# Patient Record
Sex: FEMALE | Race: WHITE | Employment: OTHER | URBAN - METROPOLITAN AREA
[De-identification: names, ages, dates, MRNs, and addresses within clinical notes are randomized per-mention and may not be internally consistent; named-entity substitution may affect disease eponyms.]

---

## 2017-03-30 DIAGNOSIS — I10 ESSENTIAL HYPERTENSION WITH GOAL BLOOD PRESSURE LESS THAN 130/80: ICD-10-CM

## 2017-03-30 DIAGNOSIS — I10 HYPERTENSION GOAL BP (BLOOD PRESSURE) < 130/80: ICD-10-CM

## 2017-03-30 NOTE — TELEPHONE ENCOUNTER
metoprolol (LOPRESSOR) 25 MG tablet   Last Written Prescription Date: 3/29/16  Last Fill Quantity: 180, # refills: 3    Last Office Visit with [facility], [facility] or [facility] prescribing provider:  12/28/16   Future Office Visit:        BP Readings from Last 3 Encounters:   12/28/16 176/68 08/17/16 166/64   07/28/16 112/72      lisinopril (PRINIVIL,ZESTRIL) 20 MG tablet     Last Written Prescription Date: 5/24/16  Last Fill Quantity: 180, # refills: 3  Last Office Visit with [facility], [facility] or [facility] prescribing provider: 12/28/16       Potassium   Date Value Ref Range Status   02/25/2016 4.1 3.4 - 5.3 mmol/L Final     Creatinine   Date Value Ref Range Status   02/25/2016 1.20 (H) 0.52 - 1.04 mg/dL Final     BP Readings from Last 3 Encounters:   12/28/16 176/68 08/17/16 166/64 07/28/16 112/72      hydrALAZINE (APRESOLINE) 50 MG tablet     Last Written Prescription Date: 4/15/16  Last Fill Quantity: 270, # refills: 3  Last Office Visit with [facility], [facility] or [facility] prescribing provider: 12/28/16       Potassium   Date Value Ref Range Status   02/25/2016 4.1 3.4 - 5.3 mmol/L Final     Creatinine   Date Value Ref Range Status   02/25/2016 1.20 (H) 0.52 - 1.04 mg/dL Final     BP Readings from Last 3 Encounters:   12/28/16 176/68 08/17/16 166/64 07/28/16 112/72

## 2017-04-04 RX ORDER — LISINOPRIL 20 MG/1
TABLET ORAL
Qty: 180 TABLET | Refills: 0 | Status: SHIPPED | OUTPATIENT
Start: 2017-04-04 | End: 2017-05-05

## 2017-04-04 RX ORDER — METOPROLOL TARTRATE 25 MG/1
TABLET, FILM COATED ORAL
Qty: 180 TABLET | Refills: 0 | Status: SHIPPED | OUTPATIENT
Start: 2017-04-04 | End: 2017-05-05

## 2017-04-04 RX ORDER — HYDRALAZINE HYDROCHLORIDE 50 MG/1
TABLET, FILM COATED ORAL
Qty: 270 TABLET | Refills: 0 | Status: SHIPPED | OUTPATIENT
Start: 2017-04-04 | End: 2017-05-05

## 2017-04-04 NOTE — TELEPHONE ENCOUNTER
Routing refill request to provider for review/approval because:  Labs out of range:  /68,  Cr 1.20    Sherry Gallegos RN

## 2017-05-05 ENCOUNTER — OFFICE VISIT (OUTPATIENT)
Dept: INTERNAL MEDICINE | Facility: CLINIC | Age: 82
End: 2017-05-05
Payer: COMMERCIAL

## 2017-05-05 VITALS
TEMPERATURE: 97.6 F | BODY MASS INDEX: 21.68 KG/M2 | SYSTOLIC BLOOD PRESSURE: 132 MMHG | HEIGHT: 64 IN | HEART RATE: 50 BPM | WEIGHT: 127 LBS | RESPIRATION RATE: 12 BRPM | OXYGEN SATURATION: 99 % | DIASTOLIC BLOOD PRESSURE: 64 MMHG

## 2017-05-05 DIAGNOSIS — I10 HYPERTENSION GOAL BP (BLOOD PRESSURE) < 130/80: ICD-10-CM

## 2017-05-05 DIAGNOSIS — R60.9 EDEMA, UNSPECIFIED TYPE: Primary | ICD-10-CM

## 2017-05-05 DIAGNOSIS — J44.9 CHRONIC OBSTRUCTIVE PULMONARY DISEASE, UNSPECIFIED COPD TYPE (H): ICD-10-CM

## 2017-05-05 DIAGNOSIS — I10 ESSENTIAL HYPERTENSION WITH GOAL BLOOD PRESSURE LESS THAN 130/80: ICD-10-CM

## 2017-05-05 DIAGNOSIS — R06.02 SOB (SHORTNESS OF BREATH): ICD-10-CM

## 2017-05-05 DIAGNOSIS — K52.9 CHRONIC DIARRHEA: ICD-10-CM

## 2017-05-05 DIAGNOSIS — L60.0 INGROWN NAIL: ICD-10-CM

## 2017-05-05 LAB
ALBUMIN SERPL-MCNC: 3.7 G/DL (ref 3.4–5)
ALP SERPL-CCNC: 72 U/L (ref 40–150)
ALT SERPL W P-5'-P-CCNC: 21 U/L (ref 0–50)
ANION GAP SERPL CALCULATED.3IONS-SCNC: 7 MMOL/L (ref 3–14)
AST SERPL W P-5'-P-CCNC: 24 U/L (ref 0–45)
BILIRUB SERPL-MCNC: 0.4 MG/DL (ref 0.2–1.3)
BUN SERPL-MCNC: 36 MG/DL (ref 7–30)
CALCIUM SERPL-MCNC: 9.3 MG/DL (ref 8.5–10.1)
CHLORIDE SERPL-SCNC: 102 MMOL/L (ref 94–109)
CO2 SERPL-SCNC: 29 MMOL/L (ref 20–32)
CREAT SERPL-MCNC: 1.32 MG/DL (ref 0.52–1.04)
ERYTHROCYTE [DISTWIDTH] IN BLOOD BY AUTOMATED COUNT: 13.2 % (ref 10–15)
GFR SERPL CREATININE-BSD FRML MDRD: 38 ML/MIN/1.7M2
GLUCOSE SERPL-MCNC: 93 MG/DL (ref 70–99)
HCT VFR BLD AUTO: 40.6 % (ref 35–47)
HGB BLD-MCNC: 12.9 G/DL (ref 11.7–15.7)
MCH RBC QN AUTO: 30.1 PG (ref 26.5–33)
MCHC RBC AUTO-ENTMCNC: 31.8 G/DL (ref 31.5–36.5)
MCV RBC AUTO: 95 FL (ref 78–100)
NT-PROBNP SERPL-MCNC: 1937 PG/ML (ref 0–450)
PLATELET # BLD AUTO: 194 10E9/L (ref 150–450)
POTASSIUM SERPL-SCNC: 4.6 MMOL/L (ref 3.4–5.3)
PROT SERPL-MCNC: 7.2 G/DL (ref 6.8–8.8)
RBC # BLD AUTO: 4.28 10E12/L (ref 3.8–5.2)
SODIUM SERPL-SCNC: 138 MMOL/L (ref 133–144)
WBC # BLD AUTO: 7.8 10E9/L (ref 4–11)

## 2017-05-05 PROCEDURE — 83880 ASSAY OF NATRIURETIC PEPTIDE: CPT | Performed by: INTERNAL MEDICINE

## 2017-05-05 PROCEDURE — 99214 OFFICE O/P EST MOD 30 MIN: CPT | Performed by: INTERNAL MEDICINE

## 2017-05-05 PROCEDURE — 80053 COMPREHEN METABOLIC PANEL: CPT | Performed by: INTERNAL MEDICINE

## 2017-05-05 PROCEDURE — 36415 COLL VENOUS BLD VENIPUNCTURE: CPT | Performed by: INTERNAL MEDICINE

## 2017-05-05 PROCEDURE — 85027 COMPLETE CBC AUTOMATED: CPT | Performed by: INTERNAL MEDICINE

## 2017-05-05 RX ORDER — HYDRALAZINE HYDROCHLORIDE 50 MG/1
50 TABLET, FILM COATED ORAL 3 TIMES DAILY
Qty: 270 TABLET | Refills: 3 | Status: SHIPPED | OUTPATIENT
Start: 2017-05-05 | End: 2018-03-02

## 2017-05-05 RX ORDER — TIOTROPIUM BROMIDE 18 UG/1
CAPSULE ORAL; RESPIRATORY (INHALATION)
Qty: 90 CAPSULE | Refills: 3 | Status: SHIPPED | OUTPATIENT
Start: 2017-05-05 | End: 2018-03-02

## 2017-05-05 RX ORDER — LOPERAMIDE HYDROCHLORIDE 2 MG/1
TABLET ORAL
Qty: 20 TABLET | Refills: 2 | Status: SHIPPED | OUTPATIENT
Start: 2017-05-05 | End: 2021-09-21

## 2017-05-05 RX ORDER — LISINOPRIL 20 MG/1
20 TABLET ORAL 2 TIMES DAILY
Qty: 180 TABLET | Refills: 3 | Status: SHIPPED | OUTPATIENT
Start: 2017-05-05 | End: 2018-03-02

## 2017-05-05 RX ORDER — METOPROLOL TARTRATE 25 MG/1
25 TABLET, FILM COATED ORAL 2 TIMES DAILY
Qty: 180 TABLET | Refills: 3 | Status: SHIPPED | OUTPATIENT
Start: 2017-05-05 | End: 2018-03-02

## 2017-05-05 RX ORDER — CEPHALEXIN 500 MG/1
500 CAPSULE ORAL 2 TIMES DAILY
Qty: 20 CAPSULE | Refills: 0 | Status: SHIPPED | OUTPATIENT
Start: 2017-05-05 | End: 2017-06-13

## 2017-05-05 ASSESSMENT — PAIN SCALES - GENERAL: PAINLEVEL: NO PAIN (0)

## 2017-05-05 NOTE — NURSING NOTE
"Chief Complaint   Patient presents with     Leg Pain     bilateral leg pain pain and numbness     Toe Pain     right great toe pain       Initial /64  Pulse 50  Temp 97.6  F (36.4  C) (Temporal)  Resp 12  Ht 5' 3.75\" (1.619 m)  Wt 127 lb (57.6 kg)  SpO2 99%  BMI 21.97 kg/m2 Estimated body mass index is 21.97 kg/(m^2) as calculated from the following:    Height as of this encounter: 5' 3.75\" (1.619 m).    Weight as of this encounter: 127 lb (57.6 kg).  Medication Reconciliation: complete    "

## 2017-05-05 NOTE — PROGRESS NOTES
SUBJECTIVE:                                                    Shiloh Covarrubias is a 87 year old female who presents to clinic today for the following health issues:      Chief Complaint   Patient presents with     Leg Pain     bilateral leg pain pain and numbness     Toe Pain     right great toe pain       Right big toe, no injury, sore to touch, hard to wear some shoes.      Legs are swollen woody and painful, numb at times. Weight is up 10 #.      Doesn't wear compression stockings with the sore toe,     Some sob with walking, harder to walk.      No chest pains.      Needs more imodium for chronic diarrhea, had this before.    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     Unspecified asthma(493.90)      Unspecified intestinal obstruction (H) 02/17/10    D/C 02/20/10     Current Outpatient Prescriptions   Medication     tiotropium (SPIRIVA HANDIHALER) 18 MCG capsule     metoprolol (LOPRESSOR) 25 MG tablet     lisinopril (PRINIVIL/ZESTRIL) 20 MG tablet     hydrALAZINE (APRESOLINE) 50 MG tablet     cephALEXin (KEFLEX) 500 MG capsule     loperamide (IMODIUM A-D) 2 MG tablet     EQ LORATADINE 10 MG tablet     furosemide (LASIX) 20 MG tablet     amLODIPine (NORVASC) 10 MG tablet     Multiple Vitamins-Minerals (MULTI VITAMIN/MINERALS PO)     Calcium Polycarbophil (FIBERCON PO)     ASPIRIN 81 MG OR TABS     [DISCONTINUED] metoprolol (LOPRESSOR) 25 MG tablet     [DISCONTINUED] hydrALAZINE (APRESOLINE) 50 MG tablet     [DISCONTINUED] lisinopril (PRINIVIL/ZESTRIL) 20 MG tablet     [DISCONTINUED] SPIRIVA HANDIHALER 18 MCG inhalation capsule     [DISCONTINUED] cloNIDine (CATAPRES) 0.2 MG tablet     No current facility-administered medications for this visit.      Social History   Substance Use Topics     Smoking status: Never Smoker     Smokeless tobacco: Never Used     Alcohol use 0.0 oz/week     0 Standard drinks or  "equivalent per week      Comment: 3 per  year     Review of Systems  Constitutional-Weight gain. Basically gain of 10 pounds  Cardiac-No chest pain or palpitations and Exertional SOB.  Respiratory-No cough, sob, or hemoptysis.  GI-No nausea, vomitting, diarrhea, constipation, or blood in the stool.  Musculoskeletal-toe pain and leg numbness and pain.    Physical Exam  /64  Pulse 50  Temp 97.6  F (36.4  C) (Temporal)  Resp 12  Ht 5' 3.75\" (1.619 m)  Wt 127 lb (57.6 kg)  SpO2 99%  BMI 21.97 kg/m2  General Appearance-healthy, alert, no distress  Cardiac-regular rate and rhythm  with normal S1, S2 ; no murmur, rub or gallops  Lungs-clear to auscultation  Extremities-2-3+ pitting edema in both legs up to the knees, woody hard feeling  Skin-right big toe has some mild erythema in the skin, with an ingrown nail    ASSESSMENT:  Ingrown toenail-I will treat her with Keflex but refer to podiatry for possible nail removal. It is growing down into the skin on both sides.    Fluid retention, edema, weight gain. Patient did go from 5-10 of amlodipine which can cause peripheral edema. We will cut this back again from 10-5 mg, however she needs treatment for her blood pressure which is finally good today. We'll also increase her Lasix to 20 mg daily and she is only taking it once a week. We will check her liver, kidneys, BNP today with shortness of breath and swelling. Hopefully with elevation, salt avoidance, compression stockings, and daily Lasix we get her weight down 8-10 pounds and improve her swelling which should improve her shortness of breath energy level    Electronically signed by Rajesh Vidal MD    "

## 2017-05-05 NOTE — MR AVS SNAPSHOT
"              After Visit Summary   2017    Shiloh Covarrubias    MRN: [id number]           Patient Information     Date Of Birth          11/3/1929        Visit Information        Provider Department      2017 9:45 AM Rajesh Vidal MD [facility]        Today's Diagnoses     Chronic obstructive pulmonary disease, unspecified COPD type (H)        Hypertension goal BP (blood pressure) < 130/80        Essential hypertension with goal blood pressure less than 130/80           Follow-ups after your visit        Who to contact     If you have questions or need follow up information about today's clinic visit or your schedule please contact [facility] directly at [phone].  Normal or non-critical lab and imaging results will be communicated to you by MyChart, letter or phone within 4 business days after the clinic has received the results. If you do not hear from us within 7 days, please contact the clinic through MyChart or phone. If you have a critical or abnormal lab result, we will notify you by phone as soon as possible.  Submit refill requests through [employer] or call your pharmacy and they will forward the refill request to us. Please allow 3 business days for your refill to be completed.          Additional Information About Your Visit        MyChart Information     [employer] lets you send messages to your doctor, view your test results, renew your prescriptions, schedule appointments and more. To sign up, go to [email or web address] . Click on \"Log in\" on the left side of the screen, which will take you to the Welcome page. Then click on \"Sign up Now\" on the right side of the page.     You will be asked to enter the access code listed below, as well as some personal information. Please follow the directions to create your username and password.     Your access code is: [email or web address]  Expires: 8/3/2017  9:50 AM     Your access code will  in 90 days. If you need help " "or a new code, please call your [facility]) or [phone].        Care EveryWhere ID     This is your Care EveryWhere ID. This could be used by other organizations to access your [facility] medical records  [id number]        Your Vitals Were     Pulse Temperature Respirations Height Pulse Oximetry BMI (Body Mass Index)    50 97.6  F (36.4  C) (Temporal) 12 5' 3.75\" (1.619 m) 99% 21.97 kg/m2       Blood Pressure from Last 3 Encounters:   05/05/17 132/64   12/28/16 176/68   08/17/16 166/64    Weight from Last 3 Encounters:   05/05/17 127 lb (57.6 kg)   12/28/16 124 lb (56.2 kg)   08/17/16 119 lb (54 kg)              Today, you had the following     No orders found for display       Primary Care Provider Office Phone # Fax #    Rajesh Vidal [phone]       [facility] [address] [place] [address]        Thank you!     Thank you for choosing [facility]  for your care. Our goal is always to provide you with excellent care. Hearing back from our patients is one way we can continue to improve our services. Please take a few minutes to complete the written survey that you may receive in the mail after your visit with us. Thank you!             Your Updated Medication List - Protect others around you: Learn how to safely use, store and throw away your medicines at www.disposemymeds.org.          This list is accurate as of: 5/5/17  9:51 AM.  Always use your most recent med list.                   Brand Name Dispense Instructions for use    amLODIPine 10 MG tablet    NORVASC    90 tablet    Take 1 tablet (10 mg) by mouth daily       aspirin 81 MG tablet      1 TABLET DAILY       EQ LORATADINE 10 MG tablet   Generic drug:  loratadine     90 tablet    TAKE ONE TABLET BY MOUTH ONCE DAILY       FIBERCON PO      Take  by mouth. 1/2 tablet w/ evening meal       furosemide 20 MG tablet    LASIX    180 tablet    Take 2 tablets (40 mg) by mouth daily       hydrALAZINE 50 " MG tablet    APRESOLINE    270 tablet    TAKE ONE TABLET BY MOUTH THREE TIMES DAILY       IMODIUM A-D 2 MG tablet   Generic drug:  loperamide     20 Tab    1/2 tab before meals       lisinopril 20 MG tablet    PRINIVIL/ZESTRIL    180 tablet    TAKE ONE TABLET BY MOUTH TWICE DAILY       metoprolol 25 MG tablet    LOPRESSOR    180 tablet    TAKE ONE TABLET BY MOUTH TWICE DAILY       MULTI VITAMIN/MINERALS PO      Take 1 tablet by mouth daily.       SPIRIVA HANDIHALER 18 MCG capsule   Generic drug:  tiotropium     90 capsule    INHALE ONE DOSE INTO LUNGS ONCE DAILY

## 2017-05-06 ASSESSMENT — PATIENT HEALTH QUESTIONNAIRE - PHQ9: SUM OF ALL RESPONSES TO PHQ QUESTIONS 1-9: 1

## 2017-05-11 ENCOUNTER — OFFICE VISIT (OUTPATIENT)
Dept: PODIATRY | Facility: CLINIC | Age: 82
End: 2017-05-11
Payer: COMMERCIAL

## 2017-05-11 VITALS — HEIGHT: 64 IN | TEMPERATURE: 97.3 F | WEIGHT: 127 LBS | BODY MASS INDEX: 21.68 KG/M2

## 2017-05-11 DIAGNOSIS — I73.9 PERIPHERAL VASCULAR DISEASE (H): ICD-10-CM

## 2017-05-11 DIAGNOSIS — L60.0 INGROWING NAIL: Primary | ICD-10-CM

## 2017-05-11 PROCEDURE — 99213 OFFICE O/P EST LOW 20 MIN: CPT | Performed by: PODIATRIST

## 2017-05-11 PROCEDURE — 11720 DEBRIDE NAIL 1-5: CPT | Performed by: PODIATRIST

## 2017-05-11 ASSESSMENT — PAIN SCALES - GENERAL: PAINLEVEL: NO PAIN (0)

## 2017-05-11 NOTE — NURSING NOTE
"Chief Complaint   Patient presents with     Consult     Ingrown B/L borders B/L great toenails > Right, onset Dec 2016; new patient       Initial Temp 97.3  F (36.3  C) (Temporal)  Ht 5' 3.75\" (1.619 m)  Wt 127 lb (57.6 kg)  BMI 21.97 kg/m2 Estimated body mass index is 21.97 kg/(m^2) as calculated from the following:    Height as of this encounter: 5' 3.75\" (1.619 m).    Weight as of this encounter: 127 lb (57.6 kg).  BP completed using cuff size: NA (Not Taken)  Medication Reconciliation: complete    Fiordaliza Malloy CMA, May 11, 2017    "

## 2017-05-11 NOTE — LETTER
5/11/2017       RE: Shiloh Covarrubias  [address] [address]           Dear Colleague,    Thank you for referring your patient, Shiloh Coavrrubias, to the [facility]. Please see a copy of my visit note below.    HPI:  Shiloh Covarrubias is a 87 year old female who is seen in consultation at the request of Rajesh Vidal MD.    Pt presents for eval of:   (Onset, Location, L/R, Character, Treatments, Injury if yes)     Onset Dec 2016, Ingrown and curving inward medial/lateral Left and Right great toenail > lateral Right  Sharp, dull ache, stabbing, redness, intermittent drainage, painful w/pressure even if sheets sit on toe  Soaking, tries to keep trimmed or filed, started 10 days Keflex 500mg 2 times daily by Dr. Young MD    Retired.    Weight management plan Patient was referred to their PCP to discuss a diet and exercise plan.       Review of Systems:  Patient denies fever, chills, rash, wound, stiffness, limping, numbness, weakness, heart burn, blood in stool, chest pain with activity, calf pain when walking, shortness of breath with activity, chronic cough, easy bleeding/bruising, swelling of ankles, excessive thirst, fatigue, depression, anxiety.  Patient admits only to symptoms noted in history.     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     Unspecified asthma(493.90)      Unspecified intestinal obstruction (H) 02/17/10    D/C 02/20/10     PAST SURGICAL HISTORY:   Past Surgical History:   Procedure Laterality Date     ESOPHAGOSCOPY, GASTROSCOPY, DUODENOSCOPY (EGD), COMBINED  2/8/2011    COMBINED ESOPHAGOSCOPY, GASTROSCOPY, DUODENOSCOPY (EGD), BIOPSY SINGLE OR MULTIPLE performed by ANGY LIMA at  GI     ESOPHAGOSCOPY, GASTROSCOPY, DUODENOSCOPY (EGD), DILATATION, COMBINED  2/8/2011    COMBINED ESOPHAGOSCOPY, GASTROSCOPY, DUODENOSCOPY (EGD),  DILATATION performed by ANGY LIMA at  GI      UGI ENDOSCOPY, SIMPLE EXAM  02/23/10     MEDICATIONS:   Current Outpatient Prescriptions:      tiotropium (SPIRIVA HANDIHALER) 18 MCG capsule, INHALE ONE DOSE INTO LUNGS ONCE DAILY, Disp: 90 capsule, Rfl: 3     metoprolol (LOPRESSOR) 25 MG tablet, Take 1 tablet (25 mg) by mouth 2 times daily, Disp: 180 tablet, Rfl: 3     lisinopril (PRINIVIL/ZESTRIL) 20 MG tablet, Take 1 tablet (20 mg) by mouth 2 times daily, Disp: 180 tablet, Rfl: 3     hydrALAZINE (APRESOLINE) 50 MG tablet, Take 1 tablet (50 mg) by mouth 3 times daily, Disp: 270 tablet, Rfl: 3     cephALEXin (KEFLEX) 500 MG capsule, Take 1 capsule (500 mg) by mouth 2 times daily, Disp: 20 capsule, Rfl: 0     loperamide (IMODIUM A-D) 2 MG tablet, 1/2 tab before meals, Disp: 20 tablet, Rfl: 2     EQ LORATADINE 10 MG tablet, TAKE ONE TABLET BY MOUTH ONCE DAILY, Disp: 90 tablet, Rfl: 2     furosemide (LASIX) 20 MG tablet, Take 2 tablets (40 mg) by mouth daily, Disp: 180 tablet, Rfl: 3     amLODIPine (NORVASC) 10 MG tablet, Take 1 tablet (10 mg) by mouth daily, Disp: 90 tablet, Rfl: 3     Multiple Vitamins-Minerals (MULTI VITAMIN/MINERALS PO), Take 1 tablet by mouth daily., Disp: , Rfl:      Calcium Polycarbophil (FIBERCON PO), Take  by mouth. 1/2 tablet w/ evening meal, Disp: , Rfl:      ASPIRIN 81 MG OR TABS, 1 TABLET DAILY, Disp: , Rfl:      [DISCONTINUED] cloNIDine (CATAPRES) 0.2 MG tablet, Take 1 tablet (0.2 mg) by mouth 2 times daily, Disp: 60 tablet, Rfl: 1  ALLERGIES:    Allergies   Allergen Reactions     Clonidine Derivatives      Severe side effects       Entocort [Corticosteroids]      Macrobid [Nitrofurantoin]      Diarrhea       Sulfa Drugs Itching     itch     SOCIAL HISTORY:   Social History     Social History     Marital status:      Spouse name: Edilson Haque     Number of children: 5     Years of education: 9     Occupational History     reitred worked for [employer]      Social History  "Main Topics     Smoking status: Never Smoker     Smokeless tobacco: Never Used     Alcohol use 0.0 oz/week     0 Standard drinks or equivalent per week      Comment: 3 per  year     Drug use: No     Sexual activity: Not Currently     Partners: Male     Other Topics Concern      Service No     Blood Transfusions No     Caffeine Concern No     Drinks decaf at home. Drinks alot of tea.     Occupational Exposure No     Hobby Hazards No     Sleep Concern No     Stress Concern Yes     sold their home, then rented, and now built town home.     Weight Concern No     Special Diet No     Back Care No     Goes to Chiropractor once every 8 weeks.     Exercise Yes     She goes to [employer] and she walks     Bike Helmet Not Asked     Does not ride a biicycle anymore.     Seat Belt Yes     Always uses a seat belt.     Self-Exams No     Parent/Sibling W/ Cabg, Mi Or Angioplasty Before 65f 55m? No     Social History Narrative     FAMILY HISTORY:   Family History   Problem Relation Age of Onset     Arthritis Mother      OSTEOPOROSIS Mother      Thyroid Disease Mother      Hypothyroid.     Depression Maternal Uncle      Bouts of depression     Hypertension No family hx of      C.A.D. No family hx of      EXAM:Vitals: Temp 97.3  F (36.3  C) (Temporal)  Ht 5' 3.75\" (1.619 m)  Wt 127 lb (57.6 kg)  BMI 21.97 kg/m2  BMI= Body mass index is 21.97 kg/(m^2).    General appearance: Patient is alert and fully cooperative with history & exam.  No sign of distress is noted during the visit.     Psychiatric: Affect is pleasant & appropriate.  Patient appears motivated to improve health.     Respiratory: Breathing is regular & unlabored while sitting.     HEENT: Hearing is intact to spoken word.  Speech is clear.  No gross evidence of visual impairment that would impact ambulation.     Vascular: DP 1/4 & PT 1/4 left & right.  CFT delayed with dependent rubor noted about the digits.  Diminished hair growth distal to mid tibia and no hair " about the foot and toes.  Temperature changes noted, warm to cool proximal to distal.  Hemosiderin pigmentation noted with multiple varicosities legs and feet bilateral. Generalized edema bilateral legs and feet.  Pt denies claudication history.     Neurologic: Normal plantar response bilateral.  Intact protective threshold plus 10/10 applications of a 5.07 monofilament.  Pt admits no burning and paraesthesias about the feet and toes with palpation.     Dermatologic: All 10 nails are thickened, elongated, discolored and painful with palpation and debridement.  Diminished texture turgor and tone about the integument.  Skin is thin & shiny.  No Pre ulcerative hyperkeratosis noted.  No abscess or full thickness ulcerations noted.     Musculoskeletal: Patient is ambulatory without assistive device or brace.  There is semi reducible contracture of the lesser digits.    Creatinine (mg/dL)   Date Value   05/05/2017 1.32 (H)   02/25/2016 1.20 (H)   02/15/2016 1.07 (H)   09/16/2015 1.20 (H)   03/11/2015 0.94   02/14/2014 1.23 (H)     ASSESSMENT:       ICD-10-CM    1. Ingrowing nail L60.0    2. Peripheral vascular disease (H) I73.9         PLAN:    5/11/2017  Both hallux nails were debrided with a nail nipper and a 6100 blade.    We discussed risk factors and preventive measures.    We discussed appropriate hygiene, shoe gear, daily foot exam, and reinforced management of weight, diet, activity goals and HA1C goal for diabetic patients.    Dispensed written foot care instructions.      Discussed treatment options and recommended permanent matrixectomy of any affected hallux nail borders versus chronic debridement.     All questions were answered to their satisfaction.  She will schedule matrixectomy at her convenience.      Noel Clark DPM      Again, thank you for allowing me to participate in the care of your patient.        Sincerely,              Noel Clark DPM

## 2017-05-11 NOTE — PROGRESS NOTES
HPI:  Shiloh Covarrubias is a 87 year old female who is seen in consultation at the request of Rajesh Vidal MD.    Pt presents for eval of:   (Onset, Location, L/R, Character, Treatments, Injury if yes)     Onset Dec 2016, Ingrown and curving inward medial/lateral Left and Right great toenail > lateral Right  Sharp, dull ache, stabbing, redness, intermittent drainage, painful w/pressure even if sheets sit on toe  Soaking, tries to keep trimmed or filed, started 10 days Keflex 500mg 2 times daily by Dr. Young MD    Retired.    Weight management plan Patient was referred to their PCP to discuss a diet and exercise plan.       Review of Systems:  Patient denies fever, chills, rash, wound, stiffness, limping, numbness, weakness, heart burn, blood in stool, chest pain with activity, calf pain when walking, shortness of breath with activity, chronic cough, easy bleeding/bruising, swelling of ankles, excessive thirst, fatigue, depression, anxiety.  Patient admits only to symptoms noted in history.     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     Unspecified asthma(493.90)      Unspecified intestinal obstruction (H) 02/17/10    D/C 02/20/10     PAST SURGICAL HISTORY:   Past Surgical History:   Procedure Laterality Date     ESOPHAGOSCOPY, GASTROSCOPY, DUODENOSCOPY (EGD), COMBINED  2/8/2011    COMBINED ESOPHAGOSCOPY, GASTROSCOPY, DUODENOSCOPY (EGD), BIOPSY SINGLE OR MULTIPLE performed by ANGY LIMA at  GI     ESOPHAGOSCOPY, GASTROSCOPY, DUODENOSCOPY (EGD), DILATATION, COMBINED  2/8/2011    COMBINED ESOPHAGOSCOPY, GASTROSCOPY, DUODENOSCOPY (EGD), DILATATION performed by ANGY LIMA at  GI      UGI ENDOSCOPY, SIMPLE EXAM  02/23/10     MEDICATIONS:   Current Outpatient Prescriptions:      tiotropium (SPIRIVA HANDIHALER) 18 MCG capsule, INHALE ONE DOSE INTO LUNGS ONCE DAILY, Disp: 90 capsule,  Rfl: 3     metoprolol (LOPRESSOR) 25 MG tablet, Take 1 tablet (25 mg) by mouth 2 times daily, Disp: 180 tablet, Rfl: 3     lisinopril (PRINIVIL/ZESTRIL) 20 MG tablet, Take 1 tablet (20 mg) by mouth 2 times daily, Disp: 180 tablet, Rfl: 3     hydrALAZINE (APRESOLINE) 50 MG tablet, Take 1 tablet (50 mg) by mouth 3 times daily, Disp: 270 tablet, Rfl: 3     cephALEXin (KEFLEX) 500 MG capsule, Take 1 capsule (500 mg) by mouth 2 times daily, Disp: 20 capsule, Rfl: 0     loperamide (IMODIUM A-D) 2 MG tablet, 1/2 tab before meals, Disp: 20 tablet, Rfl: 2     EQ LORATADINE 10 MG tablet, TAKE ONE TABLET BY MOUTH ONCE DAILY, Disp: 90 tablet, Rfl: 2     furosemide (LASIX) 20 MG tablet, Take 2 tablets (40 mg) by mouth daily, Disp: 180 tablet, Rfl: 3     amLODIPine (NORVASC) 10 MG tablet, Take 1 tablet (10 mg) by mouth daily, Disp: 90 tablet, Rfl: 3     Multiple Vitamins-Minerals (MULTI VITAMIN/MINERALS PO), Take 1 tablet by mouth daily., Disp: , Rfl:      Calcium Polycarbophil (FIBERCON PO), Take  by mouth. 1/2 tablet w/ evening meal, Disp: , Rfl:      ASPIRIN 81 MG OR TABS, 1 TABLET DAILY, Disp: , Rfl:      [DISCONTINUED] cloNIDine (CATAPRES) 0.2 MG tablet, Take 1 tablet (0.2 mg) by mouth 2 times daily, Disp: 60 tablet, Rfl: 1  ALLERGIES:    Allergies   Allergen Reactions     Clonidine Derivatives      Severe side effects       Entocort [Corticosteroids]      Macrobid [Nitrofurantoin]      Diarrhea       Sulfa Drugs Itching     itch     SOCIAL HISTORY:   Social History     Social History     Marital status:      Spouse name: Edilson Haque     Number of children: 5     Years of education: 9     Occupational History     [employer] worked for [employer]      Social History Main Topics     Smoking status: Never Smoker     Smokeless tobacco: Never Used     Alcohol use 0.0 oz/week     0 Standard drinks or equivalent per week      Comment: 3 per  year     Drug use: No     Sexual activity: Not Currently     Partners: Male  "    Other Topics Concern      Service No     Blood Transfusions No     Caffeine Concern No     Drinks decaf at home. Drinks alot of tea.     Occupational Exposure No     Hobby Hazards No     Sleep Concern No     Stress Concern Yes     sold their home, then rented, and now built town home.     Weight Concern No     Special Diet No     Back Care No     Goes to Chiropractor once every 8 weeks.     Exercise Yes     She goes to [employer] and she walks     Bike Helmet Not Asked     Does not ride a biicycle anymore.     Seat Belt Yes     Always uses a seat belt.     Self-Exams No     Parent/Sibling W/ Cabg, Mi Or Angioplasty Before 65f 55m? No     Social History Narrative     FAMILY HISTORY:   Family History   Problem Relation Age of Onset     Arthritis Mother      OSTEOPOROSIS Mother      Thyroid Disease Mother      Hypothyroid.     Depression Maternal Uncle      Bouts of depression     Hypertension No family hx of      C.A.D. No family hx of      EXAM:Vitals: Temp 97.3  F (36.3  C) (Temporal)  Ht 5' 3.75\" (1.619 m)  Wt 127 lb (57.6 kg)  BMI 21.97 kg/m2  BMI= Body mass index is 21.97 kg/(m^2).    General appearance: Patient is alert and fully cooperative with history & exam.  No sign of distress is noted during the visit.     Psychiatric: Affect is pleasant & appropriate.  Patient appears motivated to improve health.     Respiratory: Breathing is regular & unlabored while sitting.     HEENT: Hearing is intact to spoken word.  Speech is clear.  No gross evidence of visual impairment that would impact ambulation.     Vascular: DP 1/4 & PT 1/4 left & right.  CFT delayed with dependent rubor noted about the digits.  Diminished hair growth distal to mid tibia and no hair about the foot and toes.  Temperature changes noted, warm to cool proximal to distal.  Hemosiderin pigmentation noted with multiple varicosities legs and feet bilateral. Generalized edema bilateral legs and feet.  Pt denies claudication history.   "   Neurologic: Normal plantar response bilateral.  Intact protective threshold plus 10/10 applications of a 5.07 monofilament.  Pt admits no burning and paraesthesias about the feet and toes with palpation.     Dermatologic: All 10 nails are thickened, elongated, discolored and painful with palpation and debridement.  Diminished texture turgor and tone about the integument.  Skin is thin & shiny.  No Pre ulcerative hyperkeratosis noted.  No abscess or full thickness ulcerations noted.     Musculoskeletal: Patient is ambulatory without assistive device or brace.  There is semi reducible contracture of the lesser digits.    Creatinine (mg/dL)   Date Value   05/05/2017 1.32 (H)   02/25/2016 1.20 (H)   02/15/2016 1.07 (H)   09/16/2015 1.20 (H)   03/11/2015 0.94   02/14/2014 1.23 (H)     ASSESSMENT:       ICD-10-CM    1. Ingrowing nail L60.0    2. Peripheral vascular disease (H) I73.9         PLAN:    5/11/2017  Both hallux nails were debrided with a nail nipper and a 6100 blade.    We discussed risk factors and preventive measures.    We discussed appropriate hygiene, shoe gear, daily foot exam, and reinforced management of weight, diet, activity goals and HA1C goal for diabetic patients.    Dispensed written foot care instructions.      Discussed treatment options and recommended permanent matrixectomy of any affected hallux nail borders versus chronic debridement.     All questions were answered to their satisfaction.  She will schedule matrixectomy at her convenience.      Noel Clark DPM

## 2017-05-11 NOTE — MR AVS SNAPSHOT
"              After Visit Summary   5/11/2017    Shiloh Covarrubias    MRN: [id number]           Patient Information     Date Of Birth          11/3/1929        Visit Information        Provider Department      5/11/2017 9:15 AM Noel Clark DPM [facility]        Care Instructions    Nail Debridement    A high quality instrument makes trimming toenails MUCH easier.  Search Plei for any 5\" nail nipper manufactured by reliable brands such as Miltex, Integra or Jarit as these quality instruments will help manage difficult nails more effectively and comfortably. Search \"Miltex -CH\" for chrome nippers about $67 or \"Miltex -SS\" for stainless steel and are my preferred instrument in the clinic as they are processed through the autoclave after each use.  A physician is not necessary to trim nails even if you are taking blood thinners or are diabetic.  Your family or care givers may help manage your toenails.      Trim or sand the nails once weekly.  Do not wait until they are long and painful or trimming will become too difficult and painful and will increase your risk of complications or infection.  A course file or 120 grit sandpaper on a block can be helpful.  For very thick nails many people prefer battery operated colbert such as an Amope', Personal Pedi and Emjoi for regular use or heavy painful callouses or thick toenails.    Trim or skive any portion of nail that is thick, loose, crumbling, or not well attached. Do not tear the nail away, but rather cut them with a nail nipper.  You may follow up with your Podiatric Physician if you have pain, bleeding, infection, questions or other concerns.      You may also contact the following Registered Nurses for further help with nail debridement and minor hygiene concnerns.  They will come to your home, trim your toenails and soak your feet, as well as monitor for any complications that would require evaluation by a Physician.      [employer]  Rica Atwood RN, [facility]  [phone]  [email or web address]    [employer]  Ladi Lei RN  Office [phone]  [email or web address]    [employer] by Tamara Rogers RN  [phone]  Call or text for appointment        Calluses, Corns, IPKs, Porokeratosis    When there is excessive friction or pressure on the skin, the body responds by making the skin thicker.  While this may protect the deeper structures, the thickened skin can take up more space and thus increase pressure over a bony prominence or become an open sore or skin ulcer as this skin becomes less flexible.    Flat, diffuse thickening are simple calluses and they are usually caused by friction.  Often these are the result of rubbing on a shoe or going barefoot.    Calluses with a central core between the toes are called corns.  These often result from prominent joints on adjacent toes rubbing together.  Theses are often a symptom of bone malalignment and will usually recur unless the underlying bones are addressed.    Many of these lesions can be kept comfortable with routine maintenance. This consists of filing them with a Ped Egg, callus file, or 120 grit sandpaper on a block, every day during your bath or shower.  Most people prefer battery operated colbert such as an Amope', Personal Pedi and Emjoi for regular use or heavy painful callouses.  Heavy creams or ointments can be applied 1-2 times every day to keep them soft. Toe spacers can be used for corns, gel pads can be used for other lesions on the bottom of the foot. If there is a deformity noted, such as a prominent bone, often this can be addressed to minimize recurrence. However, sometimes the pressure and lesion simply migrates to another spot after surgery, so it is not a guaranteed cure.     If you have severe callouses and cracking, you may apply heavy greasy ointments that you scoop up such as Cetaphil, Eucerin, Aquaphor or Vaseline.  For more  aggressive help apply heavy creams or ointment under occlusive dressings such as Saran Wrap or Jelly Feet while sleeping.   Jelly Feet can be obtained at [email or web address].     To be successful with managing hyperkeratotic skin, you must manage hygiene daily.  At your bath or shower time is the easiest time to work on this when skin is most soft.  There is no medical or surgical treatment that will absolutely eliminate many of these symptoms.      [employer] is a reliable source for all sorts of foot pads, cushions, or interdigital spacers and foot appliances. Go to www.[employer] or request a catalog at 0-212-Tempronics.        Please call with any additional questions.               INGROWN TOENAIL POSTOPERATIVE INSTRUCTIONS   (Nail avulsion or chemical matrixectomy)   1.  Go directly home and elevate the affected foot on one or two pillows for the remainder of the day & evening. Your toe may stay numb for 2-8 hours.   2.  Take Tylenol, ibuprofen or another anti-inflammatory as needed for pain.   3.  Use oral antibiotic if that was prescribed at your doctor visit. Take the entire prescription even if your symptoms have improved.   4.  Keep dressing dry and intact the day of the procedure. The morning after the procedure, remove entire dressing and soak or wash the affected area in lukewarm water for 5-10 minutes.  You may add Epsom salt to soothe the area and help it become drier. Do this twice a day or more until the surgical site remains dry without drainage.  This may take 1-2 weeks if a small part of your nail was removed or 4-8 weeks if the entire nail was removed. You may count showering or bathing as one soak.  After each soak, pat the area dry with a clean towel or gauze and then allow to air dry for a few minutes. You may apply topical antibiotics, 3-4 drops of Cortisporin if it was prescribed at your visit or apply a very small amount of ointment like Bacitracin or Neosporin to the area.  Then cover with a  "cloth or fabric bandaid.    5.  You may walk and pursue everyday activities as tolerated with either an open toe shoe or cut-out shoe as needed. You may wear regular roomy shoes if no pain is noted.  No swimming in the lake, river, pool or hot tub until the wound has been dry for 3 days.   7.  Watch for any signs or symptoms of infection such as: red streaks going up the foot/leg, swelling, pus or foul odor. For patients that have had a phenol procedure, the toe will drain longer and may look similar to infection because it is a chemical burn.  Please call with questions.  8.  Follow up in my office in 1-2 weeks if the surgical site has not become dry or if other complications occur.  9.  There is 5-10% chance of complications such as infection or formation of another nail or a thick scared nail.            Follow-ups after your visit        Who to contact     If you have questions or need follow up information about today's clinic visit or your schedule please contact [facility] directly at [phone].  Normal or non-critical lab and imaging results will be communicated to you by [employer], letter or phone within 4 business days after the clinic has received the results. If you do not hear from us within 7 days, please contact the clinic through [employer] or phone. If you have a critical or abnormal lab result, we will notify you by phone as soon as possible.  Submit refill requests through FIXO or call your pharmacy and they will forward the refill request to us. Please allow 3 business days for your refill to be completed.          Additional Information About Your Visit        FIXO Information     FIXO lets you send messages to your doctor, view your test results, renew your prescriptions, schedule appointments and more. To sign up, go to [email or web address] . Click on \"Log in\" on the left side of the screen, which will take you to the Welcome page. Then click on \"Sign up Now\" on the " "right side of the page.     You will be asked to enter the access code listed below, as well as some personal information. Please follow the directions to create your username and password.     Your access code is: [email or web address]  Expires: 8/3/2017  9:50 AM     Your access code will  in 90 days. If you need help or a new code, please call your [facility] or [phone].        Care EveryWhere ID     This is your Care EveryWhere ID. This could be used by other organizations to access your [place] medical records  [id number]        Your Vitals Were     Temperature Height BMI (Body Mass Index)             97.3  F (36.3  C) (Temporal) 5' 3.75\" (1.619 m) 21.97 kg/m2          Blood Pressure from Last 3 Encounters:   17 132/64   16 176/68   16 166/64    Weight from Last 3 Encounters:   17 127 lb (57.6 kg)   17 127 lb (57.6 kg)   16 124 lb (56.2 kg)              Today, you had the following     No orders found for display       Primary Care Provider Office Phone # Fax #    Rajesh Vidal [phone]       [facility] [address] [place] [address]        Thank you!     Thank you for choosing [facility]  for your care. Our goal is always to provide you with excellent care. Hearing back from our patients is one way we can continue to improve our services. Please take a few minutes to complete the written survey that you may receive in the mail after your visit with us. Thank you!             Your Updated Medication List - Protect others around you: Learn how to safely use, store and throw away your medicines at www.disposemymeds.org.          This list is accurate as of: 17  9:29 AM.  Always use your most recent med list.                   Brand Name Dispense Instructions for use    amLODIPine 10 MG tablet    NORVASC    90 tablet    Take 1 tablet (10 mg) by mouth daily       aspirin 81 MG tablet      1 TABLET DAILY       " cephALEXin 500 MG capsule    KEFLEX    20 capsule    Take 1 capsule (500 mg) by mouth 2 times daily       EQ LORATADINE 10 MG tablet   Generic drug:  loratadine     90 tablet    TAKE ONE TABLET BY MOUTH ONCE DAILY       FIBERCON PO      Take  by mouth. 1/2 tablet w/ evening meal       furosemide 20 MG tablet    LASIX    180 tablet    Take 2 tablets (40 mg) by mouth daily       hydrALAZINE 50 MG tablet    APRESOLINE    270 tablet    Take 1 tablet (50 mg) by mouth 3 times daily       lisinopril 20 MG tablet    PRINIVIL/ZESTRIL    180 tablet    Take 1 tablet (20 mg) by mouth 2 times daily       loperamide 2 MG tablet    IMODIUM A-D    20 tablet    1/2 tab before meals       metoprolol 25 MG tablet    LOPRESSOR    180 tablet    Take 1 tablet (25 mg) by mouth 2 times daily       MULTI VITAMIN/MINERALS PO      Take 1 tablet by mouth daily.       tiotropium 18 MCG capsule    SPIRIVA HANDIHALER    90 capsule    INHALE ONE DOSE INTO LUNGS ONCE DAILY

## 2017-05-11 NOTE — PATIENT INSTRUCTIONS
"Nail Debridement    A high quality instrument makes trimming toenails MUCH easier.  Search [employer] for any 5\" nail nipper manufactured by reliable brands such as Miltex, Integra or Jarit as these quality instruments will help manage difficult nails more effectively and comfortably. Search \"Miltex -CH\" for chrome nippers about $67 or \"Miltex -SS\" for stainless steel and are my preferred instrument in the clinic as they are processed through the autoclave after each use.  A physician is not necessary to trim nails even if you are taking blood thinners or are diabetic.  Your family or care givers may help manage your toenails.      Trim or sand the nails once weekly.  Do not wait until they are long and painful or trimming will become too difficult and painful and will increase your risk of complications or infection.  A course file or 120 grit sandpaper on a block can be helpful.  For very thick nails many people prefer battery operated colbert such as an Amope', Personal Pedi and Emjoi for regular use or heavy painful callouses or thick toenails.    Trim or skive any portion of nail that is thick, loose, crumbling, or not well attached. Do not tear the nail away, but rather cut them with a nail nipper.  You may follow up with your Podiatric Physician if you have pain, bleeding, infection, questions or other concerns.      You may also contact the following Registered Nurses for further help with nail debridement and minor hygiene concnerns.  They will come to your home, trim your toenails and soak your feet, as well as monitor for any complications that would require evaluation by a Physician.      [employer]  Rica Atwood RN, [facility]  [phone]  [email or web address]    [employer]  Ladi Lei RN  Office [phone]  [email or web address]    [employer] by Tamara Rogers RN  [phone]  Call or text for appointment        Calluses, Corns, IPKs, " Porokeratosis    When there is excessive friction or pressure on the skin, the body responds by making the skin thicker.  While this may protect the deeper structures, the thickened skin can take up more space and thus increase pressure over a bony prominence or become an open sore or skin ulcer as this skin becomes less flexible.    Flat, diffuse thickening are simple calluses and they are usually caused by friction.  Often these are the result of rubbing on a shoe or going barefoot.    Calluses with a central core between the toes are called corns.  These often result from prominent joints on adjacent toes rubbing together.  Theses are often a symptom of bone malalignment and will usually recur unless the underlying bones are addressed.    Many of these lesions can be kept comfortable with routine maintenance. This consists of filing them with a Ped Egg, callus file, or 120 grit sandpaper on a block, every day during your bath or shower.  Most people prefer battery operated colbert such as an Amope', Personal Pedi and Emjoi for regular use or heavy painful callouses.  Heavy creams or ointments can be applied 1-2 times every day to keep them soft. Toe spacers can be used for corns, gel pads can be used for other lesions on the bottom of the foot. If there is a deformity noted, such as a prominent bone, often this can be addressed to minimize recurrence. However, sometimes the pressure and lesion simply migrates to another spot after surgery, so it is not a guaranteed cure.     If you have severe callouses and cracking, you may apply heavy greasy ointments that you scoop up such as Cetaphil, Eucerin, Aquaphor or Vaseline.  For more aggressive help apply heavy creams or ointment under occlusive dressings such as Saran Wrap or Jelly Feet while sleeping.   Jelly Feet can be obtained at [email or web address].     To be successful with managing hyperkeratotic skin, you must manage hygiene daily.  At your bath or shower time  is the easiest time to work on this when skin is most soft.  There is no medical or surgical treatment that will absolutely eliminate many of these symptoms.      Pedifix is a reliable source for all sorts of foot pads, cushions, or interdigital spacers and foot appliances. Go to www.[employer] or request a catalog at 7-506-[employer].        Please call with any additional questions.               INGROWN TOENAIL POSTOPERATIVE INSTRUCTIONS   (Nail avulsion or chemical matrixectomy)   1.  Go directly home and elevate the affected foot on one or two pillows for the remainder of the day & evening. Your toe may stay numb for 2-8 hours.   2.  Take Tylenol, ibuprofen or another anti-inflammatory as needed for pain.   3.  Use oral antibiotic if that was prescribed at your doctor visit. Take the entire prescription even if your symptoms have improved.   4.  Keep dressing dry and intact the day of the procedure. The morning after the procedure, remove entire dressing and soak or wash the affected area in lukewarm water for 5-10 minutes.  You may add Epsom salt to soothe the area and help it become drier. Do this twice a day or more until the surgical site remains dry without drainage.  This may take 1-2 weeks if a small part of your nail was removed or 4-8 weeks if the entire nail was removed. You may count showering or bathing as one soak.  After each soak, pat the area dry with a clean towel or gauze and then allow to air dry for a few minutes. You may apply topical antibiotics, 3-4 drops of Cortisporin if it was prescribed at your visit or apply a very small amount of ointment like Bacitracin or Neosporin to the area.  Then cover with a cloth or fabric bandaid.    5.  You may walk and pursue everyday activities as tolerated with either an open toe shoe or cut-out shoe as needed. You may wear regular roomy shoes if no pain is noted.  No swimming in the lake, river, pool or hot tub until the wound has been dry for 3 days.    7.  Watch for any signs or symptoms of infection such as: red streaks going up the foot/leg, swelling, pus or foul odor. For patients that have had a phenol procedure, the toe will drain longer and may look similar to infection because it is a chemical burn.  Please call with questions.  8.  Follow up in my office in 1-2 weeks if the surgical site has not become dry or if other complications occur.  9.  There is 5-10% chance of complications such as infection or formation of another nail or a thick scared nail.

## 2017-06-13 ENCOUNTER — OFFICE VISIT (OUTPATIENT)
Dept: INTERNAL MEDICINE | Facility: CLINIC | Age: 82
End: 2017-06-13
Payer: COMMERCIAL

## 2017-06-13 VITALS
DIASTOLIC BLOOD PRESSURE: 60 MMHG | RESPIRATION RATE: 12 BRPM | BODY MASS INDEX: 21.11 KG/M2 | WEIGHT: 122 LBS | TEMPERATURE: 97.1 F | HEART RATE: 60 BPM | SYSTOLIC BLOOD PRESSURE: 170 MMHG | OXYGEN SATURATION: 100 %

## 2017-06-13 DIAGNOSIS — R06.02 SOB (SHORTNESS OF BREATH): ICD-10-CM

## 2017-06-13 DIAGNOSIS — I10 ESSENTIAL HYPERTENSION WITH GOAL BLOOD PRESSURE LESS THAN 130/80: Primary | ICD-10-CM

## 2017-06-13 DIAGNOSIS — H61.23 BILATERAL IMPACTED CERUMEN: ICD-10-CM

## 2017-06-13 DIAGNOSIS — R42 DIZZINESS: ICD-10-CM

## 2017-06-13 DIAGNOSIS — L60.0 INGROWING NAIL: ICD-10-CM

## 2017-06-13 PROCEDURE — 99214 OFFICE O/P EST MOD 30 MIN: CPT | Performed by: INTERNAL MEDICINE

## 2017-06-13 RX ORDER — AMLODIPINE BESYLATE 5 MG/1
5 TABLET ORAL DAILY
Qty: 90 TABLET | Refills: 3 | Status: SHIPPED | OUTPATIENT
Start: 2017-06-13 | End: 2018-03-02

## 2017-06-13 ASSESSMENT — PAIN SCALES - GENERAL: PAINLEVEL: NO PAIN (0)

## 2017-06-13 NOTE — NURSING NOTE
"Chief Complaint   Patient presents with     Balance/ Vestibular     balance is off     Ingrown Toenail     recheck - did see podiatry       Initial /66  Pulse 60  Temp 97.1  F (36.2  C) (Temporal)  Resp 12  Wt 122 lb (55.3 kg)  SpO2 100%  BMI 21.11 kg/m2 Estimated body mass index is 21.11 kg/(m^2) as calculated from the following:    Height as of 5/11/17: 5' 3.75\" (1.619 m).    Weight as of this encounter: 122 lb (55.3 kg).  Medication Reconciliation: complete    "

## 2017-06-13 NOTE — MR AVS SNAPSHOT
"              After Visit Summary   2017    Shiloh Covarrubias    MRN: [id number]           Patient Information     Date Of Birth          11/3/1929        Visit Information        Provider Department      2017 1:30 PM Rajesh Vidal MD [facility]         Follow-ups after your visit        Who to contact     If you have questions or need follow up information about today's clinic visit or your schedule please contact [facility] directly at [phone].  Normal or non-critical lab and imaging results will be communicated to you by MyChart, letter or phone within 4 business days after the clinic has received the results. If you do not hear from us within 7 days, please contact the clinic through [employer] or phone. If you have a critical or abnormal lab result, we will notify you by phone as soon as possible.  Submit refill requests through [employer] or call your pharmacy and they will forward the refill request to us. Please allow 3 business days for your refill to be completed.          Additional Information About Your Visit        [facility] Information     [employer] lets you send messages to your doctor, view your test results, renew your prescriptions, schedule appointments and more. To sign up, go to [email or web address] . Click on \"Log in\" on the left side of the screen, which will take you to the Welcome page. Then click on \"Sign up Now\" on the right side of the page.     You will be asked to enter the access code listed below, as well as some personal information. Please follow the directions to create your username and password.     Your access code is: [email or web address]  Expires: 8/3/2017  9:50 AM     Your access code will  in 90 days. If you need help or a new code, please call your [facility] or [phone].        Care EveryWhere ID     This is your Care EveryWhere ID. This could be used by other organizations to access your [place] medical " records  [id number]        Your Vitals Were     Pulse Temperature Respirations Pulse Oximetry BMI (Body Mass Index)       60 97.1  F (36.2  C) (Temporal) 12 100% 21.11 kg/m2        Blood Pressure from Last 3 Encounters:   06/13/17 100/66   05/05/17 132/64   12/28/16 176/68    Weight from Last 3 Encounters:   06/13/17 122 lb (55.3 kg)   05/11/17 127 lb (57.6 kg)   05/05/17 127 lb (57.6 kg)              Today, you had the following     No orders found for display       Primary Care Provider Office Phone # Fax #    Rajesh Vidal [phone]       [facility] [address] [place] [address]        Thank you!     Thank you for choosing [facility]  for your care. Our goal is always to provide you with excellent care. Hearing back from our patients is one way we can continue to improve our services. Please take a few minutes to complete the written survey that you may receive in the mail after your visit with us. Thank you!             Your Updated Medication List - Protect others around you: Learn how to safely use, store and throw away your medicines at www.disposemymeds.org.          This list is accurate as of: 6/13/17  1:30 PM.  Always use your most recent med list.                   Brand Name Dispense Instructions for use    amLODIPine 10 MG tablet    NORVASC    90 tablet    Take 1 tablet (10 mg) by mouth daily       aspirin 81 MG tablet      1 TABLET DAILY       EQ LORATADINE 10 MG tablet   Generic drug:  loratadine     90 tablet    TAKE ONE TABLET BY MOUTH ONCE DAILY       FIBERCON PO      Take  by mouth. 1/2 tablet w/ evening meal       furosemide 20 MG tablet    LASIX    180 tablet    Take 2 tablets (40 mg) by mouth daily       hydrALAZINE 50 MG tablet    APRESOLINE    270 tablet    Take 1 tablet (50 mg) by mouth 3 times daily       lisinopril 20 MG tablet    PRINIVIL/ZESTRIL    180 tablet    Take 1 tablet (20 mg) by mouth 2 times daily       loperamide 2 MG  tablet    IMODIUM A-D    20 tablet    1/2 tab before meals       metoprolol 25 MG tablet    LOPRESSOR    180 tablet    Take 1 tablet (25 mg) by mouth 2 times daily       MULTI VITAMIN/MINERALS PO      Take 1 tablet by mouth daily.       tiotropium 18 MCG capsule    SPIRIVA HANDIHALER    90 capsule    INHALE ONE DOSE INTO LUNGS ONCE DAILY

## 2017-06-13 NOTE — PROGRESS NOTES
SUBJECTIVE:                                                    Shiloh Covarrubias is a 87 year old female who presents to clinic today for the following health issues:      Chief Complaint   Patient presents with     Balance/ Vestibular     balance is off     Ingrown Toenail     recheck - did see podiatry     Balance has been off some with walking.  Sometimes with turning her head.      She will go back to podiatry to get nails removed permanently.    Reviewed labs as being good.    She reduced her amlodipine to help her swelling and that is better.  Needs refill of 5 mg.          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     Unspecified asthma(493.90)      Unspecified intestinal obstruction (H) 02/17/10    D/C 02/20/10     Current Outpatient Prescriptions   Medication     tiotropium (SPIRIVA HANDIHALER) 18 MCG capsule     metoprolol (LOPRESSOR) 25 MG tablet     lisinopril (PRINIVIL/ZESTRIL) 20 MG tablet     hydrALAZINE (APRESOLINE) 50 MG tablet     loperamide (IMODIUM A-D) 2 MG tablet     EQ LORATADINE 10 MG tablet     furosemide (LASIX) 20 MG tablet     amLODIPine (NORVASC) 10 MG tablet     Multiple Vitamins-Minerals (MULTI VITAMIN/MINERALS PO)     Calcium Polycarbophil (FIBERCON PO)     ASPIRIN 81 MG OR TABS     [DISCONTINUED] cloNIDine (CATAPRES) 0.2 MG tablet     No current facility-administered medications for this visit.      Social History   Substance Use Topics     Smoking status: Never Smoker     Smokeless tobacco: Never Used     Alcohol use 0.0 oz/week     0 Standard drinks or equivalent per week      Comment: 3 per  year     Physical Exam  /66  Pulse 60  Temp 97.1  F (36.2  C) (Temporal)  Resp 12  Wt 122 lb (55.3 kg)  SpO2 100%  BMI 21.11 kg/m2  General Appearance-healthy, alert, no distress  Ears have mild cerumen which was removed with a curette from both sides. Otherwise the ears are  clear.  Negative takes Hallpike maneuver on both sides. No vertigo seen  Ankles have 1+ pitting edema but much improved from before.    ASSESSMENT:  Patient here in multiple issues. First was her ingrown nails which have not had an infection but continued to be a problem and therefore I agree with the nail removal by podiatry.    Hypertension-the patient continues to have hypertension. Some multiple medications we did cut back her amlodipine from 10 mg to 5 due to the peripheral edema and it has helped her swelling. Will keep her blood pressure in the 1 6170 range.    Dizziness-the patient does not appear to have vertigo on my testing. She does not have a lot of lightheaded symptoms. She'll work on her balance, head motion, and if this continues will let us know.    Electronically signed by Rajesh Vidal MD

## 2017-06-26 ENCOUNTER — TELEPHONE (OUTPATIENT)
Dept: INTERNAL MEDICINE | Facility: CLINIC | Age: 82
End: 2017-06-26

## 2017-06-26 NOTE — TELEPHONE ENCOUNTER
Reason for call:  Other   Patient called regarding (reason for call): call back  Additional comments: want to discuss a medication domingo that she saw an ad for     Phone number to reach patient:  Home number on file [phone] (home)    Best Time:  Anytime late afternoon maybe at the end of clinic would be fine by patient    Can we leave a detailed message on this number?  YES

## 2017-07-07 ENCOUNTER — OFFICE VISIT (OUTPATIENT)
Dept: INTERNAL MEDICINE | Facility: CLINIC | Age: 82
End: 2017-07-07
Payer: COMMERCIAL

## 2017-07-07 VITALS
SYSTOLIC BLOOD PRESSURE: 166 MMHG | TEMPERATURE: 97.3 F | WEIGHT: 125 LBS | BODY MASS INDEX: 21.62 KG/M2 | OXYGEN SATURATION: 98 % | HEART RATE: 62 BPM | RESPIRATION RATE: 12 BRPM | DIASTOLIC BLOOD PRESSURE: 66 MMHG

## 2017-07-07 DIAGNOSIS — F32.0 MAJOR DEPRESSIVE DISORDER, SINGLE EPISODE, MILD (H): ICD-10-CM

## 2017-07-07 DIAGNOSIS — K58.0 IRRITABLE BOWEL SYNDROME WITH DIARRHEA: Primary | ICD-10-CM

## 2017-07-07 PROCEDURE — 99213 OFFICE O/P EST LOW 20 MIN: CPT | Performed by: INTERNAL MEDICINE

## 2017-07-07 RX ORDER — DIPHENOXYLATE HCL/ATROPINE 2.5-.025MG
2 TABLET ORAL 4 TIMES DAILY PRN
Qty: 60 TABLET | Refills: 0 | Status: SHIPPED | OUTPATIENT
Start: 2017-07-07 | End: 2017-09-20

## 2017-07-07 ASSESSMENT — PAIN SCALES - GENERAL: PAINLEVEL: NO PAIN (0)

## 2017-07-07 NOTE — MR AVS SNAPSHOT
After Visit Summary   7/7/2017    Shiloh Covarrubias    MRN: [id number]           Patient Information     Date Of Birth          11/3/1929        Visit Information        Provider Department      7/7/2017 3:15 PM Rajesh Vidal MD [facility]         Follow-ups after your visit        Your next 10 appointments already scheduled     Jul 07, 2017  3:15 PM CDT   SHORT with Rajesh Vidal MD   [facility] ([address] [phone]            Sep 05, 2017  8:30 AM CDT   Office Visit with Rajesh Vidal MD   [facility] ([address] [phone]           Bring a current list of meds and any records pertaining to this visit.  For Physicals, please bring immunization records and any forms needing to be filled out.  Please arrive 10 minutes early to complete paperwork.              Who to contact     If you have questions or need follow up information about today's clinic visit or your schedule please contact [facility] directly at [phone].  Normal or non-critical lab and imaging results will be communicated to you by MyChart, letter or phone within 4 business days after the clinic has received the results. If you do not hear from us within 7 days, please contact the clinic through [employer] or phone. If you have a critical or abnormal lab result, we will notify you by phone as soon as possible.  Submit refill requests through [employer] or call your pharmacy and they will forward the refill request to us. Please allow 3 business days for your refill to be completed.          Additional Information About Your Visit        MyChart Information     [employer] lets you send messages to your doctor, view your test results, renew your prescriptions, schedule appointments and more. To sign up, go to [email or web address] .  "Click on \"Log in\" on the left side of the screen, which will take you to the Welcome page. Then click on \"Sign up Now\" on the right side of the page.     You will be asked to enter the access code listed below, as well as some personal information. Please follow the directions to create your username and password.     Your access code is: [email or web address]  Expires: 8/3/2017  9:50 AM     Your access code will  in 90 days. If you need help or a new code, please call your [place] clinic or [phone].        Care EveryWhere ID     This is your Care EveryWhere ID. This could be used by other organizations to access your [place] medical records  [id number]        Your Vitals Were     Pulse Temperature Respirations Pulse Oximetry BMI (Body Mass Index)       62 97.3  F (36.3  C) (Temporal) 12 98% 21.62 kg/m2        Blood Pressure from Last 3 Encounters:   17 166/66   17 170/60   17 132/64    Weight from Last 3 Encounters:   17 125 lb (56.7 kg)   17 122 lb (55.3 kg)   17 127 lb (57.6 kg)              Today, you had the following     No orders found for display       Primary Care Provider Office Phone # Fax #    Rajesh Vidal [phone]       [facility] [address] [place] [address]        Equal Access to Services     BALBINA WALLACE AH: Hadii aad ku hadasho Soomaali, waaxda luqadaha, qaybta kaalmada adeegyada, demetrius paredes . So [facility] [phone].    ATENCIÓN: Si habla samañol, tiene a camacho disposición servicios gratuitos de asistencia lingüística. Criss al [phone].    We comply with applicable federal civil rights laws and [place] laws. We do not discriminate on the basis of race, color, national origin, age, disability sex, sexual orientation or gender identity.            Thank you!     Thank you for choosing [facility]  for your care. Our goal is always to provide you with excellent care. " Hearing back from our patients is one way we can continue to improve our services. Please take a few minutes to complete the written survey that you may receive in the mail after your visit with us. Thank you!             Your Updated Medication List - Protect others around you: Learn how to safely use, store and throw away your medicines at www.disposemymeds.org.          This list is accurate as of: 7/7/17  3:07 PM.  Always use your most recent med list.                   Brand Name Dispense Instructions for use Diagnosis    amLODIPine 5 MG tablet    NORVASC    90 tablet    Take 1 tablet (5 mg) by mouth daily    Essential hypertension with goal blood pressure less than 130/80       aspirin 81 MG tablet      1 TABLET DAILY        EQ LORATADINE 10 MG tablet   Generic drug:  loratadine     90 tablet    TAKE ONE TABLET BY MOUTH ONCE DAILY    Seasonal allergies       FIBERCON PO      Take  by mouth. 1/2 tablet w/ evening meal        furosemide 20 MG tablet    LASIX    180 tablet    Take 2 tablets (40 mg) by mouth daily    SOB (shortness of breath)       hydrALAZINE 50 MG tablet    APRESOLINE    270 tablet    Take 1 tablet (50 mg) by mouth 3 times daily    Hypertension goal BP (blood pressure) < 130/80       lisinopril 20 MG tablet    PRINIVIL/ZESTRIL    180 tablet    Take 1 tablet (20 mg) by mouth 2 times daily    Essential hypertension with goal blood pressure less than 130/80       loperamide 2 MG tablet    IMODIUM A-D    20 tablet    1/2 tab before meals    Chronic diarrhea       metoprolol 25 MG tablet    LOPRESSOR    180 tablet    Take 1 tablet (25 mg) by mouth 2 times daily    Hypertension goal BP (blood pressure) < 130/80       MULTI VITAMIN/MINERALS PO      Take 1 tablet by mouth daily.        tiotropium 18 MCG capsule    SPIRIVA HANDIHALER    90 capsule    INHALE ONE DOSE INTO LUNGS ONCE DAILY    Chronic obstructive pulmonary disease, unspecified COPD type (H)

## 2017-07-07 NOTE — NURSING NOTE
"Chief Complaint   Patient presents with     Bowel Problems     discuss irritable bowel and the medication Verberzi       Initial /66  Pulse 62  Temp 97.3  F (36.3  C) (Temporal)  Resp 12  Wt 125 lb (56.7 kg)  SpO2 98%  BMI 21.62 kg/m2 Estimated body mass index is 21.62 kg/(m^2) as calculated from the following:    Height as of 5/11/17: 5' 3.75\" (1.619 m).    Weight as of this encounter: 125 lb (56.7 kg).  Medication Reconciliation: complete    "

## 2017-07-07 NOTE — PROGRESS NOTES
SUBJECTIVE:                                                    Shiloh Covarrubias is a 87 year old female who presents to clinic today for the following health issues:      Chief Complaint   Patient presents with     Bowel Problems     discuss irritable bowel and the medication Verberzi       She just continues to have diarrhea, she sometimes she has diarrhea with certain foods and then other times is ok. Then she gets weak and washed out with the diarrhea.  Fibercon has helped her, does half dose daily.  Also a probiotic and can have a good few weeks then all of a sudden diarrhea and fatigue.      Doesn't want a colonoscopy at her age she says.      Takes imodium in the daytime-half for breakfast or lunch.      Needs to see podiatry for ingrown nail as well, ready to have removed.    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     Unspecified asthma(493.90)      Unspecified intestinal obstruction (H) 02/17/10    D/C 02/20/10     Current Outpatient Prescriptions   Medication     amLODIPine (NORVASC) 5 MG tablet     tiotropium (SPIRIVA HANDIHALER) 18 MCG capsule     metoprolol (LOPRESSOR) 25 MG tablet     lisinopril (PRINIVIL/ZESTRIL) 20 MG tablet     hydrALAZINE (APRESOLINE) 50 MG tablet     loperamide (IMODIUM A-D) 2 MG tablet     EQ LORATADINE 10 MG tablet     furosemide (LASIX) 20 MG tablet     Multiple Vitamins-Minerals (MULTI VITAMIN/MINERALS PO)     Calcium Polycarbophil (FIBERCON PO)     ASPIRIN 81 MG OR TABS     [DISCONTINUED] cloNIDine (CATAPRES) 0.2 MG tablet     No current facility-administered medications for this visit.      Physical Exam  /66  Pulse 62  Temp 97.3  F (36.3  C) (Temporal)  Resp 12  Wt 125 lb (56.7 kg)  SpO2 98%  BMI 21.62 kg/m2  General Appearance-healthy, alert, no distress      ASSESSMENT:   patient has had a long history of irritable bowel symptoms. She does not want to do a  colonoscopy. She is worried that at her age there may be an increased risk of rupture is a fiend had some rupture from  A colonoscopy. She does not want to do stool cultures as she usually has formed stools with  The FiberCon and probiotic. We will try her onLomotil as needed she has loose stools and if that is nt working then consider one of the new antidiarrheal, irritable bowel medications.    Electronically signed by Rajesh Vidal MD

## 2017-07-12 ENCOUNTER — TELEPHONE (OUTPATIENT)
Dept: PODIATRY | Facility: CLINIC | Age: 82
End: 2017-07-12

## 2017-07-12 NOTE — TELEPHONE ENCOUNTER
Spoke to patient with Dr. Clark's notes below. Patient appreciated the information. Fiordaliza Malloy CMA, July 12, 2017

## 2017-07-12 NOTE — TELEPHONE ENCOUNTER
.Reason for Call:  Other call back    Detailed comments: Dr. Clark patient, has an appt on Aug 3rd for a toenail removal, would like to know exactly how that is done and also wondering if she can have another toenail done as well. Please call today about 430 if possible.     Phone Number Patient can be reached at: Home number on file [phone] (home)    Best Time: today about 430 please     Can we leave a detailed message on this number? YES    Call taken on 7/12/2017 at 12:05 PM by Jena Melendez

## 2017-08-03 ENCOUNTER — OFFICE VISIT (OUTPATIENT)
Dept: PODIATRY | Facility: CLINIC | Age: 82
End: 2017-08-03
Payer: COMMERCIAL

## 2017-08-03 VITALS — WEIGHT: 119 LBS | HEIGHT: 64 IN | BODY MASS INDEX: 20.32 KG/M2 | TEMPERATURE: 96.6 F

## 2017-08-03 DIAGNOSIS — L60.0 INGROWING NAIL: Primary | ICD-10-CM

## 2017-08-03 PROCEDURE — 11750 EXCISION NAIL&NAIL MATRIX: CPT | Mod: T5 | Performed by: PODIATRIST

## 2017-08-03 PROCEDURE — 11750 EXCISION NAIL&NAIL MATRIX: CPT | Mod: TA | Performed by: PODIATRIST

## 2017-08-03 RX ORDER — CEPHALEXIN 500 MG/1
500 CAPSULE ORAL 3 TIMES DAILY
Qty: 30 CAPSULE | Refills: 0 | Status: SHIPPED | OUTPATIENT
Start: 2017-08-03 | End: 2017-08-14

## 2017-08-03 ASSESSMENT — PAIN SCALES - GENERAL: PAINLEVEL: NO PAIN (0)

## 2017-08-03 NOTE — MR AVS SNAPSHOT
After Visit Summary   8/3/2017    Shiloh Covarrubias    MRN: [id number]           Patient Information     Date Of Birth          11/3/1929        Visit Information        Provider Department      8/3/2017 8:30 AM Noel Clark DPM [facility]        Today's Diagnoses     Ingrowing nail    -  1      Care Instructions    INGROWN TOENAIL POSTOPERATIVE INSTRUCTIONS   (Nail avulsion or chemical matrixectomy)   1.  Go directly home and elevate the affected foot on one or two pillows for the remainder of the day & evening. Your toe may stay numb for 2-8 hours.   2.  Take Tylenol, ibuprofen or another anti-inflammatory as needed for pain.   3.  Use oral antibiotic if that was prescribed at your doctor visit. Take the entire prescription even if your symptoms have improved.   4.  Keep dressing dry and intact the day of the procedure. The morning after the procedure, remove entire dressing and soak or wash the affected area in lukewarm water for 5-10 minutes.  You may add Epsom salt to soothe the area and help it become drier. Do this twice a day or more until the surgical site remains dry without drainage.  This may take 1-2 weeks if a small part of your nail was removed or 4-8 weeks if the entire nail was removed. You may count showering or bathing as one soak.  After each soak, pat the area dry with a clean towel or gauze and then allow to air dry for a few minutes. You may apply topical antibiotics, 3-4 drops of Cortisporin if it was prescribed at your visit or apply a very small amount of ointment like Bacitracin or Neosporin to the area.  Then cover with a cloth or fabric bandaid.    5.  You may walk and pursue everyday activities as tolerated with either an open toe shoe or cut-out shoe as needed. You may wear regular roomy shoes if no pain is noted.  No swimming in the lake, river, pool or hot tub until the wound has been dry for 3 days.   7.  Watch for any signs or symptoms of  infection such as: red streaks going up the foot/leg, swelling, pus or foul odor. For patients that have had a phenol procedure, the toe will drain longer and may look similar to infection because it is a chemical burn.  Please call with questions.  8.  Follow up in my office in 1-2 weeks if the surgical site has not become dry or if other complications occur.  9.  There is 5-10% chance of complications such as infection or formation of another nail or a thick scared nail.              Follow-ups after your visit        Your next 10 appointments already scheduled     Sep 05, 2017  8:30 AM CDT   Office Visit with Rajesh Vidal MD   [facility] ([facility])    [address] [place] [id number] [phone]           Bring a current list of meds and any records pertaining to this visit. For Physicals, please bring immunization records and any forms needing to be filled out. Please arrive 10 minutes early to complete paperwork.              Who to contact     If you have questions or need follow up information about today's clinic visit or your schedule please contact [facility] directly at [phone].  Normal or non-critical lab and imaging results will be communicated to you by MyChart, letter or phone within 4 business days after the clinic has received the results. If you do not hear from us within 7 days, please contact the clinic through "[employer]hart or phone. If you have a critical or abnormal lab result, we will notify you by phone as soon as possible.  Submit refill requests through [employer] or call your pharmacy and they will forward the refill request to us. Please allow 3 business days for your refill to be completed.          Additional Information About Your Visit        "[employer]harYorder Information     [employer] lets you send messages to your doctor, view your test results, renew your prescriptions, schedule appointments and more. To sign up, go to  [email or web address] . Click on \"Log in\" on the left side of the screen, which will take you to the Welcome page. Then click on \"Sign up Now\" on the right side of the page.     You will be asked to enter the access code listed below, as well as some personal information. Please follow the directions to create your username and password.     Your access code is: [email or web address]  Expires: 8/3/2017  9:50 AM     Your access code will  in 90 days. If you need help or a new code, please call your [facility] clinic or [phone].        Care EveryWhere ID     This is your Care EveryWhere ID. This could be used by other organizations to access your [facility] medical records  [id number]        Your Vitals Were     Temperature Height BMI (Body Mass Index)             96.6  F (35.9  C) (Temporal) 5' 3.75\" (1.619 m) 20.59 kg/m2          Blood Pressure from Last 3 Encounters:   17 166/66   17 170/60   17 132/64    Weight from Last 3 Encounters:   17 119 lb (54 kg)   17 125 lb (56.7 kg)   17 122 lb (55.3 kg)              We Performed the Following     REMOVAL NAIL/NAIL BED, PARTIAL OR COMPLETE          Today's Medication Changes          These changes are accurate as of: 8/3/17  9:24 AM.  If you have any questions, ask your nurse or doctor.               Start taking these medicines.        Dose/Directions    cephALEXin 500 MG capsule   Commonly known as:  KEFLEX   Used for:  Ingrowing nail   Started by:  Noel Clark DPM        Dose:  500 mg   Take 1 capsule (500 mg) by mouth 3 times daily   Quantity:  30 capsule   Refills:  0            Where to get your medicines      These medications were sent to [facility] [address] [place] [address] - [address]  [address] [facility] [address]     Phone:  [phone]     cephALEXin 500 MG capsule                Primary Care Provider Office Phone # Fax #    Rajesh Vidal [phone]       [facility] " [address] [place] [address]        Equal Access to Services     BALBINA WALLACE : Hadii aad ku hadcarolesenthil Yolaali, waamyda luqjacquelinha, qaronnyta katacosemily hensley, demetrius munozsunnyethan bianchi. So [facility] [phone].    ATENCIÓN: Si habla español, tiene a camacho disposición servicios gratuitos de asistencia lingüística. Llame al [phone].    We comply with applicable federal civil rights laws and [place] laws. We do not discriminate on the basis of race, color, national origin, age, disability sex, sexual orientation or gender identity.            Thank you!     Thank you for choosing [facility]  for your care. Our goal is always to provide you with excellent care. Hearing back from our patients is one way we can continue to improve our services. Please take a few minutes to complete the written survey that you may receive in the mail after your visit with us. Thank you!             Your Updated Medication List - Protect others around you: Learn how to safely use, store and throw away your medicines at www.disposemymeds.org.          This list is accurate as of: 8/3/17  9:24 AM.  Always use your most recent med list.                   Brand Name Dispense Instructions for use Diagnosis    amLODIPine 5 MG tablet    NORVASC    90 tablet    Take 1 tablet (5 mg) by mouth daily    Essential hypertension with goal blood pressure less than 130/80       aspirin 81 MG tablet      1 TABLET DAILY        cephALEXin 500 MG capsule    KEFLEX    30 capsule    Take 1 capsule (500 mg) by mouth 3 times daily    Ingrowing nail       diphenoxylate-atropine 2.5-0.025 MG per tablet    LOMOTIL    60 tablet    Take 2 tablets by mouth 4 times daily as needed for diarrhea    Irritable bowel syndrome with diarrhea       EQ LORATADINE 10 MG tablet   Generic drug:  loratadine     90 tablet    TAKE ONE TABLET BY MOUTH ONCE DAILY    Seasonal allergies       FIBERCON PO      Take  by mouth. 1/2 tablet w/ evening meal         furosemide 20 MG tablet    LASIX    180 tablet    Take 2 tablets (40 mg) by mouth daily    SOB (shortness of breath)       hydrALAZINE 50 MG tablet    APRESOLINE    270 tablet    Take 1 tablet (50 mg) by mouth 3 times daily    Hypertension goal BP (blood pressure) < 130/80       lisinopril 20 MG tablet    PRINIVIL/ZESTRIL    180 tablet    Take 1 tablet (20 mg) by mouth 2 times daily    Essential hypertension with goal blood pressure less than 130/80       loperamide 2 MG tablet    IMODIUM A-D    20 tablet    1/2 tab before meals    Chronic diarrhea       metoprolol 25 MG tablet    LOPRESSOR    180 tablet    Take 1 tablet (25 mg) by mouth 2 times daily    Hypertension goal BP (blood pressure) < 130/80       MULTI VITAMIN/MINERALS PO      Take 1 tablet by mouth daily.        tiotropium 18 MCG capsule    SPIRIVA HANDIHALER    90 capsule    INHALE ONE DOSE INTO LUNGS ONCE DAILY    Chronic obstructive pulmonary disease, unspecified COPD type (H)

## 2017-08-03 NOTE — NURSING NOTE
"Chief Complaint   Patient presents with     Procedure     matrixectomy Left and Right great toenails; LOV 5/11/2017       Initial Temp 96.6  F (35.9  C) (Temporal)  Ht 5' 3.75\" (1.619 m)  Wt 119 lb (54 kg)  BMI 20.59 kg/m2 Estimated body mass index is 20.59 kg/(m^2) as calculated from the following:    Height as of this encounter: 5' 3.75\" (1.619 m).    Weight as of this encounter: 119 lb (54 kg).  BP completed using cuff size: NA (Not Taken)  Medication Reconciliation: complete    Fioradliza Malloy CMA, August 3, 2017    "

## 2017-08-03 NOTE — PROGRESS NOTES
Chief Complaint   Patient presents with     Procedure     matrixectomy Left and Right great toenails; LOV 5/11/2017       Weight management plan Patient was referred to their PCP to discuss a diet and exercise plan.     HPI:  Shiloh Covarrubias is a 87 year old female who is seen in consultation at the request of Rajesh Vidal MD.    Pt presents for eval of:   (Onset, Location, L/R, Character, Treatments, Injury if yes)     Onset Dec 2016, Ingrown and curving inward medial/lateral Left and Right great toenail > lateral Right  Sharp, dull ache, stabbing, redness, intermittent drainage, painful w/pressure even if sheets sit on toe  Soaking, tries to keep trimmed or filed, started 10 days Keflex 500mg 2 times daily by Dr. Young MD    Retired.    Review of Systems:  Patient denies fever, chills, rash, wound, stiffness, limping, numbness, weakness, heart burn, blood in stool, chest pain with activity, calf pain when walking, shortness of breath with activity, chronic cough, easy bleeding/bruising, swelling of ankles, excessive thirst, fatigue, depression, anxiety.  Pt admits to the symptoms noted in history above.     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     Unspecified asthma(493.90)      Unspecified intestinal obstruction (H) 02/17/10    D/C 02/20/10        PAST SURGICAL HISTORY:   Past Surgical History:   Procedure Laterality Date     ESOPHAGOSCOPY, GASTROSCOPY, DUODENOSCOPY (EGD), COMBINED  2/8/2011    COMBINED ESOPHAGOSCOPY, GASTROSCOPY, DUODENOSCOPY (EGD), BIOPSY SINGLE OR MULTIPLE performed by ANGY LIMA at  GI     ESOPHAGOSCOPY, GASTROSCOPY, DUODENOSCOPY (EGD), DILATATION, COMBINED  2/8/2011    COMBINED ESOPHAGOSCOPY, GASTROSCOPY, DUODENOSCOPY (EGD), DILATATION performed by ANGY LIMA at  GI      UGI ENDOSCOPY, SIMPLE EXAM  02/23/10        MEDICATIONS:   Current Outpatient  Prescriptions:      diphenoxylate-atropine (LOMOTIL) 2.5-0.025 MG per tablet, Take 2 tablets by mouth 4 times daily as needed for diarrhea, Disp: 60 tablet, Rfl: 0     amLODIPine (NORVASC) 5 MG tablet, Take 1 tablet (5 mg) by mouth daily, Disp: 90 tablet, Rfl: 3     tiotropium (SPIRIVA HANDIHALER) 18 MCG capsule, INHALE ONE DOSE INTO LUNGS ONCE DAILY, Disp: 90 capsule, Rfl: 3     metoprolol (LOPRESSOR) 25 MG tablet, Take 1 tablet (25 mg) by mouth 2 times daily, Disp: 180 tablet, Rfl: 3     lisinopril (PRINIVIL/ZESTRIL) 20 MG tablet, Take 1 tablet (20 mg) by mouth 2 times daily, Disp: 180 tablet, Rfl: 3     hydrALAZINE (APRESOLINE) 50 MG tablet, Take 1 tablet (50 mg) by mouth 3 times daily, Disp: 270 tablet, Rfl: 3     loperamide (IMODIUM A-D) 2 MG tablet, 1/2 tab before meals, Disp: 20 tablet, Rfl: 2     EQ LORATADINE 10 MG tablet, TAKE ONE TABLET BY MOUTH ONCE DAILY, Disp: 90 tablet, Rfl: 2     furosemide (LASIX) 20 MG tablet, Take 2 tablets (40 mg) by mouth daily, Disp: 180 tablet, Rfl: 3     Multiple Vitamins-Minerals (MULTI VITAMIN/MINERALS PO), Take 1 tablet by mouth daily., Disp: , Rfl:      Calcium Polycarbophil (FIBERCON PO), Take  by mouth. 1/2 tablet w/ evening meal, Disp: , Rfl:      ASPIRIN 81 MG OR TABS, 1 TABLET DAILY, Disp: , Rfl:      [DISCONTINUED] cloNIDine (CATAPRES) 0.2 MG tablet, Take 1 tablet (0.2 mg) by mouth 2 times daily, Disp: 60 tablet, Rfl: 1     ALLERGIES:    Allergies   Allergen Reactions     Clonidine Derivatives      Severe side effects       Entocort [Corticosteroids]      Macrobid [Nitrofurantoin]      Diarrhea       Sulfa Drugs Itching     itch        SOCIAL HISTORY:   Social History     Social History     Marital status:      Spouse name: Edilson Haque     Number of children: 5     Years of education: 9     Occupational History     [employer] worked for [employer]      Social History Main Topics     Smoking status: Never Smoker     Smokeless tobacco: Never Used     Alcohol  "use 0.0 oz/week     0 Standard drinks or equivalent per week      Comment: 3 per  year     Drug use: No     Sexual activity: Not Currently     Partners: Male     Other Topics Concern      Service No     Blood Transfusions No     Caffeine Concern No     Drinks decaf at home. Drinks alot of tea.     Occupational Exposure No     Hobby Hazards No     Sleep Concern No     Stress Concern Yes     sold their home, then rented, and now built town home.     Weight Concern No     Special Diet No     Back Care No     Goes to Chiropractor once every 8 weeks.     Exercise Yes     She goes to [email or web address] and she walks     Bike Helmet Not Asked     Does not ride a biicycle anymore.     Seat Belt Yes     Always uses a seat belt.     Self-Exams No     Parent/Sibling W/ Cabg, Mi Or Angioplasty Before 65f 55m? No     Social History Narrative        FAMILY HISTORY:   Family History   Problem Relation Age of Onset     Arthritis Mother      OSTEOPOROSIS Mother      Thyroid Disease Mother      Hypothyroid.     Depression Maternal Uncle      Bouts of depression     Hypertension No family hx of      C.A.D. No family hx of         EXAM:Vitals: Temp 96.6  F (35.9  C) (Temporal)  Ht 5' 3.75\" (1.619 m)  Wt 119 lb (54 kg)  BMI 20.59 kg/m2  BMI= Body mass index is 20.59 kg/(m^2).    General appearance: Patient is alert and fully cooperative with history & exam.  No sign of distress is noted during the visit.     Psychiatric: Affect is pleasant & appropriate.  Patient appears motivated to improve health.     Respiratory: Breathing is regular & unlabored while sitting.     HEENT: Hearing is intact to spoken word.  Speech is clear.  No gross evidence of visual impairment that would impact ambulation.     Vascular: DP & PT pulses are intact & regular, CFT immediate, positive digital hair growth bilaterally.  No significant edema or varicosities noted and skin temperature is normal to both lower extremities.     Neurologic: Lower extremity " sensation is intact to light touch.  No evidence of weakness or contracture in the lower extremities.  No evidence of neuropathy.    Dermatologic: Adequate texture, turgor and tone about the integument.  No discoloration or thickening of the toenail however the right medial and lateral and left medial and lateral hallux nail border(s) are ingrown with localized erythema, discomfort and purulent drainage.     Musculoskeletal: Patient is ambulatory without assistive device or brace.  No gross ankle deformity noted.  No foot or ankle joint effusion is noted.     ASSESSMENT:       ICD-10-CM    1. Ingrowing nail L60.0        Plan: We reviewed medical history and EPIC chart.  After obtaining informed consent to permanently remove the right medial and lateral and left medial and lateral hallux nail(s), I utilized 3 cc of lidocaine plain to achieve local anesthesia per digit.  The toenails were then prepped with Betadine in usual fashion.  A quarter inch Penrose drain was then utilized for hemostasis.  100% of the toenail border was avulsed utilizing a nail elevator, english anvil, 6100 blade and hemostat.  The proximal root portion of the nail was confirmed to be removed atraumatically.  Three applications of 89% phenol were applied to the surgical site for 30 seconds each followed by a curette after each application.  Surgical site was then flushed with alcohol and dressed with bacitracin and a nonadherent compression dressing.  Tourniquet was removed after approximately 3 minutes followed by immediate hyperemia to the distal aspect of the hallux.  Written postoperative instructions were dispensed and patient instructed to follow up in 1-2 weeks with any questions, pain,drainage, delayed healing or concerns.  I answered all questions to patients satisfaction.        Noel Clark DPM

## 2017-08-14 ENCOUNTER — OFFICE VISIT (OUTPATIENT)
Dept: PODIATRY | Facility: CLINIC | Age: 82
End: 2017-08-14
Payer: COMMERCIAL

## 2017-08-14 VITALS — BODY MASS INDEX: 20.32 KG/M2 | WEIGHT: 119 LBS | HEIGHT: 64 IN | TEMPERATURE: 97 F

## 2017-08-14 DIAGNOSIS — L60.0 INGROWING NAIL: Primary | ICD-10-CM

## 2017-08-14 DIAGNOSIS — I73.9 PERIPHERAL VASCULAR DISEASE (H): ICD-10-CM

## 2017-08-14 PROCEDURE — 99213 OFFICE O/P EST LOW 20 MIN: CPT | Performed by: PODIATRIST

## 2017-08-14 ASSESSMENT — PAIN SCALES - GENERAL: PAINLEVEL: NO PAIN (0)

## 2017-08-14 NOTE — PROGRESS NOTES
"Chief Complaint   Patient presents with     RECHECK     finished abx 8/13/2017, minimal drainage - Matrixectomy lateral and medial Left and Right hallux - 8/3/2017       Weight management plan Patient was referred to their PCP to discuss a diet and exercise plan.     HPI:  Shiloh Covarrubias is a 87 year old female who is seen in consultation at the request of Rajesh Vidal MD.    Pt presents for eval of:   (Onset, Location, L/R, Character, Treatments, Injury if yes)     Onset Dec 2016, Ingrown and curving inward medial/lateral Left and Right great toenail > lateral Right  Sharp, dull ache, stabbing, redness, intermittent drainage, painful w/pressure even if sheets sit on toe  Soaking, tries to keep trimmed or filed, started 10 days Keflex 500mg 2 times daily by Dr. Young MD    Retired.    Since last visit she has minimal pain and minimal drainage. She continues soaking twice daily    EXAM:Vitals: Temp 97  F (36.1  C) (Temporal)  Ht 5' 3.75\" (1.619 m)  Wt 119 lb (54 kg)  BMI 20.59 kg/m2  BMI= Body mass index is 20.59 kg/(m^2).    General appearance: Patient is alert and fully cooperative with history & exam.  No sign of distress is noted during the visit.     Psychiatric: Affect is pleasant & appropriate.  Patient appears motivated to improve health.     Respiratory: Breathing is regular & unlabored while sitting.     HEENT: Hearing is intact to spoken word.  Speech is clear.  No gross evidence of visual impairment that would impact ambulation.     Vascular: DP & PT pulses are intact & regular, CFT immediate, positive digital hair growth bilaterally.  No significant edema or varicosities noted and skin temperature is normal to both lower extremities.     Neurologic: Lower extremity sensation is intact to light touch.  No evidence of weakness or contracture in the lower extremities.  No evidence of neuropathy.    Dermatologic: Adequate texture, turgor and tone about the integument.  Dry fibrotic eschar noted " about the toenails without edema erythema or discomfort.     Musculoskeletal: Patient is ambulatory without assistive device or brace.  No gross ankle deformity noted.  No foot or ankle joint effusion is noted.     ASSESSMENT:       ICD-10-CM    1. Ingrowing nail L60.0    2. Peripheral vascular disease (H) I73.9        Plan: We reviewed medical history and EPIC chart.  After obtaining informed consent to permanently remove the right medial and lateral and left medial and lateral hallux nail(s), I utilized 3 cc of lidocaine plain to achieve local anesthesia per digit.  The toenails were then prepped with Betadine in usual fashion.  A quarter inch Penrose drain was then utilized for hemostasis.  100% of the toenail border was avulsed utilizing a nail elevator, english anvil, 6100 blade and hemostat.  The proximal root portion of the nail was confirmed to be removed atraumatically.  Three applications of 89% phenol were applied to the surgical site for 30 seconds each followed by a curette after each application.  Surgical site was then flushed with alcohol and dressed with bacitracin and a nonadherent compression dressing.  Tourniquet was removed after approximately 3 minutes followed by immediate hyperemia to the distal aspect of the hallux.  Written postoperative instructions were dispensed and patient instructed to follow up in 1-2 weeks with any questions, pain,drainage, delayed healing or concerns.  I answered all questions to patients satisfaction.     8/14/2017  She has completed the antibiotics yesterday  I debrided a small amount of eschar from the wounds and she was instructed to return to regular bathing regular activities without specific wound care  All questions were answered and follow-up as needed.       Noel Clark DPM

## 2017-08-14 NOTE — MR AVS SNAPSHOT
"              After Visit Summary   8/14/2017    Shiloh Covarrubias    MRN: [id number]           Patient Information     Date Of Birth          11/3/1929        Visit Information        Provider Department      8/14/2017 9:45 AM Noel Clark DPM [facility]        Today's Diagnoses     Ingrowing nail    -  1    Peripheral vascular disease (H)          Care Instructions    Follow-up as needed          Follow-ups after your visit        Your next 10 appointments already scheduled     Sep 05, 2017  8:30 AM CDT   Office Visit with Rajesh Vidal MD   [facility] ([facility])    [address] [place] [address] [id number] [phone]           Bring a current list of meds and any records pertaining to this visit. For Physicals, please bring immunization records and any forms needing to be filled out. Please arrive 10 minutes early to complete paperwork.              Who to contact     If you have questions or need follow up information about today's clinic visit or your schedule please contact [facility] directly at [phone].  Normal or non-critical lab and imaging results will be communicated to you by MyChart, letter or phone within 4 business days after the clinic has received the results. If you do not hear from us within 7 days, please contact the clinic through [employer] or phone. If you have a critical or abnormal lab result, we will notify you by phone as soon as possible.  Submit refill requests through "[employer] or call your pharmacy and they will forward the refill request to us. Please allow 3 business days for your refill to be completed.          Additional Information About Your Visit        MyChart Information     "[employer] lets you send messages to your doctor, view your test results, renew your prescriptions, schedule appointments and more. To sign up, go to [email or web address] . Click on \"Log in\" on the left side of the " "screen, which will take you to the Welcome page. Then click on \"Sign up Now\" on the right side of the page.     You will be asked to enter the access code listed below, as well as some personal information. Please follow the directions to create your username and password.     Your access code is: [email or web address]  Expires: 2017  9:51 AM     Your access code will  in 90 days. If you need help or a new code, please call your [place] clinic or [phone].        Care EveryWhere ID     This is your Care EveryWhere ID. This could be used by other organizations to access your [facility] medical records  [id number]        Your Vitals Were     Temperature Height BMI (Body Mass Index)             97  F (36.1  C) (Temporal) 5' 3.75\" (1.619 m) 20.59 kg/m2          Blood Pressure from Last 3 Encounters:   17 166/66   17 170/60   17 132/64    Weight from Last 3 Encounters:   17 119 lb (54 kg)   17 119 lb (54 kg)   17 125 lb (56.7 kg)              Today, you had the following     No orders found for display       Primary Care Provider Office Phone # Fax #    Rajesh Vidal [phone]       [facility] [address] [place] [address]        Equal Access to Services     ALVARO WALLACE AH: Hadii aad ku hadasho Soomaali, waaxda luqadaha, qaybta kaalmada adeegyada, demetrius bianchi. So [facility] [phone].    ATENCIÓN: Si habla español, tiene a camacho disposición servicios gratuitos de asistencia lingüística. Llame al [phone].    We comply with applicable federal civil rights laws and [place] laws. We do not discriminate on the basis of race, color, national origin, age, disability sex, sexual orientation or gender identity.            Thank you!     Thank you for choosing [facility]  for your care. Our goal is always to provide you with excellent care. Hearing back from our patients is one way we can continue to " improve our services. Please take a few minutes to complete the written survey that you may receive in the mail after your visit with us. Thank you!             Your Updated Medication List - Protect others around you: Learn how to safely use, store and throw away your medicines at www.disposemymeds.org.          This list is accurate as of: 8/14/17  9:51 AM.  Always use your most recent med list.                   Brand Name Dispense Instructions for use Diagnosis    amLODIPine 5 MG tablet    NORVASC    90 tablet    Take 1 tablet (5 mg) by mouth daily    Essential hypertension with goal blood pressure less than 130/80       aspirin 81 MG tablet      1 TABLET DAILY        diphenoxylate-atropine 2.5-0.025 MG per tablet    LOMOTIL    60 tablet    Take 2 tablets by mouth 4 times daily as needed for diarrhea    Irritable bowel syndrome with diarrhea       EQ LORATADINE 10 MG tablet   Generic drug:  loratadine     90 tablet    TAKE ONE TABLET BY MOUTH ONCE DAILY    Seasonal allergies       FIBERCON PO      Take  by mouth. 1/2 tablet w/ evening meal        furosemide 20 MG tablet    LASIX    180 tablet    Take 2 tablets (40 mg) by mouth daily    SOB (shortness of breath)       hydrALAZINE 50 MG tablet    APRESOLINE    270 tablet    Take 1 tablet (50 mg) by mouth 3 times daily    Hypertension goal BP (blood pressure) < 130/80       lisinopril 20 MG tablet    PRINIVIL/ZESTRIL    180 tablet    Take 1 tablet (20 mg) by mouth 2 times daily    Essential hypertension with goal blood pressure less than 130/80       loperamide 2 MG tablet    IMODIUM A-D    20 tablet    1/2 tab before meals    Chronic diarrhea       metoprolol 25 MG tablet    LOPRESSOR    180 tablet    Take 1 tablet (25 mg) by mouth 2 times daily    Hypertension goal BP (blood pressure) < 130/80       MULTI VITAMIN/MINERALS PO      Take 1 tablet by mouth daily.        tiotropium 18 MCG capsule    SPIRIVA HANDIHALER    90 capsule    INHALE ONE DOSE INTO LUNGS ONCE  DAILY    Chronic obstructive pulmonary disease, unspecified COPD type (H)

## 2017-08-28 DIAGNOSIS — R06.02 SOB (SHORTNESS OF BREATH): ICD-10-CM

## 2017-08-28 NOTE — TELEPHONE ENCOUNTER
Furosemide      Last Written Prescription Date: 12/28/2016  Last Fill Quantity: 180, # refills: 3  Last Office Visit with [facility] prescribing provider: 07/07/2017  Next 5 appointments (look out 90 days)     Sep 05, 2017  8:30 AM CDT   Office Visit with Rajesh Vidal MD   [facility]    [address] [place] [id number] [phone]                   Potassium   Date Value Ref Range Status   05/05/2017 4.6 3.4 - 5.3 mmol/L Final     Creatinine   Date Value Ref Range Status   05/05/2017 1.32 (H) 0.52 - 1.04 mg/dL Final     BP Readings from Last 3 Encounters:   07/07/17 166/66   06/13/17 170/60   05/05/17 132/64     Monalisa Jimenez MA 8/28/2017  11:44 AM

## 2017-08-29 RX ORDER — FUROSEMIDE 20 MG
40 TABLET ORAL DAILY
Qty: 180 TABLET | Refills: 3 | OUTPATIENT
Start: 2017-08-29

## 2017-09-05 ENCOUNTER — OFFICE VISIT (OUTPATIENT)
Dept: INTERNAL MEDICINE | Facility: CLINIC | Age: 82
End: 2017-09-05
Payer: COMMERCIAL

## 2017-09-05 VITALS
WEIGHT: 121.8 LBS | OXYGEN SATURATION: 99 % | BODY MASS INDEX: 21.07 KG/M2 | DIASTOLIC BLOOD PRESSURE: 62 MMHG | SYSTOLIC BLOOD PRESSURE: 138 MMHG | TEMPERATURE: 96.9 F | HEART RATE: 48 BPM

## 2017-09-05 DIAGNOSIS — G62.9 PERIPHERAL POLYNEUROPATHY: ICD-10-CM

## 2017-09-05 DIAGNOSIS — R53.83 FATIGUE, UNSPECIFIED TYPE: Primary | ICD-10-CM

## 2017-09-05 DIAGNOSIS — R40.0 DAYTIME SLEEPINESS: ICD-10-CM

## 2017-09-05 LAB
ANION GAP SERPL CALCULATED.3IONS-SCNC: 8 MMOL/L (ref 3–14)
BUN SERPL-MCNC: 32 MG/DL (ref 7–30)
CALCIUM SERPL-MCNC: 9 MG/DL (ref 8.5–10.1)
CHLORIDE SERPL-SCNC: 103 MMOL/L (ref 94–109)
CO2 SERPL-SCNC: 29 MMOL/L (ref 20–32)
CREAT SERPL-MCNC: 1.33 MG/DL (ref 0.52–1.04)
ERYTHROCYTE [DISTWIDTH] IN BLOOD BY AUTOMATED COUNT: 13.8 % (ref 10–15)
GFR SERPL CREATININE-BSD FRML MDRD: 38 ML/MIN/1.7M2
GLUCOSE SERPL-MCNC: 84 MG/DL (ref 70–99)
HCT VFR BLD AUTO: 40.5 % (ref 35–47)
HGB BLD-MCNC: 13.3 G/DL (ref 11.7–15.7)
MCH RBC QN AUTO: 31.3 PG (ref 26.5–33)
MCHC RBC AUTO-ENTMCNC: 32.8 G/DL (ref 31.5–36.5)
MCV RBC AUTO: 95 FL (ref 78–100)
PLATELET # BLD AUTO: 171 10E9/L (ref 150–450)
POTASSIUM SERPL-SCNC: 4.2 MMOL/L (ref 3.4–5.3)
RBC # BLD AUTO: 4.25 10E12/L (ref 3.8–5.2)
SODIUM SERPL-SCNC: 140 MMOL/L (ref 133–144)
TSH SERPL DL<=0.005 MIU/L-ACNC: 2.6 MU/L (ref 0.4–4)
VIT B12 SERPL-MCNC: 375 PG/ML (ref 193–986)
WBC # BLD AUTO: 6.9 10E9/L (ref 4–11)

## 2017-09-05 PROCEDURE — 85027 COMPLETE CBC AUTOMATED: CPT | Performed by: INTERNAL MEDICINE

## 2017-09-05 PROCEDURE — 84165 PROTEIN E-PHORESIS SERUM: CPT | Performed by: INTERNAL MEDICINE

## 2017-09-05 PROCEDURE — 82607 VITAMIN B-12: CPT | Performed by: INTERNAL MEDICINE

## 2017-09-05 PROCEDURE — 84443 ASSAY THYROID STIM HORMONE: CPT | Performed by: INTERNAL MEDICINE

## 2017-09-05 PROCEDURE — 36415 COLL VENOUS BLD VENIPUNCTURE: CPT | Performed by: INTERNAL MEDICINE

## 2017-09-05 PROCEDURE — 80048 BASIC METABOLIC PNL TOTAL CA: CPT | Performed by: INTERNAL MEDICINE

## 2017-09-05 PROCEDURE — 00000402 ZZHCL STATISTIC TOTAL PROTEIN: Performed by: INTERNAL MEDICINE

## 2017-09-05 PROCEDURE — 99213 OFFICE O/P EST LOW 20 MIN: CPT | Performed by: INTERNAL MEDICINE

## 2017-09-05 ASSESSMENT — PAIN SCALES - GENERAL: PAINLEVEL: NO PAIN (0)

## 2017-09-05 NOTE — MR AVS SNAPSHOT
"              After Visit Summary   2017    Shiloh Covarrubias    MRN: [id number]           Patient Information     Date Of Birth          11/3/1929        Visit Information        Provider Department      2017 8:30 AM Rajesh Vidal MD [facility]         Follow-ups after your visit        Who to contact     If you have questions or need follow up information about today's clinic visit or your schedule please contact [facility] directly at [phone].  Normal or non-critical lab and imaging results will be communicated to you by MyChart, letter or phone within 4 business days after the clinic has received the results. If you do not hear from us within 7 days, please contact the clinic through [employer] or phone. If you have a critical or abnormal lab result, we will notify you by phone as soon as possible.  Submit refill requests through [employer] or call your pharmacy and they will forward the refill request to us. Please allow 3 business days for your refill to be completed.          Additional Information About Your Visit        [facility] Information     [employer] lets you send messages to your doctor, view your test results, renew your prescriptions, schedule appointments and more. To sign up, go to [email or web address] . Click on \"Log in\" on the left side of the screen, which will take you to the Welcome page. Then click on \"Sign up Now\" on the right side of the page.     You will be asked to enter the access code listed below, as well as some personal information. Please follow the directions to create your username and password.     Your access code is: [email or web address]  Expires: 2017  9:51 AM     Your access code will  in 90 days. If you need help or a new code, please call your [facility] or [phone].        Care EveryWhere ID     This is your Care EveryWhere ID. This could be used by other organizations to access your [place] medical " records  [id number]        Your Vitals Were     Pulse Temperature Pulse Oximetry BMI (Body Mass Index)          48 96.9  F (36.1  C) (Temporal) 99% 21.07 kg/m2         Blood Pressure from Last 3 Encounters:   09/05/17 138/62   07/07/17 166/66   06/13/17 170/60    Weight from Last 3 Encounters:   09/05/17 121 lb 12.8 oz (55.2 kg)   08/14/17 119 lb (54 kg)   08/03/17 119 lb (54 kg)              Today, you had the following     No orders found for display       Primary Care Provider Office Phone # Fax #    Rajesh Vidal [phone]       [facility] [address] [place] [address]        Equal Access to Services     BALBINA WALLACE : Hadii jcralos [place] hadasho Soomaali, waaxda luqadaha, qaybta kaalmada adeegyada, waxay fiorellain haylc paredes . So [facility] [phone].    ATENCIÓN: Si habla español, tiene a camacho disposición servicios gratuitos de asistencia lingüística. Llame al [phone].    We comply with applicable federal civil rights laws and [place] laws. We do not discriminate on the basis of race, color, national origin, age, disability sex, sexual orientation or gender identity.            Thank you!     Thank you for choosing [facility]  for your care. Our goal is always to provide you with excellent care. Hearing back from our patients is one way we can continue to improve our services. Please take a few minutes to complete the written survey that you may receive in the mail after your visit with us. Thank you!             Your Updated Medication List - Protect others around you: Learn how to safely use, store and throw away your medicines at www.disposemymeds.org.          This list is accurate as of: 9/5/17  8:54 AM.  Always use your most recent med list.                   Brand Name Dispense Instructions for use Diagnosis    amLODIPine 5 MG tablet    NORVASC    90 tablet    Take 1 tablet (5 mg) by mouth daily    Essential hypertension with goal  blood pressure less than 130/80       aspirin 81 MG tablet      1 TABLET DAILY        diphenoxylate-atropine 2.5-0.025 MG per tablet    LOMOTIL    60 tablet    Take 2 tablets by mouth 4 times daily as needed for diarrhea    Irritable bowel syndrome with diarrhea       EQ LORATADINE 10 MG tablet   Generic drug:  loratadine     90 tablet    TAKE ONE TABLET BY MOUTH ONCE DAILY    Seasonal allergies       FIBERCON PO      Take  by mouth. 1/2 tablet w/ evening meal        furosemide 20 MG tablet    LASIX    180 tablet    Take 2 tablets (40 mg) by mouth daily    SOB (shortness of breath)       hydrALAZINE 50 MG tablet    APRESOLINE    270 tablet    Take 1 tablet (50 mg) by mouth 3 times daily    Hypertension goal BP (blood pressure) < 130/80       lisinopril 20 MG tablet    PRINIVIL/ZESTRIL    180 tablet    Take 1 tablet (20 mg) by mouth 2 times daily    Essential hypertension with goal blood pressure less than 130/80       loperamide 2 MG tablet    IMODIUM A-D    20 tablet    1/2 tab before meals    Chronic diarrhea       metoprolol 25 MG tablet    LOPRESSOR    180 tablet    Take 1 tablet (25 mg) by mouth 2 times daily    Hypertension goal BP (blood pressure) < 130/80       MULTI VITAMIN/MINERALS PO      Take 1 tablet by mouth daily.        tiotropium 18 MCG capsule    SPIRIVA HANDIHALER    90 capsule    INHALE ONE DOSE INTO LUNGS ONCE DAILY    Chronic obstructive pulmonary disease, unspecified COPD type (H)

## 2017-09-05 NOTE — PROGRESS NOTES
SUBJECTIVE:   Shiloh Covarrubias is a 87 year old female who presents to clinic today for the following health issues:    Medication Followup of Lomotil    Taking Medication as prescribed: NO    Side Effects:  None    Medication Helping Symptoms:  NO     Had partial toenail removal and still some drainage a month later.  No pain.  Has seen podiatry and was doing ok.      We were going to try Lomotil for her diarrhea but pharmacist told her that it was horrible and side effects, so never got it.    She is using a different probiotic and her diarrhea is better.  Will wait on Viberzi as it is too expensive and symptoms are better.      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     Unspecified asthma(493.90)      Unspecified intestinal obstruction (H) 02/17/10    D/C 02/20/10     Current Outpatient Prescriptions   Medication     amLODIPine (NORVASC) 5 MG tablet     tiotropium (SPIRIVA HANDIHALER) 18 MCG capsule     metoprolol (LOPRESSOR) 25 MG tablet     lisinopril (PRINIVIL/ZESTRIL) 20 MG tablet     hydrALAZINE (APRESOLINE) 50 MG tablet     loperamide (IMODIUM A-D) 2 MG tablet     EQ LORATADINE 10 MG tablet     furosemide (LASIX) 20 MG tablet     Multiple Vitamins-Minerals (MULTI VITAMIN/MINERALS PO)     Calcium Polycarbophil (FIBERCON PO)     ASPIRIN 81 MG OR TABS     diphenoxylate-atropine (LOMOTIL) 2.5-0.025 MG per tablet     [DISCONTINUED] cloNIDine (CATAPRES) 0.2 MG tablet     No current facility-administered medications for this visit.      Physical Exam  /62 (BP Location: Right arm, Patient Position: Chair, Cuff Size: Adult Regular)  Pulse (!) 48  Temp 96.9  F (36.1  C) (Temporal)  Wt 121 lb 12.8 oz (55.2 kg)  SpO2 99%  BMI 21.07 kg/m2  General Appearance-healthy, alert, no distress  Her toe has healing partial nail removal, no sign of infection.    ASSESSMENT:  Patient is actually doing quite well. She has  had her partial toenail removal. She is healing. Her blood pressure is controlled with multiple medications today. She'll continue those. Her breathing is stable.    We discussed her irritable bowel with diarrhea. The question was trying new medication like Viberzi thoracic side effects, cost is a $1200 a month. We'll hold off on this. Her symptoms are much better and her weight is up another 2 pounds with taking new or different probiotic.  She is having some fatigue and sleepiness during the day, may take a daytime nap will also check labs her B-12, CBC and thyroid. She's had some neuropathy symptoms in her legs.     Electronically signed by Rajesh Vidal MD

## 2017-09-05 NOTE — NURSING NOTE
"Chief Complaint   Patient presents with     Recheck Medication       Initial /62 (BP Location: Right arm, Patient Position: Chair, Cuff Size: Adult Regular)  Pulse (!) 48  Temp 96.9  F (36.1  C) (Temporal)  Wt 121 lb 12.8 oz (55.2 kg)  SpO2 99%  BMI 21.07 kg/m2 Estimated body mass index is 21.07 kg/(m^2) as calculated from the following:    Height as of 8/14/17: 5' 3.75\" (1.619 m).    Weight as of this encounter: 121 lb 12.8 oz (55.2 kg).  Medication Reconciliation: complete  Rae EHRNANDEZ    "

## 2017-09-05 NOTE — LETTER
My COPD Action Plan     Name: Shiloh Covarrubias    YOB: 1929   Date: 9/5/2017    My doctor: Rajesh Vidal MD   My clinic: [address] [address] [phone]  My Controller Medicine:    Dose:      My Rescue Medicine:    Dose:      My Flare Up Medicine:    Dose:      My COPD Severity:       Use of Oxygen:      Make sure you've had your pneumonia   vaccines.          GREEN ZONE       Doing well today      Usual level of activity and exercise    Usual amount of cough and mucus    No shortness of breath    Usual level of health (thinking clearly, sleeping well, feel like eating) Actions:      Take daily medicines    Use oxygen as prescribed    Follow regular exercise and diet plan    Avoid cigarette smoke and other irritants that harm the lungs           YELLOW ZONE          Having a bad day or flare up      Short of breath more than usual    A lot more sputum (mucus) than usual    Sputum looks yellow, green, tan, brown or bloody    More coughing or wheezing    Fever or chills    Less energy; trouble completing activities    Trouble thinking or focusing    Using quick relief inhaler or nebulizer more often    Poor sleep; symptoms wake me up    Do not feel like eating Actions:      Get plenty of rest    Take daily medicines    Use quick relief inhaler every  hours    If you use oxygen, call you doctor to see if you should adjust your oxygen    Do breathing exercises or other things to help you relax    Let a loved one, friend or neighbor know you are feeling worse    Call your care team if you have 2 or more symptoms.  Start taking steroids or antibiotics if directed by your care team           RED ZONE       Need medical care now      Severe shortness of breath (feel you can't breathe)    Fever, chills    Not enough breath to do any activity    Trouble coughing up mucus, walking or talking    Blood in mucus    Frequent coughing   Rescue medicines  are not working    Not able to sleep because of breathing    Feel confused or drowsy    Chest pain    Actions:      Call your health care team.  If you cannot reach your care team, call 911 or go to the emergency room.        Electronically signed by: Rae Rankin, September 5, 2017  Annual Reminders:  Meet with Care Team, Flu Shot every Fall  Pharmacy:    [facility] - [facility] - [place], [place] - [address] [place] [address] [address]

## 2017-09-06 LAB
ALBUMIN SERPL ELPH-MCNC: 3.7 G/DL (ref 3.7–5.1)
ALPHA1 GLOB SERPL ELPH-MCNC: 0.3 G/DL (ref 0.2–0.4)
ALPHA2 GLOB SERPL ELPH-MCNC: 0.8 G/DL (ref 0.5–0.9)
B-GLOBULIN SERPL ELPH-MCNC: 0.8 G/DL (ref 0.6–1)
GAMMA GLOB SERPL ELPH-MCNC: 1 G/DL (ref 0.7–1.6)
M PROTEIN SERPL ELPH-MCNC: 0 G/DL
PROT PATTERN SERPL ELPH-IMP: NORMAL

## 2017-09-20 ENCOUNTER — OFFICE VISIT (OUTPATIENT)
Dept: PODIATRY | Facility: CLINIC | Age: 82
End: 2017-09-20
Payer: COMMERCIAL

## 2017-09-20 VITALS — WEIGHT: 124 LBS | TEMPERATURE: 97.3 F | BODY MASS INDEX: 21.17 KG/M2 | HEIGHT: 64 IN

## 2017-09-20 DIAGNOSIS — M20.10 HALLUX VALGUS, UNSPECIFIED LATERALITY: ICD-10-CM

## 2017-09-20 DIAGNOSIS — M20.41 HAMMER TOES OF BOTH FEET: ICD-10-CM

## 2017-09-20 DIAGNOSIS — I73.9 PERIPHERAL VASCULAR DISEASE (H): ICD-10-CM

## 2017-09-20 DIAGNOSIS — M20.42 HAMMER TOES OF BOTH FEET: ICD-10-CM

## 2017-09-20 DIAGNOSIS — L60.0 INGROWING NAIL: Primary | ICD-10-CM

## 2017-09-20 PROCEDURE — 99213 OFFICE O/P EST LOW 20 MIN: CPT | Performed by: PODIATRIST

## 2017-09-20 RX ORDER — CEPHALEXIN 500 MG/1
500 CAPSULE ORAL 3 TIMES DAILY
Qty: 30 CAPSULE | Refills: 0 | Status: SHIPPED | OUTPATIENT
Start: 2017-09-20 | End: 2017-10-12

## 2017-09-20 ASSESSMENT — PAIN SCALES - GENERAL: PAINLEVEL: NO PAIN (0)

## 2017-09-20 NOTE — NURSING NOTE
"Chief Complaint   Patient presents with     RECHECK     still having drainage - Matrixectomy lateral and medial Left and Right hallux - 8/3/2017; LOV 8/14/2017       Initial Temp 97.3  F (36.3  C) (Temporal)  Ht 5' 3.75\" (1.619 m)  Wt 124 lb (56.2 kg)  BMI 21.45 kg/m2 Estimated body mass index is 21.45 kg/(m^2) as calculated from the following:    Height as of this encounter: 5' 3.75\" (1.619 m).    Weight as of this encounter: 124 lb (56.2 kg).  BP completed using cuff size: NA (Not Taken)  Medication Reconciliation: complete    Fiordaliza Malloy CMA, September 20, 2017    "

## 2017-09-20 NOTE — MR AVS SNAPSHOT
"              After Visit Summary   9/20/2017    Shiloh Covarrubias    MRN: [id number]           Patient Information     Date Of Birth          11/3/1929        Visit Information        Provider Department      9/20/2017 2:45 PM Noel Clark DPM [facility]        Today's Diagnoses     Ingrowing nail    -  1    Peripheral vascular disease (H)        Hallux valgus, unspecified laterality        Hammer toes of both feet          Care Instructions    Begin interdigital spacer and oral antibiotics, continue soaking and follow up in 3 weeks.          Follow-ups after your visit        Who to contact     If you have questions or need follow up information about today's clinic visit or your schedule please contact [facility] directly at [phone].  Normal or non-critical lab and imaging results will be communicated to you by Traxpayhart, letter or phone within 4 business days after the clinic has received the results. If you do not hear from us within 7 days, please contact the clinic through Traxpayhart or phone. If you have a critical or abnormal lab result, we will notify you by phone as soon as possible.  Submit refill requests through [employer] or call your pharmacy and they will forward the refill request to us. Please allow 3 business days for your refill to be completed.          Additional Information About Your Visit        MyCharOrlumet Information     [employer]Rock lets you send messages to your doctor, view your test results, renew your prescriptions, schedule appointments and more. To sign up, go to [email or web address] . Click on \"Log in\" on the left side of the screen, which will take you to the Welcome page. Then click on \"Sign up Now\" on the right side of the page.     You will be asked to enter the access code listed below, as well as some personal information. Please follow the directions to create your username and password.     Your access code is: [email or web address]  Expires: " "2017  9:51 AM     Your access code will  in 90 days. If you need help or a new code, please call your [facility] clinic or [phone].        Care EveryWhere ID     This is your Care EveryWhere ID. This could be used by other organizations to access your [facility] medical records  [id number]        Your Vitals Were     Temperature Height BMI (Body Mass Index)             97.3  F (36.3  C) (Temporal) 5' 3.75\" (1.619 m) 21.45 kg/m2          Blood Pressure from Last 3 Encounters:   17 138/62   17 166/66   17 170/60    Weight from Last 3 Encounters:   17 124 lb (56.2 kg)   17 121 lb 12.8 oz (55.2 kg)   17 119 lb (54 kg)              Today, you had the following     No orders found for display         Today's Medication Changes          These changes are accurate as of: 17 11:59 PM.  If you have any questions, ask your nurse or doctor.               Start taking these medicines.        Dose/Directions    cephALEXin 500 MG capsule   Commonly known as:  KEFLEX   Used for:  Ingrowing nail   Started by:  Noel Clark DPM        Dose:  500 mg   Take 1 capsule (500 mg) by mouth 3 times daily   Quantity:  30 capsule   Refills:  0            Where to get your medicines      These medications were sent to [facility] [address] [place] [address] [address]  [address] [facility] [address]     Phone:  [phone]     cephALEXin 500 MG capsule                Primary Care Provider Office Phone # Fax #    Rajesh Vidal [phone]       [facility] [address]   [facility] [address]        Equal Access to Services     ALVARO WALLACE AH: Gentry Fulton, adin olson, russell kaalmaemily hensley, demetrius bianchi. So [facility] [phone].    ATENCIÓN: Si habla español, tiene a camacho disposición servicios gratuitos de asistencia lingüística. Llame al [phone].    We comply with applicable federal " civil rights laws and [place] laws. We do not discriminate on the basis of race, color, national origin, age, disability sex, sexual orientation or gender identity.            Thank you!     Thank you for choosing [facility]  for your care. Our goal is always to provide you with excellent care. Hearing back from our patients is one way we can continue to improve our services. Please take a few minutes to complete the written survey that you may receive in the mail after your visit with us. Thank you!             Your Updated Medication List - Protect others around you: Learn how to safely use, store and throw away your medicines at www.disposemymeds.org.          This list is accurate as of: 9/20/17 11:59 PM.  Always use your most recent med list.                   Brand Name Dispense Instructions for use Diagnosis    amLODIPine 5 MG tablet    NORVASC    90 tablet    Take 1 tablet (5 mg) by mouth daily    Essential hypertension with goal blood pressure less than 130/80       aspirin 81 MG tablet      1 TABLET DAILY        cephALEXin 500 MG capsule    KEFLEX    30 capsule    Take 1 capsule (500 mg) by mouth 3 times daily    Ingrowing nail       EQ LORATADINE 10 MG tablet   Generic drug:  loratadine     90 tablet    TAKE ONE TABLET BY MOUTH ONCE DAILY    Seasonal allergies       FIBERCON PO      Take  by mouth. 1/2 tablet w/ evening meal        furosemide 20 MG tablet    LASIX    180 tablet    Take 2 tablets (40 mg) by mouth daily    SOB (shortness of breath)       hydrALAZINE 50 MG tablet    APRESOLINE    270 tablet    Take 1 tablet (50 mg) by mouth 3 times daily    Hypertension goal BP (blood pressure) < 130/80       lisinopril 20 MG tablet    PRINIVIL/ZESTRIL    180 tablet    Take 1 tablet (20 mg) by mouth 2 times daily    Essential hypertension with goal blood pressure less than 130/80       loperamide 2 MG tablet    IMODIUM A-D    20 tablet    1/2 tab before meals    Chronic diarrhea        metoprolol 25 MG tablet    LOPRESSOR    180 tablet    Take 1 tablet (25 mg) by mouth 2 times daily    Hypertension goal BP (blood pressure) < 130/80       MULTI VITAMIN/MINERALS PO      Take 1 tablet by mouth daily.        tiotropium 18 MCG capsule    SPIRIVA HANDIHALER    90 capsule    INHALE ONE DOSE INTO LUNGS ONCE DAILY    Chronic obstructive pulmonary disease, unspecified COPD type (H)

## 2017-09-20 NOTE — PROGRESS NOTES
"Chief Complaint   Patient presents with     RECHECK     still having drainage - Matrixectomy lateral and medial Left and Right hallux - 8/3/2017; LOV 8/14/2017       Weight management plan Patient was referred to their PCP to discuss a diet and exercise plan.    HPI:  Shiloh Covarrubias is a 87 year old female who is seen in consultation at the request of Rajesh Vidal MD.    Pt presents for eval of:   (Onset, Location, L/R, Character, Treatments, Injury if yes)     Onset Dec 2016, Ingrown and curving inward medial/lateral Left and Right great toenail > lateral Right  Sharp, dull ache, stabbing, redness, intermittent drainage, painful w/pressure even if sheets sit on toe  Soaking, tries to keep trimmed or filed, started 10 days Keflex 500mg 2 times daily by Dr. Young MD    Retired.    Since last visit she has minimal pain and minimal drainage. She continues soaking twice daily    EXAM:Vitals: Temp 97.3  F (36.3  C) (Temporal)  Ht 5' 3.75\" (1.619 m)  Wt 124 lb (56.2 kg)  BMI 21.45 kg/m2  BMI= Body mass index is 21.45 kg/(m^2).    General appearance: Patient is alert and fully cooperative with history & exam.  No sign of distress is noted during the visit.     Psychiatric: Affect is pleasant & appropriate.  Patient appears motivated to improve health.     Respiratory: Breathing is regular & unlabored while sitting.     HEENT: Hearing is intact to spoken word.  Speech is clear.  No gross evidence of visual impairment that would impact ambulation.     Vascular: DP & PT pulses are intact & regular, CFT immediate, positive digital hair growth bilaterally.  No significant edema or varicosities noted and skin temperature is normal to both lower extremities.     Neurologic: Lower extremity sensation is intact to light touch.  No evidence of weakness or contracture in the lower extremities.  No evidence of neuropathy.    Dermatologic: Adequate texture, turgor and tone about the integument.  Medial borders are completely " dry without concern. The lateral borders remain somewhat irritated without granuloma. Less than 3 mm of erythema on the lateral borders only.    Musculoskeletal: Patient is ambulatory without assistive device or brace.  No gross ankle deformity noted.  No foot or ankle joint effusion is noted.     ASSESSMENT:       ICD-10-CM    1. Ingrowing nail L60.0 cephALEXin (KEFLEX) 500 MG capsule   2. Peripheral vascular disease (H) I73.9    3. Hallux valgus, unspecified laterality M20.10    4. Hammer toes of both feet M20.41     M20.42        Plan:     8/3/17  We reviewed medical history and EPIC chart.  After obtaining informed consent to permanently remove the right medial and lateral and left medial and lateral hallux nail(s), I utilized 3 cc of lidocaine plain to achieve local anesthesia per digit.  The toenails were then prepped with Betadine in usual fashion.  A quarter inch Penrose drain was then utilized for hemostasis.  100% of the toenail border was avulsed utilizing a nail elevator, english anvil, 6100 blade and hemostat.  The proximal root portion of the nail was confirmed to be removed atraumatically.  Three applications of 89% phenol were applied to the surgical site for 30 seconds each followed by a curette after each application.  Surgical site was then flushed with alcohol and dressed with bacitracin and a nonadherent compression dressing.  Tourniquet was removed after approximately 3 minutes followed by immediate hyperemia to the distal aspect of the hallux.  Written postoperative instructions were dispensed and patient instructed to follow up in 1-2 weeks with any questions, pain,drainage, delayed healing or concerns.  I answered all questions to patients satisfaction.     8/14/2017  She has completed the antibiotics yesterday  I debrided a small amount of eschar from the wounds and she was instructed to return to regular bathing regular activities without specific wound care  All questions were answered  and follow-up as needed.    9/20/2017  Refill abx one time    Also will add spacer bw 1-2 toes to separate.  Her shoes and socks compress these toes together and cause irritation.  Medial borders are dry and no problem. The lateral borders remain agitated by pressure from the second toe against the hallux.  Follow up in 2-3 weeks. Will follow until this is dry.     Noel Clark DPM

## 2017-09-30 DIAGNOSIS — R06.02 SOB (SHORTNESS OF BREATH): ICD-10-CM

## 2017-10-02 NOTE — TELEPHONE ENCOUNTER
furosemide (LASIX) 20 MG tablet      Last Written Prescription Date: 12/28/16  Last Fill Quantity: 180, # refills: 3  Last Office Visit with G, P or [facility] prescribing provider: 9/5/17       Potassium   Date Value Ref Range Status   09/05/2017 4.2 3.4 - 5.3 mmol/L Final     Creatinine   Date Value Ref Range Status   09/05/2017 1.33 (H) 0.52 - 1.04 mg/dL Final     BP Readings from Last 3 Encounters:   09/05/17 138/62   07/07/17 166/66   06/13/17 170/60

## 2017-10-03 RX ORDER — FUROSEMIDE 20 MG
TABLET ORAL
Qty: 180 TABLET | Refills: 3 | OUTPATIENT
Start: 2017-10-03

## 2017-10-12 ENCOUNTER — OFFICE VISIT (OUTPATIENT)
Dept: PODIATRY | Facility: CLINIC | Age: 82
End: 2017-10-12
Payer: COMMERCIAL

## 2017-10-12 VITALS — WEIGHT: 121 LBS | BODY MASS INDEX: 20.66 KG/M2 | HEIGHT: 64 IN | TEMPERATURE: 96.9 F

## 2017-10-12 DIAGNOSIS — L60.0 INGROWING NAIL: Primary | ICD-10-CM

## 2017-10-12 DIAGNOSIS — I73.9 PERIPHERAL VASCULAR DISEASE (H): ICD-10-CM

## 2017-10-12 PROCEDURE — 99213 OFFICE O/P EST LOW 20 MIN: CPT | Performed by: PODIATRIST

## 2017-10-12 ASSESSMENT — PAIN SCALES - GENERAL: PAINLEVEL: NO PAIN (0)

## 2017-10-12 NOTE — MR AVS SNAPSHOT
"              After Visit Summary   10/12/2017    Shiloh Covarrubias    MRN: [id number]           Patient Information     Date Of Birth          11/3/1929        Visit Information        Provider Department      10/12/2017 11:15 AM Noel Clark DPM [facility]        Today's Diagnoses     Ingrowing nail    -  1    Peripheral vascular disease (H)          Care Instructions    Follow-up as needed.          Follow-ups after your visit        Who to contact     If you have questions or need follow up information about today's clinic visit or your schedule please contact [facility] directly at [phone].  Normal or non-critical lab and imaging results will be communicated to you by Moonbasahart, letter or phone within 4 business days after the clinic has received the results. If you do not hear from us within 7 days, please contact the clinic through Moonbasahart or phone. If you have a critical or abnormal lab result, we will notify you by phone as soon as possible.  Submit refill requests through [employer] or call your pharmacy and they will forward the refill request to us. Please allow 3 business days for your refill to be completed.          Additional Information About Your Visit        MyChart Information     [employer] lets you send messages to your doctor, view your test results, renew your prescriptions, schedule appointments and more. To sign up, go to [email or web address] . Click on \"Log in\" on the left side of the screen, which will take you to the Welcome page. Then click on \"Sign up Now\" on the right side of the page.     You will be asked to enter the access code listed below, as well as some personal information. Please follow the directions to create your username and password.     Your access code is: [email or web address]  Expires: 2017  9:51 AM     Your access code will  in 90 days. If you need help or a new code, please call your [facility] or [phone].      " "  Care EveryWhere ID     This is your Care EveryWhere ID. This could be used by other organizations to access your [facility] medical records  [id number]        Your Vitals Were     Temperature Height BMI (Body Mass Index)             96.9  F (36.1  C) (Temporal) 5' 3.75\" (1.619 m) 20.93 kg/m2          Blood Pressure from Last 3 Encounters:   09/05/17 138/62   07/07/17 166/66   06/13/17 170/60    Weight from Last 3 Encounters:   10/12/17 121 lb (54.9 kg)   09/20/17 124 lb (56.2 kg)   09/05/17 121 lb 12.8 oz (55.2 kg)              Today, you had the following     No orders found for display       Primary Care Provider Office Phone # Fax #    Rajesh iVdal [phone]       [facility] [address] [place] [address]        Equal Access to Services     BALBINA WALLACE AH: Hadii aad ku hadasho Soomaali, waaxda luqadaha, qaybta kaalmada adeegyada, waxay fiorellain hayomarn martha paredes . So [facility] [phone].    ATENCIÓN: Si habla español, tiene a camacho disposición servicios gratuitos de asistencia lingüística. Llame al [phone].    We comply with applicable federal civil rights laws and [place] laws. We do not discriminate on the basis of race, color, national origin, age, disability, sex, sexual orientation, or gender identity.            Thank you!     Thank you for choosing [facility]  for your care. Our goal is always to provide you with excellent care. Hearing back from our patients is one way we can continue to improve our services. Please take a few minutes to complete the written survey that you may receive in the mail after your visit with us. Thank you!             Your Updated Medication List - Protect others around you: Learn how to safely use, store and throw away your medicines at www.disposemymeds.org.          This list is accurate as of: 10/12/17 11:30 AM.  Always use your most recent med list.                   Brand Name Dispense Instructions for " use Diagnosis    amLODIPine 5 MG tablet    NORVASC    90 tablet    Take 1 tablet (5 mg) by mouth daily    Essential hypertension with goal blood pressure less than 130/80       aspirin 81 MG tablet      1 TABLET DAILY        EQ LORATADINE 10 MG tablet   Generic drug:  loratadine     90 tablet    TAKE ONE TABLET BY MOUTH ONCE DAILY    Seasonal allergies       FIBERCON PO      Take  by mouth. 1/2 tablet w/ evening meal        furosemide 20 MG tablet    LASIX    180 tablet    Take 2 tablets (40 mg) by mouth daily    SOB (shortness of breath)       hydrALAZINE 50 MG tablet    APRESOLINE    270 tablet    Take 1 tablet (50 mg) by mouth 3 times daily    Hypertension goal BP (blood pressure) < 130/80       lisinopril 20 MG tablet    PRINIVIL/ZESTRIL    180 tablet    Take 1 tablet (20 mg) by mouth 2 times daily    Essential hypertension with goal blood pressure less than 130/80       loperamide 2 MG tablet    IMODIUM A-D    20 tablet    1/2 tab before meals    Chronic diarrhea       metoprolol 25 MG tablet    LOPRESSOR    180 tablet    Take 1 tablet (25 mg) by mouth 2 times daily    Hypertension goal BP (blood pressure) < 130/80       MULTI VITAMIN/MINERALS PO      Take 1 tablet by mouth daily.        tiotropium 18 MCG capsule    SPIRIVA HANDIHALER    90 capsule    INHALE ONE DOSE INTO LUNGS ONCE DAILY    Chronic obstructive pulmonary disease, unspecified COPD type (H)

## 2017-10-12 NOTE — NURSING NOTE
"Chief Complaint   Patient presents with     RECHECK     no issues today - Matrixectomy lateral and medial Left and Right hallux - 8/3/2017; LOV 9/20/2017       Initial Temp 96.9  F (36.1  C) (Temporal)  Ht 5' 3.75\" (1.619 m)  Wt 121 lb (54.9 kg)  BMI 20.93 kg/m2 Estimated body mass index is 20.93 kg/(m^2) as calculated from the following:    Height as of this encounter: 5' 3.75\" (1.619 m).    Weight as of this encounter: 121 lb (54.9 kg).  BP completed using cuff size: NA (Not Taken)  Medication Reconciliation: complete    Fiordaliza Malloy CMA, October 12, 2017    "

## 2017-10-12 NOTE — PROGRESS NOTES
"Chief Complaint   Patient presents with     RECHECK     no issues today - Matrixectomy lateral and medial Left and Right hallux - 8/3/2017; LOV 9/20/2017       Weight management plan Patient was referred to their PCP to discuss a diet and exercise plan.     HPI:  Shiloh Covarrubias is a 87 year old female who is seen in consultation at the request of Rajesh Vidal MD.    Pt presents for eval of:   (Onset, Location, L/R, Character, Treatments, Injury if yes)     Onset Dec 2016, Ingrown and curving inward medial/lateral Left and Right great toenail > lateral Right  Sharp, dull ache, stabbing, redness, intermittent drainage, painful w/pressure even if sheets sit on toe  Soaking, tries to keep trimmed or filed, started 10 days Keflex 500mg 2 times daily by Dr. Young MD    Retired.    Since last visit she has minimal pain and minimal drainage. She continues soaking twice daily    EXAM:Vitals: Temp 96.9  F (36.1  C) (Temporal)  Ht 5' 3.75\" (1.619 m)  Wt 121 lb (54.9 kg)  BMI 20.93 kg/m2  BMI= Body mass index is 20.93 kg/(m^2).    General appearance: Patient is alert and fully cooperative with history & exam.  No sign of distress is noted during the visit.     Psychiatric: Affect is pleasant & appropriate.  Patient appears motivated to improve health.     Respiratory: Breathing is regular & unlabored while sitting.     HEENT: Hearing is intact to spoken word.  Speech is clear.  No gross evidence of visual impairment that would impact ambulation.     Vascular: DP & PT pulses are intact & regular, CFT immediate, positive digital hair growth bilaterally.  No significant edema or varicosities noted and skin temperature is normal to both lower extremities.     Neurologic: Lower extremity sensation is intact to light touch.  No evidence of weakness or contracture in the lower extremities.  No evidence of neuropathy.    Dermatologic: Adequate texture, turgor and tone about the integument.  Medial and lateral borders of both " hallux nails are dry and on the right hallux nail there is some hyperkeratosis along the lateral border.    Musculoskeletal: Patient is ambulatory without assistive device or brace.  No gross ankle deformity noted.  No foot or ankle joint effusion is noted.     ASSESSMENT:       ICD-10-CM    1. Ingrowing nail L60.0    2. Peripheral vascular disease (H) I73.9        Plan:     8/3/17  We reviewed medical history and EPIC chart.  After obtaining informed consent to permanently remove the right medial and lateral and left medial and lateral hallux nail(s), I utilized 3 cc of lidocaine plain to achieve local anesthesia per digit.  The toenails were then prepped with Betadine in usual fashion.  A quarter inch Penrose drain was then utilized for hemostasis.  100% of the toenail border was avulsed utilizing a nail elevator, english anvil, 6100 blade and hemostat.  The proximal root portion of the nail was confirmed to be removed atraumatically.  Three applications of 89% phenol were applied to the surgical site for 30 seconds each followed by a curette after each application.  Surgical site was then flushed with alcohol and dressed with bacitracin and a nonadherent compression dressing.  Tourniquet was removed after approximately 3 minutes followed by immediate hyperemia to the distal aspect of the hallux.  Written postoperative instructions were dispensed and patient instructed to follow up in 1-2 weeks with any questions, pain,drainage, delayed healing or concerns.  I answered all questions to patients satisfaction.     8/14/2017  She has completed the antibiotics yesterday  I debrided a small amount of eschar from the wounds and she was instructed to return to regular bathing regular activities without specific wound care  All questions were answered and follow-up as needed.    9/20/2017  Refill abx one time    Also will add spacer bw 1-2 toes to separate.  Her shoes and socks compress these toes together and cause  irritation.  Medial borders are dry and no problem. The lateral borders remain agitated by pressure from the second toe against the hallux.  Follow up in 2-3 weeks. Will follow until this is dry.    10/12/2017  Mild amount of hyperkeratotic tissue was removed with a curet from the surgical sites. These remained dry. Recommended moisturizer to be applied once daily and return to activities. Follow-up as needed. All questions were answered.     Noel Clark DPM

## 2017-11-02 ENCOUNTER — OFFICE VISIT (OUTPATIENT)
Dept: FAMILY MEDICINE | Facility: CLINIC | Age: 82
End: 2017-11-02
Payer: COMMERCIAL

## 2017-11-02 VITALS
OXYGEN SATURATION: 96 % | WEIGHT: 121.4 LBS | HEART RATE: 57 BPM | TEMPERATURE: 96.9 F | BODY MASS INDEX: 21 KG/M2 | SYSTOLIC BLOOD PRESSURE: 130 MMHG | DIASTOLIC BLOOD PRESSURE: 86 MMHG

## 2017-11-02 DIAGNOSIS — L30.9 DERMATITIS: Primary | ICD-10-CM

## 2017-11-02 PROCEDURE — 99213 OFFICE O/P EST LOW 20 MIN: CPT | Performed by: FAMILY MEDICINE

## 2017-11-02 RX ORDER — TRIAMCINOLONE ACETONIDE 1 MG/G
OINTMENT TOPICAL
Qty: 80 G | Refills: 0 | Status: SHIPPED | OUTPATIENT
Start: 2017-11-02 | End: 2017-12-22

## 2017-11-02 RX ORDER — CETIRIZINE HYDROCHLORIDE 10 MG/1
10 TABLET ORAL DAILY PRN
Qty: 30 TABLET | Refills: 1 | Status: SHIPPED | OUTPATIENT
Start: 2017-11-02 | End: 2017-12-22 | Stop reason: ALTCHOICE

## 2017-11-02 NOTE — NURSING NOTE
"Chief Complaint   Patient presents with     Derm Problem       Initial /86 (BP Location: Right arm, Patient Position: Chair, Cuff Size: Adult Regular)  Pulse 57  Temp 96.9  F (36.1  C) (Temporal)  Wt 121 lb 6.4 oz (55.1 kg)  SpO2 96%  BMI 21 kg/m2 Estimated body mass index is 21 kg/(m^2) as calculated from the following:    Height as of 10/12/17: 5' 3.75\" (1.619 m).    Weight as of this encounter: 121 lb 6.4 oz (55.1 kg).  Medication Reconciliation: complete   Eileen Call CMA    Health Maintenance Due   Topic Date Due     DEXA Q3 YR  10/28/2011     LIPID MONITORING Q1 YEAR  01/30/2015     MICROALBUMIN Q1 YEAR  01/30/2015     INFLUENZA VACCINE (SYSTEM ASSIGNED)  09/01/2017     PHQ-9 Q6 MONTHS  11/05/2017       Health Maintenance reviewed at today's visit patient asked to schedule/complete:   Patient was given questionaires. aware of rest.       "

## 2017-11-02 NOTE — MR AVS SNAPSHOT
"              After Visit Summary   2017    Shiloh Covarrubias    MRN: [id number]           Patient Information     Date Of Birth          11/3/1929        Visit Information        Provider Department      2017 11:00 AM Garrett Harper MD [facility]        Today's Diagnoses     Dermatitis    -  1       Follow-ups after your visit        Who to contact     If you have questions or need follow up information about today's clinic visit or your schedule please contact [facility] directly at [phone].  Normal or non-critical lab and imaging results will be communicated to you by Deep Glinthart, letter or phone within 4 business days after the clinic has received the results. If you do not hear from us within 7 days, please contact the clinic through Deep Glinthart or phone. If you have a critical or abnormal lab result, we will notify you by phone as soon as possible.  Submit refill requests through Shareaholic or call your pharmacy and they will forward the refill request to us. Please allow 3 business days for your refill to be completed.          Additional Information About Your Visit        MyChart Information     Shareaholic lets you send messages to your doctor, view your test results, renew your prescriptions, schedule appointments and more. To sign up, go to [email or web address] . Click on \"Log in\" on the left side of the screen, which will take you to the Welcome page. Then click on \"Sign up Now\" on the right side of the page.     You will be asked to enter the access code listed below, as well as some personal information. Please follow the directions to create your username and password.     Your access code is: [email or web address]  Expires: 2017  9:51 AM     Your access code will  in 90 days. If you need help or a new code, please call your [facility] or [phone].        Care EveryWhere ID     This is your Care EveryWhere ID. This could be used by other " organizations to access your [facility] medical records  [id number]        Your Vitals Were     Pulse Temperature Pulse Oximetry BMI (Body Mass Index)          57 96.9  F (36.1  C) (Temporal) 96% 21 kg/m2         Blood Pressure from Last 3 Encounters:   11/02/17 130/86   09/05/17 138/62   07/07/17 166/66    Weight from Last 3 Encounters:   11/02/17 121 lb 6.4 oz (55.1 kg)   10/12/17 121 lb (54.9 kg)   09/20/17 124 lb (56.2 kg)              Today, you had the following     No orders found for display         Today's Medication Changes          These changes are accurate as of: 11/2/17  7:22 PM.  If you have any questions, ask your nurse or doctor.               Start taking these medicines.        Dose/Directions    cetirizine 10 MG tablet   Commonly known as:  zyrTEC   Used for:  Dermatitis   Started by:  Garrett Harper MD        Dose:  10 mg   Take 1 tablet (10 mg) by mouth daily as needed (itch)   Quantity:  30 tablet   Refills:  1       triamcinolone 0.1 % ointment   Commonly known as:  KENALOG   Used for:  Dermatitis   Started by:  Garrett Harper MD        Apply sparingly to affected area three times daily until itching is gone   Quantity:  80 g   Refills:  0            Where to get your medicines      These medications were sent to [facility] [address]   [address] [place] [address] [facility] [address]     Phone:  [phone]     cetirizine 10 MG tablet    triamcinolone 0.1 % ointment                Primary Care Provider Office Phone # Fax #    Rajesh Vidal -[phone]       [address]   [facility] [address]        Equal Access to Services     ALVARO WALLACE AH: Gentry Fulton, wajoseline lusavannah, qafelicia kaalmada shellie, demetrius bianchi. So [facility] [phone].    ATENCIÓN: Si habla español, tiene a camacho disposición servicios gratuitos de asistencia lingüística. Llame al  [phone].    We comply with applicable federal civil rights laws and [place] laws. We do not discriminate on the basis of race, color, national origin, age, disability, sex, sexual orientation, or gender identity.            Thank you!     Thank you for choosing [facility]  for your care. Our goal is always to provide you with excellent care. Hearing back from our patients is one way we can continue to improve our services. Please take a few minutes to complete the written survey that you may receive in the mail after your visit with us. Thank you!             Your Updated Medication List - Protect others around you: Learn how to safely use, store and throw away your medicines at www.disposemymeds.org.          This list is accurate as of: 11/2/17  7:22 PM.  Always use your most recent med list.                   Brand Name Dispense Instructions for use Diagnosis    amLODIPine 5 MG tablet    NORVASC    90 tablet    Take 1 tablet (5 mg) by mouth daily    Essential hypertension with goal blood pressure less than 130/80       aspirin 81 MG tablet      1 TABLET DAILY        cetirizine 10 MG tablet    zyrTEC    30 tablet    Take 1 tablet (10 mg) by mouth daily as needed (itch)    Dermatitis       EQ LORATADINE 10 MG tablet   Generic drug:  loratadine     90 tablet    TAKE ONE TABLET BY MOUTH ONCE DAILY    Seasonal allergies       FIBERCON PO      Take  by mouth. 1/2 tablet w/ evening meal        furosemide 20 MG tablet    LASIX    180 tablet    Take 2 tablets (40 mg) by mouth daily    SOB (shortness of breath)       hydrALAZINE 50 MG tablet    APRESOLINE    270 tablet    Take 1 tablet (50 mg) by mouth 3 times daily    Hypertension goal BP (blood pressure) < 130/80       lisinopril 20 MG tablet    PRINIVIL/ZESTRIL    180 tablet    Take 1 tablet (20 mg) by mouth 2 times daily    Essential hypertension with goal blood pressure less than 130/80       loperamide 2 MG tablet    IMODIUM A-D    20 tablet     1/2 tab before meals    Chronic diarrhea       metoprolol 25 MG tablet    LOPRESSOR    180 tablet    Take 1 tablet (25 mg) by mouth 2 times daily    Hypertension goal BP (blood pressure) < 130/80       MULTI VITAMIN/MINERALS PO      Take 1 tablet by mouth daily.        tiotropium 18 MCG capsule    SPIRIVA HANDIHALER    90 capsule    INHALE ONE DOSE INTO LUNGS ONCE DAILY    Chronic obstructive pulmonary disease, unspecified COPD type (H)       triamcinolone 0.1 % ointment    KENALOG    80 g    Apply sparingly to affected area three times daily until itching is gone    Dermatitis

## 2017-11-02 NOTE — PROGRESS NOTES
SUBJECTIVE:                                                    Shiloh Covarrubias is a 87 year old female who presents to clinic today for the following health issues:    Chief Complaint   Patient presents with     Derm Problem        Rash  Onset: 1 week    Description:   Location: on the top of feet, thighs, waist line. Neck line on wrist  Character: blotchy, raised, burning, red  Itching (Pruritis): YES    Progression of Symptoms:  same    Accompanying Signs & Symptoms:  Fever: no   Body aches or joint pain: no   Sore throat symptoms: YES  Recent cold symptoms: no     History:   Previous similar rash: no     Precipitating factors:   Exposure to similar rash: no   New exposures: lotions used a new lotion 2 weeks ago   Recent travel: no     Alleviating factors:  hydrocortisone cream    Therapies Tried and outcome: hydrocortisone cream helps for a short amount of time.  States that her eyes are also dry and itchy.         ROS:      OBJECTIVE:                                                    /86 (BP Location: Right arm, Patient Position: Chair, Cuff Size: Adult Regular)  Pulse 57  Temp 96.9  F (36.1  C) (Temporal)  Wt 121 lb 6.4 oz (55.1 kg)  SpO2 96%  BMI 21 kg/m2  Body mass index is 21 kg/(m^2).  Well-appearing elderly female. She has several small 1-2 mm erythematous papules across her lower chest and belt line, and shoulder, and wrists.         ASSESSMENT/PLAN:                                                        ICD-10-CM    1. Dermatitis L30.9 triamcinolone (KENALOG) 0.1 % ointment     cetirizine (ZYRTEC) 10 MG tablet       Nonspecific skin eruption . Suspicious for a bite such as scabies. Or other. We'll use symptomatic care for now and return to clinic if it doesn't resolve in the next week or 2    Garrett Harper MD  [facility]   Anything

## 2017-12-22 ENCOUNTER — OFFICE VISIT (OUTPATIENT)
Dept: INTERNAL MEDICINE | Facility: CLINIC | Age: 82
End: 2017-12-22
Payer: COMMERCIAL

## 2017-12-22 VITALS
SYSTOLIC BLOOD PRESSURE: 164 MMHG | OXYGEN SATURATION: 98 % | WEIGHT: 123 LBS | TEMPERATURE: 97 F | RESPIRATION RATE: 16 BRPM | BODY MASS INDEX: 21.28 KG/M2 | DIASTOLIC BLOOD PRESSURE: 84 MMHG | HEART RATE: 56 BPM

## 2017-12-22 DIAGNOSIS — B02.9 HERPES ZOSTER WITHOUT COMPLICATION: Primary | ICD-10-CM

## 2017-12-22 PROCEDURE — 99213 OFFICE O/P EST LOW 20 MIN: CPT | Performed by: INTERNAL MEDICINE

## 2017-12-22 RX ORDER — ACYCLOVIR 800 MG/1
800 TABLET ORAL
Qty: 35 TABLET | Refills: 0 | Status: SHIPPED | OUTPATIENT
Start: 2017-12-22 | End: 2018-03-02

## 2017-12-22 ASSESSMENT — PAIN SCALES - GENERAL: PAINLEVEL: NO PAIN (0)

## 2017-12-22 NOTE — NURSING NOTE
"Chief Complaint   Patient presents with     Derm Problem     check rash on back, worried about shingles       Initial /84  Pulse 56  Temp 97  F (36.1  C) (Temporal)  Resp 16  Wt 123 lb (55.8 kg)  SpO2 98%  BMI 21.28 kg/m2 Estimated body mass index is 21.28 kg/(m^2) as calculated from the following:    Height as of 10/12/17: 5' 3.75\" (1.619 m).    Weight as of this encounter: 123 lb (55.8 kg).  Medication Reconciliation: complete    "

## 2017-12-22 NOTE — MR AVS SNAPSHOT
"              After Visit Summary   2017    Shlioh Covarrubias    MRN: [id number]           Patient Information     Date Of Birth          11/3/1929        Visit Information        Provider Department      2017 9:30 AM Rajesh Vidal MD [facility]         Follow-ups after your visit        Your next 10 appointments already scheduled     Dec 22, 2017  9:30 AM CST   [facility]    [address] [place] [address] [id number] [phone]              Who to contact     If you have questions or need follow up information about today's clinic visit or your schedule please contact [facility] directly at [phone].  Normal or non-critical lab and imaging results will be communicated to you by MyChart, letter or phone within 4 business days after the clinic has received the results. If you do not hear from us within 7 days, please contact the clinic through MyChart or phone. If you have a critical or abnormal lab result, we will notify you by phone as soon as possible.  Submit refill requests through Shoutly or call your pharmacy and they will forward the refill request to us. Please allow 3 business days for your refill to be completed.          Additional Information About Your Visit        MyCharTheLadders Information     Shoutly lets you send messages to your doctor, view your test results, renew your prescriptions, schedule appointments and more. To sign up, go to [email or web address] . Click on \"Log in\" on the left side of the screen, which will take you to the Welcome page. Then click on \"Sign up Now\" on the right side of the page.     You will be asked to enter the access code listed below, as well as some personal information. Please follow the directions to create your username and password.     Your access code is: [email or web address]  Expires: 3/22/2018  9:11 AM     Your access code will  in 90 " days. If you need help or a new code, please call your [facility] clinic or [phone].        Care EveryWhere ID     This is your Care EveryWhere ID. This could be used by other organizations to access your [facility] medical records  [id number]        Your Vitals Were     Pulse Temperature Respirations Pulse Oximetry BMI (Body Mass Index)       56 97  F (36.1  C) (Temporal) 16 98% 21.28 kg/m2        Blood Pressure from Last 3 Encounters:   12/22/17 164/84   11/02/17 130/86   09/05/17 138/62    Weight from Last 3 Encounters:   12/22/17 123 lb (55.8 kg)   11/02/17 121 lb 6.4 oz (55.1 kg)   10/12/17 121 lb (54.9 kg)              Today, you had the following     No orders found for display         Today's Medication Changes          These changes are accurate as of: 12/22/17  9:11 AM.  If you have any questions, ask your nurse or doctor.               Stop taking these medicines if you haven't already. Please contact your care team if you have questions.     cetirizine 10 MG tablet   Commonly known as:  zyrTEC   Stopped by:  Rajesh Vidal MD                    Primary Care Provider Office Phone # Fax #    Rajesh Vidal [phone]       [facility] [address] [place] [address]        Equal Access to Services     BALBINA WALLACE AH: Hadii jcarlos [place] hadasho [place]ali, waaxda luqadaha, qaybta kaalmada shellie, demetrius bianchi. So [facility] [phone].    ATENCIÓN: Si habla español, tiene a camacho disposición servicios gratuitos de asistencia lingüística. Criss al [phone].    We comply with applicable federal civil rights laws and [place] laws. We do not discriminate on the basis of race, color, national origin, age, disability, sex, sexual orientation, or gender identity.            Thank you!     Thank you for choosing [facility]  for your care. Our goal is always to provide you with excellent care. Hearing back from our patients is one way we  can continue to improve our services. Please take a few minutes to complete the written survey that you may receive in the mail after your visit with us. Thank you!             Your Updated Medication List - Protect others around you: Learn how to safely use, store and throw away your medicines at www.disposemymeds.org.          This list is accurate as of: 12/22/17  9:11 AM.  Always use your most recent med list.                   Brand Name Dispense Instructions for use Diagnosis    amLODIPine 5 MG tablet    NORVASC    90 tablet    Take 1 tablet (5 mg) by mouth daily    Essential hypertension with goal blood pressure less than 130/80       aspirin 81 MG tablet      1 TABLET DAILY        EQ LORATADINE 10 MG tablet   Generic drug:  loratadine     90 tablet    TAKE ONE TABLET BY MOUTH ONCE DAILY    Seasonal allergies       FIBERCON PO      Take  by mouth. 1/2 tablet w/ evening meal        furosemide 20 MG tablet    LASIX    180 tablet    Take 2 tablets (40 mg) by mouth daily    SOB (shortness of breath)       hydrALAZINE 50 MG tablet    APRESOLINE    270 tablet    Take 1 tablet (50 mg) by mouth 3 times daily    Hypertension goal BP (blood pressure) < 130/80       lisinopril 20 MG tablet    PRINIVIL/ZESTRIL    180 tablet    Take 1 tablet (20 mg) by mouth 2 times daily    Essential hypertension with goal blood pressure less than 130/80       loperamide 2 MG tablet    IMODIUM A-D    20 tablet    1/2 tab before meals    Chronic diarrhea       metoprolol 25 MG tablet    LOPRESSOR    180 tablet    Take 1 tablet (25 mg) by mouth 2 times daily    Hypertension goal BP (blood pressure) < 130/80       MULTI VITAMIN/MINERALS PO      Take 1 tablet by mouth daily.        tiotropium 18 MCG capsule    SPIRIVA HANDIHALER    90 capsule    INHALE ONE DOSE INTO LUNGS ONCE DAILY    Chronic obstructive pulmonary disease, unspecified COPD type (H)

## 2017-12-22 NOTE — PROGRESS NOTES
SUBJECTIVE:   Shiloh Covarrubias is a 88 year old female who presents to clinic today for the following health issues:      Chief Complaint   Patient presents with     Derm Problem     check rash on back, worried about shingles     Not feeling right for a few days, rash and pain on the right side of the abdomen.  She has had shingles before, 8 years ago and then more recently like 5 years ago.      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     Unspecified asthma(493.90)      Unspecified intestinal obstruction 02/17/10    D/C 02/20/10     Current Outpatient Prescriptions   Medication     amLODIPine (NORVASC) 5 MG tablet     tiotropium (SPIRIVA HANDIHALER) 18 MCG capsule     metoprolol (LOPRESSOR) 25 MG tablet     lisinopril (PRINIVIL/ZESTRIL) 20 MG tablet     hydrALAZINE (APRESOLINE) 50 MG tablet     loperamide (IMODIUM A-D) 2 MG tablet     EQ LORATADINE 10 MG tablet     furosemide (LASIX) 20 MG tablet     Multiple Vitamins-Minerals (MULTI VITAMIN/MINERALS PO)     Calcium Polycarbophil (FIBERCON PO)     ASPIRIN 81 MG OR TABS     [DISCONTINUED] cloNIDine (CATAPRES) 0.2 MG tablet     No current facility-administered medications for this visit.      Social History   Substance Use Topics     Smoking status: Never Smoker     Smokeless tobacco: Never Used     Alcohol use 0.0 oz/week     0 Standard drinks or equivalent per week      Comment: 3 per  year     Physical Exam  /84  Pulse 56  Temp 97  F (36.1  C) (Temporal)  Resp 16  Wt 123 lb (55.8 kg)  SpO2 98%  BMI 21.28 kg/m2  General Appearance-healthy, alert, no distress  Rash-small red rash on her mid back    ASSESSMENT:  Patient has a history of shingles twice.  She feels like a similar episode of shingles.  She has some abdominal pain, hypersensitivity to her clothing.  Is only on the right side.  Her rash is definitely not shingles textbook rash yet.  But could be  the start of it.  We will treat her empirically with acyclovir 5 times a day for a week.    Electronically signed by Rajesh Vidal MD

## 2018-03-02 ENCOUNTER — OFFICE VISIT (OUTPATIENT)
Dept: INTERNAL MEDICINE | Facility: CLINIC | Age: 83
End: 2018-03-02
Payer: COMMERCIAL

## 2018-03-02 VITALS
DIASTOLIC BLOOD PRESSURE: 68 MMHG | HEART RATE: 64 BPM | OXYGEN SATURATION: 97 % | BODY MASS INDEX: 21.79 KG/M2 | HEIGHT: 63 IN | RESPIRATION RATE: 18 BRPM | TEMPERATURE: 98.2 F | WEIGHT: 123 LBS | SYSTOLIC BLOOD PRESSURE: 126 MMHG

## 2018-03-02 DIAGNOSIS — I10 ESSENTIAL HYPERTENSION WITH GOAL BLOOD PRESSURE LESS THAN 130/80: Primary | ICD-10-CM

## 2018-03-02 DIAGNOSIS — R06.02 SOB (SHORTNESS OF BREATH): ICD-10-CM

## 2018-03-02 DIAGNOSIS — R07.89 ATYPICAL CHEST PAIN: ICD-10-CM

## 2018-03-02 DIAGNOSIS — J30.1 CHRONIC SEASONAL ALLERGIC RHINITIS DUE TO POLLEN: ICD-10-CM

## 2018-03-02 DIAGNOSIS — J44.9 CHRONIC OBSTRUCTIVE PULMONARY DISEASE, UNSPECIFIED COPD TYPE (H): ICD-10-CM

## 2018-03-02 DIAGNOSIS — I10 HYPERTENSION GOAL BP (BLOOD PRESSURE) < 130/80: ICD-10-CM

## 2018-03-02 PROCEDURE — 99214 OFFICE O/P EST MOD 30 MIN: CPT | Performed by: INTERNAL MEDICINE

## 2018-03-02 RX ORDER — TIOTROPIUM BROMIDE 18 UG/1
CAPSULE ORAL; RESPIRATORY (INHALATION)
Qty: 90 CAPSULE | Refills: 3 | Status: SHIPPED | OUTPATIENT
Start: 2018-03-02 | End: 2018-08-27

## 2018-03-02 RX ORDER — HYDRALAZINE HYDROCHLORIDE 50 MG/1
50 TABLET, FILM COATED ORAL 3 TIMES DAILY
Qty: 270 TABLET | Refills: 3 | Status: SHIPPED | OUTPATIENT
Start: 2018-03-02 | End: 2019-04-11

## 2018-03-02 RX ORDER — AMLODIPINE BESYLATE 5 MG/1
5 TABLET ORAL DAILY
Qty: 90 TABLET | Refills: 3 | Status: SHIPPED | OUTPATIENT
Start: 2018-03-02 | End: 2019-04-29

## 2018-03-02 RX ORDER — FUROSEMIDE 20 MG
40 TABLET ORAL DAILY
Qty: 180 TABLET | Refills: 3 | Status: SHIPPED | OUTPATIENT
Start: 2018-03-02 | End: 2019-05-28

## 2018-03-02 RX ORDER — LISINOPRIL 20 MG/1
20 TABLET ORAL 2 TIMES DAILY
Qty: 180 TABLET | Refills: 3 | Status: SHIPPED | OUTPATIENT
Start: 2018-03-02 | End: 2018-11-13

## 2018-03-02 RX ORDER — METOPROLOL TARTRATE 25 MG/1
25 TABLET, FILM COATED ORAL 2 TIMES DAILY
Qty: 180 TABLET | Refills: 3 | Status: SHIPPED | OUTPATIENT
Start: 2018-03-02 | End: 2019-04-02

## 2018-03-02 ASSESSMENT — PAIN SCALES - GENERAL: PAINLEVEL: NO PAIN (0)

## 2018-03-02 NOTE — MR AVS SNAPSHOT
After Visit Summary   3/2/2018    Shiloh Covarrubias    MRN: [id number]           Patient Information     Date Of Birth          11/3/1929        Visit Information        Provider Department      3/2/2018 11:30 AM Rajesh Vidal MD [facility]        Today's Diagnoses     Essential hypertension with goal blood pressure less than 130/80        Chronic obstructive pulmonary disease, unspecified COPD type (H)        Hypertension goal BP (blood pressure) < 130/80        SOB (shortness of breath)        Seasonal allergies           Follow-ups after your visit        Your next 10 appointments already scheduled     Mar 02, 2018 11:30 AM CST   [facility] Visit with Rajesh [facility]idal MD   [facility]    [address] [id number] [phone]           Bring a current list of meds and any records pertaining to this visit. For Physicals, please bring immunization records and any forms needing to be filled out. Please arrive 10 minutes early to complete paperwork.              Who to contact     If you have questions or need follow up information about today's clinic visit or your schedule please contact [facility] directly at [phone].  Normal or non-critical lab and imaging results will be communicated to you by MyChart, letter or phone within 4 business days after the clinic has received the results. If you do not hear from us within 7 days, please contact the clinic through Loccit (ML4D)hart or phone. If you have a critical or abnormal lab result, we will notify you by phone as soon as possible.  Submit refill requests through Pluto Media or call your pharmacy and they will forward the refill request to us. Please allow 3 business days for your refill to be completed.          Additional Information About Your Visit        MyChart Information     Pluto Media lets you send messages to your doctor, view your test results, renew your  "prescriptions, schedule appointments and more. To sign up, go to [email or web address] . Click on \"Log in\" on the left side of the screen, which will take you to the Welcome page. Then click on \"Sign up Now\" on the right side of the page.     You will be asked to enter the access code listed below, as well as some personal information. Please follow the directions to create your username and password.     Your access code is: [email or web address]  Expires: 3/22/2018  9:11 AM     Your access code will  in 90 days. If you need help or a new code, please call your [place] clinic or [phone].        Care EveryWhere ID     This is your Care EveryWhere ID. This could be used by other organizations to access your [place] medical records  [id number]        Your Vitals Were     Pulse Temperature Respirations Height Pulse Oximetry Breastfeeding?    64 98.2  F (36.8  C) (Temporal) 18 5' 3\" (1.6 m) 97% No    BMI (Body Mass Index)                   21.79 kg/m2            Blood Pressure from Last 3 Encounters:   18 126/68   17 164/84   17 130/86    Weight from Last 3 Encounters:   18 123 lb (55.8 kg)   17 123 lb (55.8 kg)   17 121 lb 6.4 oz (55.1 kg)              Today, you had the following     No orders found for display         Today's Medication Changes          These changes are accurate as of 3/2/18 11:24 AM.  If you have any questions, ask your nurse or doctor.               Stop taking these medicines if you haven't already. Please contact your care team if you have questions.     acyclovir 800 MG tablet   Commonly known as:  ZOVIRAX   Stopped by:  Rajesh Vidal MD                    Primary Care Provider Office Phone # Fax #    Rajesh Vidal [phone]       [facility] [address] [place] [address]        Equal Access to Services     [facility] Hospital[facility] AH: Gentry Fulton, adin olson, qaybta kaalmademetrius vasquezin " latha marcelajustina tammysam paredes ah. So [facility] [phone].    ATENCIÓN: Si manela giles, tiene a camacho disposición servicios gratuitos de asistencia lingüística. Criss sandhu [phone].    We comply with applicable federal civil rights laws and [place] laws. We do not discriminate on the basis of race, color, national origin, age, disability, sex, sexual orientation, or gender identity.            Thank you!     Thank you for choosing [facility]  for your care. Our goal is always to provide you with excellent care. Hearing back from our patients is one way we can continue to improve our services. Please take a few minutes to complete the written survey that you may receive in the mail after your visit with us. Thank you!             Your Updated Medication List - Protect others around you: Learn how to safely use, store and throw away your medicines at www.disposemymeds.org.          This list is accurate as of 3/2/18 11:24 AM.  Always use your most recent med list.                   Brand Name Dispense Instructions for use Diagnosis    amLODIPine 5 MG tablet    NORVASC    90 tablet    Take 1 tablet (5 mg) by mouth daily    Essential hypertension with goal blood pressure less than 130/80       aspirin 81 MG tablet      1 TABLET DAILY        EQ LORATADINE 10 MG tablet   Generic drug:  loratadine     90 tablet    TAKE ONE TABLET BY MOUTH ONCE DAILY    Seasonal allergies       FIBERCON PO      Take  by mouth. 1/2 tablet w/ evening meal        furosemide 20 MG tablet    LASIX    180 tablet    Take 2 tablets (40 mg) by mouth daily    SOB (shortness of breath)       hydrALAZINE 50 MG tablet    APRESOLINE    270 tablet    Take 1 tablet (50 mg) by mouth 3 times daily    Hypertension goal BP (blood pressure) < 130/80       lisinopril 20 MG tablet    PRINIVIL/ZESTRIL    180 tablet    Take 1 tablet (20 mg) by mouth 2 times daily    Essential hypertension with goal blood pressure less than 130/80       loperamide 2 MG  tablet    IMODIUM A-D    20 tablet    1/2 tab before meals    Chronic diarrhea       metoprolol tartrate 25 MG tablet    LOPRESSOR    180 tablet    Take 1 tablet (25 mg) by mouth 2 times daily    Hypertension goal BP (blood pressure) < 130/80       MULTI VITAMIN/MINERALS PO      Take 1 tablet by mouth daily.        tiotropium 18 MCG capsule    SPIRIVA HANDIHALER    90 capsule    INHALE ONE DOSE INTO LUNGS ONCE DAILY    Chronic obstructive pulmonary disease, unspecified COPD type (H)

## 2018-03-02 NOTE — NURSING NOTE
"Chief Complaint   Patient presents with     Recheck Medication     refills       Initial /68  Pulse 64  Temp 98.2  F (36.8  C) (Temporal)  Resp 18  Ht 5' 3\" (1.6 m)  Wt 123 lb (55.8 kg)  SpO2 97%  Breastfeeding? No  BMI 21.79 kg/m2 Estimated body mass index is 21.79 kg/(m^2) as calculated from the following:    Height as of this encounter: 5' 3\" (1.6 m).    Weight as of this encounter: 123 lb (55.8 kg).  Medication Reconciliation: complete    "

## 2018-03-02 NOTE — PROGRESS NOTES
Shiloh Covarrubias is a 88 year old female who presents with recheck of things.    Possible shingles is better, never broke out completely.    Needs some medication refills.     BP has been good at the dentist.    Questions on gluten and is she gluten sensitive. She is doing better now.      Taking alleve one a day for knees.      Buzzing in her left ear for years, gets worse at times.  Not noticed as much in the daytime.      Rare, occasional chest pain, sometimes doesn't last too long, sometimes over night.  Doesn't want a stress test.  Will come in if it doesn't go away.    Soles of her feet get numb, appears to be a neuropathy.      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     Unspecified asthma(493.90)      Unspecified intestinal obstruction 02/17/10    D/C 02/20/10     Current Outpatient Prescriptions   Medication     amLODIPine (NORVASC) 5 MG tablet     tiotropium (SPIRIVA HANDIHALER) 18 MCG capsule     metoprolol tartrate (LOPRESSOR) 25 MG tablet     lisinopril (PRINIVIL/ZESTRIL) 20 MG tablet     hydrALAZINE (APRESOLINE) 50 MG tablet     furosemide (LASIX) 20 MG tablet     EQ LORATADINE 10 MG tablet     [DISCONTINUED] amLODIPine (NORVASC) 5 MG tablet     loperamide (IMODIUM A-D) 2 MG tablet     [DISCONTINUED] tiotropium (SPIRIVA HANDIHALER) 18 MCG capsule     [DISCONTINUED] metoprolol (LOPRESSOR) 25 MG tablet     [DISCONTINUED] lisinopril (PRINIVIL/ZESTRIL) 20 MG tablet     [DISCONTINUED] hydrALAZINE (APRESOLINE) 50 MG tablet     [DISCONTINUED] furosemide (LASIX) 20 MG tablet     [DISCONTINUED] cloNIDine (CATAPRES) 0.2 MG tablet     Multiple Vitamins-Minerals (MULTI VITAMIN/MINERALS PO)     Calcium Polycarbophil (FIBERCON PO)     ASPIRIN 81 MG OR TABS     No current facility-administered medications for this visit.      Social History   Substance Use Topics     Smoking status: Never Smoker     Smokeless tobacco:  "Never Used     Alcohol use 0.0 oz/week     0 Standard drinks or equivalent per week      Comment: 3 per  year     Review of Systems  Constitutional-No fevers, chills, or weight changes..  ENT-No earpain, sore throat, voice changes or rhinitis.  Cardiac-No chest pain or palpitations.  Respiratory-No cough, sob, or hemoptysis.  GI-No nausea, vomitting, diarrhea, constipation, or blood in the stool.  Musculoskeletal-some pain in the bottom of her feet.    Physical Exam  /68  Pulse 64  Temp 98.2  F (36.8  C) (Temporal)  Resp 18  Ht 5' 3\" (1.6 m)  Wt 123 lb (55.8 kg)  SpO2 97%  Breastfeeding? No  BMI 21.79 kg/m2  General Appearance-healthy, alert, no distress  Cardiac-regular rate and rhythm  with normal S1, S2 ; no murmur, rub or gallops  Lungs-clear to auscultation  Extremities-no peripheral edema, peripheral pulses normal    ASSESSMENT:  88-year-old woman who has history of hypertension, COPD, chronic rhinitis.  She lives on her own and drives.  She does have some atypical chest pain we discussed this.  Seems to come on with stress maybe goes away with rest.  Does not come on with exertion.  I recommended a stress test for her but she does not want this.  We discussed that if her pain comes on it does not resolve or go away in 10 minutes that she need to come to the emergency room and she agreed to do this.    Hypertension-her blood pressure is well controlled at 126/68, we will renew her other medications labs were done in September.    COPD-the patient is doing very well with her breathing, she does take her inhalers.  We discussed a chest x-ray but since her exam is normal she does not need one today.    Foot pain is appears to be a mild tingling sensation she does not have diabetes we discussed other workup which she does not want.  She can try vitamin D supplement to help.    Electronically signed by Rajesh Vidal MD    .      "

## 2018-05-18 DIAGNOSIS — J44.9 CHRONIC OBSTRUCTIVE PULMONARY DISEASE, UNSPECIFIED COPD TYPE (H): ICD-10-CM

## 2018-05-18 NOTE — TELEPHONE ENCOUNTER
"Last Written Prescription Date:  3/2/18  Last Fill Quantity: 90,  # refills: 3   Last office visit: with prescribing provider:  3/2/2018    Future Office Visit:    Requested Prescriptions   Pending Prescriptions Disp Refills     SPIRIVA HANDIHALER 18 MCG capsule [Pharmacy Med Name: SPIRIVA HANDIHLR 18MCG CAP] 30 capsule 11     Sig: INHALE ONE DOSE BY MOUTH ONCE DAILY    Asthma Maintenance Inhalers - Anticholinergics Failed    5/18/2018  8:04 AM       Failed - Asthma control assessment score within normal limits in last 6 months    Please review ACT score.          Passed - Patient is age 12 years or older       Passed - Recent (6 mo) or future (30 days) visit within the authorizing provider's specialty    Patient had office visit in the last 6 months or has a visit in the next 30 days with authorizing provider or within the authorizing provider's specialty.  See \"Patient Info\" tab in inbasket, or \"Choose Columns\" in Meds & Orders section of the refill encounter.            Mary Kate Sullivan MA  "

## 2018-05-21 RX ORDER — TIOTROPIUM BROMIDE 18 UG/1
CAPSULE ORAL; RESPIRATORY (INHALATION)
Qty: 30 CAPSULE | Refills: 8 | Status: SHIPPED | OUTPATIENT
Start: 2018-05-21 | End: 2019-01-22

## 2018-05-21 NOTE — TELEPHONE ENCOUNTER
Prescription approved per [facility] Refill Protocol...........MONY Suresh

## 2018-08-27 ENCOUNTER — OFFICE VISIT (OUTPATIENT)
Dept: INTERNAL MEDICINE | Facility: CLINIC | Age: 83
End: 2018-08-27
Payer: COMMERCIAL

## 2018-08-27 VITALS
HEART RATE: 50 BPM | TEMPERATURE: 97.6 F | WEIGHT: 111 LBS | BODY MASS INDEX: 19.66 KG/M2 | RESPIRATION RATE: 16 BRPM | OXYGEN SATURATION: 98 % | DIASTOLIC BLOOD PRESSURE: 66 MMHG | SYSTOLIC BLOOD PRESSURE: 138 MMHG

## 2018-08-27 DIAGNOSIS — R63.4 LOSS OF WEIGHT: ICD-10-CM

## 2018-08-27 DIAGNOSIS — I10 ESSENTIAL HYPERTENSION WITH GOAL BLOOD PRESSURE LESS THAN 130/80: Primary | ICD-10-CM

## 2018-08-27 DIAGNOSIS — R73.9 ELEVATED BLOOD SUGAR: ICD-10-CM

## 2018-08-27 DIAGNOSIS — R19.7 DIARRHEA, UNSPECIFIED TYPE: ICD-10-CM

## 2018-08-27 DIAGNOSIS — M62.81 GENERALIZED MUSCLE WEAKNESS: ICD-10-CM

## 2018-08-27 LAB
ALBUMIN SERPL-MCNC: 3.7 G/DL (ref 3.4–5)
ALP SERPL-CCNC: 53 U/L (ref 40–150)
ALT SERPL W P-5'-P-CCNC: 20 U/L (ref 0–50)
ANION GAP SERPL CALCULATED.3IONS-SCNC: 9 MMOL/L (ref 3–14)
AST SERPL W P-5'-P-CCNC: 24 U/L (ref 0–45)
BILIRUB SERPL-MCNC: 0.5 MG/DL (ref 0.2–1.3)
BUN SERPL-MCNC: 39 MG/DL (ref 7–30)
CALCIUM SERPL-MCNC: 9.2 MG/DL (ref 8.5–10.1)
CHLORIDE SERPL-SCNC: 102 MMOL/L (ref 94–109)
CO2 SERPL-SCNC: 28 MMOL/L (ref 20–32)
CREAT SERPL-MCNC: 1.45 MG/DL (ref 0.52–1.04)
ERYTHROCYTE [DISTWIDTH] IN BLOOD BY AUTOMATED COUNT: 13.2 % (ref 10–15)
GFR SERPL CREATININE-BSD FRML MDRD: 34 ML/MIN/1.7M2
GLUCOSE SERPL-MCNC: 147 MG/DL (ref 70–99)
HBA1C MFR BLD: 5.3 % (ref 0–5.6)
HCT VFR BLD AUTO: 39.6 % (ref 35–47)
HGB BLD-MCNC: 13 G/DL (ref 11.7–15.7)
MAGNESIUM SERPL-MCNC: 2 MG/DL (ref 1.6–2.3)
MCH RBC QN AUTO: 31.2 PG (ref 26.5–33)
MCHC RBC AUTO-ENTMCNC: 32.8 G/DL (ref 31.5–36.5)
MCV RBC AUTO: 95 FL (ref 78–100)
PLATELET # BLD AUTO: 172 10E9/L (ref 150–450)
POTASSIUM SERPL-SCNC: 4.4 MMOL/L (ref 3.4–5.3)
PROT SERPL-MCNC: 7.2 G/DL (ref 6.8–8.8)
RBC # BLD AUTO: 4.17 10E12/L (ref 3.8–5.2)
SODIUM SERPL-SCNC: 139 MMOL/L (ref 133–144)
TSH SERPL DL<=0.005 MIU/L-ACNC: 1.94 MU/L (ref 0.4–4)
VIT B12 SERPL-MCNC: 358 PG/ML (ref 193–986)
WBC # BLD AUTO: 8 10E9/L (ref 4–11)

## 2018-08-27 PROCEDURE — 83036 HEMOGLOBIN GLYCOSYLATED A1C: CPT | Performed by: INTERNAL MEDICINE

## 2018-08-27 PROCEDURE — 80053 COMPREHEN METABOLIC PANEL: CPT | Performed by: INTERNAL MEDICINE

## 2018-08-27 PROCEDURE — 82607 VITAMIN B-12: CPT | Performed by: INTERNAL MEDICINE

## 2018-08-27 PROCEDURE — 99214 OFFICE O/P EST MOD 30 MIN: CPT | Performed by: INTERNAL MEDICINE

## 2018-08-27 PROCEDURE — 36415 COLL VENOUS BLD VENIPUNCTURE: CPT | Performed by: INTERNAL MEDICINE

## 2018-08-27 PROCEDURE — 85027 COMPLETE CBC AUTOMATED: CPT | Performed by: INTERNAL MEDICINE

## 2018-08-27 PROCEDURE — 84443 ASSAY THYROID STIM HORMONE: CPT | Performed by: INTERNAL MEDICINE

## 2018-08-27 PROCEDURE — 83735 ASSAY OF MAGNESIUM: CPT | Performed by: INTERNAL MEDICINE

## 2018-08-27 ASSESSMENT — PAIN SCALES - GENERAL: PAINLEVEL: NO PAIN (0)

## 2018-08-27 NOTE — MR AVS SNAPSHOT
After Visit Summary   8/27/2018    Shiloh Covarrubias    MRN: [id number]           Patient Information     Date Of Birth          11/3/1929        Visit Information        Provider Department      8/27/2018 9:15 AM Rajesh Vidal MD [facility]         Follow-ups after your visit        Who to contact     If you have questions or need follow up information about today's clinic visit or your schedule please contact [facility] directly at [phone].  Normal or non-critical lab and imaging results will be communicated to you by MyChart, letter or phone within 4 business days after the clinic has received the results. If you do not hear from us within 7 days, please contact the clinic through MyChart or phone. If you have a critical or abnormal lab result, we will notify you by phone as soon as possible.  Submit refill requests through [employer] or call your pharmacy and they will forward the refill request to us. Please allow 3 business days for your refill to be completed.          Additional Information About Your Visit        Care EveryWhere ID     This is your Care EveryWhere ID. This could be used by other organizations to access your [facility] medical records  [id number]        Your Vitals Were     Pulse Temperature Respirations Pulse Oximetry BMI (Body Mass Index)       50 97.6  F (36.4  C) (Temporal) 16 98% 19.66 kg/m2        Blood Pressure from Last 3 Encounters:   08/27/18 138/66   03/02/18 126/68   12/22/17 164/84    Weight from Last 3 Encounters:   08/27/18 111 lb (50.3 kg)   03/02/18 123 lb (55.8 kg)   12/22/17 123 lb (55.8 kg)              Today, you had the following     No orders found for display       Primary Care Provider Office Phone # Fax #    Rajesh Vidal [phone] [address] [facility] [address]        Equal Access to Services     BALBINA WALLACE : Gentry Fulton, adin olson, russell grimes  demetrius hensleyjustina baltazaraan ah. So [facility] [phone].    ATENCIÓN: Si manela giles, tiene a camacho disposición servicios gratuitos de asistencia lingüística. Criss al [phone].    We comply with applicable federal civil rights laws and [place] laws. We do not discriminate on the basis of race, color, national origin, age, disability, sex, sexual orientation, or gender identity.            Thank you!     Thank you for choosing [facility]  for your care. Our goal is always to provide you with excellent care. Hearing back from our patients is one way we can continue to improve our services. Please take a few minutes to complete the written survey that you may receive in the mail after your visit with us. Thank you!             Your Updated Medication List - Protect others around you: Learn how to safely use, store and throw away your medicines at www.disposemymeds.org.          This list is accurate as of 8/27/18  9:18 AM.  Always use your most recent med list.                   Brand Name Dispense Instructions for use Diagnosis    amLODIPine 5 MG tablet    NORVASC    90 tablet    Take 1 tablet (5 mg) by mouth daily    Essential hypertension with goal blood pressure less than 130/80       aspirin 81 MG tablet      1 TABLET DAILY        EQ LORATADINE 10 MG tablet   Generic drug:  loratadine     90 tablet    Take 1 tablet (10 mg) by mouth daily    Chronic seasonal allergic rhinitis due to pollen       FIBERCON PO      Take  by mouth. 1/2 tablet w/ evening meal        furosemide 20 MG tablet    LASIX    180 tablet    Take 2 tablets (40 mg) by mouth daily    SOB (shortness of breath)       hydrALAZINE 50 MG tablet    APRESOLINE    270 tablet    Take 1 tablet (50 mg) by mouth 3 times daily    Hypertension goal BP (blood pressure) < 130/80       lisinopril 20 MG tablet    PRINIVIL/ZESTRIL    180 tablet    Take 1 tablet (20 mg) by mouth 2 times daily    Essential hypertension with goal  blood pressure less than 130/80       loperamide 2 MG tablet    IMODIUM A-D    20 tablet    1/2 tab before meals    Chronic diarrhea       metoprolol tartrate 25 MG tablet    LOPRESSOR    180 tablet    Take 1 tablet (25 mg) by mouth 2 times daily    Hypertension goal BP (blood pressure) < 130/80       MULTI VITAMIN/MINERALS PO      Take 1 tablet by mouth daily.        SPIRIVA HANDIHALER 18 MCG capsule   Generic drug:  tiotropium     30 capsule    INHALE ONE DOSE BY MOUTH ONCE DAILY    Chronic obstructive pulmonary disease, unspecified COPD type (H)

## 2018-08-27 NOTE — PROGRESS NOTES
SUBJECTIVE:   Shiloh Covarrubias is a 88 year old female who presents to clinic today for the following health issues:    Chief Complaint   Patient presents with     Hypertension     follow up     Fatigue     discuss episodes of weakness      Living on her own, house near her son's property.  She is still driving.     Lost 12 pounds.  She gets diarrhea from some foods and takes a few days to recover, can't figure out what causes it.  No vomiting, no food sticking.     Breakfast is a piece of toast.  Lunch meat and potatoe  Supper soup  Snacks of chips, peanuts.     She is getting weaker and hard to walk when shopping, hard to walk over 200 feet if not feeling.      Some numbness in her left hand and feet.     No headaches,   Some heartburn but liquid medication helps.     Doing yoga and working on her balance.    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     Unspecified asthma(493.90)      Unspecified intestinal obstruction 02/17/10    D/C 02/20/10     Current Outpatient Prescriptions   Medication     amLODIPine (NORVASC) 5 MG tablet     ASPIRIN 81 MG OR TABS     Calcium Polycarbophil (FIBERCON PO)     EQ LORATADINE 10 MG tablet     furosemide (LASIX) 20 MG tablet     hydrALAZINE (APRESOLINE) 50 MG tablet     lisinopril (PRINIVIL/ZESTRIL) 20 MG tablet     loperamide (IMODIUM A-D) 2 MG tablet     metoprolol tartrate (LOPRESSOR) 25 MG tablet     Multiple Vitamins-Minerals (MULTI VITAMIN/MINERALS PO)     SPIRIVA HANDIHALER 18 MCG capsule     [DISCONTINUED] cloNIDine (CATAPRES) 0.2 MG tablet     [DISCONTINUED] tiotropium (SPIRIVA HANDIHALER) 18 MCG capsule     No current facility-administered medications for this visit.      Social History   Substance Use Topics     Smoking status: Never Smoker     Smokeless tobacco: Never Used     Alcohol use 0.0 oz/week     0 Standard drinks or equivalent per week      Comment: 3 per  year      Review of Systems  Constitutional-Weight loss.  Cardiac-No chest pain or palpitations.  Respiratory-No cough, sob, or hemoptysis.  GI-No nausea, vomitting, diarrhea, constipation, or blood in the stool.  Musculoskeletal-No muscles aches or joint pains.    Physical Exam  /66  Pulse 50  Temp 97.6  F (36.4  C) (Temporal)  Resp 16  Wt 111 lb (50.3 kg)  SpO2 98%  BMI 19.66 kg/m2  General Appearance-healthy, alert, no distress  Cardiac-regular rate and rhythm  with normal S1, S2 ; no murmur, rub or gallops  Lungs-clear to auscultation  GI-Soft, nontender.  Normal bowel sounds.  No hepatosplenomegaly or abnormal masses  Extremities-1+ pitting edema and positive pulses.      ASSESSMENT:  BP has been good, htn is controlled, continue her 4 medications.  We will check her labs and update prescription as needed.    Weakness and loss of weight, down 12 pounds.  This makes her extremely weak.  She does not seem to have a food occurrence for correlation.  I did recommend she take an Ensure or boost at least once a day in between meals, add a protein source like cheese stick or egg to her morning piece of toast.    Patient is having some generalized weakness, we will check her B12, CBC, TSH.  I will give her a handicap sticker.  If her weakness and weight are not improving in a month she will be following up with us and will have to do more testing.  Patient has some diarrhea but does not know what from, this happens rarely it sounds like.      Electronically signed by Rajesh Vidal MD

## 2018-09-17 ENCOUNTER — TELEPHONE (OUTPATIENT)
Dept: FAMILY MEDICINE | Facility: CLINIC | Age: 83
End: 2018-09-17

## 2018-09-17 DIAGNOSIS — R63.4 LOSS OF WEIGHT: Primary | ICD-10-CM

## 2018-09-17 NOTE — TELEPHONE ENCOUNTER
Reason for Call:  Same Day Appointment, Requested Provider:  Rajesh Vidal MD    PCP: Rajesh Vidal    Reason for visit: Patient stated that she has been losing weight in the last month and isn't trying to. She would like to see Dr. Vidal soon but dosen't want to wait until 10/2/18 when Dr. Vidal has his next opening.     Duration of symptoms: about a month     Have you been treated for this in the past? Yes    Additional comments: not at this time    Can we leave a detailed message on this number? YES    Phone number patient can be reached at: Home number on file [phone] (home)    Best Time: any    Call taken on 9/17/2018 at 1:00 PM by Leilani Chaudhry

## 2018-09-17 NOTE — TELEPHONE ENCOUNTER
If she has been doing Ensure daily and still losing weight then I want her to get a chest/ab/pelvis ct and then see me after it is done.

## 2018-09-17 NOTE — TELEPHONE ENCOUNTER
Phone # busy.    Scheduled to see/for CT .  Date 9/18/18 Time 10:30 arrival, 11:00 scan Location [facility].   NPO 2 hrs prior.

## 2018-09-18 ENCOUNTER — APPOINTMENT (OUTPATIENT)
Dept: LAB | Facility: CLINIC | Age: 83
End: 2018-09-18
Payer: MEDICARE

## 2018-09-18 ENCOUNTER — HOSPITAL ENCOUNTER (OUTPATIENT)
Dept: CT IMAGING | Facility: CLINIC | Age: 83
Discharge: HOME | End: 2018-09-18
Attending: INTERNAL MEDICINE | Admitting: INTERNAL MEDICINE
Payer: MEDICARE

## 2018-09-18 DIAGNOSIS — R63.4 LOSS OF WEIGHT: ICD-10-CM

## 2018-09-18 LAB
CREAT SERPL-MCNC: 1.36 MG/DL (ref 0.52–1.04)
GFR SERPL CREATININE-BSD FRML MDRD: 37 ML/MIN/1.7M2

## 2018-09-18 PROCEDURE — 82565 ASSAY OF CREATININE: CPT | Performed by: RADIOLOGY

## 2018-09-18 PROCEDURE — 71250 CT THORAX DX C-: CPT

## 2018-09-18 PROCEDURE — 36415 COLL VENOUS BLD VENIPUNCTURE: CPT | Performed by: RADIOLOGY

## 2018-09-21 ENCOUNTER — OFFICE VISIT (OUTPATIENT)
Dept: INTERNAL MEDICINE | Facility: CLINIC | Age: 83
End: 2018-09-21
Payer: COMMERCIAL

## 2018-09-21 VITALS
HEART RATE: 66 BPM | OXYGEN SATURATION: 99 % | DIASTOLIC BLOOD PRESSURE: 76 MMHG | RESPIRATION RATE: 16 BRPM | TEMPERATURE: 97.2 F | BODY MASS INDEX: 19.49 KG/M2 | WEIGHT: 110 LBS | SYSTOLIC BLOOD PRESSURE: 152 MMHG

## 2018-09-21 DIAGNOSIS — R06.02 SOB (SHORTNESS OF BREATH): Primary | ICD-10-CM

## 2018-09-21 DIAGNOSIS — I10 ESSENTIAL HYPERTENSION WITH GOAL BLOOD PRESSURE LESS THAN 130/80: ICD-10-CM

## 2018-09-21 DIAGNOSIS — R63.4 LOSS OF WEIGHT: ICD-10-CM

## 2018-09-21 PROCEDURE — 99214 OFFICE O/P EST MOD 30 MIN: CPT | Performed by: INTERNAL MEDICINE

## 2018-09-21 ASSESSMENT — PAIN SCALES - GENERAL: PAINLEVEL: NO PAIN (0)

## 2018-09-21 NOTE — MR AVS SNAPSHOT
After Visit Summary   9/21/2018    Shiloh Covarrubias    MRN: [id number]           Patient Information     Date Of Birth          11/3/1929        Visit Information        Provider Department      9/21/2018 2:45 PM Rajesh Vidal MD [facility]        Today's Diagnoses     SOB (shortness of breath)    -  1    Loss of weight        Essential hypertension with goal blood pressure less than 130/80           Follow-ups after your visit        Additional Services     NUTRITION REFERRAL       Your provider has referred you to: [facility]: [facility] [phone]   [email or web address]    Please be aware that coverage of these services is subject to the terms and limitations of your health insurance plan.  Call member services at your health plan with any benefit or coverage questions.      Please bring the following with you to your appointment:    (1) This referral request  (2) Any documents given to you regarding this referral  (3) Any specific questions you have about diet and/or food choices                  Your next 10 appointments already scheduled     Oct 09, 2018  9:00 AM CDT   (Arrive by 8:30 AM)   NM MPI WITH LEXISCAN with PHNM1, NL STRESS TEST, PMC RM1   [facility] ([facility])    [address] [place] [id number] [phone]           How do I prepare for my exam? (Food and drink instructions) Day 1 & Day 2: Stop all caffeine 12 hours before the test. This includes coffee, tea, soda pop, chocolate and certain medicines (such as Anacin, Excedrin and NoDoz). Also avoid decaf coffee and tea, as these contain small amounts of caffeine. Stop eating 4 hours before the test. You may drink water.  How do I prepare for my exam? (Other instructions) You may need to stop some medicines before the test. Follow your doctor s orders. Day 1 & Day 2: *If you take a beta blocker: Do not take your  beta-blocker on the day before your test, unless specifically told to by your doctor. And do not take it on the day of your test. Bring it with you to take after the test.  *If you take Aggrenox or dipyridamole (Persantine, Permole), stop taking it 48 hours before your test. *If you take Viagra, Cialis or Levitra, stop taking it 48 hours before your test. *If you take theophylline or aminophylline, stop taking it 12 hours before your test.  For patients with diabetes: *If you take insulin, call your diabetes care team. Ask if you should take a 1/2 dose the morning of your test. *If you take diabetes medicine by mouth, don t take it on the morning of your test. Bring it with you to take after the test. (If you have questions, call your diabetes care team.)  *Do not take nitrates on the day of your test. Do not wear your Nitro-Patch. *No alcohol, smoking or other tobacco for 12 hours before the test.  What should I wear: Please wear a loose two-piece outfit. If you will have an exercise test, bring rubber-soled walking shoes.  How long does the exam take: *This test can take 1-2 days.* ONE day exam: Allow 3-4 hours for test. IF TWO day exam: Allow 30-60 minutes PER day for test.  What should I bring: Please bring a list of your medicines (including vitamins, minerals and over-the-counter drugs). Leave your valuables at home.  Do I need a :  No  is needed.  What do I need to tell my doctor? When you arrive, please tell us if you: * Have diabetes * Are breastfeeding * May be pregnant * Have a pacemaker of ICD (implantable defibrillator).  What should I do after the exam: No restrictions, You may resume normal activities.  What is this test: Your doctor has ordered a nuclear stress test to check how well blood is flowing through your heart. You will either exercise or take a medicine that mimics exercise; we will watch your heart.  Who should I call with questions: If you have any questions, please call the  Imaging Department where you will have your exam. Directions, parking instructions, and other information is available on our website, [email or web address].              Future tests that were ordered for you today     Open Future Orders        Priority Expected Expires Ordered    NM Lexiscan stress test Routine  10/4/2019 10/4/2018            Who to contact     If you have questions or need follow up information about today's clinic visit or your schedule please contact [facility] directly at [phone].  Normal or non-critical lab and imaging results will be communicated to you by MyChart, letter or phone within 4 business days after the clinic has received the results. If you do not hear from us within 7 days, please contact the clinic through MyChart or phone. If you have a critical or abnormal lab result, we will notify you by phone as soon as possible.  Submit refill requests through [employer] or call your pharmacy and they will forward the refill request to us. Please allow 3 business days for your refill to be completed.          Additional Information About Your Visit        Care EveryWhere ID     This is your Care EveryWhere ID. This could be used by other organizations to access your [facility] medical records  [id number]        Your Vitals Were     Pulse Temperature Respirations Pulse Oximetry BMI (Body Mass Index)       66 97.2  F (36.2  C) (Temporal) 16 99% 19.49 kg/m2        Blood Pressure from Last 3 Encounters:   09/21/18 152/76   08/27/18 138/66   03/02/18 126/68    Weight from Last 3 Encounters:   09/21/18 110 lb (49.9 kg)   08/27/18 111 lb (50.3 kg)   03/02/18 123 lb (55.8 kg)              We Performed the Following     NUTRITION REFERRAL        Primary Care Provider Office Phone # Fax #    Rajesh Vidal [phone]       [address] [facility] [address]        Equal Access to Services     BALBINA WALLACE : adin Soliz,  demetrius baxter ah. So [facility] [phone].    ATENCIÓN: Si ed skaggs, tiene a camacho disposición servicios gratuitos de asistencia lingüística. Criss al [phone].    We comply with applicable federal civil rights laws and [place] laws. We do not discriminate on the basis of race, color, national origin, age, disability, sex, sexual orientation, or gender identity.            Thank you!     Thank you for choosing [facility]  for your care. Our goal is always to provide you with excellent care. Hearing back from our patients is one way we can continue to improve our services. Please take a few minutes to complete the written survey that you may receive in the mail after your visit with us. Thank you!             Your Updated Medication List - Protect others around you: Learn how to safely use, store and throw away your medicines at www.disposemymeds.org.          This list is accurate as of 9/21/18 11:59 PM.  Always use your most recent med list.                   Brand Name Dispense Instructions for use Diagnosis    amLODIPine 5 MG tablet    NORVASC    90 tablet    Take 1 tablet (5 mg) by mouth daily    Essential hypertension with goal blood pressure less than 130/80       aspirin 81 MG tablet      1 TABLET DAILY        EQ LORATADINE 10 MG tablet   Generic drug:  loratadine     90 tablet    Take 1 tablet (10 mg) by mouth daily    Chronic seasonal allergic rhinitis due to pollen       FIBERCON PO      Take  by mouth. 1/2 tablet w/ evening meal        furosemide 20 MG tablet    LASIX    180 tablet    Take 2 tablets (40 mg) by mouth daily    SOB (shortness of breath)       hydrALAZINE 50 MG tablet    APRESOLINE    270 tablet    Take 1 tablet (50 mg) by mouth 3 times daily    Hypertension goal BP (blood pressure) < 130/80       lisinopril 20 MG tablet    PRINIVIL/ZESTRIL    180 tablet    Take 1 tablet (20 mg) by mouth 2 times daily    Essential  hypertension with goal blood pressure less than 130/80       loperamide 2 MG tablet    IMODIUM A-D    20 tablet    1/2 tab before meals    Chronic diarrhea       metoprolol tartrate 25 MG tablet    LOPRESSOR    180 tablet    Take 1 tablet (25 mg) by mouth 2 times daily    Hypertension goal BP (blood pressure) < 130/80       MULTI VITAMIN/MINERALS PO      Take 1 tablet by mouth daily.        SPIRIVA HANDIHALER 18 MCG capsule   Generic drug:  tiotropium     30 capsule    INHALE ONE DOSE BY MOUTH ONCE DAILY    Chronic obstructive pulmonary disease, unspecified COPD type (H)

## 2018-09-21 NOTE — PROGRESS NOTES
SUBJECTIVE:   Shiloh Covarrubias is a 88 year old female who presents to clinic today for the following health issues:      Chief Complaint   Patient presents with     Weight Loss     Results     CT       Patient comes in here with her son.    She has lost some weight but now is stabilizing.    They would like to discuss her ct results.    She does have some dyspnea and maybe sob limiting her activity.    Denies chest pains.    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     Unspecified asthma(493.90)      Unspecified intestinal obstruction 02/17/10    D/C 02/20/10     Current Outpatient Prescriptions   Medication     amLODIPine (NORVASC) 5 MG tablet     ASPIRIN 81 MG OR TABS     Calcium Polycarbophil (FIBERCON PO)     EQ LORATADINE 10 MG tablet     furosemide (LASIX) 20 MG tablet     hydrALAZINE (APRESOLINE) 50 MG tablet     lisinopril (PRINIVIL/ZESTRIL) 20 MG tablet     loperamide (IMODIUM A-D) 2 MG tablet     metoprolol tartrate (LOPRESSOR) 25 MG tablet     Multiple Vitamins-Minerals (MULTI VITAMIN/MINERALS PO)     SPIRIVA HANDIHALER 18 MCG capsule     [DISCONTINUED] cloNIDine (CATAPRES) 0.2 MG tablet     No current facility-administered medications for this visit.      Social History   Substance Use Topics     Smoking status: Never Smoker     Smokeless tobacco: Never Used     Alcohol use 0.0 oz/week     0 Standard drinks or equivalent per week      Comment: 3 per  year     Physical Exam  /76  Pulse 66  Temp 97.2  F (36.2  C) (Temporal)  Resp 16  Wt 110 lb (49.9 kg)  SpO2 99%  BMI 19.49 kg/m2  General Appearance-healthy, alert, no distress  Extremities-1+ edema in her ankles, better then normal for her.        ASSESSMENT:     88-year-old woman who is here with her son.  She actually lives with her son and his family.  She is very independent and active.  She has lost some weight recently.  We did do a lab  workup which was negative.  She had a abdominal chest pelvic CT which showed some increased atherosclerosis but no other tumors or masses.  She does get shortness of breath with activity which could be many causes but with the increased atherosclerosis we will set her up for a stress echocardiogram.  Patient agrees with this.    She also has some depression since her  passed away which was years ago.  She is trying to eat more high-calorie foods but she does get upset stomach with certain foods and at certain times.  We will set her up with a nutritionist to help review this.  Patient will continue to monitor her weight.  She does not want to be on an antidepressant she continues to be active and talk to people and has her family around her.    I spent greater than 50% of this 30 minute visit in counseling and coordination of care of weight loss and dyspnea and depression    Electronically signed by Rajesh Vidal MD  .

## 2018-10-04 ENCOUNTER — TELEPHONE (OUTPATIENT)
Dept: INTERNAL MEDICINE | Facility: CLINIC | Age: 83
End: 2018-10-04

## 2018-10-04 DIAGNOSIS — R06.02 SOB (SHORTNESS OF BREATH): Primary | ICD-10-CM

## 2018-10-04 NOTE — TELEPHONE ENCOUNTER
An ekg doesn't show much, just her heart those seconds when lying there at rest.    Could she do an adenosine nuclear study?  No walking for that.

## 2018-10-04 NOTE — TELEPHONE ENCOUNTER
Reason for Call:  Other call back    Detailed comments: pt would like to know if instead of the stress test, which she could not complete because of back pain, she could have an EKG     Phone Number Patient can be reached at: Home number on file [phone] (home)    Best Time: anytime    Can we leave a detailed message on this number? YES    Call taken on 10/4/2018 at 10:57 AM by Lois Gallegos

## 2018-10-09 ENCOUNTER — HOSPITAL ENCOUNTER (OUTPATIENT)
Dept: NUCLEAR MEDICINE | Facility: CLINIC | Age: 83
Setting detail: NUCLEAR MEDICINE
Discharge: HOME | End: 2018-10-09
Attending: INTERNAL MEDICINE | Admitting: INTERNAL MEDICINE
Payer: MEDICARE

## 2018-10-09 DIAGNOSIS — R06.02 SOB (SHORTNESS OF BREATH): ICD-10-CM

## 2018-10-09 PROCEDURE — 93018 CV STRESS TEST I&R ONLY: CPT | Performed by: INTERNAL MEDICINE

## 2018-10-09 PROCEDURE — 93017 CV STRESS TEST TRACING ONLY: CPT

## 2018-10-09 PROCEDURE — 78452 HT MUSCLE IMAGE SPECT MULT: CPT

## 2018-10-09 PROCEDURE — 25000128 H RX IP 250 OP 636: Performed by: INTERNAL MEDICINE

## 2018-10-09 PROCEDURE — 78452 HT MUSCLE IMAGE SPECT MULT: CPT | Mod: 26 | Performed by: INTERNAL MEDICINE

## 2018-10-09 PROCEDURE — A9502 TC99M TETROFOSMIN: HCPCS | Performed by: INTERNAL MEDICINE

## 2018-10-09 PROCEDURE — 93016 CV STRESS TEST SUPVJ ONLY: CPT | Performed by: INTERNAL MEDICINE

## 2018-10-09 PROCEDURE — 34300033 ZZH RX 343: Performed by: INTERNAL MEDICINE

## 2018-10-09 RX ORDER — REGADENOSON 0.08 MG/ML
0.4 INJECTION, SOLUTION INTRAVENOUS ONCE
Status: COMPLETED | OUTPATIENT
Start: 2018-10-09 | End: 2018-10-09

## 2018-10-09 RX ADMIN — TETROFOSMIN 31.4 MCI.: 1.38 INJECTION, POWDER, LYOPHILIZED, FOR SOLUTION INTRAVENOUS at 10:42

## 2018-10-09 RX ADMIN — REGADENOSON 0.4 MG: 0.08 INJECTION, SOLUTION INTRAVENOUS at 10:44

## 2018-10-09 RX ADMIN — TETROFOSMIN 10.4 MCI.: 1.38 INJECTION, POWDER, LYOPHILIZED, FOR SOLUTION INTRAVENOUS at 09:00

## 2018-11-13 ENCOUNTER — OFFICE VISIT (OUTPATIENT)
Dept: INTERNAL MEDICINE | Facility: CLINIC | Age: 83
End: 2018-11-13
Payer: COMMERCIAL

## 2018-11-13 VITALS
TEMPERATURE: 97.2 F | SYSTOLIC BLOOD PRESSURE: 144 MMHG | HEART RATE: 52 BPM | BODY MASS INDEX: 20.27 KG/M2 | RESPIRATION RATE: 18 BRPM | OXYGEN SATURATION: 98 % | DIASTOLIC BLOOD PRESSURE: 50 MMHG | WEIGHT: 114.4 LBS

## 2018-11-13 DIAGNOSIS — J44.9 CHRONIC OBSTRUCTIVE PULMONARY DISEASE, UNSPECIFIED COPD TYPE (H): ICD-10-CM

## 2018-11-13 DIAGNOSIS — R06.09 DOE (DYSPNEA ON EXERTION): Primary | ICD-10-CM

## 2018-11-13 DIAGNOSIS — I10 ESSENTIAL HYPERTENSION WITH GOAL BLOOD PRESSURE LESS THAN 130/80: ICD-10-CM

## 2018-11-13 DIAGNOSIS — J30.1 CHRONIC SEASONAL ALLERGIC RHINITIS DUE TO POLLEN: ICD-10-CM

## 2018-11-13 DIAGNOSIS — G56.02 CARPAL TUNNEL SYNDROME OF LEFT WRIST: ICD-10-CM

## 2018-11-13 PROCEDURE — 99214 OFFICE O/P EST MOD 30 MIN: CPT | Performed by: INTERNAL MEDICINE

## 2018-11-13 RX ORDER — ALBUTEROL SULFATE 90 UG/1
2 AEROSOL, METERED RESPIRATORY (INHALATION) EVERY 6 HOURS PRN
Qty: 1 INHALER | Refills: 3 | Status: SHIPPED | OUTPATIENT
Start: 2018-11-13 | End: 2020-11-16

## 2018-11-13 RX ORDER — LISINOPRIL 20 MG/1
20 TABLET ORAL 2 TIMES DAILY
Qty: 180 TABLET | Refills: 3 | Status: SHIPPED | OUTPATIENT
Start: 2018-11-13 | End: 2019-04-29

## 2018-11-13 ASSESSMENT — PATIENT HEALTH QUESTIONNAIRE - PHQ9: SUM OF ALL RESPONSES TO PHQ QUESTIONS 1-9: 1

## 2018-11-13 ASSESSMENT — PAIN SCALES - GENERAL: PAINLEVEL: NO PAIN (0)

## 2018-11-13 NOTE — MR AVS SNAPSHOT
After Visit Summary   11/13/2018    Shiloh Covarrubias    MRN: [id number]           Patient Information     Date Of Birth          11/3/1929        Visit Information        Provider Department      11/13/2018 8:30 AM Rajesh Vidal MD [facility]         Follow-ups after your visit        Who to contact     If you have questions or need follow up information about today's clinic visit or your schedule please contact [facility] directly at [phone].  Normal or non-critical lab and imaging results will be communicated to you by MyChart, letter or phone within 4 business days after the clinic has received the results. If you do not hear from us within 7 days, please contact the clinic through MyChart or phone. If you have a critical or abnormal lab result, we will notify you by phone as soon as possible.  Submit refill requests through [employer] or call your pharmacy and they will forward the refill request to us. Please allow 3 business days for your refill to be completed.          Additional Information About Your Visit        Care EveryWhere ID     This is your Care EveryWhere ID. This could be used by other organizations to access your [facility] medical records  [id number]        Your Vitals Were     Pulse Temperature Respirations Pulse Oximetry BMI (Body Mass Index)       52 97.2  F (36.2  C) (Temporal) 18 98% 20.27 kg/m2        Blood Pressure from Last 3 Encounters:   11/13/18 144/50   09/21/18 152/76   08/27/18 138/66    Weight from Last 3 Encounters:   11/13/18 114 lb 6.4 oz (51.9 kg)   09/21/18 110 lb (49.9 kg)   08/27/18 111 lb (50.3 kg)              Today, you had the following     No orders found for display       Primary Care Provider Office Phone # Fax #    Rajesh Vidal [phone]        [facility] [address]        Equal Access to Services     BALBINA WALLACE AH: adin Soliz, russell  demetrius rodriguez haylc medranojustina paredes ah. [facility] [phone].    ATENCIÓN: Si ed skaggs, tiene a camacho disposición servicios gratuitos de asistencia lingüística. Criss al [phone].    We comply with applicable federal civil rights laws and [place] laws. We do not discriminate on the basis of race, color, national origin, age, disability, sex, sexual orientation, or gender identity.            Thank you!     Thank you for choosing [facility]  for your care. Our goal is always to provide you with excellent care. Hearing back from our patients is one way we can continue to improve our services. Please take a few minutes to complete the written survey that you may receive in the mail after your visit with us. Thank you!             Your Updated Medication List - Protect others around you: Learn how to safely use, store and throw away your medicines at www.disposemymeds.org.          This list is accurate as of 11/13/18  9:52 AM.  Always use your most recent med list.                   Brand Name Dispense Instructions for use Diagnosis    amLODIPine 5 MG tablet    NORVASC    90 tablet    Take 1 tablet (5 mg) by mouth daily    Essential hypertension with goal blood pressure less than 130/80       aspirin 81 MG tablet      1 TABLET DAILY        EQ LORATADINE 10 MG tablet   Generic drug:  loratadine     90 tablet    Take 1 tablet (10 mg) by mouth daily    Chronic seasonal allergic rhinitis due to pollen       FIBERCON PO      Take  by mouth. 1/2 tablet w/ evening meal        furosemide 20 MG tablet    LASIX    180 tablet    Take 2 tablets (40 mg) by mouth daily    SOB (shortness of breath)       hydrALAZINE 50 MG tablet    APRESOLINE    270 tablet    Take 1 tablet (50 mg) by mouth 3 times daily    Hypertension goal BP (blood pressure) < 130/80       lisinopril 20 MG tablet    PRINIVIL/ZESTRIL    180 tablet    Take 1 tablet (20 mg) by mouth 2 times daily    Essential hypertension  with goal blood pressure less than 130/80       loperamide 2 MG tablet    IMODIUM A-D    20 tablet    1/2 tab before meals    Chronic diarrhea       metoprolol tartrate 25 MG tablet    LOPRESSOR    180 tablet    Take 1 tablet (25 mg) by mouth 2 times daily    Hypertension goal BP (blood pressure) < 130/80       MULTI VITAMIN/MINERALS PO      Take 1 tablet by mouth daily.        SPIRIVA HANDIHALER 18 MCG capsule   Generic drug:  tiotropium     30 capsule    INHALE ONE DOSE BY MOUTH ONCE DAILY    Chronic obstructive pulmonary disease, unspecified COPD type (H)

## 2018-11-13 NOTE — PROGRESS NOTES
SUBJECTIVE:   Shiloh Covarrubias is a 89 year old female who presents to clinic today for the following health issues:      Chief Complaint   Patient presents with     Shortness of Breath     comes and goes       Gained some weight, doing some Ensure. Stress test was good, EF of 70%.      Still sob, some days are worse then others. Today is a good day.      Spirometry years ago showed obstructive disease.     Also has some issues in her left hand, 3 fingers are numb.     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     Unspecified asthma(493.90)      Unspecified intestinal obstruction 02/17/10    D/C 02/20/10     Current Outpatient Prescriptions   Medication     albuterol (PROAIR HFA/PROVENTIL HFA/VENTOLIN HFA) 108 (90 Base) MCG/ACT inhaler     amLODIPine (NORVASC) 5 MG tablet     ASPIRIN 81 MG OR TABS     Calcium Polycarbophil (FIBERCON PO)     EQ LORATADINE 10 MG tablet     furosemide (LASIX) 20 MG tablet     hydrALAZINE (APRESOLINE) 50 MG tablet     lisinopril (PRINIVIL/ZESTRIL) 20 MG tablet     loperamide (IMODIUM A-D) 2 MG tablet     metoprolol tartrate (LOPRESSOR) 25 MG tablet     Multiple Vitamins-Minerals (MULTI VITAMIN/MINERALS PO)     SPIRIVA HANDIHALER 18 MCG capsule     [DISCONTINUED] cloNIDine (CATAPRES) 0.2 MG tablet     [DISCONTINUED] lisinopril (PRINIVIL/ZESTRIL) 20 MG tablet     No current facility-administered medications for this visit.      Social History   Substance Use Topics     Smoking status: Never Smoker     Smokeless tobacco: Never Used     Alcohol use 0.0 oz/week     0 Standard drinks or equivalent per week      Comment: 3 per  year     Review of Systems  Constitutional-No fevers, chills, or weight changes..  Cardiac-No chest pain or palpitations and Exertional SOB.  Respiratory-SOB.  Neuro-Pain and Tingling.      Physical Exam  /50  Pulse 52  Temp 97.2  F (36.2  C) (Temporal)  Resp 18   Wt 114 lb 6.4 oz (51.9 kg)  SpO2 98%  BMI 20.27 kg/m2  General Appearance-healthy, alert, no distress  Cardiac-regular rate and rhythm  with normal S1, S2 ; no murmur, rub or gallops  Lungs-mild to moderate decreased air movement  Neurological-left hand decreased sensation in thumb and 2nda nd 3rd fingers      ASSESSMENT:  89-year-old woman who is doing okay, she is actually gained a little bit of weight she is eating better.  She does not seem to be depressed.  She does have good days and bad days.  We discussed her recent CT scan and stress test is being okay..  She had an ejection fraction of 70%.  She does not feel better with Spiriva but she does have some obstruction on spirometry from 2014.  We will try her on albuterol for dyspnea on exertion she can use this twice a day or just as needed.  When she is not better we will then do formal pulmonary function tests.    Patient also has some left hand carpal tunnel symptoms, the pattern is appropriate for that and she will try wrist splints at night, not interested in surgery for this.

## 2018-11-14 ENCOUNTER — TELEPHONE (OUTPATIENT)
Dept: FAMILY MEDICINE | Facility: CLINIC | Age: 83
End: 2018-11-14

## 2018-11-14 NOTE — TELEPHONE ENCOUNTER
Please call, has questions on albuterol inhaler, do you want her to take instead or along with spirida?

## 2018-11-14 NOTE — TELEPHONE ENCOUNTER
Take it with the spiriva,   She can do spiriva daily and then the albuterol as needed for sob and activity

## 2019-01-16 ENCOUNTER — OFFICE VISIT (OUTPATIENT)
Dept: INTERNAL MEDICINE | Facility: CLINIC | Age: 84
End: 2019-01-16
Payer: MEDICARE

## 2019-01-16 VITALS
DIASTOLIC BLOOD PRESSURE: 66 MMHG | OXYGEN SATURATION: 100 % | SYSTOLIC BLOOD PRESSURE: 118 MMHG | WEIGHT: 114 LBS | HEART RATE: 50 BPM | RESPIRATION RATE: 16 BRPM | HEIGHT: 63 IN | TEMPERATURE: 97.8 F | BODY MASS INDEX: 20.2 KG/M2

## 2019-01-16 DIAGNOSIS — M79.652 PAIN IN BOTH THIGHS: ICD-10-CM

## 2019-01-16 DIAGNOSIS — J44.9 CHRONIC OBSTRUCTIVE PULMONARY DISEASE, UNSPECIFIED COPD TYPE (H): Primary | ICD-10-CM

## 2019-01-16 DIAGNOSIS — M79.651 PAIN IN BOTH THIGHS: ICD-10-CM

## 2019-01-16 PROCEDURE — 99213 OFFICE O/P EST LOW 20 MIN: CPT | Performed by: INTERNAL MEDICINE

## 2019-01-16 ASSESSMENT — PAIN SCALES - GENERAL: PAINLEVEL: NO PAIN (0)

## 2019-01-16 ASSESSMENT — MIFFLIN-ST. JEOR: SCORE: 911.23

## 2019-01-16 NOTE — PROGRESS NOTES
"  SUBJECTIVE:   Shiloh Covarrubias is a 89 year old female who presents to clinic today for the following health issues:      Chief Complaint   Patient presents with     Recheck Medication     Htn  COPD     Her supplemental insurance cost doubled and hard to afford spiriva now.  Tried taking it every other day then didn't feel as good.  $150 a month the last 3 months and first 3 months for her deductible.      Questions on valsartan and her bp meds but hers are all ok.    Legs are giving her some pains at night.  Side of her legs that she sleeps on ache.         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     Unspecified asthma(493.90)      Unspecified intestinal obstruction 02/17/10    D/C 02/20/10     Current Outpatient Medications   Medication     albuterol (PROAIR HFA/PROVENTIL HFA/VENTOLIN HFA) 108 (90 Base) MCG/ACT inhaler     amLODIPine (NORVASC) 5 MG tablet     ASPIRIN 81 MG OR TABS     Calcium Polycarbophil (FIBERCON PO)     EQ LORATADINE 10 MG tablet     furosemide (LASIX) 20 MG tablet     hydrALAZINE (APRESOLINE) 50 MG tablet     lisinopril (PRINIVIL/ZESTRIL) 20 MG tablet     loperamide (IMODIUM A-D) 2 MG tablet     metoprolol tartrate (LOPRESSOR) 25 MG tablet     Multiple Vitamins-Minerals (MULTI VITAMIN/MINERALS PO)     SPIRIVA HANDIHALER 18 MCG capsule     No current facility-administered medications for this visit.      Social History     Tobacco Use     Smoking status: Never Smoker     Smokeless tobacco: Never Used   Substance Use Topics     Alcohol use: Yes     Alcohol/week: 0.0 oz     Comment: 3 per  year     Drug use: No     Physical Exam  /66   Pulse 50   Temp 97.8  F (36.6  C) (Temporal)   Resp 16   Ht 1.6 m (5' 3\")   Wt 51.7 kg (114 lb)   SpO2 100%   BMI 20.19 kg/m    General Appearance-healthy, alert, no distress  Lungs-clear to auscultation  Musculoskeletal-legs are ok, hips normal rom, no " pain on bursa,     ASSESSMENT:  Patient with COPD to the that does well on Spiriva.  Unfortunately his previous getting too expensive for her where she can afford it.  She is 8 days of this left.  I will contact the [employer] assistance program to see if she will qualify for anything.  Otherwise we could try her on Incruse Ellipta if that was cheaper through her insurance.    Leg pain at night, this could be from doing her exercises and yoga it appears to be in the iliotibial band area, she does not have true trochanteric bursitis pain.  I will have her use a topical such as Aspercreme and different pillows for positioning.    Electronically signed by Rajesh Vidal MD

## 2019-01-22 ENCOUNTER — TELEPHONE (OUTPATIENT)
Dept: INTERNAL MEDICINE | Facility: CLINIC | Age: 84
End: 2019-01-22

## 2019-01-22 DIAGNOSIS — J44.9 CHRONIC OBSTRUCTIVE PULMONARY DISEASE, UNSPECIFIED COPD TYPE (H): ICD-10-CM

## 2019-01-22 NOTE — TELEPHONE ENCOUNTER
Received message from [facility] pharmacy.  The copay for Spiriva is 417.00, is there an alternative.  Dr. Vidal will have her try Incruse.  Rx placed, review and approve if okay.

## 2019-02-12 ENCOUNTER — TELEPHONE (OUTPATIENT)
Dept: INTERNAL MEDICINE | Facility: CLINIC | Age: 84
End: 2019-02-12

## 2019-02-12 NOTE — TELEPHONE ENCOUNTER
STACYI~ Feb 12, 2019 I spoke with Matilde, she is in need of financial assistance for medication.    We reviewed the Pharmacy Assistance Fund $500, the Prescription Assistance Program for manfacturer brand name assistance programs, gross income, insurance and Rx list.     assistance applications will be completed for: Spiriva Handihaler & Proventil HFA/Ventolin HFA.    Carmina Jimenez  Prescription

## 2019-02-13 ENCOUNTER — TELEPHONE (OUTPATIENT)
Dept: INTERNAL MEDICINE | Facility: CLINIC | Age: 84
End: 2019-02-13

## 2019-02-13 NOTE — TELEPHONE ENCOUNTER
FYI~ I have approved Matilde for $500 of the Pharmacy Assistance Fund to be filled at the [facility].    Carmina Jimenez  Prescription

## 2019-02-14 ENCOUNTER — TELEPHONE (OUTPATIENT)
Dept: INTERNAL MEDICINE | Facility: CLINIC | Age: 84
End: 2019-02-14

## 2019-02-14 NOTE — TELEPHONE ENCOUNTER
Pharmacy assistance NOT needed for Shiloh,   She was not understanding her insurance, She paid $417.00 last month for her Spiriva BUT this was because of her deductible. Now it is $25 at [facility]. She is staying with [facility] for her pharmacy, and [place]   And will not need the pharmacy assistance at this time.   Nothing further is needed.     Thanks  Annelise Jones [facility]   [phone]

## 2019-03-01 ENCOUNTER — OFFICE VISIT (OUTPATIENT)
Dept: INTERNAL MEDICINE | Facility: CLINIC | Age: 84
End: 2019-03-01
Payer: MEDICARE

## 2019-03-01 VITALS
RESPIRATION RATE: 16 BRPM | OXYGEN SATURATION: 98 % | WEIGHT: 116 LBS | SYSTOLIC BLOOD PRESSURE: 182 MMHG | DIASTOLIC BLOOD PRESSURE: 60 MMHG | HEART RATE: 64 BPM | BODY MASS INDEX: 20.55 KG/M2 | TEMPERATURE: 97.3 F

## 2019-03-01 DIAGNOSIS — M62.830 SPASM OF BACK MUSCLES: Primary | ICD-10-CM

## 2019-03-01 DIAGNOSIS — J06.9 VIRAL URI: ICD-10-CM

## 2019-03-01 PROCEDURE — 99213 OFFICE O/P EST LOW 20 MIN: CPT | Performed by: INTERNAL MEDICINE

## 2019-03-01 RX ORDER — CYCLOBENZAPRINE HCL 5 MG
5 TABLET ORAL 3 TIMES DAILY PRN
Qty: 12 TABLET | Refills: 0 | Status: SHIPPED | OUTPATIENT
Start: 2019-03-01 | End: 2019-09-30

## 2019-03-01 ASSESSMENT — PAIN SCALES - GENERAL: PAINLEVEL: MODERATE PAIN (4)

## 2019-03-01 NOTE — PROGRESS NOTES
SUBJECTIVE:   Shiloh Covarrubias is a 89 year old female who presents to clinic today for the following health issues:    Chief Complaint   Patient presents with     Neck Pain     neck pain/stiffness - started Sunday     Had a cold last week then Sunday got a kink in her neck, Monday could barely lift her head up, worse on the right shoulder and arm muscles. Even had trouble swallowing. Still painful under the shoulder blade. Taking tylenol.      Felt a little feverish.    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     Unspecified asthma(493.90)      Unspecified intestinal obstruction 02/17/10    D/C 02/20/10     Current Outpatient Medications   Medication     albuterol (PROAIR HFA/PROVENTIL HFA/VENTOLIN HFA) 108 (90 Base) MCG/ACT inhaler     amLODIPine (NORVASC) 5 MG tablet     ASPIRIN 81 MG OR TABS     Calcium Polycarbophil (FIBERCON PO)     EQ LORATADINE 10 MG tablet     furosemide (LASIX) 20 MG tablet     hydrALAZINE (APRESOLINE) 50 MG tablet     lisinopril (PRINIVIL/ZESTRIL) 20 MG tablet     loperamide (IMODIUM A-D) 2 MG tablet     metoprolol tartrate (LOPRESSOR) 25 MG tablet     Multiple Vitamins-Minerals (MULTI VITAMIN/MINERALS PO)     umeclidinium (INCRUSE ELLIPTA) 62.5 MCG/INH inhaler     No current facility-administered medications for this visit.      Physical Exam  /60 (BP Location: Left arm, Patient Position: Sitting, Cuff Size: Adult Regular)   Pulse 64   Temp 97.3  F (36.3  C) (Temporal)   Resp 16   Wt 52.6 kg (116 lb)   SpO2 98%   BMI 20.55 kg/m    General Appearance-healthy, alert, no distress  Neck on the right side is tight at the trapezius  Soreness to cough and muscle spasm in the right rhomboid.    ASSESSMENT:  Viral URI with some resultant muscle spasms and stiffness in the right neck and rhomboid. Will continue to use heat, biofreeze and will prescribe her flexeril for the  spasms.    Electronically signed by Rajesh Vidal MD

## 2019-03-25 DIAGNOSIS — J44.9 CHRONIC OBSTRUCTIVE PULMONARY DISEASE, UNSPECIFIED COPD TYPE (H): ICD-10-CM

## 2019-03-25 RX ORDER — TIOTROPIUM BROMIDE 18 UG/1
CAPSULE ORAL; RESPIRATORY (INHALATION)
Qty: 30 CAPSULE | Refills: 11 | Status: SHIPPED | OUTPATIENT
Start: 2019-03-25 | End: 2019-08-28

## 2019-03-25 NOTE — TELEPHONE ENCOUNTER
"tiotropium  Last Written Prescription Date:  05/21/2018  Last Fill Quantity: 30 cap,  # refills: 8   Last office visit: 3/1/2019 with prescribing provider:      Future Office Visit:      Requested Prescriptions   Pending Prescriptions Disp Refills     tiotropium (SPIRIVA HANDIHALER) 18 MCG inhaled capsule [Pharmacy Med Name: SPIRIVA HANDIHLR 18MCG CAP] 30 capsule 11     Sig: INHALE 1 PUFF BY MOUTH ONCE DAILY    Asthma Maintenance Inhalers - Anticholinergics Failed - 3/25/2019 11:16 AM       Failed - Asthma control assessment score within normal limits in last 6 months    Please review ACT score.          Failed - Medication is active on med list       Passed - Patient is age 12 years or older       Passed - Recent (6 mo) or future (30 days) visit within the authorizing provider's specialty    Patient had office visit in the last 6 months or has a visit in the next 30 days with authorizing provider or within the authorizing provider's specialty.  See \"Patient Info\" tab in inbasket, or \"Choose Columns\" in Meds & Orders section of the refill encounter.              Routing refill request to provider for review/approval because:  Discontinued for formulary change.                Sherry Gallegos RN on 3/25/2019 at 3:57 PM    "

## 2019-04-08 ENCOUNTER — HOSPITAL ENCOUNTER (EMERGENCY)
Facility: CLINIC | Age: 84
Discharge: HOME | End: 2019-04-08
Attending: STUDENT IN AN ORGANIZED HEALTH CARE EDUCATION/TRAINING PROGRAM | Admitting: STUDENT IN AN ORGANIZED HEALTH CARE EDUCATION/TRAINING PROGRAM
Payer: MEDICARE

## 2019-04-08 ENCOUNTER — ALLIED HEALTH/NURSE VISIT (OUTPATIENT)
Dept: FAMILY MEDICINE | Facility: CLINIC | Age: 84
End: 2019-04-08
Payer: MEDICARE

## 2019-04-08 ENCOUNTER — APPOINTMENT (OUTPATIENT)
Dept: CT IMAGING | Facility: CLINIC | Age: 84
End: 2019-04-08
Attending: STUDENT IN AN ORGANIZED HEALTH CARE EDUCATION/TRAINING PROGRAM
Payer: MEDICARE

## 2019-04-08 ENCOUNTER — TELEPHONE (OUTPATIENT)
Dept: INTERNAL MEDICINE | Facility: CLINIC | Age: 84
End: 2019-04-08

## 2019-04-08 VITALS — DIASTOLIC BLOOD PRESSURE: 70 MMHG | SYSTOLIC BLOOD PRESSURE: 210 MMHG

## 2019-04-08 VITALS
HEART RATE: 64 BPM | SYSTOLIC BLOOD PRESSURE: 210 MMHG | RESPIRATION RATE: 13 BRPM | DIASTOLIC BLOOD PRESSURE: 80 MMHG | TEMPERATURE: 97.6 F | OXYGEN SATURATION: 96 %

## 2019-04-08 DIAGNOSIS — F41.9 ANXIETY: ICD-10-CM

## 2019-04-08 DIAGNOSIS — I10 ELEVATED BLOOD PRESSURE READING IN OFFICE WITH DIAGNOSIS OF HYPERTENSION: ICD-10-CM

## 2019-04-08 DIAGNOSIS — R25.1 SHAKINESS: Primary | ICD-10-CM

## 2019-04-08 LAB
ALBUMIN SERPL-MCNC: 4.1 G/DL (ref 3.4–5)
ALP SERPL-CCNC: 85 U/L (ref 40–150)
ALT SERPL W P-5'-P-CCNC: 24 U/L (ref 0–50)
ANION GAP SERPL CALCULATED.3IONS-SCNC: 9 MMOL/L (ref 3–14)
AST SERPL W P-5'-P-CCNC: 26 U/L (ref 0–45)
BASOPHILS # BLD AUTO: 0.1 10E9/L (ref 0–0.2)
BASOPHILS NFR BLD AUTO: 0.8 %
BILIRUB SERPL-MCNC: 0.5 MG/DL (ref 0.2–1.3)
BUN SERPL-MCNC: 18 MG/DL (ref 7–30)
CALCIUM SERPL-MCNC: 9.8 MG/DL (ref 8.5–10.1)
CHLORIDE SERPL-SCNC: 105 MMOL/L (ref 94–109)
CO2 SERPL-SCNC: 25 MMOL/L (ref 20–32)
CREAT SERPL-MCNC: 0.99 MG/DL (ref 0.52–1.04)
DIFFERENTIAL METHOD BLD: NORMAL
EOSINOPHIL NFR BLD AUTO: 0.4 %
ERYTHROCYTE [DISTWIDTH] IN BLOOD BY AUTOMATED COUNT: 13.7 % (ref 10–15)
GFR SERPL CREATININE-BSD FRML MDRD: 50 ML/MIN/{1.73_M2}
GLUCOSE SERPL-MCNC: 110 MG/DL (ref 70–99)
HCT VFR BLD AUTO: 42.9 % (ref 35–47)
HGB BLD-MCNC: 14 G/DL (ref 11.7–15.7)
IMM GRANULOCYTES # BLD: 0 10E9/L (ref 0–0.4)
IMM GRANULOCYTES NFR BLD: 0.3 %
LYMPHOCYTES # BLD AUTO: 1.1 10E9/L (ref 0.8–5.3)
LYMPHOCYTES NFR BLD AUTO: 15.4 %
MCH RBC QN AUTO: 30.2 PG (ref 26.5–33)
MCHC RBC AUTO-ENTMCNC: 32.6 G/DL (ref 31.5–36.5)
MCV RBC AUTO: 93 FL (ref 78–100)
MONOCYTES # BLD AUTO: 0.7 10E9/L (ref 0–1.3)
MONOCYTES NFR BLD AUTO: 9.1 %
NEUTROPHILS # BLD AUTO: 5.5 10E9/L (ref 1.6–8.3)
NEUTROPHILS NFR BLD AUTO: 74 %
NRBC # BLD AUTO: 0 10*3/UL
NRBC BLD AUTO-RTO: 0 /100
PLATELET # BLD AUTO: 185 10E9/L (ref 150–450)
POTASSIUM SERPL-SCNC: 4 MMOL/L (ref 3.4–5.3)
PROT SERPL-MCNC: 8 G/DL (ref 6.8–8.8)
RBC # BLD AUTO: 4.63 10E12/L (ref 3.8–5.2)
SODIUM SERPL-SCNC: 139 MMOL/L (ref 133–144)
TROPONIN I SERPL-MCNC: <0.015 UG/L (ref 0–0.04)
WBC # BLD AUTO: 7.4 10E9/L (ref 4–11)

## 2019-04-08 PROCEDURE — 25000132 ZZH RX MED GY IP 250 OP 250 PS 637: Mod: GY | Performed by: STUDENT IN AN ORGANIZED HEALTH CARE EDUCATION/TRAINING PROGRAM

## 2019-04-08 PROCEDURE — 93005 ELECTROCARDIOGRAM TRACING: CPT | Performed by: STUDENT IN AN ORGANIZED HEALTH CARE EDUCATION/TRAINING PROGRAM

## 2019-04-08 PROCEDURE — 70450 CT HEAD/BRAIN W/O DYE: CPT

## 2019-04-08 PROCEDURE — 99285 EMERGENCY DEPT VISIT HI MDM: CPT | Mod: 25 | Performed by: STUDENT IN AN ORGANIZED HEALTH CARE EDUCATION/TRAINING PROGRAM

## 2019-04-08 PROCEDURE — 85025 COMPLETE CBC W/AUTO DIFF WBC: CPT | Performed by: STUDENT IN AN ORGANIZED HEALTH CARE EDUCATION/TRAINING PROGRAM

## 2019-04-08 PROCEDURE — 84484 ASSAY OF TROPONIN QUANT: CPT | Performed by: STUDENT IN AN ORGANIZED HEALTH CARE EDUCATION/TRAINING PROGRAM

## 2019-04-08 PROCEDURE — 80053 COMPREHEN METABOLIC PANEL: CPT | Performed by: STUDENT IN AN ORGANIZED HEALTH CARE EDUCATION/TRAINING PROGRAM

## 2019-04-08 PROCEDURE — 99284 EMERGENCY DEPT VISIT MOD MDM: CPT | Mod: Z6 | Performed by: STUDENT IN AN ORGANIZED HEALTH CARE EDUCATION/TRAINING PROGRAM

## 2019-04-08 PROCEDURE — 99207 ZZC NO CHARGE NURSE ONLY: CPT

## 2019-04-08 RX ORDER — METOPROLOL TARTRATE 50 MG
50 TABLET ORAL ONCE
Status: COMPLETED | OUTPATIENT
Start: 2019-04-08 | End: 2019-04-08

## 2019-04-08 RX ORDER — CEFADROXIL 500 MG/1
CAPSULE ORAL
COMMUNITY
Start: 2018-07-19 | End: 2019-04-29

## 2019-04-08 RX ADMIN — METOPROLOL TARTRATE 50 MG: 50 TABLET, FILM COATED ORAL at 12:59

## 2019-04-08 NOTE — ED PROVIDER NOTES
"  History     Chief Complaint   Patient presents with     Hypertension     HPI  Shiloh Covarrubias is a 89 year old female with past medical history which includes hypertension, COPD, osteoporosis, GERD, anxiety and depression who presents from clinic for evaluation of anxiety.  Patient explains over the past 1 week she has felt increasingly \"tight\" and anxious.  She relates it to feeling drunk as if she were out of control but denies headache, chest pain, weakness or imbalance with ambulation.  She reports having read side effects of her medications on WebMD and had read potential side effects of amlodipine and metoprolol as causing anxiety so she did not take medications this morning before her scheduled clinic appointment.  She has been without fever, illness, fall or injury.  No physical complaints.    Allergies:  Allergies   Allergen Reactions     Clonidine Derivatives      Severe side effects       Entocort [Corticosteroids]      Macrobid [Nitrofurantoin]      Diarrhea       Sulfa Drugs Itching     itch       Problem List:    Patient Active Problem List    Diagnosis Date Noted     Chest pain 01/13/2011     Priority: High     Problem list name updated by automated process. Provider to review       Essential hypertension with goal blood pressure less than 130/80 05/24/2016     Priority: Medium     Major depressive disorder, single episode, mild (H) 02/15/2016     Priority: Medium     Advanced directives, counseling/discussion 10/09/2012     Priority: Medium     Advance Care Planning:   Receipt of ACP document:  Received: Health Care Directive which was witnessed or notarized on 1/14/07.  Document not previously scanned.  Validation form completed and sent with document to be scanned.  Code Status reflects choices in most recent ACP document.  Confirmed/documented designated decision maker(s). See permanent comments section of demographics in clinical tab. View document(s) and details by clicking on code status. "   Added by Dolores Yu on 11/15/2013.               Hyperlipidemia LDL goal <100 03/30/2011     Priority: Medium     GERD (gastroesophageal reflux disease) 01/19/2011     Priority: Medium     Palpitations 01/13/2011     Priority: Medium     Hypertriglyceridemia 05/18/2010     Priority: Medium     Osteoarthrosis, unspecified whether generalized or localized, involving lower leg 01/08/2007     Priority: Medium     Problem list name updated by automated process. Provider to review       Chronic airway obstruction (H) 10/13/2005     Priority: Medium     Problem list name updated by automated process. Provider to review       Senile cataract 12/08/2003     Priority: Medium     Problem list name updated by automated process. Provider to review       Sebaceous cyst 12/08/2003     Priority: Medium     Impacted cerumen 12/08/2003     Priority: Medium     Anxiety state 10/09/2003     Priority: Medium     Problem list name updated by automated process. Provider to review       Essential and other specified forms of tremor 10/09/2003     Priority: Medium     Edema 08/01/2003     Priority: Medium        Past Medical History:    Past Medical History:   Diagnosis Date     Edema      Hypertension      Squamous cell carcinoma 2015     Unspecified asthma(493.90)      Unspecified intestinal obstruction 02/17/10       Past Surgical History:    Past Surgical History:   Procedure Laterality Date     ESOPHAGOSCOPY, GASTROSCOPY, DUODENOSCOPY (EGD), COMBINED  2/8/2011    COMBINED ESOPHAGOSCOPY, GASTROSCOPY, DUODENOSCOPY (EGD), BIOPSY SINGLE OR MULTIPLE performed by ANGY LIMA at  GI     ESOPHAGOSCOPY, GASTROSCOPY, DUODENOSCOPY (EGD), DILATATION, COMBINED  2/8/2011    COMBINED ESOPHAGOSCOPY, GASTROSCOPY, DUODENOSCOPY (EGD), DILATATION performed by ANGY LIMA at  GI     [facility] UGI ENDOSCOPY, SIMPLE EXAM  02/23/10       Family History:    Family History   Problem Relation Age of Onset     Arthritis Mother      Osteoporosis Mother       Thyroid Disease Mother         Hypothyroid.     Depression Maternal Uncle         Bouts of depression     Hypertension No family hx of      C.A.D. No family hx of        Social History:  Marital Status:   [2]  Social History     Tobacco Use     Smoking status: Never Smoker     Smokeless tobacco: Never Used   Substance Use Topics     Alcohol use: Yes     Alcohol/week: 0.0 oz     Comment: 3 per  year     Drug use: No        Medications:      albuterol (PROAIR HFA/PROVENTIL HFA/VENTOLIN HFA) 108 (90 Base) MCG/ACT inhaler   amLODIPine (NORVASC) 5 MG tablet   ASPIRIN 81 MG OR TABS   Calcium Polycarbophil (FIBERCON PO)   cefadroxil (DURICEF) 500 MG capsule   cyclobenzaprine (FLEXERIL) 5 MG tablet   EQ LORATADINE 10 MG tablet   furosemide (LASIX) 20 MG tablet   hydrALAZINE (APRESOLINE) 50 MG tablet   lisinopril (PRINIVIL/ZESTRIL) 20 MG tablet   loperamide (IMODIUM A-D) 2 MG tablet   metoprolol tartrate (LOPRESSOR) 25 MG tablet   Multiple Vitamins-Minerals (MULTI VITAMIN/MINERALS PO)   tiotropium (SPIRIVA HANDIHALER) 18 MCG inhaled capsule   umeclidinium (INCRUSE ELLIPTA) 62.5 MCG/INH inhaler         Review of Systems  Constitutional:  Negative for fever or recent illness.  Eye:  Negative for double vision or change from baseline.  Cardiovascular:  Negative for chest pain or palpitations.  Respiratory:  Negative for cough or shortness of breath.  Gastrointestinal:  Negative for abdominal pain, vomiting, or diarrhea.    Musculoskeletal: Negative for recent fall or injury.  Neurological:  Negative for headache, dizziness, or weakness.    All others reviewed and are negative.      Physical Exam   BP: (!) 206/77(right arm, small adult cuff)  Pulse: 83  Heart Rate: 78  Temp: 97.6  F (36.4  C)  Resp: 14  SpO2: 99 %      Physical Exam  Constitutional:  Well developed, well nourished.  Appears mildly anxious but nontoxic and in no acute distress.  HENT:  Normocephalic and atraumatic.  Symmetric in appearance.  Eyes:   Conjunctivae are normal.  Neck:  Neck supple.  Cardiovascular:  No cyanosis.  RRR.  No audible murmurs noted.  No lower extremity edema or asymmetry.   Respiratory:  Effort normal without sign of respiratory distress.  CTAB without diminished regions.   Gastrointestinal:  Soft nondistended abdomen.  Nontender and without guarding.  No rigidity or rebound tenderness.  Negative Wheat's sign.  Negative McBurney's point.    Genitourinary:  Noncontributory.   Musculoskeletal:  Moves extremities spontaneously.  Neurological:  Patient is alert.  Skin:  Skin is warm and dry.  Psych:  Well-kept appearance.  Patient does not show signs of confusion or intoxication.  Normal mood and affect, not agitated or threatening.  Able to carry a conversation with organized speech and thought process.  Denies suicidal ideation or intent to harm self/others.  Denies visual or auditory hallucinations and is without evidence of delusions or psychosis.      ED Course        Procedures              Critical Care time:  none               Results for orders placed or performed during the hospital encounter of 04/08/19 (from the past 24 hour(s))   CBC with platelets differential   Result Value Ref Range    WBC 7.4 4.0 - 11.0 10e9/L    RBC Count 4.63 3.8 - 5.2 10e12/L    Hemoglobin 14.0 11.7 - 15.7 g/dL    Hematocrit 42.9 35.0 - 47.0 %    MCV 93 78 - 100 fl    MCH 30.2 26.5 - 33.0 pg    MCHC 32.6 31.5 - 36.5 g/dL    RDW 13.7 10.0 - 15.0 %    Platelet Count 185 150 - 450 10e9/L    Diff Method Automated Method     % Neutrophils 74.0 %    % Lymphocytes 15.4 %    % Monocytes 9.1 %    % Eosinophils 0.4 %    % Basophils 0.8 %    % Immature Granulocytes 0.3 %    Nucleated RBCs 0 0 /100    Absolute Neutrophil 5.5 1.6 - 8.3 10e9/L    Absolute Lymphocytes 1.1 0.8 - 5.3 10e9/L    Absolute Monocytes 0.7 0.0 - 1.3 10e9/L    Absolute Basophils 0.1 0.0 - 0.2 10e9/L    Abs Immature Granulocytes 0.0 0 - 0.4 10e9/L    Absolute Nucleated RBC 0.0    Comprehensive  metabolic panel   Result Value Ref Range    Sodium 139 133 - 144 mmol/L    Potassium 4.0 3.4 - 5.3 mmol/L    Chloride 105 94 - 109 mmol/L    Carbon Dioxide 25 20 - 32 mmol/L    Anion Gap 9 3 - 14 mmol/L    Glucose 110 (H) 70 - 99 mg/dL    Urea Nitrogen 18 7 - 30 mg/dL    Creatinine 0.99 0.52 - 1.04 mg/dL    GFR Estimate 50 (L) >60 mL/min/[1.73_m2]    GFR Estimate If Black 58 (L) >60 mL/min/[1.73_m2]    Calcium 9.8 8.5 - 10.1 mg/dL    Bilirubin Total 0.5 0.2 - 1.3 mg/dL    Albumin 4.1 3.4 - 5.0 g/dL    Protein Total 8.0 6.8 - 8.8 g/dL    Alkaline Phosphatase 85 40 - 150 U/L    ALT 24 0 - 50 U/L    AST 26 0 - 45 U/L   Troponin I   Result Value Ref Range    Troponin I ES <0.015 0.000 - 0.045 ug/L   Head CT w/o contrast    Narrative    CT SCAN OF THE HEAD WITHOUT CONTRAST   4/8/2019 1:20 PM     HISTORY: Anxiety and significantly elevated blood pressure.    TECHNIQUE: 4 mm thick axial images of the head without IV contrast  material. Radiation dose for this scan was reduced using automated  exposure control, adjustment of the mA and/or kV according to patient  size, or iterative reconstruction technique.    COMPARISON: CT head dated 9/16/2015.    FINDINGS:  There is generalized atrophy of the brain.  Areas of low  attenuation are present in the white matter of the cerebral  hemispheres that are consistent with small vessel ischemic disease in  this age patient.  There is no evidence of intracranial hemorrhage,  mass, acute infarct or anomaly. The visualized portions of the sinuses  and mastoids appear normal. There is no evidence of trauma. Bilateral  ocular lens replacements are again noted.      Impression    IMPRESSION:  Atrophy of the brain and white matter changes consistent  with small vessel ischemic disease. No acute intracranial hemorrhage  is identified.       Medications   metoprolol tartrate (LOPRESSOR) tablet 50 mg (50 mg Oral Given 4/8/19 1259)       Assessments & Plan (with Medical Decision Making)  "  Shiloh Covarrubias is a 89 year old female who presents to the department for evaluation of anxiety and elevated blood pressure reading at clinic today.  In the department she is hypertensive but without headache, dizziness, chest pain, confusion, or other associated symptoms.  Eventually her son arrived to the department and agrees with patient's history of anxiety and being \"uptight\".  She has trialed medications in the past for anxiety but admits to getting an upset stomach so discontinued.  She was given a dose of metoprolol in the department and blood pressure gradually came down.  Metabolic panel values including creatinine within reference range.  No sign of ischemia on EKG, CT imaging independently reviewed and no sign of intracranial hemorrhage or acute abnormality.  I called and spoke with patient's primary provider Dr. Vidal who states that she was referred to the department because he had no openings in clinic today, but agrees with plan to see her in clinic and further discuss medication management.  Patient and her son are in agreement with discharge plan including follow-up.  She is to resume previously prescribed medications.        Disclaimer:  This note consists of symbols derived from keyboarding, dictation, and/or voice recognition software.  As a result, there may be errors in the script that have gone undetected.  Please consider this when interpreting information found in the chart.        I have reviewed the nursing notes.    I have reviewed the findings, diagnosis, plan and need for follow up with the patient.          Medication List      There are no discharge medications for this visit.         Final diagnoses:   Elevated blood pressure reading in office with diagnosis of hypertension   Anxiety       4/8/2019   [facility] EMERGENCY DEPARTMENT     Tyrone Solano DO  04/08/19 1509    "

## 2019-04-08 NOTE — ED TRIAGE NOTES
"BP has been elevated since Thursday.  Went to clinic today and they sent her to ED.  Feeling \"so anxious\".  "

## 2019-04-08 NOTE — TELEPHONE ENCOUNTER
Reason for Call:  Same Day Appointment, Requested Provider:  Rajesh Vidal MD or any provider in [place]     PCP: Rajesh Vidal    Reason for visit: anxiety sx- shaky/ nervous/ uptight     Duration of symptoms: started Thursday     Have you been treated for this in the past? Yes    Additional comments: Would like appt today after 9:30 AM. She thinks it may be from her BP meds.     Can we leave a detailed message on this number? YES    Phone number patient can be reached at: Home number on file [phone] (home)    Best Time: any     Call taken on 4/8/2019 at 8:30 AM by Rica Alfonso

## 2019-04-08 NOTE — ED AVS SNAPSHOT
[facility] Emergency Department  [address] [place] [address]  Phone:  [phone]  Fax:  [phone]                                    Shiloh Covarrubias   MRN: [id number]    Department:  [facility] Emergency Department   Date of Visit:  4/8/2019           After Visit Summary Signature Page    I have received my discharge instructions, and my questions have been answered. I have discussed any challenges I see with this plan with the nurse or doctor.    ..........................................................................................................................................  Patient/Patient Representative Signature      ..........................................................................................................................................  Patient Representative Print Name and Relationship to Patient    ..................................................               ................................................  Date                                   Time    ..........................................................................................................................................  Reviewed by Signature/Title    ...................................................              ..............................................  Date                                               Time          22EPIC Rev 08/18
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
## 2019-04-11 ENCOUNTER — TELEPHONE (OUTPATIENT)
Dept: INTERNAL MEDICINE | Facility: CLINIC | Age: 84
End: 2019-04-11

## 2019-04-11 DIAGNOSIS — I10 HYPERTENSION GOAL BP (BLOOD PRESSURE) < 130/80: ICD-10-CM

## 2019-04-11 NOTE — PROGRESS NOTES
SUBJECTIVE:   Shiloh Covarrubias is a 89 year old female who presents to clinic today for the following health issues:      HPI  Abnormal Mood Symptoms  Onset: always been and tried meds before but got worse a few months has gotten worse    Description:   Depression: YES  Anxiety: YES    Accompanying Signs & Symptoms:  Still participating in activities that you used to enjoy: YES but has started to stop doing them and states she does not enjoy them any   Fatigue: no  Irritability: YES  Difficulty concentrating: YES  Changes in appetite: no  Problems with sleep: YES- goes back and forth with it   Heart racing/beating fast : YES  Thoughts of hurting yourself or others: none    History:   Recent stress: YES- a few friends have passed or are in hospice   Prior depression hospitalization: None  Family history of depression: no  Family history of anxiety: no    Precipitating factors:   Alcohol/drug use: no    Alleviating factors:being with family or friends     Patient presents today with her son to discuss anxiety. Patient reports symptoms have been worsening since the passing of her  about 7 years ago. She reports she worries much more than she used to. Feels like her mind and her heart are always racing. She states she feels like she has 2-3 bad days in a row and then will have 1 good day. Reports sad that things are different - friends are becoming ill or passing away. She was in the ER on 4/8/19 as symptoms became very overwhelming. Patient reports she never forgets to eat but states she is often afraid to eat. States for the last few years she has frequent, recurrent diarrhea. Never sure what will trigger this. She is going to try a probiotic. She was drinking Ensure in the past but stopped doing this. Patient reports trying Zoloft and Paxil in the past. She's unsure why she stopped these but is sure it must have been due to a side effect. She would like to start a new medication today. She see's her son  daily. Has other children she will be seeing over [facility]. Reports this always brings her robbin.     Therapies Tried and outcome: None  Additional history: as documented    Reviewed and updated as needed this visit by clinical staff         Reviewed and updated as needed this visit by Provider       Patient Active Problem List   Diagnosis     Edema     Anxiety state     Essential and other specified forms of tremor     Senile cataract     Sebaceous cyst     Impacted cerumen     Chronic airway obstruction (H)     Osteoarthrosis, unspecified whether generalized or localized, involving lower leg     Hypertriglyceridemia     Chest pain     Palpitations     GERD (gastroesophageal reflux disease)     Hyperlipidemia LDL goal <100     Advanced directives, counseling/discussion     Major depressive disorder, single episode, mild (H)     Essential hypertension with goal blood pressure less than 130/80     Past Surgical History:   Procedure Laterality Date     ESOPHAGOSCOPY, GASTROSCOPY, DUODENOSCOPY (EGD), COMBINED  2/8/2011    COMBINED ESOPHAGOSCOPY, GASTROSCOPY, DUODENOSCOPY (EGD), BIOPSY SINGLE OR MULTIPLE performed by ANGY LIMA at  GI     ESOPHAGOSCOPY, GASTROSCOPY, DUODENOSCOPY (EGD), DILATATION, COMBINED  2/8/2011    COMBINED ESOPHAGOSCOPY, GASTROSCOPY, DUODENOSCOPY (EGD), DILATATION performed by ANGY LIMA at  GI     [facility] UGI ENDOSCOPY, SIMPLE EXAM  02/23/10       Social History     Tobacco Use     Smoking status: Never Smoker     Smokeless tobacco: Never Used   Substance Use Topics     Alcohol use: Yes     Alcohol/week: 0.0 oz     Comment: 3 per  year     Family History   Problem Relation Age of Onset     Arthritis Mother      Osteoporosis Mother      Thyroid Disease Mother         Hypothyroid.     Depression Maternal Uncle         Bouts of depression     Hypertension No family hx of      C.A.D. No family hx of          Current Outpatient Medications   Medication Sig Dispense Refill     albuterol (PROAIR  HFA/PROVENTIL HFA/VENTOLIN HFA) 108 (90 Base) MCG/ACT inhaler Inhale 2 puffs into the lungs every 6 hours as needed for shortness of breath / dyspnea or wheezing 1 Inhaler 3     amLODIPine (NORVASC) 5 MG tablet Take 1 tablet (5 mg) by mouth daily 90 tablet 3     ASPIRIN 81 MG OR TABS 1 TABLET DAILY       Calcium Polycarbophil (FIBERCON PO) Take  by mouth. 1/2 tablet w/ evening meal       cefadroxil (DURICEF) 500 MG capsule        cyclobenzaprine (FLEXERIL) 5 MG tablet Take 1 tablet (5 mg) by mouth 3 times daily as needed for muscle spasms 12 tablet 0     EQ LORATADINE 10 MG tablet Take 1 tablet (10 mg) by mouth daily 90 tablet 3     escitalopram (LEXAPRO) 5 MG tablet Take 1 tablet (5 mg) by mouth daily 30 tablet 1     furosemide (LASIX) 20 MG tablet Take 2 tablets (40 mg) by mouth daily 180 tablet 3     hydrALAZINE (APRESOLINE) 50 MG tablet Take 1 tablet (50 mg) by mouth 3 times daily 270 tablet 3     lisinopril (PRINIVIL/ZESTRIL) 20 MG tablet Take 1 tablet (20 mg) by mouth 2 times daily 180 tablet 3     loperamide (IMODIUM A-D) 2 MG tablet 1/2 tab before meals 20 tablet 2     metoprolol tartrate (LOPRESSOR) 25 MG tablet TAKE 1 TABLET BY MOUTH TWICE DAILY 180 tablet 0     Multiple Vitamins-Minerals (MULTI VITAMIN/MINERALS PO) Take 1 tablet by mouth daily.       tiotropium (SPIRIVA HANDIHALER) 18 MCG inhaled capsule INHALE 1 PUFF BY MOUTH ONCE DAILY 30 capsule 11     umeclidinium (INCRUSE ELLIPTA) 62.5 MCG/INH inhaler Inhale 1 puff into the lungs daily 1 Inhaler 2     Allergies   Allergen Reactions     Clonidine Derivatives      Severe side effects       Entocort [Corticosteroids]      Macrobid [Nitrofurantoin]      Diarrhea       Sulfa Drugs Itching     itch     BP Readings from Last 3 Encounters:   04/12/19 130/64   04/08/19 (!) 210/80   04/08/19 (!) 210/70    Wt Readings from Last 3 Encounters:   04/12/19 46.7 kg (103 lb)   03/01/19 52.6 kg (116 lb)   01/16/19 51.7 kg (114 lb)         ROS:  Constitutional, HEENT,  cardiovascular, pulmonary, gi and gu systems are negative, except as otherwise noted.    OBJECTIVE:     /64   Pulse 58   Temp 97.8  F (36.6  C) (Temporal)   Resp 16   Wt 46.7 kg (103 lb)   LMP  (LMP Unknown)   SpO2 98%   BMI 18.25 kg/m    Body mass index is 18.25 kg/m .  GENERAL: healthy, alert and no distress  NECK: no adenopathy, no asymmetry, masses, or scars and thyroid normal to palpation  RESP: lungs clear to auscultation - no rales, rhonchi or wheezes  CV: regular rate and rhythm, normal S1 S2, no S3 or S4, no murmur, click or rub, no peripheral edema and peripheral pulses strong  SKIN: no suspicious lesions or rashes  PSYCH: mentation appears normal, affect normal/bright, tearful, anxious, judgement and insight intact and appearance well groomed    Diagnostic Test Results:  none     ASSESSMENT/PLAN:     1. LOUIS (generalized anxiety disorder)  Patient presents today due to increased anxiety over the last several weeks. She is interested in trying a new medication but is worried this may cause diarrhea. I do suspect that this may actually be caused by anxiety and thus also causing ongoing weight loss. However further work-up may be needed pending progression of symptoms. Patient will start Lexapro today. Uses and side effects discussed in great detail. Discussed with patient that if often takes 2-6 weeks to become effective. I do suspect a dose increase will be needed. Patient has a follow up with her primary in 3 weeks and will further address at that visit. She will certainly follow-up sooner if needed.  - escitalopram (LEXAPRO) 5 MG tablet; Take 1 tablet (5 mg) by mouth daily  Dispense: 30 tablet; Refill: 1    2. Weight loss  Recommended patient restart a daily supplemental shake such as Ensure. We also discussed BRAT diet/fiber/probiotics to help with diarrhea. Again, may need further work-up if weight loss persisting.     The patient indicates understanding of these issues and agrees with the  plan.    Patricia Veliz PA-C  [facility]  Answers for HPI/ROS submitted by the patient on 4/12/2019   If you checked off any problems, how difficult have these problems made it for you to do your work, take care of things at home, or get along with other people?: Somewhat difficult  PHQ9 TOTAL SCORE: 13  LOUIS 7 TOTAL SCORE: 17

## 2019-04-11 NOTE — TELEPHONE ENCOUNTER
Reason for Call:  Same Day Appointment, Requested Provider:  Rajesh Vidal MD    PCP: Rajesh Vidal    Reason for visit: work in-patient is calling stating that she was in the ED on Monday with blood pressure that was really high. The doctor in the ED told her she needs to have something for anxiety, patient is very anxious and would like to be seen tomorrow with Dr. Vidal     Duration of symptoms:     Have you been treated for this in the past? No    Additional comments:     Can we leave a detailed message on this number? YES    Phone number patient can be reached at: Home number on file [phone] (home)    Best Time: any    Call taken on 4/11/2019 at 3:35 PM by Sierra Pelaez

## 2019-04-12 ENCOUNTER — OFFICE VISIT (OUTPATIENT)
Dept: FAMILY MEDICINE | Facility: OTHER | Age: 84
End: 2019-04-12
Payer: MEDICARE

## 2019-04-12 VITALS
TEMPERATURE: 97.8 F | OXYGEN SATURATION: 98 % | SYSTOLIC BLOOD PRESSURE: 130 MMHG | RESPIRATION RATE: 16 BRPM | WEIGHT: 103 LBS | BODY MASS INDEX: 18.25 KG/M2 | DIASTOLIC BLOOD PRESSURE: 64 MMHG | HEART RATE: 58 BPM

## 2019-04-12 DIAGNOSIS — R63.4 WEIGHT LOSS: ICD-10-CM

## 2019-04-12 DIAGNOSIS — F41.1 GAD (GENERALIZED ANXIETY DISORDER): Primary | ICD-10-CM

## 2019-04-12 PROCEDURE — 99214 OFFICE O/P EST MOD 30 MIN: CPT | Performed by: PHYSICIAN ASSISTANT

## 2019-04-12 RX ORDER — ESCITALOPRAM OXALATE 5 MG/1
5 TABLET ORAL DAILY
Qty: 30 TABLET | Refills: 1 | Status: SHIPPED | OUTPATIENT
Start: 2019-04-12 | End: 2019-05-28

## 2019-04-12 ASSESSMENT — ANXIETY QUESTIONNAIRES
3. WORRYING TOO MUCH ABOUT DIFFERENT THINGS: NEARLY EVERY DAY
2. NOT BEING ABLE TO STOP OR CONTROL WORRYING: NEARLY EVERY DAY
7. FEELING AFRAID AS IF SOMETHING AWFUL MIGHT HAPPEN: NEARLY EVERY DAY
GAD7 TOTAL SCORE: 17
6. BECOMING EASILY ANNOYED OR IRRITABLE: SEVERAL DAYS
4. TROUBLE RELAXING: MORE THAN HALF THE DAYS
GAD7 TOTAL SCORE: 17
7. FEELING AFRAID AS IF SOMETHING AWFUL MIGHT HAPPEN: NEARLY EVERY DAY
GAD7 TOTAL SCORE: 17
1. FEELING NERVOUS, ANXIOUS, OR ON EDGE: NEARLY EVERY DAY
5. BEING SO RESTLESS THAT IT IS HARD TO SIT STILL: MORE THAN HALF THE DAYS

## 2019-04-12 ASSESSMENT — PATIENT HEALTH QUESTIONNAIRE - PHQ9
SUM OF ALL RESPONSES TO PHQ QUESTIONS 1-9: 13
SUM OF ALL RESPONSES TO PHQ QUESTIONS 1-9: 13
10. IF YOU CHECKED OFF ANY PROBLEMS, HOW DIFFICULT HAVE THESE PROBLEMS MADE IT FOR YOU TO DO YOUR WORK, TAKE CARE OF THINGS AT HOME, OR GET ALONG WITH OTHER PEOPLE: SOMEWHAT DIFFICULT

## 2019-04-12 ASSESSMENT — PAIN SCALES - GENERAL: PAINLEVEL: NO PAIN (0)

## 2019-04-12 NOTE — PATIENT INSTRUCTIONS
Start Escitalopram (Lexapro)   - Start with 1/2 tablet for the first week then increase to 1 full tablet daily   - Take this in the morning, if you feel more tired then you can move it to bedtime   - It often takes 4-6 weeks for these medications to start working   - Please let me know if you have any questions    Start Boost or Ensure again    Try Magnesium at bedtime for the legs    Keep your appointment with Dr. Vidal on the 29th

## 2019-04-13 ASSESSMENT — PATIENT HEALTH QUESTIONNAIRE - PHQ9: SUM OF ALL RESPONSES TO PHQ QUESTIONS 1-9: 13

## 2019-04-13 ASSESSMENT — ANXIETY QUESTIONNAIRES: GAD7 TOTAL SCORE: 17

## 2019-04-15 RX ORDER — HYDRALAZINE HYDROCHLORIDE 50 MG/1
TABLET, FILM COATED ORAL
Qty: 270 TABLET | Refills: 0 | Status: SHIPPED | OUTPATIENT
Start: 2019-04-15 | End: 2019-05-28

## 2019-04-15 NOTE — TELEPHONE ENCOUNTER
"Tiffanie refill given per RN protocol.   Please contact patient to have them schedule the following:  Patient has an appt scheduled for 4/29/2019  Amber Niño RN, BSN      Hydralazine   Last Written Prescription Date:  3/2/2018  Last Fill Quantity: 270,  # refills: 3   Last office visit: 3/1/2019 with prescribing provider:  denice   Future Office Visit:   Next 5 appointments (look out 90 days)    Apr 29, 2019 10:30 AM CDT  Office Visit with Rajesh [facility]idal MD  [facility] [address] [place] [id number] [phone]           Requested Prescriptions   Pending Prescriptions Disp Refills     hydrALAZINE (APRESOLINE) 50 MG tablet [Pharmacy Med Name: HYDRALAZINE 50MG    TAB] 270 tablet 3     Sig: TAKE 1 TABLET BY MOUTH THREE TIMES DAILY       Vasodilators Failed - 4/11/2019 11:42 AM        Failed - Most recent BP less than 140/90 on record     BP Readings from Last 3 Encounters:   04/12/19 130/64   04/08/19 (!) 210/80   04/08/19 (!) 210/70                 Passed - Most recent encounter is not a hospital encounter. Patient has recent (12 mos) or future (1 mos) visit with authorizing provider's specialty     Patient's most recent encounter is NOT a hospital encounter and has had an office visit in the last 12 months or has a visit in the next 30 days with authorizing provider or within the authorizing provider's specialty.      See \"Patient Info\" tab in inbasket, or \"Choose Columns\" in Meds & Orders section of the refill encounter.      If most recent encounter is a hospital encounter AND the patient does NOT have an appointment scheduled with the authorizing provider or authorizing provider's specialty within the next 30 days, forward refill to authorizing provider for medication review.          Passed - Medication is active on med list        Passed - Patient is of age 18 years or older        Passed - Patient is not pregnant        Passed - Patient has not had " a positive pregnancy test within the past 12 months          Amber Niño RN on 4/15/2019 at 10:52 AM

## 2019-04-28 DIAGNOSIS — I10 ESSENTIAL HYPERTENSION WITH GOAL BLOOD PRESSURE LESS THAN 130/80: ICD-10-CM

## 2019-04-29 ENCOUNTER — OFFICE VISIT (OUTPATIENT)
Dept: INTERNAL MEDICINE | Facility: CLINIC | Age: 84
End: 2019-04-29
Payer: MEDICARE

## 2019-04-29 VITALS
BODY MASS INDEX: 20.19 KG/M2 | RESPIRATION RATE: 16 BRPM | HEART RATE: 56 BPM | TEMPERATURE: 97 F | DIASTOLIC BLOOD PRESSURE: 60 MMHG | SYSTOLIC BLOOD PRESSURE: 172 MMHG | WEIGHT: 114 LBS | OXYGEN SATURATION: 99 %

## 2019-04-29 DIAGNOSIS — J30.1 CHRONIC SEASONAL ALLERGIC RHINITIS DUE TO POLLEN: ICD-10-CM

## 2019-04-29 DIAGNOSIS — I10 HYPERTENSION GOAL BP (BLOOD PRESSURE) < 130/80: ICD-10-CM

## 2019-04-29 DIAGNOSIS — I10 ESSENTIAL HYPERTENSION WITH GOAL BLOOD PRESSURE LESS THAN 130/80: ICD-10-CM

## 2019-04-29 DIAGNOSIS — F41.9 ANXIETY: Primary | ICD-10-CM

## 2019-04-29 PROCEDURE — 99214 OFFICE O/P EST MOD 30 MIN: CPT | Performed by: INTERNAL MEDICINE

## 2019-04-29 RX ORDER — AMLODIPINE BESYLATE 5 MG/1
5 TABLET ORAL DAILY
Qty: 90 TABLET | Refills: 3 | Status: SHIPPED | OUTPATIENT
Start: 2019-04-29 | End: 2019-08-28

## 2019-04-29 RX ORDER — LISINOPRIL 20 MG/1
20 TABLET ORAL 2 TIMES DAILY
Qty: 180 TABLET | Refills: 3 | Status: SHIPPED | OUTPATIENT
Start: 2019-04-29 | End: 2020-02-18

## 2019-04-29 RX ORDER — METOPROLOL TARTRATE 25 MG/1
25 TABLET, FILM COATED ORAL 2 TIMES DAILY
Qty: 180 TABLET | Refills: 3 | Status: SHIPPED | OUTPATIENT
Start: 2019-04-29 | End: 2019-08-28

## 2019-04-29 ASSESSMENT — PAIN SCALES - GENERAL: PAINLEVEL: NO PAIN (0)

## 2019-04-29 NOTE — PROGRESS NOTES
SUBJECTIVE:   Shiloh Covarrubias is a 89 year old female who presents to clinic today for the following   health issues:    Chief Complaint   Patient presents with     Recheck Medication     Htn and Anxiety     BP was high at the /70, she was ok in labs and head ct. Thought could be anxiety and she has trouble with medications.     She was just at her middle son's house and gained some weight. Still takes imodium before meals.      Saw PA and started on Lexapro and doing ok, no side effects. She gets anxious with eating and diarrhea, worries and then has lost weight.      Lives in her own home next to her son's home in [place].  She still drives.      I don't want her to take magnesium as it may make her diarrhea worse.     spiriva is expensive but will continue it.     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     Unspecified asthma(493.90)      Unspecified intestinal obstruction 02/17/10    D/C 02/20/10     Current Outpatient Medications   Medication     albuterol (PROAIR HFA/PROVENTIL HFA/VENTOLIN HFA) 108 (90 Base) MCG/ACT inhaler     amLODIPine (NORVASC) 5 MG tablet     ASPIRIN 81 MG OR TABS     Calcium Polycarbophil (FIBERCON PO)     cyclobenzaprine (FLEXERIL) 5 MG tablet     EQ LORATADINE 10 MG tablet     escitalopram (LEXAPRO) 5 MG tablet     furosemide (LASIX) 20 MG tablet     hydrALAZINE (APRESOLINE) 50 MG tablet     lisinopril (PRINIVIL/ZESTRIL) 20 MG tablet     loperamide (IMODIUM A-D) 2 MG tablet     metoprolol tartrate (LOPRESSOR) 25 MG tablet     Multiple Vitamins-Minerals (MULTI VITAMIN/MINERALS PO)     tiotropium (SPIRIVA HANDIHALER) 18 MCG inhaled capsule     No current facility-administered medications for this visit.      Social History     Tobacco Use     Smoking status: Never Smoker     Smokeless tobacco: Never Used   Substance Use Topics     Alcohol use: Yes     Alcohol/week: 0.0 oz      Comment: 3 per  year     Drug use: No       Physical Exam  /60   Pulse 56   Temp 97  F (36.1  C) (Temporal)   Resp 16   Wt 51.7 kg (114 lb)   LMP  (LMP Unknown)   SpO2 99%   BMI 20.19 kg/m    General Appearance-healthy, alert, no distress  Cardiac-regular rate and rhythm  with normal S1, S2 ; no murmur, rub or gallops  Lungs-clear to auscultation      ASSESSMENT:  This is an 89-year-old woman who has a lot of anxiety, hypertension, some GI upset at times.  She also has some COPD.  She comes in for recheck.  She was in the emergency room on April 8 with high blood pressure, and anxiety.  Her labs and head CT were normal.  This is felt to be anxiety.  She did see a another provider about a week ago and get started on low-dose Lexapro 5 mg she is doing well she is tolerating this.  She apparently did not tolerate Paxil and Zoloft in the past.  Her stomach is doing better, she was down at her son's and did gain some weight.  Her weight has been down because she was having diarrhea and anxiety.  Most of our visit today was reviewing her stuff and reviewing that she needs to be out seeing people.  I recommended she go to the [employer] [facility] couple days a week just for the interaction and to have a meal with other people she does have evening meals sometimes with her son who [place].  I would like her to come back in 2 weeks to do a blood pressure check and a weight check here with the [facility] nurse.    Medications were refilled as well.    Greater than 50% of the visit visit was in direct counseling and face-to-face care, this was a 30-minute visit.  For the diagnosis of anxiety.    Electronically signed by Rajesh Vidal MD

## 2019-04-30 RX ORDER — AMLODIPINE BESYLATE 5 MG/1
TABLET ORAL
Qty: 90 TABLET | Refills: 3 | OUTPATIENT
Start: 2019-04-30

## 2019-04-30 NOTE — TELEPHONE ENCOUNTER
"Rx was sent 4/29/2019 for 3 months and 3 refills. Patient should have medication through 4/29/2020  Pharmacy notified via E-prescribe refusal  Amber Niño RN, BSN    norvasc  Last Written Prescription Date:  4/29/2019  Last Fill Quantity: 90,  # refills: 3   Last office visit: 4/29/2019 with prescribing provider:  denice   Future Office Visit:   Next 5 appointments (look out 90 days)    May 13, 2019 10:00 AM CDT  Nurse Only with PH JOSE LUKE  [facility] [address] [place] [id number] [phone]           Requested Prescriptions   Pending Prescriptions Disp Refills     amLODIPine (NORVASC) 5 MG tablet [Pharmacy Med Name: AMLODIPINE 5MG TAB] 90 tablet 3     Sig: TAKE 1 TABLET BY MOUTH ONCE DAILY       Calcium Channel Blockers Protocol  Passed - 4/28/2019  4:09 PM        Passed - Blood pressure under 140/90 in past 12 months     BP Readings from Last 3 Encounters:   04/29/19 172/60   04/12/19 130/64   04/08/19 (!) 210/80                 Passed - Recent (12 mo) or future (30 days) visit within the authorizing provider's specialty     Patient had office visit in the last 12 months or has a visit in the next 30 days with authorizing provider or within the authorizing provider's specialty.  See \"Patient Info\" tab in inbasket, or \"Choose Columns\" in Meds & Orders section of the refill encounter.              Passed - Medication is active on med list        Passed - Patient is age 18 or older        Passed - No active pregnancy on record        Passed - Normal serum creatinine on file in past 12 months     Recent Labs   Lab Test 04/08/19  1303   CR 0.99             Passed - No positive pregnancy test in past 12 months          Amber Niño RN on 4/30/2019 at 9:17 AM    "

## 2019-05-20 ENCOUNTER — ALLIED HEALTH/NURSE VISIT (OUTPATIENT)
Dept: FAMILY MEDICINE | Facility: CLINIC | Age: 84
End: 2019-05-20
Payer: MEDICARE

## 2019-05-20 VITALS — BODY MASS INDEX: 20.5 KG/M2 | SYSTOLIC BLOOD PRESSURE: 138 MMHG | DIASTOLIC BLOOD PRESSURE: 50 MMHG | WEIGHT: 115.7 LBS

## 2019-05-20 DIAGNOSIS — R63.4 LOSS OF WEIGHT: Primary | ICD-10-CM

## 2019-05-20 PROCEDURE — 99207 ZZC NO CHARGE NURSE ONLY: CPT

## 2019-05-20 NOTE — NURSING NOTE
Shiloh Covarrubias is a 89 year old patient who comes in today for a Blood Pressure check.  Initial BP:  /50   Wt 52.5 kg (115 lb 11.2 oz)   LMP  (LMP Unknown)   BMI 20.50 kg/m       Disposition: results routed to provider  Maryan Hills CMA ([facility])

## 2019-05-24 ENCOUNTER — TELEPHONE (OUTPATIENT)
Dept: INTERNAL MEDICINE | Facility: CLINIC | Age: 84
End: 2019-05-24

## 2019-05-24 NOTE — TELEPHONE ENCOUNTER
Reason for Call:  Same Day Appointment, Requested Provider:  Rajesh Vidal MD    PCP: Rajesh Vidal    Reason for visit: Patient would like to be scheduled to see Dr. Vidal before 6/6/19 to discuss and follow up medication. Patient runs out of meds on 6/6/19.     Duration of symptoms: NA    Have you been treated for this in the past? NA    Additional comments: NA    Can we leave a detailed message on this number? YES    Phone number patient can be reached at: Home number on file [phone] (home) or Cell number on file:    No relevant phone numbers on file.       Best Time: ANY    Call taken on 5/24/2019 at 11:45 AM by Karen Manzanares

## 2019-05-28 ENCOUNTER — OFFICE VISIT (OUTPATIENT)
Dept: INTERNAL MEDICINE | Facility: CLINIC | Age: 84
End: 2019-05-28
Payer: MEDICARE

## 2019-05-28 VITALS
SYSTOLIC BLOOD PRESSURE: 126 MMHG | TEMPERATURE: 97.6 F | DIASTOLIC BLOOD PRESSURE: 84 MMHG | WEIGHT: 115 LBS | BODY MASS INDEX: 20.37 KG/M2 | HEART RATE: 60 BPM | RESPIRATION RATE: 16 BRPM | OXYGEN SATURATION: 97 %

## 2019-05-28 DIAGNOSIS — F41.1 GAD (GENERALIZED ANXIETY DISORDER): Primary | ICD-10-CM

## 2019-05-28 DIAGNOSIS — R06.02 SOB (SHORTNESS OF BREATH): ICD-10-CM

## 2019-05-28 DIAGNOSIS — I10 HYPERTENSION GOAL BP (BLOOD PRESSURE) < 130/80: ICD-10-CM

## 2019-05-28 PROCEDURE — 99213 OFFICE O/P EST LOW 20 MIN: CPT | Performed by: INTERNAL MEDICINE

## 2019-05-28 RX ORDER — ESCITALOPRAM OXALATE 5 MG/1
5 TABLET ORAL DAILY
Qty: 90 TABLET | Refills: 3 | Status: SHIPPED | OUTPATIENT
Start: 2019-05-28 | End: 2019-08-28

## 2019-05-28 RX ORDER — HYDRALAZINE HYDROCHLORIDE 50 MG/1
TABLET, FILM COATED ORAL
Qty: 270 TABLET | Refills: 3 | Status: SHIPPED | OUTPATIENT
Start: 2019-05-28 | End: 2019-08-28

## 2019-05-28 RX ORDER — FUROSEMIDE 20 MG
40 TABLET ORAL DAILY
Qty: 180 TABLET | Refills: 3 | Status: SHIPPED | OUTPATIENT
Start: 2019-05-28 | End: 2020-11-16

## 2019-05-28 ASSESSMENT — PAIN SCALES - GENERAL: PAINLEVEL: NO PAIN (0)

## 2019-05-28 NOTE — PROGRESS NOTES
Subjective     Shiloh Covarrubias is a 89 year old female who presents to clinic today for the following health issues:    HPI   Chief Complaint   Patient presents with     Anxiety     follow up     Anxiety is getting better, on escitalopram at 5 mg and doing ok.  No side effects.    She asks about sugar, uses a little in her coffee.  Doesn't use it too much.      Weight is stable at 115. Goes to [employer], has some family prepared meals.     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     Unspecified asthma(493.90)      Unspecified intestinal obstruction 02/17/10    D/C 02/20/10     Current Outpatient Medications   Medication     albuterol (PROAIR HFA/PROVENTIL HFA/VENTOLIN HFA) 108 (90 Base) MCG/ACT inhaler     amLODIPine (NORVASC) 5 MG tablet     ASPIRIN 81 MG OR TABS     Calcium Polycarbophil (FIBERCON PO)     cyclobenzaprine (FLEXERIL) 5 MG tablet     EQ LORATADINE 10 MG tablet     escitalopram (LEXAPRO) 5 MG tablet     furosemide (LASIX) 20 MG tablet     hydrALAZINE (APRESOLINE) 50 MG tablet     lisinopril (PRINIVIL/ZESTRIL) 20 MG tablet     loperamide (IMODIUM A-D) 2 MG tablet     metoprolol tartrate (LOPRESSOR) 25 MG tablet     Multiple Vitamins-Minerals (MULTI VITAMIN/MINERALS PO)     tiotropium (SPIRIVA HANDIHALER) 18 MCG inhaled capsule     No current facility-administered medications for this visit.      Social History     Tobacco Use     Smoking status: Never Smoker     Smokeless tobacco: Never Used   Substance Use Topics     Alcohol use: Yes     Alcohol/week: 0.0 oz     Comment: 3 per  year     Drug use: No     Physical Exam  /84   Pulse 60   Temp 97.6  F (36.4  C) (Temporal)   Resp 16   Wt 52.2 kg (115 lb)   LMP  (LMP Unknown)   SpO2 97%   BMI 20.37 kg/m    General Appearance-healthy, alert, no distress  Cardiac-regular rate and rhythm  with normal S1, S2 ; no murmur, rub or  gallops    ASSESSMENT:  Patient here for recheck of her anxiety, she is doing better on Lexapro she is been on it for over a month, she is on 5 mg.  We will refill this for her for the next 3 months, low-dose she is having no side effects.    Other medications for hypertension are refilled as well including hydralazine.      We discussed sugar in her diet, eats okay for her to have some sugar not excessive amounts like large amounts of pop or significant amounts of juice.  She does need to keep her weight up though also having some sugar and sweets is fine and she is reassured by this.  Electronically signed by Rajesh Vidal MD

## 2019-07-22 ENCOUNTER — OFFICE VISIT (OUTPATIENT)
Dept: PODIATRY | Facility: CLINIC | Age: 84
End: 2019-07-22
Payer: MEDICARE

## 2019-07-22 VITALS — WEIGHT: 112 LBS | BODY MASS INDEX: 19.84 KG/M2 | TEMPERATURE: 97.8 F | HEIGHT: 63 IN

## 2019-07-22 DIAGNOSIS — L85.9 HYPERKERATOSIS: Primary | ICD-10-CM

## 2019-07-22 DIAGNOSIS — I73.9 PERIPHERAL VASCULAR DISEASE (H): ICD-10-CM

## 2019-07-22 DIAGNOSIS — M20.41 HAMMER TOES OF BOTH FEET: ICD-10-CM

## 2019-07-22 DIAGNOSIS — M20.42 HAMMER TOES OF BOTH FEET: ICD-10-CM

## 2019-07-22 PROCEDURE — 11055 PARING/CUTG B9 HYPRKER LES 1: CPT | Performed by: PODIATRIST

## 2019-07-22 PROCEDURE — 99213 OFFICE O/P EST LOW 20 MIN: CPT | Mod: 25 | Performed by: PODIATRIST

## 2019-07-22 ASSESSMENT — PAIN SCALES - GENERAL: PAINLEVEL: SEVERE PAIN (6)

## 2019-07-22 ASSESSMENT — MIFFLIN-ST. JEOR: SCORE: 902.16

## 2019-07-22 NOTE — PROGRESS NOTES
"HPI:     Onset early Jan 2019, distal Left great toe pain. No injury noted. Presents today with new looking new balance athletic shoes. Toenail deformity Left great toenail.  8/3/2017 - Matrixectomy lateral and medial Left and Right great toenail.  Constant, redness, dull ache, Intermittent, throbbing, sharp, pain 6, painful with sheets on toe.    Retired.    Weight management plan Patient was referred to their PCP to discuss a diet and exercise plan.     Chief Complaint   Patient presents with     Consult     distal Left great toe pain; LO V10/12/2017 - New issue     EXAM:Vitals: Temp 97.8  F (36.6  C) (Temporal)   Ht 1.6 m (5' 3\")   Wt 50.8 kg (112 lb)   LMP  (LMP Unknown)   BMI 19.84 kg/m    BMI= Body mass index is 19.84 kg/m .    General appearance: Patient is alert and fully cooperative with history & exam.  No sign of distress is noted during the visit.     Psychiatric: Affect is pleasant & appropriate.  Patient appears motivated to improve health.     Respiratory: Breathing is regular & unlabored while sitting.     HEENT: Hearing is intact to spoken word.  Speech is clear.  No gross evidence of visual impairment that would impact ambulation.     Vascular: DP & PT pulses are intact & regular, CFT immediate, positive digital hair growth bilaterally.  No significant edema or varicosities noted and skin temperature is normal to both lower extremities.     Neurologic: Lower extremity sensation is intact to light touch.  No evidence of weakness or contracture in the lower extremities.  No evidence of neuropathy.    Dermatologic: Mildly diminished texture, turgor and tone about the integument.  There is localized hyperkeratosis about the dorsal distal aspect of the left hallux.  This is not an ingrown nail.  No erythema noted.  There is dry hematogenous exudate noted upon debridement of the distal hyperkeratosis.    Musculoskeletal: Patient is ambulatory without assistive device or brace.  No gross ankle " deformity noted.  No foot or ankle joint effusion is noted.     ASSESSMENT:       ICD-10-CM    1. Hyperkeratosis L85.9 TRIM HYPERKERATOTIC SKIN LESION, ONE   2. Peripheral vascular disease (H) I73.9 TRIM HYPERKERATOTIC SKIN LESION, ONE   3. Hammer toes of both feet M20.41 TRIM HYPERKERATOTIC SKIN LESION, ONE    M20.42        Plan:     7/22/2019  I debrided one painful hyperkeratotic lesion sharply from the distal left hallux.  Recommended abrasive debridement at home and petroleum-based moisturizer written instructions dispensed.  Follow-up as needed.     Noel Clark DPM

## 2019-07-22 NOTE — PATIENT INSTRUCTIONS
"Nail Debridement    A high quality instrument makes trimming toenails MUCH easier.  Search [employer]bay for any 5\" nail nipper manufactured by reliable brands such as Miltex, Integra or Jarit as these quality instruments will help manage difficult nails more effectively and comfortably. We use Miltex -SS.  A physician is not necessary to trim nails even if you are taking blood thinners or are diabetic.  Your family or care givers may help manage your toenails.      Trim or sand the nails once weekly.  Do not wait until they are long and painful or trimming will become too difficult and painful and will increase your risk of complications or infection.  A course file or 120 grit sandpaper on a sanding block can be helpful.  For very thick nails many people prefer battery operated colbert such as an Amope', Personal Pedi and Emjoi for regular use or heavy painful callouses or thick toenails.    Trim or skive any portion of nail that is thick, loose, crumbling, or not well attached. Do not tear the nail away, but rather cut them with a nail nipperor sand or sand them down.  You may follow up with your Podiatric Physician if you have pain, bleeding, infection, questions or other concerns.      You may also contact the following Registered Nurses for further help with nail debridement and minor hygiene concnerns.  They may come to your home or meet them at their clinic to trim your toenails and soak your feet, as well as monitor for any complications that would require evaluation by a Physician.      [facility]  Ladi Lei RN  Office [phone]    [facility]  Tamara Rogers RN  [phone]   Call or text for appointment  Some home visits and has a clinic at:  [address] [place] [address]    [facility]  Helen Lobo RN  [phone]    [facility]  [phone]  [facility]  [phone]  [email or web address] - [facility]    For up to date list and to find foot care nurses in other communities visit [employer] Association website:  afcna.org.     Calluses, Corns, IPKs, Porokeratosis    When there is excessive friction or pressure on the skin, the body responds by making the skin thicker.  While this may protect the deeper structures, the thickened skin can take up more space and thus increase pressure over a bony prominence or become an open sore or skin ulcer as this skin becomes less flexible.    Flat, diffuse thickening are simple calluses and they are usually caused by friction.  Often these are the result of rubbing on a shoe or going barefoot.    Calluses with a central core between the toes are called corns.  These often result from prominent joints on adjacent toes rubbing together.  Theses are often a symptom of bone malalignment and will usually recur unless the underlying bones are addressed.    Many of these lesions can be kept comfortable with routine maintenance. This consists of filing them with a Ped Egg, callus file, or 120 grit sandpaper on a block, every day during your bath or shower.  Most people prefer battery operated colbert such as an Amope', Personal Pedi and Emjoi for regular use or heavy painful callouses.  Heavy creams or ointments can be applied 1-2 times every day to keep them soft. Toe spacers can be used for corns, gel pads can be used for other lesions on the bottom of the foot. If there is a deformity noted, such as a prominent bone, often this can be addressed to minimize recurrence. However, sometimes the pressure and lesion simply migrates to another spot after surgery, so it is not a guaranteed cure.     If you have severe callouses and cracking, you may apply heavy ointments that you scoop up such as Cetaphil cream, Eucerin, Aquaphor or Vaseline.  Be sure to obtain cream or ointment in these brands and not lotion  (lotion is water based and not durable enough for feet). For more aggressive help apply heavy creams or ointment under occlusive dressings such as Saran Wrap or Jelly Feet while sleeping.   Jelly Feet can be obtained at www.jellyfeet.com.     To be successful with managing hyperkeratotic skin, you must manage hygiene daily.  Apply the cream once or twice EVERY day.  At your bath or shower time is the easiest time to work on this when skin is most soft.  There is no medical or surgical treatment that will absolutely eliminate many of these symptoms.      Pedifix is a reliable source for all sorts of foot pads, cushions, or interdigital spacers and foot appliances. Go to www.[employer] or request a catalog at 9-125-8x8 Inc.        Please call with any additional questions.

## 2019-08-28 DIAGNOSIS — J44.9 CHRONIC OBSTRUCTIVE PULMONARY DISEASE, UNSPECIFIED COPD TYPE (H): ICD-10-CM

## 2019-08-28 DIAGNOSIS — I10 HYPERTENSION GOAL BP (BLOOD PRESSURE) < 130/80: ICD-10-CM

## 2019-08-28 DIAGNOSIS — I10 ESSENTIAL HYPERTENSION WITH GOAL BLOOD PRESSURE LESS THAN 130/80: ICD-10-CM

## 2019-08-28 DIAGNOSIS — F41.1 GAD (GENERALIZED ANXIETY DISORDER): ICD-10-CM

## 2019-08-28 RX ORDER — TIOTROPIUM BROMIDE 18 UG/1
CAPSULE ORAL; RESPIRATORY (INHALATION)
Qty: 90 CAPSULE | Refills: 3 | Status: SHIPPED | OUTPATIENT
Start: 2019-08-28 | End: 2019-09-30

## 2019-08-28 RX ORDER — METOPROLOL TARTRATE 25 MG/1
25 TABLET, FILM COATED ORAL 2 TIMES DAILY
Qty: 180 TABLET | Refills: 3 | Status: SHIPPED | OUTPATIENT
Start: 2019-08-28 | End: 2019-09-30

## 2019-08-28 RX ORDER — AMLODIPINE BESYLATE 5 MG/1
5 TABLET ORAL DAILY
Qty: 90 TABLET | Refills: 3 | Status: SHIPPED | OUTPATIENT
Start: 2019-08-28 | End: 2019-09-30

## 2019-08-28 RX ORDER — ESCITALOPRAM OXALATE 5 MG/1
5 TABLET ORAL DAILY
Qty: 90 TABLET | Refills: 3 | Status: SHIPPED | OUTPATIENT
Start: 2019-08-28 | End: 2019-09-30

## 2019-08-28 RX ORDER — HYDRALAZINE HYDROCHLORIDE 50 MG/1
TABLET, FILM COATED ORAL
Qty: 270 TABLET | Refills: 3 | Status: SHIPPED | OUTPATIENT
Start: 2019-08-28 | End: 2019-09-30

## 2019-08-28 NOTE — TELEPHONE ENCOUNTER
Spoke with patient and she wants to get her through [facility] now.  Rx's have been placed, review and approve if okay

## 2019-09-30 ENCOUNTER — OFFICE VISIT (OUTPATIENT)
Dept: INTERNAL MEDICINE | Facility: CLINIC | Age: 84
End: 2019-09-30
Payer: MEDICARE

## 2019-09-30 VITALS
BODY MASS INDEX: 20.37 KG/M2 | OXYGEN SATURATION: 98 % | WEIGHT: 115 LBS | SYSTOLIC BLOOD PRESSURE: 148 MMHG | HEART RATE: 50 BPM | DIASTOLIC BLOOD PRESSURE: 64 MMHG | RESPIRATION RATE: 16 BRPM | TEMPERATURE: 96.9 F

## 2019-09-30 DIAGNOSIS — J44.9 CHRONIC OBSTRUCTIVE PULMONARY DISEASE, UNSPECIFIED COPD TYPE (H): ICD-10-CM

## 2019-09-30 DIAGNOSIS — I10 HYPERTENSION GOAL BP (BLOOD PRESSURE) < 130/80: ICD-10-CM

## 2019-09-30 DIAGNOSIS — F41.1 GAD (GENERALIZED ANXIETY DISORDER): Primary | ICD-10-CM

## 2019-09-30 DIAGNOSIS — I10 ESSENTIAL HYPERTENSION WITH GOAL BLOOD PRESSURE LESS THAN 130/80: ICD-10-CM

## 2019-09-30 PROCEDURE — 99214 OFFICE O/P EST MOD 30 MIN: CPT | Performed by: INTERNAL MEDICINE

## 2019-09-30 RX ORDER — METOPROLOL TARTRATE 25 MG/1
25 TABLET, FILM COATED ORAL 2 TIMES DAILY
Qty: 180 TABLET | Refills: 3 | Status: SHIPPED | OUTPATIENT
Start: 2019-09-30 | End: 2020-11-16

## 2019-09-30 RX ORDER — TIOTROPIUM BROMIDE 18 UG/1
CAPSULE ORAL; RESPIRATORY (INHALATION)
Qty: 90 CAPSULE | Refills: 3 | Status: SHIPPED | OUTPATIENT
Start: 2019-09-30 | End: 2020-11-16

## 2019-09-30 RX ORDER — AMLODIPINE BESYLATE 5 MG/1
5 TABLET ORAL DAILY
Qty: 90 TABLET | Refills: 3 | Status: SHIPPED | OUTPATIENT
Start: 2019-09-30 | End: 2020-10-26

## 2019-09-30 RX ORDER — HYDRALAZINE HYDROCHLORIDE 50 MG/1
TABLET, FILM COATED ORAL
Qty: 270 TABLET | Refills: 3 | Status: SHIPPED | OUTPATIENT
Start: 2019-09-30 | End: 2020-02-18

## 2019-09-30 RX ORDER — ESCITALOPRAM OXALATE 5 MG/1
5 TABLET ORAL DAILY
Qty: 90 TABLET | Refills: 3 | Status: SHIPPED | OUTPATIENT
Start: 2019-09-30 | End: 2020-02-18

## 2019-09-30 ASSESSMENT — PAIN SCALES - GENERAL: PAINLEVEL: NO PAIN (0)

## 2019-09-30 NOTE — PROGRESS NOTES
Subjective     Shiloh Covarrubias is a 89 year old female who presents to clinic today for the following health issues:    HPI   Chief Complaint   Patient presents with     Anxiety     follow up     Recheck Medication     discuss medications       spiriva is costing $300, this was at [facility], in the doughnut hole.      Anxiety is doing better, will keep the escitalopram at 5 mg. Doing better, more calm.     Will transfer her medications to [facility] again.      No chest pains, no sob.    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     Unspecified asthma(493.90)      Unspecified intestinal obstruction 02/17/10    D/C 02/20/10     Current Outpatient Medications   Medication     albuterol (PROAIR HFA/PROVENTIL HFA/VENTOLIN HFA) 108 (90 Base) MCG/ACT inhaler     amLODIPine (NORVASC) 5 MG tablet     ASPIRIN 81 MG OR TABS     Calcium Polycarbophil (FIBERCON PO)     EQ LORATADINE 10 MG tablet     escitalopram (LEXAPRO) 5 MG tablet     furosemide (LASIX) 20 MG tablet     hydrALAZINE (APRESOLINE) 50 MG tablet     lisinopril (PRINIVIL/ZESTRIL) 20 MG tablet     loperamide (IMODIUM A-D) 2 MG tablet     metoprolol tartrate (LOPRESSOR) 25 MG tablet     Multiple Vitamins-Minerals (MULTI VITAMIN/MINERALS PO)     tiotropium (SPIRIVA HANDIHALER) 18 MCG inhaled capsule     No current facility-administered medications for this visit.      Social History     Tobacco Use     Smoking status: Never Smoker     Smokeless tobacco: Never Used   Substance Use Topics     Alcohol use: Yes     Alcohol/week: 0.0 standard drinks     Comment: 3 per  year     Drug use: No     Review of Systems  Constitutional-No fevers, chills, or weight changes..  Cardiac-No chest pain or palpitations.  Respiratory-No cough, sob, or hemoptysis.  GI-No nausea, vomitting, diarrhea, constipation, or blood in the stool.  Musculoskeletal-No muscles aches or joint pains.    Physical  Exam  BP (!) 148/64   Pulse 50   Temp 96.9  F (36.1  C) (Temporal)   Resp 16   Wt 52.2 kg (115 lb)   LMP  (LMP Unknown)   SpO2 98%   BMI 20.37 kg/m    General Appearance-healthy, alert, no distress  Cardiac-regular rate and rhythm  with normal S1, S2 ; no murmur, rub or gallops  Lungs-clear to auscultation  GI-Soft, nontender.  Normal bowel sounds.  No hepatosplenomegaly or abnormal masses  Extremities-no peripheral edema, peripheral pulses normal    ASSESSMENT:  89-year-old woman is here for recheck.  Overall she is doing pretty well.    From the one anxiety the patient is on Lexapro at 5 mg doing well we will continue it will not increase at this time.    Problem #2 medication refill we discussed her medications we will keep her on 3-month refills we did a long discussion about her donut hole.  We will change her Spiriva which is working well keeping on her albuterol use to a minimum.  We will refill the Spiriva at 3 months to [facility].    Hypertension we will refill her hydralazine, lisinopril, metoprolol as well.  Her blood pressure is a little bit high today but it seems to vary quite a bit with her anxiety.      Electronically signed by Rajesh Vidal MD

## 2019-11-27 ENCOUNTER — TELEPHONE (OUTPATIENT)
Dept: INTERNAL MEDICINE | Facility: CLINIC | Age: 84
End: 2019-11-27

## 2019-11-27 NOTE — TELEPHONE ENCOUNTER
Patient is due for a PHQ-9.  Index start date:8/13/2019  Index end date:12/11/2019    Please call patient.

## 2020-02-03 ENCOUNTER — TELEPHONE (OUTPATIENT)
Dept: INTERNAL MEDICINE | Facility: CLINIC | Age: 85
End: 2020-02-03

## 2020-02-03 NOTE — TELEPHONE ENCOUNTER
Reason for Call:  Same Day Appointment, Requested Provider:  Rajesh Vidal MD    PCP: Rajesh Vidal    Reason for visit: leg pain for couple weeks.  She states it isn't injury related.  She has been seeing a chiropractor but it always starts hurting again.  She thinks it is time to be seen.    Duration of symptoms: 2 to 3 weeks.    Have you been treated for this in the past?     Additional comments: Please call.    Can we leave a detailed message on this number? YES    Phone number patient can be reached at: Home number on file [phone] (home)    Best Time: any    Call taken on 2/3/2020 at 2:19 PM by Kishore Lopez

## 2020-02-03 NOTE — TELEPHONE ENCOUNTER
Patient states that she would like to wait and see Dr. Vidal. She is scheduled on 2/18/2020 at 10:30 am. Patient will call back and schedule with someone else sooner if it does get worse.    Renetta Marques, [facility]

## 2020-02-18 ENCOUNTER — OFFICE VISIT (OUTPATIENT)
Dept: INTERNAL MEDICINE | Facility: CLINIC | Age: 85
End: 2020-02-18
Payer: MEDICARE

## 2020-02-18 VITALS
OXYGEN SATURATION: 97 % | TEMPERATURE: 97.3 F | BODY MASS INDEX: 21.09 KG/M2 | HEIGHT: 63 IN | RESPIRATION RATE: 16 BRPM | SYSTOLIC BLOOD PRESSURE: 158 MMHG | WEIGHT: 119 LBS | DIASTOLIC BLOOD PRESSURE: 60 MMHG | HEART RATE: 50 BPM

## 2020-02-18 DIAGNOSIS — M76.31 ILIOTIBIAL BAND SYNDROME, RIGHT: Primary | ICD-10-CM

## 2020-02-18 DIAGNOSIS — I10 HYPERTENSION GOAL BP (BLOOD PRESSURE) < 130/80: ICD-10-CM

## 2020-02-18 DIAGNOSIS — I10 ESSENTIAL HYPERTENSION WITH GOAL BLOOD PRESSURE LESS THAN 130/80: ICD-10-CM

## 2020-02-18 DIAGNOSIS — J30.1 CHRONIC SEASONAL ALLERGIC RHINITIS DUE TO POLLEN: ICD-10-CM

## 2020-02-18 DIAGNOSIS — J44.9 CHRONIC OBSTRUCTIVE PULMONARY DISEASE, UNSPECIFIED COPD TYPE (H): ICD-10-CM

## 2020-02-18 DIAGNOSIS — F41.1 GAD (GENERALIZED ANXIETY DISORDER): ICD-10-CM

## 2020-02-18 PROCEDURE — 99214 OFFICE O/P EST MOD 30 MIN: CPT | Performed by: INTERNAL MEDICINE

## 2020-02-18 RX ORDER — HYDRALAZINE HYDROCHLORIDE 50 MG/1
TABLET, FILM COATED ORAL
Qty: 270 TABLET | Refills: 3 | Status: SHIPPED | OUTPATIENT
Start: 2020-02-18 | End: 2021-03-30

## 2020-02-18 RX ORDER — ESCITALOPRAM OXALATE 5 MG/1
5 TABLET ORAL DAILY
Qty: 90 TABLET | Refills: 3 | Status: SHIPPED | OUTPATIENT
Start: 2020-02-18 | End: 2020-08-24

## 2020-02-18 RX ORDER — LISINOPRIL 20 MG/1
20 TABLET ORAL 2 TIMES DAILY
Qty: 180 TABLET | Refills: 3 | Status: SHIPPED | OUTPATIENT
Start: 2020-02-18 | End: 2020-11-16

## 2020-02-18 ASSESSMENT — PATIENT HEALTH QUESTIONNAIRE - PHQ9: SUM OF ALL RESPONSES TO PHQ QUESTIONS 1-9: 1

## 2020-02-18 ASSESSMENT — MIFFLIN-ST. JEOR: SCORE: 928.91

## 2020-02-18 ASSESSMENT — PAIN SCALES - GENERAL: PAINLEVEL: MODERATE PAIN (5)

## 2020-02-18 NOTE — PROGRESS NOTES
"Justin Covarrubias is a 89 year old female who presents to clinic today for the following health issues:    HPI   Chief Complaint   Patient presents with     Leg Pain     right leg pain for over a month - NKI       Right leg is bothering her for over a month, no injury.  Knee and hip are ok just leg pain, some in the back area.     Alleve and advil help some, uses sports cream.       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     Unspecified asthma(493.90)      Unspecified intestinal obstruction 02/17/10    D/C 02/20/10     Current Outpatient Medications   Medication     albuterol (PROAIR HFA/PROVENTIL HFA/VENTOLIN HFA) 108 (90 Base) MCG/ACT inhaler     amLODIPine (NORVASC) 5 MG tablet     ASPIRIN 81 MG OR TABS     Calcium Polycarbophil (FIBERCON PO)     EQ LORATADINE 10 MG tablet     escitalopram (LEXAPRO) 5 MG tablet     furosemide (LASIX) 20 MG tablet     hydrALAZINE (APRESOLINE) 50 MG tablet     lisinopril (PRINIVIL/ZESTRIL) 20 MG tablet     loperamide (IMODIUM A-D) 2 MG tablet     metoprolol tartrate (LOPRESSOR) 25 MG tablet     Multiple Vitamins-Minerals (MULTI VITAMIN/MINERALS PO)     tiotropium (SPIRIVA HANDIHALER) 18 MCG inhaled capsule     No current facility-administered medications for this visit.      Social History     Tobacco Use     Smoking status: Never Smoker     Smokeless tobacco: Never Used   Substance Use Topics     Alcohol use: Yes     Alcohol/week: 0.0 standard drinks     Comment: 3 per  year     Drug use: No     Review of Systems  Constitutional-No fevers, chills, or weight changes..  Cardiac-No chest pain or palpitations.  Respiratory-No cough, sob, or hemoptysis.  Musculoskeletal-right leg pains.      Physical Exam  BP (!) 158/60   Pulse 50   Temp 97.3  F (36.3  C) (Temporal)   Resp 16   Ht 1.6 m (5' 3\")   Wt 54 kg (119 lb)   LMP  (LMP Unknown)   SpO2 97%   BMI 21.08 kg/m  "   General Appearance-healthy, alert, no distress  Right hip is ok, right knee has good rom  Pain on the lateral hip in the IT region  LUngs are clear today.    ASSESSMENT:  (M76.31) Iliotibial band syndrome, right  (primary encounter diagnosis)  Comment: painful IT band syndrome. Will have her use ice, rest, avoid yoga for a week. She can take otc nsaids for one week,       (I10) Hypertension goal BP (blood pressure) < 130/80  Comment: stable, will refill hydralazine  Plan: hydrALAZINE (APRESOLINE) 50 MG tablet            (J30.1) Chronic seasonal allergic rhinitis due to pollen  Comment: refill loratadine for the year   Plan: EQ LORATADINE 10 MG tablet            (F41.1) LOUIS (generalized anxiety disorder)  Comment: stable, better on lexapro,   Plan: escitalopram (LEXAPRO) 5 MG tablet            (I10) Essential hypertension with goal blood pressure less than 130/80  Comment: stable, refill   Plan: lisinopril (ZESTRIL) 20 MG tablet            (J44.9) Chronic obstructive pulmonary disease, unspecified COPD type (H)  Comment: doing well, wants to try respimat inhaler for spiriva, doing ok but would like to be better.   Plan: tiotropium (SPIRIVA RESPIMAT) 2.5 MCG/ACT         inhaler          Electronically signed by Rajesh Vidal MD

## 2020-08-24 ENCOUNTER — TELEPHONE (OUTPATIENT)
Dept: INTERNAL MEDICINE | Facility: CLINIC | Age: 85
End: 2020-08-24

## 2020-08-24 DIAGNOSIS — F41.1 GAD (GENERALIZED ANXIETY DISORDER): Primary | ICD-10-CM

## 2020-08-24 RX ORDER — ESCITALOPRAM OXALATE 10 MG/1
10 TABLET ORAL DAILY
Qty: 90 TABLET | Refills: 3 | Status: SHIPPED | OUTPATIENT
Start: 2020-08-24 | End: 2021-03-30

## 2020-08-24 NOTE — TELEPHONE ENCOUNTER
Reason for Call: Medication question/request    Detailed comments: Pt calling and states she would like to increase the escitalopram (LEXAPRO) 5 MG tablet because she is feeling more upset/anxious about things. Please advise.     Phone Number Patient can be reached at: Home number on file [phone] (home)    Best Time:     Can we leave a detailed message on this number? YES    Call taken on 8/24/2020 at 11:44 AM by Marisela Banks

## 2020-08-24 NOTE — TELEPHONE ENCOUNTER
Yes ok to increase from 5 to 10 mg, new script done. Sent to [facility], she can take two of the 5 mg until gone.

## 2020-10-25 DIAGNOSIS — I10 ESSENTIAL HYPERTENSION WITH GOAL BLOOD PRESSURE LESS THAN 130/80: ICD-10-CM

## 2020-10-26 RX ORDER — AMLODIPINE BESYLATE 5 MG/1
TABLET ORAL
Qty: 90 TABLET | Refills: 0 | Status: SHIPPED | OUTPATIENT
Start: 2020-10-26 | End: 2020-11-16

## 2020-10-26 NOTE — TELEPHONE ENCOUNTER
"Routing refill request to provider for review/approval because:  Labs not current:  CR  BP did not pass protocol  T'dup for 3 months per provider request for review      Requested Prescriptions   Pending Prescriptions Disp Refills     amLODIPine (NORVASC) 5 MG tablet [Pharmacy Med Name: amLODIPine Besylate 5 MG Oral Tablet] 90 tablet 0     Sig: Take 1 tablet by mouth once daily   Last Written Prescription Date:  9/30/2019  Last Fill Quantity: 90,  # refills: 3   Last office visit: 2/18/2020 with prescribing provider:     Future Office Visit:      Calcium Channel Blockers Protocol  Failed - 10/25/2020  4:27 PM        Failed - Blood pressure under 140/90 in past 12 months     BP Readings from Last 3 Encounters:   02/18/20 (!) 158/60   09/30/19 (!) 148/64   05/28/19 126/84           Failed - Normal serum creatinine on file in past 12 months     Recent Labs   Lab Test 04/08/19  1303   CR 0.99     Ok to refill medication if creatinine is low          Passed - Recent (12 mo) or future (30 days) visit within the authorizing provider's specialty     Patient has had an office visit with the authorizing provider or a provider within the authorizing providers department within the previous 12 mos or has a future within next 30 days. See \"Patient Info\" tab in inbasket, or \"Choose Columns\" in Meds & Orders section of the refill encounter.            Passed - Medication is active on med list        Passed - Patient is age 18 or older        Passed - No active pregnancy on record        Passed - No positive pregnancy test in past 12 months         Margaux Whitman RN      "

## 2020-11-14 DIAGNOSIS — J44.9 CHRONIC OBSTRUCTIVE PULMONARY DISEASE, UNSPECIFIED COPD TYPE (H): ICD-10-CM

## 2020-11-16 ENCOUNTER — VIRTUAL VISIT (OUTPATIENT)
Dept: INTERNAL MEDICINE | Facility: CLINIC | Age: 85
End: 2020-11-16
Payer: MEDICARE

## 2020-11-16 DIAGNOSIS — I10 ESSENTIAL HYPERTENSION WITH GOAL BLOOD PRESSURE LESS THAN 130/80: ICD-10-CM

## 2020-11-16 DIAGNOSIS — J30.1 CHRONIC SEASONAL ALLERGIC RHINITIS DUE TO POLLEN: ICD-10-CM

## 2020-11-16 DIAGNOSIS — N18.30 STAGE 3 CHRONIC KIDNEY DISEASE, UNSPECIFIED WHETHER STAGE 3A OR 3B CKD (H): ICD-10-CM

## 2020-11-16 DIAGNOSIS — J44.9 CHRONIC OBSTRUCTIVE PULMONARY DISEASE, UNSPECIFIED COPD TYPE (H): ICD-10-CM

## 2020-11-16 DIAGNOSIS — R06.02 SOB (SHORTNESS OF BREATH): ICD-10-CM

## 2020-11-16 DIAGNOSIS — R06.09 DOE (DYSPNEA ON EXERTION): ICD-10-CM

## 2020-11-16 DIAGNOSIS — F32.0 MAJOR DEPRESSIVE DISORDER, SINGLE EPISODE, MILD (H): ICD-10-CM

## 2020-11-16 PROCEDURE — 99442 PR PHYSICIAN TELEPHONE EVALUATION 11-20 MIN: CPT | Mod: 95 | Performed by: INTERNAL MEDICINE

## 2020-11-16 RX ORDER — ALBUTEROL SULFATE 90 UG/1
2 AEROSOL, METERED RESPIRATORY (INHALATION) EVERY 6 HOURS PRN
Qty: 1 INHALER | Refills: 5 | Status: SHIPPED | OUTPATIENT
Start: 2020-11-16 | End: 2021-06-14

## 2020-11-16 RX ORDER — AMLODIPINE BESYLATE 5 MG/1
5 TABLET ORAL DAILY
Qty: 90 TABLET | Refills: 3 | Status: SHIPPED | OUTPATIENT
Start: 2020-11-16 | End: 2021-09-21

## 2020-11-16 RX ORDER — FUROSEMIDE 20 MG
40 TABLET ORAL DAILY
Qty: 180 TABLET | Refills: 3 | Status: ON HOLD | OUTPATIENT
Start: 2020-11-16 | End: 2021-09-09

## 2020-11-16 RX ORDER — TIOTROPIUM BROMIDE 18 UG/1
CAPSULE ORAL; RESPIRATORY (INHALATION)
Qty: 60 CAPSULE | Refills: 0 | OUTPATIENT
Start: 2020-11-16

## 2020-11-16 RX ORDER — METOPROLOL TARTRATE 25 MG/1
25 TABLET, FILM COATED ORAL 2 TIMES DAILY
Qty: 180 TABLET | Refills: 3 | Status: ON HOLD | OUTPATIENT
Start: 2020-11-16 | End: 2021-09-09

## 2020-11-16 RX ORDER — LISINOPRIL 20 MG/1
20 TABLET ORAL 2 TIMES DAILY
Qty: 180 TABLET | Refills: 3 | Status: SHIPPED | OUTPATIENT
Start: 2020-11-16 | End: 2021-03-30

## 2020-11-16 ASSESSMENT — PATIENT HEALTH QUESTIONNAIRE - PHQ9: SUM OF ALL RESPONSES TO PHQ QUESTIONS 1-9: 2

## 2020-11-16 NOTE — TELEPHONE ENCOUNTER
"  Requested Prescriptions   Pending Prescriptions Disp Refills     tiotropium (SPIRIVA HANDIHALER) 18 MCG inhaled capsule [Pharmacy Med Name: Spiriva HandiHaler 18 MCG Inhalation Capsule] 60 capsule 0     Sig: Inhale 1 puff by mouth once daily       Asthma Maintenance Inhalers - Anticholinergics Failed - 11/14/2020  1:02 PM        Failed - Asthma control assessment score within normal limits in last 6 months     Please review ACT score.           Passed - Patient is age 12 years or older        Passed - Medication is active on med list        Passed - Recent (6 mo) or future (30 days) visit within the authorizing provider's specialty     Patient had office visit in the last 6 months or has a visit in the next 30 days with authorizing provider or within the authorizing provider's specialty.  See \"Patient Info\" tab in inbasket, or \"Choose Columns\" in Meds & Orders section of the refill encounter.           Inhaled Steroids Protocol Failed - 11/14/2020  1:02 PM        Failed - Asthma control assessment score within normal limits in last 6 months     Please review ACT score.           Passed - Patient is age 12 or older        Passed - Medication is active on med list        Passed - Recent (6 mo) or future (30 days) visit within the authorizing provider's specialty     Patient had office visit in the last 6 months or has a visit in the next 30 days with authorizing provider or within the authorizing provider's specialty.  See \"Patient Info\" tab in inbasket, or \"Choose Columns\" in Meds & Orders section of the refill encounter.             Duplicate  Denied  Sent today at office visit 11/16/2020 to this pharmacy  Margaux Whitman RN    "

## 2020-11-16 NOTE — PROGRESS NOTES
"Shiloh Covarrubias is a 89 year old female who is being evaluated via a billable telephone visit.      The patient has been notified of following:     \"This telephone visit will be conducted via a call between you and your physician/provider. We have found that certain health care needs can be provided without the need for a physical exam.  This service lets us provide the care you need with a short phone conversation.  If a prescription is necessary we can send it directly to your pharmacy.  If lab work is needed we can place an order for that and you can then stop by our lab to have the test done at a later time.    Telephone visits are billed at different rates depending on your insurance coverage. During this emergency period, for some insurers they may be billed the same as an in-person visit.  Please reach out to your insurance provider with any questions.    If during the course of the call the physician/provider feels a telephone visit is not appropriate, you will not be charged for this service.\"    Patient has given verbal consent for Telephone visit?  Yes    What phone number would you like to be contacted at? [phone]    How would you like to obtain your AVS? Mail a copy    Subjective     Shiloh Covarrubias is a 89 year old female who presents via phone visit today for the following health issues:    HPI     Chief Complaint   Patient presents with     Telephone     recheck medications and handicap sticker renewal     She has been doing good. Staying at home mostly.  Next door to her son and family, 4 miles out of town.      Needs new prescriptions done.     She has high blood pressure. She will check bp and goal for her is less then 160.  On 4 medications.      Taking the lasix and has some swelling, not too bad.     Needs a new handicap sticker, right leg pain and difficult to walk over 200 feet.   Balance is an issue, uses a cane.  COPD and hard to walk with her breathing.     She got a flu shot in " Sept.       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     Unspecified asthma(493.90)      Unspecified intestinal obstruction 02/17/10    D/C 02/20/10     Current Outpatient Medications   Medication     albuterol (PROAIR HFA/PROVENTIL HFA/VENTOLIN HFA) 108 (90 Base) MCG/ACT inhaler     amLODIPine (NORVASC) 5 MG tablet     ASPIRIN 81 MG OR TABS     Calcium Polycarbophil (FIBERCON PO)     EQ LORATADINE 10 MG tablet     escitalopram (LEXAPRO) 10 MG tablet     furosemide (LASIX) 20 MG tablet     hydrALAZINE (APRESOLINE) 50 MG tablet     lisinopril (ZESTRIL) 20 MG tablet     loperamide (IMODIUM A-D) 2 MG tablet     metoprolol tartrate (LOPRESSOR) 25 MG tablet     Multiple Vitamins-Minerals (MULTI VITAMIN/MINERALS PO)     tiotropium (SPIRIVA RESPIMAT) 2.5 MCG/ACT inhaler     No current facility-administered medications for this visit.      Social History     Tobacco Use     Smoking status: Never Smoker     Smokeless tobacco: Never Used   Substance Use Topics     Alcohol use: Yes     Alcohol/week: 0.0 standard drinks     Comment: 3 per  year     Drug use: No         Review of Systems   CONSTITUTIONAL: NEGATIVE for fever, chills, change in weight  EYES: NEGATIVE for vision changes or irritation  ENT/MOUTH: NEGATIVE for ear, mouth and throat problems  RESP:chronic sob  CV: NEGATIVE for chest pain, palpitations or peripheral edema  GI: NEGATIVE for nausea, abdominal pain, heartburn, or change in bowel habits  : NEGATIVE for frequency, dysuria, or hematuria  MUSCULOSKELETAL:leg swelling stable   NEURO: NEGATIVE for weakness, dizziness or paresthesias  ENDOCRINE: NEGATIVE for temperature intolerance, skin/hair changes  HEME: NEGATIVE for bleeding problems  PSYCHIATRIC: NEGATIVE for changes in mood or affect       Objective        Vitals:  No vitals were obtained today due to virtual visit.    healthy, alert and no distress  PSYCH:  Alert and oriented times 3; coherent speech, normal   rate and volume, able to articulate logical thoughts, able   to abstract reason, no tangential thoughts, no hallucinations   or delusions  Her affect is normal  RESP: No cough, no audible wheezing, able to talk in full sentences  Remainder of exam unable to be completed due to telephone visits        Assessment/Plan:    Assessment & Plan     Essential hypertension with goal blood pressure less than 130/80  Patient will check her blood pressure at home.  Continue the 4 medications, her goal is under 160 at this time.  We will check labs in future when Covid calms down.  - amLODIPine (NORVASC) 5 MG tablet; Take 1 tablet (5 mg) by mouth daily  - lisinopril (ZESTRIL) 20 MG tablet; Take 1 tablet (20 mg) by mouth 2 times daily  - **Comprehensive metabolic panel FUTURE anytime; Future  - **CBC with platelets FUTURE anytime; Future  - Albumin Random Urine Quantitative with Creat Ratio; Future    Chronic seasonal allergic rhinitis due to pollen  Continue on loratadine daily.  - EQ LORATADINE 10 MG tablet; Take 1 tablet (10 mg) by mouth daily    SOB (shortness of breath)  Shortness of breath is stable she continues to Lasix 40 mg daily  - furosemide (LASIX) 20 MG tablet; Take 2 tablets (40 mg) by mouth daily    Chronic obstructive pulmonary disease, unspecified COPD type (H)  COPD she is on Spiriva and needs a refill she has albuterol to use as needed she did have a flu shot.  - tiotropium (SPIRIVA RESPIMAT) 2.5 MCG/ACT inhaler; Inhale 2 puffs into the lungs daily    MORATAYA (dyspnea on exertion)  Patient has albuterol to use as needed  - albuterol (PROAIR HFA/PROVENTIL HFA/VENTOLIN HFA) 108 (90 Base) MCG/ACT inhaler; Inhale 2 puffs into the lungs every 6 hours as needed for shortness of breath / dyspnea or wheezing    Stage 3 chronic kidney disease, unspecified whether stage 3a or 3b CKD  Chronic kidney disease we will check her labs in the near future.  Is on medications  such as Lasix and lisinopril but needs labs done within the next 6 months.    Major depressive disorder, single episode, mild (H)  Depression seems to be stable her son's family is next-door they provide things if she needs it.  She is able to get out and drive still she is staying home due to Covid and is very cautious.    She needs a new handicap sticker due to her COPD, right leg pain and imbalance.  She is able to still drive.              No follow-ups on file.    Rajesh Vidal MD  [facility]    Phone call duration:  11 minutes

## 2021-02-25 ENCOUNTER — IMMUNIZATION (OUTPATIENT)
Dept: FAMILY MEDICINE | Facility: CLINIC | Age: 86
End: 2021-02-25
Payer: MEDICARE

## 2021-02-25 PROCEDURE — 91300 PR COVID VAC PFIZER DIL RECON 30 MCG/0.3 ML IM: CPT

## 2021-02-25 PROCEDURE — 0001A PR COVID VAC PFIZER DIL RECON 30 MCG/0.3 ML IM: CPT

## 2021-03-18 ENCOUNTER — IMMUNIZATION (OUTPATIENT)
Dept: FAMILY MEDICINE | Facility: CLINIC | Age: 86
End: 2021-03-18
Attending: FAMILY MEDICINE
Payer: MEDICARE

## 2021-03-18 PROCEDURE — 91300 PR COVID VAC PFIZER DIL RECON 30 MCG/0.3 ML IM: CPT

## 2021-03-18 PROCEDURE — 0002A PR COVID VAC PFIZER DIL RECON 30 MCG/0.3 ML IM: CPT

## 2021-03-25 ENCOUNTER — TELEPHONE (OUTPATIENT)
Dept: INTERNAL MEDICINE | Facility: CLINIC | Age: 86
End: 2021-03-25

## 2021-03-25 DIAGNOSIS — M54.2 NECK PAIN: Primary | ICD-10-CM

## 2021-03-25 RX ORDER — CYCLOBENZAPRINE HCL 5 MG
5 TABLET ORAL 3 TIMES DAILY PRN
Qty: 12 TABLET | Refills: 0 | Status: ON HOLD | OUTPATIENT
Start: 2021-03-25 | End: 2021-09-07

## 2021-03-25 NOTE — TELEPHONE ENCOUNTER
Patient calling to report has had this before, woke up cannot turn head without pain, shoulder on right side is painful.  Patient alert and oriented on phone in no obvious distress.    Did schedule telephone appointment on 3/30/2021 at 3:00 pm   Patient requesting something for pain to be sent to [facility] pharmacy?     Last time told Dr. Vidal her father had pleurisy.    Patient states Dr. Vidal gave her something last time that helped. See office visit 3/1/2019  Nurse sees this script from that office visit   cyclobenzaprine (FLEXERIL) 5 MG tablet (Discontinued) 12 tablet 0 3/1/2019 9/30/2019 --   Sig - Route: Take 1 tablet (5 mg) by mouth 3 times daily as needed for muscle spasms - Oral   Sent to pharmacy as: cyclobenzaprine (FLEXERIL) 5 MG tablet   Class: E-Prescribe     Admits does cough sometimes at night. Not smoker never smoked.   No fever, no exposure to Covid 19, no numbness or tingling, no issues swallowing, no other symptoms.      Second shot Pfiser Covid 19 vaccine on 3/18/2021.      Blank Travis RN  [facility]

## 2021-03-25 NOTE — TELEPHONE ENCOUNTER
Tried to reach patient, left message for patient to call the clinic back. If patient calls back, please relay providers message to her.    Dr. Vidal sent flexeril again for her, watch for over sedation, use heat on the area as well to loosen it up.    Thank you.    Renetta Marques, CMA

## 2021-03-25 NOTE — TELEPHONE ENCOUNTER
I sent flexeril again for her, watch for over sedation, use heat on the area as well to loosen it up.

## 2021-03-30 ENCOUNTER — VIRTUAL VISIT (OUTPATIENT)
Dept: INTERNAL MEDICINE | Facility: CLINIC | Age: 86
End: 2021-03-30
Payer: MEDICARE

## 2021-03-30 DIAGNOSIS — F41.1 GAD (GENERALIZED ANXIETY DISORDER): ICD-10-CM

## 2021-03-30 DIAGNOSIS — I73.9 PERIPHERAL VASCULAR DISEASE (H): ICD-10-CM

## 2021-03-30 DIAGNOSIS — I10 ESSENTIAL HYPERTENSION WITH GOAL BLOOD PRESSURE LESS THAN 130/80: ICD-10-CM

## 2021-03-30 DIAGNOSIS — F32.0 MAJOR DEPRESSIVE DISORDER, SINGLE EPISODE, MILD (H): ICD-10-CM

## 2021-03-30 DIAGNOSIS — J44.9 CHRONIC OBSTRUCTIVE PULMONARY DISEASE, UNSPECIFIED COPD TYPE (H): ICD-10-CM

## 2021-03-30 DIAGNOSIS — N18.30 STAGE 3 CHRONIC KIDNEY DISEASE, UNSPECIFIED WHETHER STAGE 3A OR 3B CKD (H): ICD-10-CM

## 2021-03-30 PROCEDURE — 99441 PR PHYSICIAN TELEPHONE EVALUATION 5-10 MIN: CPT | Mod: 95 | Performed by: INTERNAL MEDICINE

## 2021-03-30 RX ORDER — HYDRALAZINE HYDROCHLORIDE 50 MG/1
TABLET, FILM COATED ORAL
Qty: 270 TABLET | Refills: 3 | Status: SHIPPED | OUTPATIENT
Start: 2021-03-30 | End: 2021-12-03

## 2021-03-30 RX ORDER — LISINOPRIL 20 MG/1
20 TABLET ORAL 2 TIMES DAILY
Qty: 180 TABLET | Refills: 3 | Status: SHIPPED | OUTPATIENT
Start: 2021-03-30 | End: 2021-09-21

## 2021-03-30 RX ORDER — ESCITALOPRAM OXALATE 10 MG/1
10 TABLET ORAL DAILY
Qty: 90 TABLET | Refills: 3 | Status: SHIPPED | OUTPATIENT
Start: 2021-03-30 | End: 2021-12-03

## 2021-03-30 NOTE — PROGRESS NOTES
Shiloh Covarrubias is a 89 year old female who presents with a telephone visit, started at 3:30.  Patient agreed to a telephone visit today.  She knows that if she has any other symptoms or may need an exam she will have to come to the clinic.    Collarbone and neck is doing better, pretty much gone, used heat and flexeril and that helped her.     Last November insurance changed her to spiriva respimat. Breathing has been ok,  She got her Covid vaccine.     Needs refills on blood pressure and mood stabilizer, will send for next .     She thinks her weight is ok, stable, clothes fit well.     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     Unspecified asthma(493.90)      Unspecified intestinal obstruction 02/17/10    D/C 02/20/10     Current Outpatient Medications   Medication     amLODIPine (NORVASC) 5 MG tablet     ASPIRIN 81 MG OR TABS     Calcium Polycarbophil (FIBERCON PO)     cyclobenzaprine (FLEXERIL) 5 MG tablet     EQ LORATADINE 10 MG tablet     escitalopram (LEXAPRO) 10 MG tablet     furosemide (LASIX) 20 MG tablet     hydrALAZINE (APRESOLINE) 50 MG tablet     lisinopril (ZESTRIL) 20 MG tablet     loperamide (IMODIUM A-D) 2 MG tablet     metoprolol tartrate (LOPRESSOR) 25 MG tablet     Multiple Vitamins-Minerals (MULTI VITAMIN/MINERALS PO)     albuterol (PROAIR HFA/PROVENTIL HFA/VENTOLIN HFA) 108 (90 Base) MCG/ACT inhaler     tiotropium (SPIRIVA RESPIMAT) 2.5 MCG/ACT inhaler     No current facility-administered medications for this visit.      Social History     Tobacco Use     Smoking status: Never Smoker     Smokeless tobacco: Never Used   Substance Use Topics     Alcohol use: Yes     Alcohol/week: 0.0 standard drinks     Comment: 3 per  year     Drug use: No     Physical Exam  LMP  (LMP Unknown)   NAD  Voice is hoarse.   Good spirit    Phone time is 6 minutes.    ASSESSMENT:  (I10) Essential  hypertension with goal blood pressure less than 130/80  Comment: Patient is doing well she needs a refill of her lisinopril, not following her blood pressure, last check was a little high at 158 but okay for 89 years old.  She will do labs in a week and a half and we can see her in 3 months.  Plan: lisinopril (ZESTRIL) 20 MG tablet            (F41.1) LOUIS (generalized anxiety disorder)  Comment: Anxiety is doing well you can tell she is slightly anxious but much better on the Lexapro 10 mg, refills done for the next year.  Plan: escitalopram (LEXAPRO) 10 MG tablet            (J44.9) Chronic obstructive pulmonary disease, unspecified COPD type (H)  Comment: COPD is stable she does have a different delivery system of her Spiriva but doing well denies shortness of breath or cough, she will continue this and will do a lung exam when she is available to be seen in clinic.  Plan:     (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, single episode, mild (H)  Comment: Major depression stable continue Lexapro  Plan:     (I73.9) Peripheral vascular disease (H)  Comment: Peripheral vascular disease is stable leg problems at this time.  Plan:     (N18.30) Stage 3 chronic kidney disease, unspecified whether stage 3a or 3b CKD  Comment: Chronic kidney disease we will recheck her kidney function and microalbumin when she comes in since she is got high blood pressure and is on lisinopril.  Plan:     Neck pains are improved, we gave her Flexeril a few days ago between heat and Flexeril she is better without symptoms.    Electronically signed by Rajesh Vidal MD

## 2021-04-04 ENCOUNTER — HEALTH MAINTENANCE LETTER (OUTPATIENT)
Age: 86
End: 2021-04-04

## 2021-04-09 DIAGNOSIS — I10 ESSENTIAL HYPERTENSION WITH GOAL BLOOD PRESSURE LESS THAN 130/80: ICD-10-CM

## 2021-04-09 LAB
ALBUMIN SERPL-MCNC: 3.6 G/DL (ref 3.4–5)
ALP SERPL-CCNC: 73 U/L (ref 40–150)
ALT SERPL W P-5'-P-CCNC: 22 U/L (ref 0–50)
ANION GAP SERPL CALCULATED.3IONS-SCNC: 5 MMOL/L (ref 3–14)
AST SERPL W P-5'-P-CCNC: 17 U/L (ref 0–45)
BILIRUB SERPL-MCNC: 0.4 MG/DL (ref 0.2–1.3)
BUN SERPL-MCNC: 30 MG/DL (ref 7–30)
CALCIUM SERPL-MCNC: 9.4 MG/DL (ref 8.5–10.1)
CHLORIDE SERPL-SCNC: 104 MMOL/L (ref 94–109)
CO2 SERPL-SCNC: 28 MMOL/L (ref 20–32)
CREAT SERPL-MCNC: 1.24 MG/DL (ref 0.52–1.04)
CREAT UR-MCNC: 118 MG/DL
ERYTHROCYTE [DISTWIDTH] IN BLOOD BY AUTOMATED COUNT: 13.2 % (ref 10–15)
GFR SERPL CREATININE-BSD FRML MDRD: 38 ML/MIN/{1.73_M2}
GLUCOSE SERPL-MCNC: 96 MG/DL (ref 70–99)
HCT VFR BLD AUTO: 40.4 % (ref 35–47)
HGB BLD-MCNC: 13 G/DL (ref 11.7–15.7)
MCH RBC QN AUTO: 30.7 PG (ref 26.5–33)
MCHC RBC AUTO-ENTMCNC: 32.2 G/DL (ref 31.5–36.5)
MCV RBC AUTO: 95 FL (ref 78–100)
MICROALBUMIN UR-MCNC: 141 MG/L
MICROALBUMIN/CREAT UR: 119.49 MG/G CR (ref 0–25)
PLATELET # BLD AUTO: 234 10E9/L (ref 150–450)
POTASSIUM SERPL-SCNC: 4.4 MMOL/L (ref 3.4–5.3)
PROT SERPL-MCNC: 7.7 G/DL (ref 6.8–8.8)
RBC # BLD AUTO: 4.24 10E12/L (ref 3.8–5.2)
SODIUM SERPL-SCNC: 137 MMOL/L (ref 133–144)
WBC # BLD AUTO: 8.3 10E9/L (ref 4–11)

## 2021-04-09 PROCEDURE — 85027 COMPLETE CBC AUTOMATED: CPT | Performed by: INTERNAL MEDICINE

## 2021-04-09 PROCEDURE — 80053 COMPREHEN METABOLIC PANEL: CPT | Performed by: INTERNAL MEDICINE

## 2021-04-09 PROCEDURE — 82043 UR ALBUMIN QUANTITATIVE: CPT | Performed by: INTERNAL MEDICINE

## 2021-04-09 PROCEDURE — 36415 COLL VENOUS BLD VENIPUNCTURE: CPT | Performed by: INTERNAL MEDICINE

## 2021-06-14 ENCOUNTER — VIRTUAL VISIT (OUTPATIENT)
Dept: INTERNAL MEDICINE | Facility: CLINIC | Age: 86
End: 2021-06-14
Payer: MEDICARE

## 2021-06-14 DIAGNOSIS — M51.369 DDD (DEGENERATIVE DISC DISEASE), LUMBAR: Primary | ICD-10-CM

## 2021-06-14 PROCEDURE — 99442 PR PHYSICIAN TELEPHONE EVALUATION 11-20 MIN: CPT | Mod: 95 | Performed by: INTERNAL MEDICINE

## 2021-06-14 RX ORDER — TRAMADOL HYDROCHLORIDE 50 MG/1
50 TABLET ORAL EVERY 6 HOURS PRN
Qty: 10 TABLET | Refills: 0 | Status: SHIPPED | OUTPATIENT
Start: 2021-06-14 | End: 2021-06-17

## 2021-06-14 RX ORDER — COVID-19 ANTIGEN TEST
220 KIT MISCELLANEOUS 2 TIMES DAILY WITH MEALS
Status: ON HOLD | COMMUNITY
End: 2021-09-07

## 2021-06-14 ASSESSMENT — PATIENT HEALTH QUESTIONNAIRE - PHQ9: SUM OF ALL RESPONSES TO PHQ QUESTIONS 1-9: 0

## 2021-06-14 NOTE — PROGRESS NOTES
KENNA is a 89 year old who is being evaluated via a billable telephone visit.      What phone number would you like to be contacted at? [phone]  How would you like to obtain your AVS? MyChart    Assessment & Plan     DDD (degenerative disc disease), lumbar  Patient calling in with lumbar back pain that started 2 weeks ago no apparent cause happened while she was sleeping.  It does radiate down her right leg some.  She still able to walk but has pain.  She is taking Aleve.  Recommended she increase her Aleve to twice a day dosing, ice and use salon spot patches.  We will give her some tramadol to help with the pain she can test this out before her shower this weekend to make sure she not too sleepy from it    I recommended an MRI if she is willing to do an injection or surgery but she does not want an injection.  I then recommended oral prednisone but she did not like steroids years ago therefore will not use them.  She not getting better we will then pursue an MRI.  We did discuss that 90% of back pain gets better in 6 weeks.  She can continue with the chiropractor and keep working on mobility.      - traMADol (ULTRAM) 50 MG tablet; Take 1 tablet (50 mg) by mouth every 6 hours as needed for severe pain                 No follow-ups on file.    Rajesh Vidal MD  [facility]   KENNA is a 89 year old who presents for the following health issues     HPI     Chief Complaint   Patient presents with     Telephone     low back pain that radiates down right leg, started two weeks ago - been seeing chiropractor     Back ache for a couple weeks, has seen Dr Ko chiropracter.  Gets a little better then gets worse.  Low back and radiates down to her heel on the right leg.  Worse with straightening up and then hurts.    No known injury, just woke up with more pain. Taking Aleve every morning, sleeping ok.      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     Unspecified asthma(493.90)      Unspecified intestinal obstruction 02/17/10    D/C 02/20/10     Current Outpatient Medications   Medication     amLODIPine (NORVASC) 5 MG tablet     ASPIRIN 81 MG OR TABS     Calcium Polycarbophil (FIBERCON PO)     EQ LORATADINE 10 MG tablet     escitalopram (LEXAPRO) 10 MG tablet     furosemide (LASIX) 20 MG tablet     hydrALAZINE (APRESOLINE) 50 MG tablet     lisinopril (ZESTRIL) 20 MG tablet     loperamide (IMODIUM A-D) 2 MG tablet     metoprolol tartrate (LOPRESSOR) 25 MG tablet     Multiple Vitamins-Minerals (MULTI VITAMIN/MINERALS PO)     naproxen sodium 220 MG capsule     tiotropium (SPIRIVA RESPIMAT) 2.5 MCG/ACT inhaler     cyclobenzaprine (FLEXERIL) 5 MG tablet     No current facility-administered medications for this visit.      Social History     Tobacco Use     Smoking status: Never Smoker     Smokeless tobacco: Never Used   Substance Use Topics     Alcohol use: Yes     Alcohol/week: 0.0 standard drinks     Comment: 3 per  year     Drug use: No         Review of Systems         Objective           Vitals:  No vitals were obtained today due to virtual visit.    Physical Exam   healthy, alert and no distress  PSYCH: Alert and oriented times 3; coherent speech, normal   rate and volume, able to articulate logical thoughts, able   to abstract reason, no tangential thoughts, no hallucinations   or delusions  Her affect is normal  RESP: No cough, no audible wheezing, able to talk in full sentences  Remainder of exam unable to be completed due to telephone visits                Phone call duration: 12 minutes

## 2021-09-03 ENCOUNTER — HOSPITAL ENCOUNTER (EMERGENCY)
Facility: CLINIC | Age: 86
Discharge: HOME | End: 2021-09-03
Attending: EMERGENCY MEDICINE | Admitting: EMERGENCY MEDICINE
Payer: MEDICARE

## 2021-09-03 ENCOUNTER — APPOINTMENT (OUTPATIENT)
Dept: CT IMAGING | Facility: CLINIC | Age: 86
End: 2021-09-03
Attending: EMERGENCY MEDICINE
Payer: MEDICARE

## 2021-09-03 VITALS
HEART RATE: 62 BPM | WEIGHT: 119 LBS | SYSTOLIC BLOOD PRESSURE: 138 MMHG | BODY MASS INDEX: 21.08 KG/M2 | DIASTOLIC BLOOD PRESSURE: 61 MMHG | RESPIRATION RATE: 16 BRPM | OXYGEN SATURATION: 96 % | TEMPERATURE: 99.2 F

## 2021-09-03 DIAGNOSIS — M47.816 ARTHRITIS OF FACET JOINT OF LUMBAR SPINE: ICD-10-CM

## 2021-09-03 DIAGNOSIS — N39.0 UTI (URINARY TRACT INFECTION), BACTERIAL: ICD-10-CM

## 2021-09-03 DIAGNOSIS — A49.9 UTI (URINARY TRACT INFECTION), BACTERIAL: ICD-10-CM
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
## 2021-09-03 DIAGNOSIS — M54.41 RIGHT-SIDED LOW BACK PAIN WITH RIGHT-SIDED SCIATICA, UNSPECIFIED CHRONICITY: ICD-10-CM
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
## 2021-09-03 DIAGNOSIS — M48.061 SPINAL STENOSIS OF LUMBAR REGION, UNSPECIFIED WHETHER NEUROGENIC CLAUDICATION PRESENT: ICD-10-CM

## 2021-09-03 LAB
ALBUMIN SERPL-MCNC: 2.9 G/DL (ref 3.4–5)
ALBUMIN UR-MCNC: 100 MG/DL
ALP SERPL-CCNC: 81 U/L (ref 40–150)
ALT SERPL W P-5'-P-CCNC: 28 U/L (ref 0–50)
ANION GAP SERPL CALCULATED.3IONS-SCNC: 7 MMOL/L (ref 3–14)
APPEARANCE UR: ABNORMAL
AST SERPL W P-5'-P-CCNC: 26 U/L (ref 0–45)
BACTERIA #/AREA URNS HPF: ABNORMAL /HPF
BASOPHILS # BLD AUTO: 0.1 10E3/UL (ref 0–0.2)
BASOPHILS NFR BLD AUTO: 0 %
BILIRUB SERPL-MCNC: 1 MG/DL (ref 0.2–1.3)
BILIRUB UR QL STRIP: NEGATIVE
BUN SERPL-MCNC: 27 MG/DL (ref 7–30)
CALCIUM SERPL-MCNC: 8.8 MG/DL (ref 8.5–10.1)
CHLORIDE BLD-SCNC: 102 MMOL/L (ref 94–109)
CO2 SERPL-SCNC: 27 MMOL/L (ref 20–32)
COLOR UR AUTO: ABNORMAL
CREAT SERPL-MCNC: 1.21 MG/DL (ref 0.52–1.04)
EOSINOPHIL # BLD AUTO: 0 10E3/UL (ref 0–0.7)
EOSINOPHIL NFR BLD AUTO: 0 %
ERYTHROCYTE [DISTWIDTH] IN BLOOD BY AUTOMATED COUNT: 13.2 % (ref 10–15)
GFR SERPL CREATININE-BSD FRML MDRD: 39 ML/MIN/1.73M2
GLUCOSE BLD-MCNC: 124 MG/DL (ref 70–99)
GLUCOSE UR STRIP-MCNC: NEGATIVE MG/DL
HCT VFR BLD AUTO: 35.5 % (ref 35–47)
HGB BLD-MCNC: 12 G/DL (ref 11.7–15.7)
HGB UR QL STRIP: ABNORMAL
IMM GRANULOCYTES # BLD: 0 10E3/UL
IMM GRANULOCYTES NFR BLD: 0 %
KETONES UR STRIP-MCNC: 5 MG/DL
LEUKOCYTE ESTERASE UR QL STRIP: ABNORMAL
LYMPHOCYTES # BLD AUTO: 0.8 10E3/UL (ref 0.8–5.3)
LYMPHOCYTES NFR BLD AUTO: 6 %
MAGNESIUM SERPL-MCNC: 1.7 MG/DL (ref 1.6–2.3)
MCH RBC QN AUTO: 31 PG (ref 26.5–33)
MCHC RBC AUTO-ENTMCNC: 33.8 G/DL (ref 31.5–36.5)
MCV RBC AUTO: 92 FL (ref 78–100)
MONOCYTES # BLD AUTO: 1.4 10E3/UL (ref 0–1.3)
MONOCYTES NFR BLD AUTO: 10 %
NEUTROPHILS # BLD AUTO: 11.6 10E3/UL (ref 1.6–8.3)
NEUTROPHILS NFR BLD AUTO: 84 %
NITRATE UR QL: POSITIVE
NRBC # BLD AUTO: 0 10E3/UL
NRBC BLD AUTO-RTO: 0 /100
PH UR STRIP: 5 [PH] (ref 5–7)
PHOSPHATE SERPL-MCNC: 2.5 MG/DL (ref 2.5–4.5)
PLATELET # BLD AUTO: 170 10E3/UL (ref 150–450)
POTASSIUM BLD-SCNC: 3.8 MMOL/L (ref 3.4–5.3)
PROT SERPL-MCNC: 6.9 G/DL (ref 6.8–8.8)
RBC # BLD AUTO: 3.87 10E6/UL (ref 3.8–5.2)
RBC URINE: 9 /HPF
RENAL TUB EPI: 1 /HPF
SODIUM SERPL-SCNC: 136 MMOL/L (ref 133–144)
SP GR UR STRIP: 1.01 (ref 1–1.03)
SQUAMOUS EPITHELIAL: 1 /HPF
UROBILINOGEN UR STRIP-MCNC: NORMAL MG/DL
WBC # BLD AUTO: 14 10E3/UL (ref 4–11)
WBC CLUMPS #/AREA URNS HPF: PRESENT /HPF
WBC URINE: >182 /HPF

## 2021-09-03 PROCEDURE — 250N000011 HC RX IP 250 OP 636: Performed by: EMERGENCY MEDICINE

## 2021-09-03 PROCEDURE — 83735 ASSAY OF MAGNESIUM: CPT | Performed by: EMERGENCY MEDICINE

## 2021-09-03 PROCEDURE — 96365 THER/PROPH/DIAG IV INF INIT: CPT

## 2021-09-03 PROCEDURE — 96375 TX/PRO/DX INJ NEW DRUG ADDON: CPT

## 2021-09-03 PROCEDURE — 87086 URINE CULTURE/COLONY COUNT: CPT | Performed by: EMERGENCY MEDICINE

## 2021-09-03 PROCEDURE — 96361 HYDRATE IV INFUSION ADD-ON: CPT

## 2021-09-03 PROCEDURE — 84100 ASSAY OF PHOSPHORUS: CPT | Performed by: EMERGENCY MEDICINE

## 2021-09-03 PROCEDURE — 72131 CT LUMBAR SPINE W/O DYE: CPT | Mod: MG

## 2021-09-03 PROCEDURE — 99285 EMERGENCY DEPT VISIT HI MDM: CPT | Mod: 25

## 2021-09-03 PROCEDURE — 82040 ASSAY OF SERUM ALBUMIN: CPT | Performed by: EMERGENCY MEDICINE

## 2021-09-03 PROCEDURE — 258N000003 HC RX IP 258 OP 636: Performed by: EMERGENCY MEDICINE

## 2021-09-03 PROCEDURE — 99285 EMERGENCY DEPT VISIT HI MDM: CPT | Performed by: EMERGENCY MEDICINE

## 2021-09-03 PROCEDURE — 81001 URINALYSIS AUTO W/SCOPE: CPT | Performed by: EMERGENCY MEDICINE

## 2021-09-03 PROCEDURE — 36415 COLL VENOUS BLD VENIPUNCTURE: CPT | Performed by: EMERGENCY MEDICINE

## 2021-09-03 PROCEDURE — 85025 COMPLETE CBC W/AUTO DIFF WBC: CPT | Performed by: EMERGENCY MEDICINE

## 2021-09-03 RX ORDER — ONDANSETRON 2 MG/ML
4 INJECTION INTRAMUSCULAR; INTRAVENOUS EVERY 30 MIN PRN
Status: DISCONTINUED | OUTPATIENT
Start: 2021-09-03 | End: 2021-09-03 | Stop reason: HOSPADM

## 2021-09-03 RX ORDER — OXYCODONE HYDROCHLORIDE 5 MG/1
2.5-5 TABLET ORAL EVERY 6 HOURS PRN
Qty: 12 TABLET | Refills: 0 | Status: ON HOLD | OUTPATIENT
Start: 2021-09-03 | End: 2021-09-09

## 2021-09-03 RX ORDER — CEFTRIAXONE 1 G/1
1 INJECTION, POWDER, FOR SOLUTION INTRAMUSCULAR; INTRAVENOUS ONCE
Status: COMPLETED | OUTPATIENT
Start: 2021-09-03 | End: 2021-09-03

## 2021-09-03 RX ORDER — CEFDINIR 300 MG/1
300 CAPSULE ORAL 2 TIMES DAILY
Qty: 14 CAPSULE | Refills: 0 | Status: ON HOLD | OUTPATIENT
Start: 2021-09-03 | End: 2021-09-09

## 2021-09-03 RX ORDER — HYDROMORPHONE HYDROCHLORIDE 1 MG/ML
0.5 INJECTION, SOLUTION INTRAMUSCULAR; INTRAVENOUS; SUBCUTANEOUS
Status: DISCONTINUED | OUTPATIENT
Start: 2021-09-03 | End: 2021-09-03 | Stop reason: HOSPADM

## 2021-09-03 RX ORDER — SODIUM CHLORIDE 9 MG/ML
INJECTION, SOLUTION INTRAVENOUS CONTINUOUS
Status: DISCONTINUED | OUTPATIENT
Start: 2021-09-03 | End: 2021-09-03 | Stop reason: HOSPADM

## 2021-09-03 RX ADMIN — CEFTRIAXONE SODIUM 1 G: 1 INJECTION, POWDER, FOR SOLUTION INTRAMUSCULAR; INTRAVENOUS at 19:49

## 2021-09-03 RX ADMIN — ONDANSETRON 4 MG: 2 INJECTION INTRAMUSCULAR; INTRAVENOUS at 17:46

## 2021-09-03 RX ADMIN — SODIUM CHLORIDE 1000 ML: 9 INJECTION, SOLUTION INTRAVENOUS at 17:46

## 2021-09-03 RX ADMIN — HYDROMORPHONE HYDROCHLORIDE 0.5 MG: 1 INJECTION, SOLUTION INTRAMUSCULAR; INTRAVENOUS; SUBCUTANEOUS at 17:46

## 2021-09-03 NOTE — ED TRIAGE NOTES
Fell last night while trying to reach down for a cord. Complaints of back pain with standing and walking.

## 2021-09-04 NOTE — DISCHARGE INSTRUCTIONS
Your urine was sent for culture.  We will call you if any changes need to be made.  Take antibiotics as prescribed.  Next dose tomorrow night.  I recommend eating yogurt or taking probiotics such as Culturelle or Florastor while on antibiotics to help replenish your normal gut bacteria if possible.    Oxycodone if needed for severe pain.  This can cause drowsiness or dizziness so be sure to use your cane and be careful when moving around.  You can take 1/2 to 1 tablet as needed.  It can also cause constipation so take stool softeners if needed.    Start to taper down your gabapentin to 1 tablet daily, I recommend taking at bedtime.  Contact your spine provider for follow-up.  I will also send a message to your primary care provider about your ED visit.    Return at anytime for worsening, changes or concerns.    I hope you start to improve quickly and enjoy the weekend!!

## 2021-09-04 NOTE — ED PROVIDER NOTES
"  History     Chief Complaint   Patient presents with     Fall     HPI  History per patient and medical records and son    This is a 89-year-old female, history of hypertension, hyperlipidemia, GERD, chronic kidney disease, other history as below presenting after a fall.  Patient states that she usually goes to sleep at about 10 PM but last night she fell asleep on the sofa.  She woke up at about 12:00 and bent down to get the wire to plug in her phone and accidentally fell forward hitting her head slightly against the wall.  She did not feel like she was able to get herself up off the floor so she decided just to go back to sleep on the floor.  She was found by her granddaughter and daughter-in-law today.  They were able to help her get up to use the bathroom but she complained of severe low back pain.  Patient has had history of severe low back pain and was being managed by her chiropractor, Dr. Ko.  He referred her to [facility] and she had an MRI which showed arthritis.  She had 2 unsuccessful steroid injections and was started on gabapentin.  She notes this has only caused her to feel worse including nausea, decreased appetite, and \"feeling like her legs do not want to move\".  Patient lives in a mother-in-law apartment at her son's.  He notes that 5 days ago patient was in good health but couple of days afterwards she seemed to be more tired, weak.  She has had decreased oral intake.  She has had the sensation of being hot and cold.  No cold or cough symptoms.  No loss of taste or smell.  No vomiting or diarrhea.  No urinary symptoms.  She did receive her Covid immunization.    Allergies:  Allergies   Allergen Reactions     Clonidine Derivatives      Severe side effects       Entocort [Corticosteroids]      Macrobid [Nitrofurantoin]      Diarrhea       Sulfa Drugs Itching     itch       Problem List:    Patient Active Problem List    Diagnosis Date Noted     Chest pain 01/13/2011     Priority: High     Problem list " name updated by automated process. Provider to review       Peripheral vascular disease (H) 03/30/2021     Priority: Medium     Chronic kidney disease, stage 3 11/16/2020     Priority: Medium     Essential hypertension with goal blood pressure less than 130/80 05/24/2016     Priority: Medium     Major depressive disorder, single episode, mild (H) 02/15/2016     Priority: Medium     Advanced directives, counseling/discussion 10/09/2012     Priority: Medium     Advance Care Planning:   Receipt of ACP document:  Received: Health Care Directive which was witnessed or notarized on 1/14/07.  Document not previously scanned.  Validation form completed and sent with document to be scanned.  Code Status reflects choices in most recent ACP document.  Confirmed/documented designated decision maker(s). See permanent comments section of demographics in clinical tab. View document(s) and details by clicking on code status.   Added by Dolores Yu on 11/15/2013.               Hyperlipidemia LDL goal <100 03/30/2011     Priority: Medium     GERD (gastroesophageal reflux disease) 01/19/2011     Priority: Medium     Palpitations 01/13/2011     Priority: Medium     Hypertriglyceridemia 05/18/2010     Priority: Medium     Osteoarthrosis, unspecified whether generalized or localized, involving lower leg 01/08/2007     Priority: Medium     Problem list name updated by automated process. Provider to review       Chronic airway obstruction (H) 10/13/2005     Priority: Medium     Problem list name updated by automated process. Provider to review       Senile cataract 12/08/2003     Priority: Medium     Problem list name updated by automated process. Provider to review       Sebaceous cyst 12/08/2003     Priority: Medium     Impacted cerumen 12/08/2003     Priority: Medium     Anxiety state 10/09/2003     Priority: Medium     Problem list name updated by automated process. Provider to review       Essential and other specified forms of tremor  10/09/2003     Priority: Medium     Edema 08/01/2003     Priority: Medium        Past Medical History:    Past Medical History:   Diagnosis Date     Edema      Hypertension      Squamous cell carcinoma 2015     Unspecified asthma(493.90)      Unspecified intestinal obstruction 02/17/10       Past Surgical History:    Past Surgical History:   Procedure Laterality Date     ESOPHAGOSCOPY, GASTROSCOPY, DUODENOSCOPY (EGD), COMBINED  2/8/2011    COMBINED ESOPHAGOSCOPY, GASTROSCOPY, DUODENOSCOPY (EGD), BIOPSY SINGLE OR MULTIPLE performed by ANGY LIMA at  GI     ESOPHAGOSCOPY, GASTROSCOPY, DUODENOSCOPY (EGD), DILATATION, COMBINED  2/8/2011    COMBINED ESOPHAGOSCOPY, GASTROSCOPY, DUODENOSCOPY (EGD), DILATATION performed by ANGY LIMA at  GI     [facility]GI ENDOSCOPY, SIMPLE EXAM  02/23/10       Family History:    Family History   Problem Relation Age of Onset     Arthritis Mother      Osteoporosis Mother      Thyroid Disease Mother         Hypothyroid.     Depression Maternal Uncle         Bouts of depression     Hypertension No family hx of      C.A.D. No family hx of        Social History:  Marital Status:   [5]  Social History     Tobacco Use     Smoking status: Never Smoker     Smokeless tobacco: Never Used   Substance Use Topics     Alcohol use: Yes     Alcohol/week: 0.0 standard drinks     Comment: 3 per  year     Drug use: No        Medications:    cefdinir (OMNICEF) 300 MG capsule  oxyCODONE (ROXICODONE) 5 MG tablet  amLODIPine (NORVASC) 5 MG tablet  ASPIRIN 81 MG OR TABS  Calcium Polycarbophil (FIBERCON PO)  cyclobenzaprine (FLEXERIL) 5 MG tablet  EQ LORATADINE 10 MG tablet  escitalopram (LEXAPRO) 10 MG tablet  furosemide (LASIX) 20 MG tablet  hydrALAZINE (APRESOLINE) 50 MG tablet  lisinopril (ZESTRIL) 20 MG tablet  loperamide (IMODIUM A-D) 2 MG tablet  metoprolol tartrate (LOPRESSOR) 25 MG tablet  Multiple Vitamins-Minerals (MULTI VITAMIN/MINERALS PO)  naproxen sodium 220 MG capsule  tiotropium  (SPIRIVA RESPIMAT) 2.5 MCG/ACT inhaler          Review of Systems   All other ROS reviewed and are negative or non-contributory except as stated in HPI.     Physical Exam   BP: (!) 184/74  Pulse: 64  Temp: 99.2  F (37.3  C)  Resp: 18  Weight: 54 kg (119 lb)  SpO2: 94 %      Physical Exam  Vitals and nursing note reviewed.   Constitutional:       Appearance: Normal appearance.      Comments: Very pleasant, thin, elderly female sitting in the bed   HENT:      Head: Normocephalic.      Comments: No obvious area of tenderness, bruising, contusion of the head     Right Ear: Tympanic membrane and ear canal normal.      Left Ear: Tympanic membrane and ear canal normal.      Nose: Nose normal.      Mouth/Throat:      Pharynx: Oropharynx is clear.      Comments: Dry mucous membranes  Eyes:      Extraocular Movements: Extraocular movements intact.      Conjunctiva/sclera: Conjunctivae normal.      Pupils: Pupils are equal, round, and reactive to light.   Cardiovascular:      Rate and Rhythm: Normal rate and regular rhythm.      Pulses: Normal pulses.      Heart sounds: Murmur heard.     Pulmonary:      Effort: Pulmonary effort is normal.      Breath sounds: Normal breath sounds.   Chest:      Chest wall: No tenderness.   Abdominal:      Palpations: Abdomen is soft.      Tenderness: There is no abdominal tenderness.   Musculoskeletal:         General: No deformity. Normal range of motion.      Cervical back: Normal range of motion and neck supple. No tenderness.      Right lower leg: No edema.      Left lower leg: No edema.      Comments: Tenderness along the right lower lumbar spine into the right sciatic area without obvious external abnormalities   Skin:     General: Skin is warm and dry.   Neurological:      General: No focal deficit present.      Mental Status: She is alert. Mental status is at baseline.   Psychiatric:         Mood and Affect: Mood normal.         Behavior: Behavior normal.         ED Course (with Medical  Decision Making)    Pt seen and examined by me.  RN and EPIC notes reviewed.       Patient with concerns of significant low back pain after a fall in her home.  She has been a little bit more tired and weak this week.  History of low back pain and arthritis.  On exam, she seems quite dry.  Her temperature is 99.2.  She has pain with palpation on the low back.    I am going to place an IV and give her some fluids.  Check labs and urine.  CT scan of the L-spine to be sure she does not have a fracture.  Pain medication.    Patient's chemistry panel shows mildly elevated creatinine but it is near baseline.  Her white count is slightly elevated.  Lumbar spine shows canal and facet stenosis but no acute fractures or other abnormalities.  Urine shows significant white cells clumps, bacteria, leukocyte esterase and nitrates.  It was sent for culture.  Patient was given 1 dose of IV ceftriaxone in the ED.    Patient also received a small dose of Dilaudid and was able to stand and pivot in the ED.  She ambulates with a walker or cane at home and she has close family.  She feels comfortable to try to return home.  I am going to give her a small amount of pain medications to use if needed for severe pain.  I am also going to continue antibiotics.  They are going to follow-up with her spine specialist.  I would like her to have repeat urine check after antibiotics are complete.  If she has any significant worsening, changes or concerns return at any time.        Procedures    Results for orders placed or performed during the hospital encounter of 09/03/21 (from the past 24 hour(s))   CBC with platelets differential    Narrative    The following orders were created for panel order CBC with platelets differential.  Procedure                               Abnormality         Status                     ---------                               -----------         ------                     CBC with platelets and d...[[id number]]   Abnormal            Final result                 Please view results for these tests on the individual orders.   Comprehensive metabolic panel   Result Value Ref Range    Sodium 136 133 - 144 mmol/L    Potassium 3.8 3.4 - 5.3 mmol/L    Chloride 102 94 - 109 mmol/L    Carbon Dioxide (CO2) 27 20 - 32 mmol/L    Anion Gap 7 3 - 14 mmol/L    Urea Nitrogen 27 7 - 30 mg/dL    Creatinine 1.21 (H) 0.52 - 1.04 mg/dL    Calcium 8.8 8.5 - 10.1 mg/dL    Glucose 124 (H) 70 - 99 mg/dL    Alkaline Phosphatase 81 40 - 150 U/L    AST 26 0 - 45 U/L    ALT 28 0 - 50 U/L    Protein Total 6.9 6.8 - 8.8 g/dL    Albumin 2.9 (L) 3.4 - 5.0 g/dL    Bilirubin Total 1.0 0.2 - 1.3 mg/dL    GFR Estimate 39 (L) >60 mL/min/1.73m2   Magnesium   Result Value Ref Range    Magnesium 1.7 1.6 - 2.3 mg/dL   Phosphorus   Result Value Ref Range    Phosphorus 2.5 2.5 - 4.5 mg/dL   CBC with platelets and differential   Result Value Ref Range    WBC Count 14.0 (H) 4.0 - 11.0 10e3/uL    RBC Count 3.87 3.80 - 5.20 10e6/uL    Hemoglobin 12.0 11.7 - 15.7 g/dL    Hematocrit 35.5 35.0 - 47.0 %    MCV 92 78 - 100 fL    MCH 31.0 26.5 - 33.0 pg    MCHC 33.8 31.5 - 36.5 g/dL    RDW 13.2 10.0 - 15.0 %    Platelet Count 170 150 - 450 10e3/uL    % Neutrophils 84 %    % Lymphocytes 6 %    % Monocytes 10 %    % Eosinophils 0 %    % Basophils 0 %    % Immature Granulocytes 0 %    NRBCs per 100 WBC 0 <1 /100    Absolute Neutrophils 11.6 (H) 1.6 - 8.3 10e3/uL    Absolute Lymphocytes 0.8 0.8 - 5.3 10e3/uL    Absolute Monocytes 1.4 (H) 0.0 - 1.3 10e3/uL    Absolute Eosinophils 0.0 0.0 - 0.7 10e3/uL    Absolute Basophils 0.1 0.0 - 0.2 10e3/uL    Absolute Immature Granulocytes 0.0 <=0.0 10e3/uL    Absolute NRBCs 0.0 10e3/uL   Lumbar spine CT w/o contrast    Narrative    CT LUMBAR SPINE WITHOUT CONTRAST  9/3/2021 6:25 PM     HISTORY: Fall. Pain.     TECHNIQUE: Axial images of the lumbar spine were obtained without  intravenous contrast. Multiplanar reformations were performed.    Radiation dose for this scan was reduced using automated exposure  control, adjustment of the mA and/or kV according to patient size, or  iterative reconstruction technique.     COMPARISON: None.     FINDINGS:  There are 5 lumbar-type vertebral bodies assumed for the  purposes of this dictation.     Mild leftward curvature of the lumbar spine. Leftward listhesis of L4  on L5. Mild grade 1 anterolisthesis of L3 on L4 and L4 on L5. No acute  lucent fracture lines visualized. No significant loss of vertebral  body height. No destructive bony lesions appreciated.    Level by level as follows:     T12-L1: No loss of disc height. No significant disc herniation. Normal  facets. No spinal canal or neural foraminal narrowing.     L1-L2: No loss of disc height. No significant disc herniation. Normal  facets. No spinal canal or neural foraminal narrowing.     L2-L3: No loss of disc height. No significant disc herniation. Mild  facet hypertrophy. No spinal canal or neural foraminal narrowing.     L3-L4: Mild grade 1 anterolisthesis. Mild loss of disc height.  Circumferential disc bulge. Moderate bilateral facet hypertrophy. Mild  spinal canal narrowing. Mild bilateral neural foraminal narrowing.     L4-L5: Mild grade 1 anterolisthesis. Moderate loss of disc height.  Circumferential disc bulge more pronounced towards the right foraminal  region. Moderate bilateral facet hypertrophy. Moderate to severe  spinal canal narrowing. Moderate to severe right neural foraminal  narrowing. Moderate left neural foraminal narrowing.     L5-S1: Marked loss of disc height with sclerotic degenerative endplate  changes. Circumferential disc bulge with endplate osteophytic  spurring. Moderate bilateral facet hypertrophy. No significant spinal  canal narrowing. Moderate bilateral neural foraminal narrowing.     Visualized paraspinous tissues: Partially visualized right pleural  effusion.       Impression    IMPRESSION:    1. No evidence of acute  fracture or subluxation in the lumbar spine.  2. Marked degenerative changes in the lower lumbar spine as detailed  above, most pronounced at L4-L5.  3. At L4-L5, there is moderate to severe spinal canal narrowing as  well as moderate to severe right and moderate left neural foraminal  narrowing.    KEIRY JAVIER MD         SYSTEM ID:  [id number]   UA with Microscopic reflex to Culture    Specimen: Urine, Midstream   Result Value Ref Range    Color Urine Francesca (A) Colorless, Straw, Light Yellow, Yellow    Appearance Urine Cloudy (A) Clear    Glucose Urine Negative Negative mg/dL    Bilirubin Urine Negative Negative    Ketones Urine 5  (A) Negative mg/dL    Specific Gravity Urine 1.014 1.003 - 1.035    Blood Urine Small (A) Negative    pH Urine 5.0 5.0 - 7.0    Protein Albumin Urine 100  (A) Negative mg/dL    Urobilinogen Urine Normal Normal, 2.0 mg/dL    Nitrite Urine Positive (A) Negative    Leukocyte Esterase Urine Large (A) Negative    Bacteria Urine Many (A) None Seen /HPF    WBC Clumps Urine Present (A) None Seen /HPF    RBC Urine 9 (H) <=2 /HPF    WBC Urine >182 (H) <=5 /HPF    Squamous Epithelials Urine 1 <=1 /HPF    Renal Tubular Epithelials Urine 1 (H) None Seen /HPF    Narrative    Urine Culture ordered based on laboratory criteria       Medications   0.9% sodium chloride BOLUS (0 mLs Intravenous Stopped 9/1935)   cefTRIAXone (ROCEPHIN) 1 g vial to attach to  mL bag for ADULTS or NS 50 mL bag for PEDS (0 g Intravenous Stopped 9/3/21 2008)       Assessments & Plan   I have reviewed the findings, diagnosis, plan and need for follow up with the patient.    Discharge Medication List as of 9/3/2021  8:10 PM      START taking these medications    Details   cefdinir (OMNICEF) 300 MG capsule Take 1 capsule (300 mg) by mouth 2 times daily for 7 days, Disp-14 capsule, R-0, E-Prescribe      oxyCODONE (ROXICODONE) 5 MG tablet Take 0.5-1 tablets (2.5-5 mg) by mouth every 6 hours as needed for pain, Disp-12  tablet, R-0, E-Prescribe             Final diagnoses:   UTI (urinary tract infection), bacterial   Fall in home, initial encounter   Right-sided low back pain with right-sided sciatica, unspecified chronicity   Spinal stenosis of lumbar region, unspecified whether neurogenic claudication present   Arthritis of facet joint of lumbar spine     Disposition: Patient discharged home in stable condition.  Plan as above.  Return for concerns.      Note: Chart documentation done in part with Dragon Voice Recognition software. Although reviewed after completion, some word and grammatical errors may remain.     9/3/2021   [facility] EMERGENCY DEPT     Reanna Irene MD  09/04/21 0132

## 2021-09-05 LAB — BACTERIA UR CULT: NORMAL

## 2021-09-06 ENCOUNTER — APPOINTMENT (OUTPATIENT)
Dept: CT IMAGING | Facility: CLINIC | Age: 86
End: 2021-09-06
Attending: EMERGENCY MEDICINE
Payer: MEDICARE

## 2021-09-06 ENCOUNTER — APPOINTMENT (OUTPATIENT)
Dept: GENERAL RADIOLOGY | Facility: CLINIC | Age: 86
End: 2021-09-06
Attending: EMERGENCY MEDICINE
Payer: MEDICARE

## 2021-09-06 ENCOUNTER — HOSPITAL ENCOUNTER (EMERGENCY)
Facility: CLINIC | Age: 86
Discharge: ANOTHER HOSPITAL | End: 2021-09-06
Attending: EMERGENCY MEDICINE | Admitting: EMERGENCY MEDICINE
Payer: MEDICARE

## 2021-09-06 ENCOUNTER — HOSPITAL ENCOUNTER (INPATIENT)
Facility: CLINIC | Age: 86
LOS: 3 days | Discharge: NURSING, REHAB OR LONG-TERM CARE | DRG: 291 | End: 2021-09-09
Attending: INTERNAL MEDICINE | Admitting: HOSPITALIST
Payer: MEDICARE

## 2021-09-06 ENCOUNTER — MYC MEDICAL ADVICE (OUTPATIENT)
Dept: INTERNAL MEDICINE | Facility: CLINIC | Age: 86
End: 2021-09-06

## 2021-09-06 ENCOUNTER — NURSE TRIAGE (OUTPATIENT)
Dept: NURSING | Facility: CLINIC | Age: 86
End: 2021-09-06

## 2021-09-06 VITALS
HEART RATE: 50 BPM | WEIGHT: 125 LBS | BODY MASS INDEX: 22.14 KG/M2 | RESPIRATION RATE: 24 BRPM | DIASTOLIC BLOOD PRESSURE: 74 MMHG | SYSTOLIC BLOOD PRESSURE: 93 MMHG | TEMPERATURE: 98.5 F | OXYGEN SATURATION: 98 %

## 2021-09-06 DIAGNOSIS — R06.02 SOB (SHORTNESS OF BREATH): ICD-10-CM

## 2021-09-06 DIAGNOSIS — M54.50 LUMBAR BACK PAIN: ICD-10-CM

## 2021-09-06 DIAGNOSIS — J18.9 PNEUMONIA OF RIGHT LOWER LOBE DUE TO INFECTIOUS ORGANISM: ICD-10-CM

## 2021-09-06 DIAGNOSIS — R53.1 GENERALIZED WEAKNESS: Primary | ICD-10-CM

## 2021-09-06 DIAGNOSIS — M62.81 GENERALIZED MUSCLE WEAKNESS: ICD-10-CM

## 2021-09-06 DIAGNOSIS — R09.02 HYPOXIA: ICD-10-CM

## 2021-09-06 LAB
ALBUMIN SERPL-MCNC: 2.8 G/DL (ref 3.4–5)
ALBUMIN UR-MCNC: NEGATIVE MG/DL
ALP SERPL-CCNC: 107 U/L (ref 40–150)
ALT SERPL W P-5'-P-CCNC: 40 U/L (ref 0–50)
AMORPH CRY #/AREA URNS HPF: ABNORMAL /HPF
ANION GAP SERPL CALCULATED.3IONS-SCNC: 8 MMOL/L (ref 3–14)
APPEARANCE UR: ABNORMAL
AST SERPL W P-5'-P-CCNC: 44 U/L (ref 0–45)
BASE EXCESS BLDV CALC-SCNC: 2.1 MMOL/L (ref -7.7–1.9)
BASOPHILS # BLD AUTO: 0.1 10E3/UL (ref 0–0.2)
BASOPHILS NFR BLD AUTO: 1 %
BILIRUB SERPL-MCNC: 0.5 MG/DL (ref 0.2–1.3)
BILIRUB UR QL STRIP: NEGATIVE
BUN SERPL-MCNC: 63 MG/DL (ref 7–30)
CALCIUM SERPL-MCNC: 9 MG/DL (ref 8.5–10.1)
CHLORIDE BLD-SCNC: 101 MMOL/L (ref 94–109)
CO2 SERPL-SCNC: 26 MMOL/L (ref 20–32)
COLOR UR AUTO: YELLOW
CREAT SERPL-MCNC: 2.27 MG/DL (ref 0.52–1.04)
EOSINOPHIL # BLD AUTO: 0 10E3/UL (ref 0–0.7)
EOSINOPHIL NFR BLD AUTO: 0 %
ERYTHROCYTE [DISTWIDTH] IN BLOOD BY AUTOMATED COUNT: 13.1 % (ref 10–15)
GFR SERPL CREATININE-BSD FRML MDRD: 18 ML/MIN/1.73M2
GLUCOSE BLD-MCNC: 112 MG/DL (ref 70–99)
GLUCOSE UR STRIP-MCNC: NEGATIVE MG/DL
HCO3 BLDV-SCNC: 28 MMOL/L (ref 21–28)
HCT VFR BLD AUTO: 34.6 % (ref 35–47)
HGB BLD-MCNC: 11.4 G/DL (ref 11.7–15.7)
HGB UR QL STRIP: NEGATIVE
HYALINE CASTS: 20 /LPF
IMM GRANULOCYTES # BLD: 0 10E3/UL
IMM GRANULOCYTES NFR BLD: 0 %
INR PPP: 1.28 (ref 0.85–1.15)
KETONES UR STRIP-MCNC: NEGATIVE MG/DL
LACTATE SERPL-SCNC: 0.7 MMOL/L (ref 0.7–2)
LEUKOCYTE ESTERASE UR QL STRIP: NEGATIVE
LYMPHOCYTES # BLD AUTO: 0.7 10E3/UL (ref 0.8–5.3)
LYMPHOCYTES NFR BLD AUTO: 7 %
MCH RBC QN AUTO: 30.7 PG (ref 26.5–33)
MCHC RBC AUTO-ENTMCNC: 32.9 G/DL (ref 31.5–36.5)
MCV RBC AUTO: 93 FL (ref 78–100)
MONOCYTES # BLD AUTO: 1.2 10E3/UL (ref 0–1.3)
MONOCYTES NFR BLD AUTO: 12 %
MUCOUS THREADS #/AREA URNS LPF: PRESENT /LPF
NEUTROPHILS # BLD AUTO: 7.7 10E3/UL (ref 1.6–8.3)
NEUTROPHILS NFR BLD AUTO: 80 %
NITRATE UR QL: NEGATIVE
NRBC # BLD AUTO: 0 10E3/UL
NRBC BLD AUTO-RTO: 0 /100
NT-PROBNP SERPL-MCNC: 6057 PG/ML (ref 0–1800)
O2/TOTAL GAS SETTING VFR VENT: 21 %
PCO2 BLDV: 48 MM HG (ref 40–50)
PH BLDV: 7.37 [PH] (ref 7.32–7.43)
PH UR STRIP: 5 [PH] (ref 5–7)
PLATELET # BLD AUTO: 250 10E3/UL (ref 150–450)
PO2 BLDV: 28 MM HG (ref 25–47)
POTASSIUM BLD-SCNC: 4.2 MMOL/L (ref 3.4–5.3)
PROT SERPL-MCNC: 7.4 G/DL (ref 6.8–8.8)
RBC # BLD AUTO: 3.71 10E6/UL (ref 3.8–5.2)
RBC URINE: <1 /HPF
SARS-COV-2 RNA RESP QL NAA+PROBE: NEGATIVE
SODIUM SERPL-SCNC: 135 MMOL/L (ref 133–144)
SP GR UR STRIP: 1.01 (ref 1–1.03)
TROPONIN I SERPL-MCNC: <0.015 UG/L (ref 0–0.04)
UROBILINOGEN UR STRIP-MCNC: NORMAL MG/DL
WBC # BLD AUTO: 9.7 10E3/UL (ref 4–11)
WBC URINE: 1 /HPF

## 2021-09-06 PROCEDURE — 96365 THER/PROPH/DIAG IV INF INIT: CPT

## 2021-09-06 PROCEDURE — 96366 THER/PROPH/DIAG IV INF ADDON: CPT

## 2021-09-06 PROCEDURE — 36415 COLL VENOUS BLD VENIPUNCTURE: CPT | Performed by: HOSPITALIST

## 2021-09-06 PROCEDURE — 72131 CT LUMBAR SPINE W/O DYE: CPT

## 2021-09-06 PROCEDURE — 82803 BLOOD GASES ANY COMBINATION: CPT | Performed by: EMERGENCY MEDICINE

## 2021-09-06 PROCEDURE — 84484 ASSAY OF TROPONIN QUANT: CPT | Performed by: EMERGENCY MEDICINE

## 2021-09-06 PROCEDURE — 81001 URINALYSIS AUTO W/SCOPE: CPT | Performed by: EMERGENCY MEDICINE

## 2021-09-06 PROCEDURE — 99223 1ST HOSP IP/OBS HIGH 75: CPT | Mod: AI | Performed by: HOSPITALIST

## 2021-09-06 PROCEDURE — 71045 X-RAY EXAM CHEST 1 VIEW: CPT

## 2021-09-06 PROCEDURE — 36415 COLL VENOUS BLD VENIPUNCTURE: CPT | Performed by: EMERGENCY MEDICINE

## 2021-09-06 PROCEDURE — 120N000001 HC R&B MED SURG/OB

## 2021-09-06 PROCEDURE — 87635 SARS-COV-2 COVID-19 AMP PRB: CPT | Performed by: EMERGENCY MEDICINE

## 2021-09-06 PROCEDURE — 250N000011 HC RX IP 250 OP 636: Performed by: EMERGENCY MEDICINE

## 2021-09-06 PROCEDURE — 85610 PROTHROMBIN TIME: CPT | Performed by: EMERGENCY MEDICINE

## 2021-09-06 PROCEDURE — 84145 PROCALCITONIN (PCT): CPT | Performed by: HOSPITALIST

## 2021-09-06 PROCEDURE — 96361 HYDRATE IV INFUSION ADD-ON: CPT

## 2021-09-06 PROCEDURE — 72125 CT NECK SPINE W/O DYE: CPT

## 2021-09-06 PROCEDURE — 96367 TX/PROPH/DG ADDL SEQ IV INF: CPT

## 2021-09-06 PROCEDURE — 250N000013 HC RX MED GY IP 250 OP 250 PS 637: Performed by: EMERGENCY MEDICINE

## 2021-09-06 PROCEDURE — C9803 HOPD COVID-19 SPEC COLLECT: HCPCS

## 2021-09-06 PROCEDURE — 99285 EMERGENCY DEPT VISIT HI MDM: CPT | Mod: 25 | Performed by: EMERGENCY MEDICINE

## 2021-09-06 PROCEDURE — 85004 AUTOMATED DIFF WBC COUNT: CPT | Performed by: EMERGENCY MEDICINE

## 2021-09-06 PROCEDURE — 93010 ELECTROCARDIOGRAM REPORT: CPT | Performed by: EMERGENCY MEDICINE

## 2021-09-06 PROCEDURE — 258N000003 HC RX IP 258 OP 636: Performed by: EMERGENCY MEDICINE

## 2021-09-06 PROCEDURE — 99285 EMERGENCY DEPT VISIT HI MDM: CPT | Mod: 25

## 2021-09-06 PROCEDURE — 83880 ASSAY OF NATRIURETIC PEPTIDE: CPT | Performed by: EMERGENCY MEDICINE

## 2021-09-06 PROCEDURE — 93005 ELECTROCARDIOGRAM TRACING: CPT

## 2021-09-06 PROCEDURE — 80053 COMPREHEN METABOLIC PANEL: CPT | Performed by: EMERGENCY MEDICINE

## 2021-09-06 PROCEDURE — 83605 ASSAY OF LACTIC ACID: CPT | Performed by: EMERGENCY MEDICINE

## 2021-09-06 PROCEDURE — 70450 CT HEAD/BRAIN W/O DYE: CPT

## 2021-09-06 RX ORDER — ONDANSETRON 2 MG/ML
4 INJECTION INTRAMUSCULAR; INTRAVENOUS EVERY 6 HOURS PRN
Status: DISCONTINUED | OUTPATIENT
Start: 2021-09-06 | End: 2021-09-09 | Stop reason: HOSPADM

## 2021-09-06 RX ORDER — ESCITALOPRAM OXALATE 10 MG/1
10 TABLET ORAL DAILY
Status: DISCONTINUED | OUTPATIENT
Start: 2021-09-07 | End: 2021-09-09 | Stop reason: HOSPADM

## 2021-09-06 RX ORDER — NALOXONE HYDROCHLORIDE 0.4 MG/ML
0.2 INJECTION, SOLUTION INTRAMUSCULAR; INTRAVENOUS; SUBCUTANEOUS
Status: DISCONTINUED | OUTPATIENT
Start: 2021-09-06 | End: 2021-09-09 | Stop reason: HOSPADM

## 2021-09-06 RX ORDER — CEFTRIAXONE 1 G/1
1 INJECTION, POWDER, FOR SOLUTION INTRAMUSCULAR; INTRAVENOUS EVERY 24 HOURS
Status: DISCONTINUED | OUTPATIENT
Start: 2021-09-06 | End: 2021-09-06 | Stop reason: HOSPADM

## 2021-09-06 RX ORDER — NALOXONE HYDROCHLORIDE 0.4 MG/ML
0.4 INJECTION, SOLUTION INTRAMUSCULAR; INTRAVENOUS; SUBCUTANEOUS
Status: DISCONTINUED | OUTPATIENT
Start: 2021-09-06 | End: 2021-09-09 | Stop reason: HOSPADM

## 2021-09-06 RX ORDER — ACETAMINOPHEN 650 MG/1
650 SUPPOSITORY RECTAL EVERY 6 HOURS PRN
Status: DISCONTINUED | OUTPATIENT
Start: 2021-09-06 | End: 2021-09-09 | Stop reason: HOSPADM

## 2021-09-06 RX ORDER — AZITHROMYCIN 250 MG/1
250 TABLET, FILM COATED ORAL DAILY
Status: DISCONTINUED | OUTPATIENT
Start: 2021-09-07 | End: 2021-09-09 | Stop reason: HOSPADM

## 2021-09-06 RX ORDER — GABAPENTIN 100 MG/1
100 CAPSULE ORAL 2 TIMES DAILY
Status: ON HOLD | COMMUNITY
Start: 2021-08-09 | End: 2021-09-07

## 2021-09-06 RX ORDER — LACTOBACILLUS RHAMNOSUS GG 10B CELL
1 CAPSULE ORAL 2 TIMES DAILY
COMMUNITY
End: 2021-10-06

## 2021-09-06 RX ORDER — LIDOCAINE 40 MG/G
CREAM TOPICAL
Status: DISCONTINUED | OUTPATIENT
Start: 2021-09-06 | End: 2021-09-09 | Stop reason: HOSPADM

## 2021-09-06 RX ORDER — METOPROLOL TARTRATE 25 MG/1
25 TABLET, FILM COATED ORAL 2 TIMES DAILY
Status: DISCONTINUED | OUTPATIENT
Start: 2021-09-06 | End: 2021-09-06

## 2021-09-06 RX ORDER — ALBUTEROL SULFATE 90 UG/1
2 AEROSOL, METERED RESPIRATORY (INHALATION) 4 TIMES DAILY PRN
Status: DISCONTINUED | OUTPATIENT
Start: 2021-09-06 | End: 2021-09-09 | Stop reason: HOSPADM

## 2021-09-06 RX ORDER — ONDANSETRON 4 MG/1
4 TABLET, ORALLY DISINTEGRATING ORAL EVERY 6 HOURS PRN
Status: DISCONTINUED | OUTPATIENT
Start: 2021-09-06 | End: 2021-09-09 | Stop reason: HOSPADM

## 2021-09-06 RX ORDER — CEFTRIAXONE 1 G/1
1 INJECTION, POWDER, FOR SOLUTION INTRAMUSCULAR; INTRAVENOUS EVERY 24 HOURS
Status: DISCONTINUED | OUTPATIENT
Start: 2021-09-07 | End: 2021-09-09 | Stop reason: HOSPADM

## 2021-09-06 RX ORDER — FUROSEMIDE 10 MG/ML
40 INJECTION INTRAMUSCULAR; INTRAVENOUS
Status: COMPLETED | OUTPATIENT
Start: 2021-09-07 | End: 2021-09-08

## 2021-09-06 RX ORDER — ACETAMINOPHEN 325 MG/1
650 TABLET ORAL ONCE
Status: COMPLETED | OUTPATIENT
Start: 2021-09-06 | End: 2021-09-06

## 2021-09-06 RX ORDER — ACETAMINOPHEN 325 MG/1
650 TABLET ORAL EVERY 6 HOURS PRN
Status: DISCONTINUED | OUTPATIENT
Start: 2021-09-06 | End: 2021-09-09 | Stop reason: HOSPADM

## 2021-09-06 RX ORDER — SODIUM CHLORIDE 9 MG/ML
INJECTION, SOLUTION INTRAVENOUS CONTINUOUS
Status: DISCONTINUED | OUTPATIENT
Start: 2021-09-06 | End: 2021-09-06 | Stop reason: HOSPADM

## 2021-09-06 RX ORDER — AMPICILLIN TRIHYDRATE 500 MG
CAPSULE ORAL
Status: ON HOLD | COMMUNITY
End: 2021-09-07

## 2021-09-06 RX ADMIN — SODIUM CHLORIDE 1000 ML: 9 INJECTION, SOLUTION INTRAVENOUS at 15:16

## 2021-09-06 RX ADMIN — SODIUM CHLORIDE: 9 INJECTION, SOLUTION INTRAVENOUS at 19:51

## 2021-09-06 RX ADMIN — AZITHROMYCIN 500 MG: 500 INJECTION, POWDER, LYOPHILIZED, FOR SOLUTION INTRAVENOUS at 18:29

## 2021-09-06 RX ADMIN — ACETAMINOPHEN 650 MG: 325 TABLET, FILM COATED ORAL at 17:10

## 2021-09-06 RX ADMIN — CEFTRIAXONE SODIUM 1 G: 1 INJECTION, POWDER, FOR SOLUTION INTRAMUSCULAR; INTRAVENOUS at 17:40

## 2021-09-06 ASSESSMENT — ACTIVITIES OF DAILY LIVING (ADL)
WALKING_OR_CLIMBING_STAIRS_DIFFICULTY: NO
DRESSING/BATHING_DIFFICULTY: NO
HEARING_DIFFICULTY_OR_DEAF: NO
FALL_HISTORY_WITHIN_LAST_SIX_MONTHS: YES
VISION_MANAGEMENT: WEARS GLASSES
DIFFICULTY_COMMUNICATING: NO
WHICH_OF_THE_ABOVE_FUNCTIONAL_RISKS_HAD_A_RECENT_ONSET_OR_CHANGE?: FALL HISTORY
NUMBER_OF_TIMES_PATIENT_HAS_FALLEN_WITHIN_LAST_SIX_MONTHS: 1
DOING_ERRANDS_INDEPENDENTLY_DIFFICULTY: NO
CONCENTRATING,_REMEMBERING_OR_MAKING_DECISIONS_DIFFICULTY: NO
PATIENT_/_FAMILY_COMMUNICATION_STYLE: SPOKEN LANGUAGE (ENGLISH OR BILINGUAL)
TOILETING_ISSUES: NO
EQUIPMENT_CURRENTLY_USED_AT_HOME: WALKER, ROLLING
WEAR_GLASSES_OR_BLIND: YES
DIFFICULTY_EATING/SWALLOWING: NO

## 2021-09-06 ASSESSMENT — MIFFLIN-ST. JEOR: SCORE: 948.4

## 2021-09-06 NOTE — TELEPHONE ENCOUNTER
TRIAGE CALL -   Sb Curiel calling mom is next to him - (CTC) on file  Mom sufferers from chronic Lower back pain, She had injections for it.she is also on gabapentin    They live in he same place, in additional senior suit. Son stated that on thusrdya night she fell, and they found her later in the morning of Friday and they could take er to the er they had to call 911 she could satnd on her own.  She was seen on the ER 09/03/2021  She was Dx with an  UTI and sent home   And they took her home.  Pt is On antibiotic,  Not eating much, but no nausea vomition or diarrhea.  Son stated that she is took week and sleepy she is getting worse.    Ate a bowl of cereal this morning and tea. No sever difficulty with breathing but son stated that when they helper to the toilet her breathing gets labored.   Son is very concern because they thing she is going to fall again, she needs 24 hrs attention since she can stand on her own or go to the bathroom without 2 person help.    Patient denies difficulty with breathing, chest pain,  nausea, vomiting,or diarrhea.    Patient able to verly talk to the nurse     Per protocol, disposition to call 911 now  Son verbalized understanding and agrees with plan    Hawa Gandara RN Nurse Triage Advisor 1:00 PM 9/6/2021     Care advise given, patients questions were answered  Reminded we will be here 24/7 and can call back if symptoms worsen        Reason for Disposition    Shock suspected (e.g., cold/pale/clammy skin, too weak to stand, low BP, rapid pulse)    Additional Information    Negative: Severe difficulty breathing (e.g., struggling for each breath, speaks in single words)    Protocols used: WEAKNESS (GENERALIZED) AND FATIGUE-A-AH

## 2021-09-06 NOTE — ED PROVIDER NOTES
History     Chief Complaint   Patient presents with     Generalized Weakness     HPI  Shiloh Covarrubias is a 89 year old female who presents by ambulance for increasing weakness.  Patient apparently on Friday was reaching down to get her phone cord to charge it and unfortunately fell hitting her head but states she did not have loss of consciousness and denies any headache.  Denies neck or upper back pain.  She had some lower back pain.  She was evaluated in the emergency room and had blood work which was negative except her creatinine was mildly elevated at 1.21, white count was 89095, and her urine was grossly positive with greater than 182 red cells and large esterase.  However culture result did not grow out any definitive bacteria to cause the infection.  Since that time she has had increasing weakness and back pain.  She feels mildly short of breath.  Denies any congestion or sore throat.  No problems with speech or swallowing.  No change in taste or smell.  She has not any diarrhea.  No vomiting.  She has got low back pain that does not radiate down her legs.  He is generally weak and does not have any focal motor weakness.  She did received Covid immunizations back in February and March.    Allergies:  Allergies   Allergen Reactions     Clonidine Derivatives      Severe side effects       Entocort [Corticosteroids]      Macrobid [Nitrofurantoin]      Diarrhea       Sulfa Drugs Itching     itch       Problem List:    Patient Active Problem List    Diagnosis Date Noted     Chest pain 01/13/2011     Priority: High     Problem list name updated by automated process. Provider to review       Peripheral vascular disease (H) 03/30/2021     Priority: Medium     Chronic kidney disease, stage 3 11/16/2020     Priority: Medium     Essential hypertension with goal blood pressure less than 130/80 05/24/2016     Priority: Medium     Major depressive disorder, single episode, mild (H) 02/15/2016     Priority: Medium      Advanced directives, counseling/discussion 10/09/2012     Priority: Medium     Advance Care Planning:   Receipt of ACP document:  Received: Health Care Directive which was witnessed or notarized on 1/14/07.  Document not previously scanned.  Validation form completed and sent with document to be scanned.  Code Status reflects choices in most recent ACP document.  Confirmed/documented designated decision maker(s). See permanent comments section of demographics in clinical tab. View document(s) and details by clicking on code status.   Added by Doolres Yu on 11/15/2013.               Hyperlipidemia LDL goal <100 03/30/2011     Priority: Medium     GERD (gastroesophageal reflux disease) 01/19/2011     Priority: Medium     Palpitations 01/13/2011     Priority: Medium     Hypertriglyceridemia 05/18/2010     Priority: Medium     Osteoarthrosis, unspecified whether generalized or localized, involving lower leg 01/08/2007     Priority: Medium     Problem list name updated by automated process. Provider to review       Chronic airway obstruction (H) 10/13/2005     Priority: Medium     Problem list name updated by automated process. Provider to review       Senile cataract 12/08/2003     Priority: Medium     Problem list name updated by automated process. Provider to review       Sebaceous cyst 12/08/2003     Priority: Medium     Impacted cerumen 12/08/2003     Priority: Medium     Anxiety state 10/09/2003     Priority: Medium     Problem list name updated by automated process. Provider to review       Essential and other specified forms of tremor 10/09/2003     Priority: Medium     Edema 08/01/2003     Priority: Medium        Past Medical History:    Past Medical History:   Diagnosis Date     Edema      Hypertension      Squamous cell carcinoma 2015     Unspecified asthma(493.90)      Unspecified intestinal obstruction 02/17/10       Past Surgical History:    Past Surgical History:   Procedure Laterality Date      ESOPHAGOSCOPY, GASTROSCOPY, DUODENOSCOPY (EGD), COMBINED  2/8/2011    COMBINED ESOPHAGOSCOPY, GASTROSCOPY, DUODENOSCOPY (EGD), BIOPSY SINGLE OR MULTIPLE performed by ANGY LIMA at  GI     ESOPHAGOSCOPY, GASTROSCOPY, DUODENOSCOPY (EGD), DILATATION, COMBINED  2/8/2011    COMBINED ESOPHAGOSCOPY, GASTROSCOPY, DUODENOSCOPY (EGD), DILATATION performed by ANGY LIMA at  GI     [facility] UGI ENDOSCOPY, SIMPLE EXAM  02/23/10       Family History:    Family History   Problem Relation Age of Onset     Arthritis Mother      Osteoporosis Mother      Thyroid Disease Mother         Hypothyroid.     Depression Maternal Uncle         Bouts of depression     Hypertension No family hx of      C.A.D. No family hx of        Social History:  Marital Status:   [5]  Social History     Tobacco Use     Smoking status: Never Smoker     Smokeless tobacco: Never Used   Substance Use Topics     Alcohol use: Yes     Alcohol/week: 0.0 standard drinks     Comment: 3 per  year     Drug use: No        Medications:    amLODIPine (NORVASC) 5 MG tablet  Calcium Polycarbophil (FIBERCON PO)  cefdinir (OMNICEF) 300 MG capsule  cholecalciferol (VITAMIN D3) 25 mcg (1000 units) capsule  Cranberry 450 MG CAPS  cyclobenzaprine (FLEXERIL) 5 MG tablet  EQ LORATADINE 10 MG tablet  escitalopram (LEXAPRO) 10 MG tablet  furosemide (LASIX) 20 MG tablet  gabapentin (NEURONTIN) 100 MG capsule  hydrALAZINE (APRESOLINE) 50 MG tablet  lactobacillus rhamnosus, GG, (CULTURELL) capsule  lisinopril (ZESTRIL) 20 MG tablet  metoprolol tartrate (LOPRESSOR) 25 MG tablet  Multiple Vitamins-Minerals (MULTI VITAMIN/MINERALS PO)  oxyCODONE (ROXICODONE) 5 MG tablet  tiotropium (SPIRIVA RESPIMAT) 2.5 MCG/ACT inhaler  ASPIRIN 81 MG OR TABS  loperamide (IMODIUM A-D) 2 MG tablet  naproxen sodium 220 MG capsule          Review of Systems all other systems are reviewed and are negative.    Physical Exam   BP: (!) 152/59  Pulse: 51  Temp: 98.3  F (36.8  C)  Resp: 20  Weight:  56.7 kg (125 lb)  SpO2: 90 %      Physical Exam alert female in moderate distress.  HEENT is atraumatic.  No hemotympanum or montaño signs.  No raccoons eyes.  No facial droop or asymmetry of this.  Speech is clear.  Neck is supple without limitation, bruits or stridor.  Lungs reveal rare crackles but no active wheezing.  Cardiac auscultation is bradycardic without murmur.  On back exam she does have tenderness midline over the upper to mid lumbar spine.  On extremities she has no pitting edema.  She has stasis changes of both ankles.  Negative Homans.  She does not have focal motor weakness in her arms or legs.  She did not feel she could ambulate as she feels too weak and her back hurts too much.    ED Course        Procedures              EKG Interpretation:      Interpreted by Genaro Noble MD  Time reviewed: 14:05  Symptoms at time of EKG: Generalized weakness and shortness of breath  Rhythm: normal sinus   Rate: Bradycardia  Axis: Normal  Ectopy: none  Conduction: normal  ST Segments/ T Waves: No acute ischemic changes  Q Waves: none  Comparison to prior: Unchanged from 4/8/19    Clinical Impression: sinus bradycardia                Critical Care time:  none               Results for orders placed or performed during the hospital encounter of 09/06/21 (from the past 24 hour(s))   Asymptomatic COVID-19 Virus (Coronavirus) by PCR Nasopharyngeal    Specimen: Nasopharyngeal; Swab   Result Value Ref Range    SARS CoV2 PCR Negative Negative    Narrative    Testing was performed using the melissa  SARS-CoV-2 & Influenza A/B Assay on the melissa  Ping  System.  This test should be ordered for the detection of SARS-COV-2 in individuals who meet SARS-CoV-2 clinical and/or epidemiological criteria. Test performance is unknown in asymptomatic patients.  This test is for in vitro diagnostic use under the FDA EUA for laboratories certified under CLIA to perform moderate and/or high complexity testing. This test has not been FDA  cleared or approved.  A negative test does not rule out the presence of PCR inhibitors in the specimen or target RNA in concentration below the limit of detection for the assay. The possibility of a false negative should be considered if the patient's recent exposure or clinical presentation suggests COVID-19.  [facility] are certified under the Clinical Laboratory Improvement Amendments of 1988 (CLIA-88) as qualified to perform moderate and/or high complexity laboratory testing.   CBC with platelets differential    Narrative    The following orders were created for panel order CBC with platelets differential.  Procedure                               Abnormality         Status                     ---------                               -----------         ------                     CBC with platelets and d...[[id number]]  Abnormal            Final result                 Please view results for these tests on the individual orders.   INR   Result Value Ref Range    INR 1.28 (H) 0.85 - 1.15   Comprehensive metabolic panel   Result Value Ref Range    Sodium 135 133 - 144 mmol/L    Potassium 4.2 3.4 - 5.3 mmol/L    Chloride 101 94 - 109 mmol/L    Carbon Dioxide (CO2) 26 20 - 32 mmol/L    Anion Gap 8 3 - 14 mmol/L    Urea Nitrogen 63 (H) 7 - 30 mg/dL    Creatinine 2.27 (H) 0.52 - 1.04 mg/dL    Calcium 9.0 8.5 - 10.1 mg/dL    Glucose 112 (H) 70 - 99 mg/dL    Alkaline Phosphatase 107 40 - 150 U/L    AST 44 0 - 45 U/L    ALT 40 0 - 50 U/L    Protein Total 7.4 6.8 - 8.8 g/dL    Albumin 2.8 (L) 3.4 - 5.0 g/dL    Bilirubin Total 0.5 0.2 - 1.3 mg/dL    GFR Estimate 18 (L) >60 mL/min/1.73m2   Lactic acid whole blood   Result Value Ref Range    Lactic Acid 0.7 0.7 - 2.0 mmol/L   Troponin I   Result Value Ref Range    Troponin I <0.015 0.000 - 0.045 ug/L   Blood gas venous   Result Value Ref Range    pH Venous 7.37 7.32 - 7.43    pCO2 Venous 48 40 - 50 mm Hg    pO2 Venous 28 25 - 47 mm Hg    Bicarbonate Venous  28 21 - 28 mmol/L    Base Excess/Deficit (+/-) 2.1 (H) -7.7 - 1.9 mmol/L    FIO2 21    CBC with platelets and differential   Result Value Ref Range    WBC Count 9.7 4.0 - 11.0 10e3/uL    RBC Count 3.71 (L) 3.80 - 5.20 10e6/uL    Hemoglobin 11.4 (L) 11.7 - 15.7 g/dL    Hematocrit 34.6 (L) 35.0 - 47.0 %    MCV 93 78 - 100 fL    MCH 30.7 26.5 - 33.0 pg    MCHC 32.9 31.5 - 36.5 g/dL    RDW 13.1 10.0 - 15.0 %    Platelet Count 250 150 - 450 10e3/uL    % Neutrophils 80 %    % Lymphocytes 7 %    % Monocytes 12 %    % Eosinophils 0 %    % Basophils 1 %    % Immature Granulocytes 0 %    NRBCs per 100 WBC 0 <1 /100    Absolute Neutrophils 7.7 1.6 - 8.3 10e3/uL    Absolute Lymphocytes 0.7 (L) 0.8 - 5.3 10e3/uL    Absolute Monocytes 1.2 0.0 - 1.3 10e3/uL    Absolute Eosinophils 0.0 0.0 - 0.7 10e3/uL    Absolute Basophils 0.1 0.0 - 0.2 10e3/uL    Absolute Immature Granulocytes 0.0 <=0.0 10e3/uL    Absolute NRBCs 0.0 10e3/uL   XR Chest Port 1 View    Narrative    CHEST PORTABLE ONE VIEW   9/6/2021 4:07 PM     HISTORY: Shortness of breath.    COMPARISON: CT chest, abdomen and pelvis dated 9/18/2018, chest x-rays  dated 9/16/2015.    FINDINGS:  Cardiac silhouette is markedly enlarged in size since the  prior chest x-rays from 2015. There is hyperaeration. There is a  moderate-sized right pleural fluid collection with right basilar  atelectasis and/or infiltrate. Increased interstitial markings in the  right lung are noted and could represent vascular congestion or  possible subtle infiltrative process. No pneumothorax is identified.  No definite left pleural fluid collection. There are thoracic aortic  calcifications. Dextroconvex curvature of the thoracic spine is again  noted. No acute osseous fracture is identified. Probable right carotid  artery calcifications are noted.      Impression    IMPRESSION:  1. New right pleural fluid collection and right basilar atelectasis  and/or infiltrate. Probable vascular congestion and  worsening  cardiomegaly indicate probable congestive heart failure. Superimposed  right basilar infiltrate is not excluded.  2. Hyperaeration is again noted.   3. Aortic calcifications again noted. Possible carotid artery  calcifications.   CT Head w/o Contrast    Narrative    CT SCAN OF THE HEAD WITHOUT CONTRAST   9/6/2021 4:08 PM     HISTORY: Weakness. Pain.    TECHNIQUE:  Axial images of the head and coronal reformations without  IV contrast material. Radiation dose for this scan was reduced using  automated exposure control, adjustment of the mA and/or kV according  to patient size, or iterative reconstruction technique.    COMPARISON: Head CT 4/8/2019.    FINDINGS: There is no evidence of intracranial hemorrhage, mass, acute  infarct or anomaly. The ventricles are normal in size, shape and  configuration. Mild diffuse parenchymal volume loss. Mild patchy  periventricular white matter hypodensities which are nonspecific, but  likely related to chronic microvascular ischemic disease.     The visualized portions of the sinuses and mastoids appear normal. The  bony calvarium and bones of the skull base appear intact.       Impression    IMPRESSION:     1. No evidence of acute intracranial hemorrhage, mass, or herniation.  2. Mild diffuse parenchymal volume loss and white matter changes  likely due to chronic microvascular ischemic disease.    KEIRY JAVIER MD         SYSTEM ID:  [id number]   CT Lumbar Spine w/o Contrast    Narrative    CT LUMBAR SPINE WITHOUT CONTRAST  9/6/2021 4:09 PM     HISTORY: Fall. Back pain.      TECHNIQUE: Axial images of the lumbar spine were obtained without  intravenous contrast. Multiplanar reformations were performed.   Radiation dose for this scan was reduced using automated exposure  control, adjustment of the mA and/or kV according to patient size, or  iterative reconstruction technique.     COMPARISON: Lumbar spine CT 9/3/2021.     FINDINGS:  There are 5 lumbar-type vertebral bodies  assumed for the  purposes of this dictation.     Mild grade 1 anterolisthesis of L3 on L4 and L4 on L5. Leftward  listhesis of L4 on L5. No acute lucent fracture lines. No significant  loss of vertebral body height. Marked degenerative endplate changes  and loss of disc height at L5-S1. Mild degenerative endplate changes  and loss of disc height at the other levels. Prominent facet  hypertrophy in the lower lumbar spine.    Degenerative changes in the lower lumbar spine, most pronounced at the  L4-L5 level where there is moderate-to-severe spinal canal narrowing  as well as moderate-to-severe right and moderate left neural foraminal  narrowing. At L5-S1, there is moderate bilateral neural foraminal  narrowing.    Visualized paraspinous tissues: Right pleural effusion partially  visualized.       Impression    IMPRESSION:    1. No evidence of acute fracture or subluxation in the lumbar spine.  2. Degenerative changes in the lower lumbar spine as detailed above,  most pronounced at L4-5 and L5-S1. Findings appear similar to recent  CT 9/3/2021.  3. Right pleural effusion partially visualized.     KEIRY JAVIER MD         SYSTEM ID:  [id number]   CT Cervical Spine w/o Contrast    Narrative    CT CERVICAL SPINE WITHOUT CONTRAST 9/6/2021 4:09 PM     HISTORY: Fall. Pain.     TECHNIQUE: Axial images of the cervical spine were obtained without  intravenous contrast. Multiplanar reformations were performed.   Radiation dose for this scan was reduced using automated exposure  control, adjustment of the mA and/or kV according to patient size, or  iterative reconstruction technique.    COMPARISON: Cervical spine x-ray 1/30/2008.    FINDINGS: Mild grade 1 anterolisthesis of C4 on C5. No significant  loss of vertebral body height. No acute lucent fracture line  visualized. Marked degenerative endplate changes and loss of disc  height at C5-6 and C6-7. Moderate degenerative endplate changes and  loss of disc height at C3-4 and  C4-5. Fusion of the right facets at  C2-C3. Mild spinal canal narrowings at C5-6 and C6-7. Multiple levels  of neural foraminal narrowing, most pronounced, left greater than  right, at C3-4 and bilaterally at C5-6 and C6-7.    Visualized paraspinous tissues: Partially visualized pleural effusion  at the right lung apex. Atherosclerotic calcifications in the carotid  arteries.      Impression    IMPRESSION:   1. No evidence of acute fracture or subluxation in the cervical spine.  2. Degenerative changes in the cervical spine as described above.    KEIRY JAVIER MD         SYSTEM ID:  [id number]   UA with Microscopic reflex to Culture    Specimen: Urine, Catheter   Result Value Ref Range    Color Urine Yellow Colorless, Straw, Light Yellow, Yellow    Appearance Urine Slightly Cloudy (A) Clear    Glucose Urine Negative Negative mg/dL    Bilirubin Urine Negative Negative    Ketones Urine Negative Negative mg/dL    Specific Gravity Urine 1.012 1.003 - 1.035    Blood Urine Negative Negative    pH Urine 5.0 5.0 - 7.0    Protein Albumin Urine Negative Negative mg/dL    Urobilinogen Urine Normal Normal, 2.0 mg/dL    Nitrite Urine Negative Negative    Leukocyte Esterase Urine Negative Negative    Mucus Urine Present (A) None Seen /LPF    Amorphous Crystals Urine Few (A) None Seen /HPF    RBC Urine <1 <=2 /HPF    WBC Urine 1 <=5 /HPF    Hyaline Casts Urine 20 (H) <=2 /LPF    Narrative    Urine Culture not indicated       Medications   0.9% sodium chloride BOLUS (1,000 mLs Intravenous New Bag 9/6/21 1516)     Followed by   sodium chloride 0.9% infusion (has no administration in time range)   cefTRIAXone (ROCEPHIN) 1 g vial to attach to  mL bag for ADULTS or NS 50 mL bag for PEDS (1 g Intravenous New Bag 9/6/21 1740)   azithromycin (ZITHROMAX) 500 mg in sodium chloride 0.9 % 250 mL intermittent infusion (has no administration in time range)   acetaminophen (TYLENOL) tablet 650 mg (650 mg Oral Given 9/6/21 1710)        Assessments & Plan (with Medical Decision Making)   Shiloh Covarrubias is a 89 year old female who presents by ambulance for increasing weakness.  Patient apparently on Friday was reaching down to get her phone cord to charge it and unfortunately fell hitting her head but states she did not have loss of consciousness and denies any headache.  Denies neck or upper back pain.  She had some lower back pain.  She was evaluated in the emergency room and had blood work which was negative except her creatinine was mildly elevated at 1.21, white count was 82662, and her urine was grossly positive with greater than 182 red cells and large esterase.  However culture result did not grow out any definitive bacteria to cause the infection.  Since that time she has had increasing weakness and back pain.  She feels mildly short of breath.  Denies any congestion or sore throat.  No problems with speech or swallowing.  No change in taste or smell.  She has not any diarrhea.  No vomiting.  She has got low back pain that does not radiate down her legs.  He is generally weak and does not have any focal motor weakness.  She did received Covid immunizations back in February and March. On presentation patient was alert and cooperative. She is in moderate distress due to her low back pain. She was afebrile and vitally stable. On exam he had basilar crackles but no active wheezing. She had diffuse midline low lumbar tenderness without crepitus or step-off. Generally weak but no focal weakness. Her EKG showed a sinus bradycardia but no acute changes from previous. Troponin was normal. Blood work showed normal white count. Her creatinine  increased from 1.21 to 2.27. She also had elevated BUN. Suspect there is some prerenal component. She was given some IV fluids. Despite having a CT the other day with her increasing low back pain the CT was repeated and did not show any acute changes in the low back. We also CT her head and neck as she  did fall and hit her head on Friday. This did not show any acute abnormality. Chest x-ray now shows a new fluid collection in the right pleura with an infiltrate. During the ER course she did drop her sats 89%. Also spiked a temp to 101.1. She was given IV Rocephin and Zithromax.  She was given Tylenol for fever.  O2 at 1 L nasal cannula.  I talked to Dr. Gallegos from [facility] and there is willing to assume care in transfer.  Asked to add a proBNP to her blood work as she does have a history of congestive failure.  This was done and pending.  His blood gas was normal.  Her Covid swab was negative.  Patient be transferred by ambulance.  Both of her sons are in attendance and agree with the plan.    I have reviewed the nursing notes.    I have reviewed the findings, diagnosis, plan and need for follow up with the patient.       New Prescriptions    No medications on file       Final diagnoses:   Generalized muscle weakness   Pneumonia of right lower lobe due to infectious organism   Hypoxia   Lumbar back pain       9/6/2021   [facility] EMERGENCY DEPT     Genaro Noble MD  09/06/21 [id number]

## 2021-09-06 NOTE — ED NOTES
Patient returned from CT and placed back on cardiac/bp/oximetry. Her skin now feels hot and rectal temp done. Straight cath UA obtained and incontinent brief changed. Family at bedside.

## 2021-09-06 NOTE — PROGRESS NOTES
3-Hospitalist Huddle Documentation    Acuity/Preferred Bed Type: medical floor  Infection Concerns: none  Additional Specialist Needed Or Specialist Already Contacted:   None  Timely Treatments Needed: No  Important things to know/address during hospitalization: sitter not needed      Provider: Dr. Klein  Dx: Pneumonia  Clinic: [facility]    Presents with increasing weakness and back pain after fall. Had fever in ED. Lumbar CT negative. Urine looked ok and she has been on Omnicef due to concern for UTI. X ray shows RLL infiltrate. COVID negative.     Rocephin and Zithromax. Requiring 1 litre of oxygen. Gave less than 1 litre of fluids.   ECG shows sinus jose antonio.

## 2021-09-07 ENCOUNTER — APPOINTMENT (OUTPATIENT)
Dept: CARDIOLOGY | Facility: CLINIC | Age: 86
DRG: 291 | End: 2021-09-07
Attending: HOSPITALIST
Payer: MEDICARE

## 2021-09-07 LAB
ANION GAP SERPL CALCULATED.3IONS-SCNC: 4 MMOL/L (ref 3–14)
BUN SERPL-MCNC: 54 MG/DL (ref 7–30)
CALCIUM SERPL-MCNC: 9.3 MG/DL (ref 8.5–10.1)
CHLORIDE BLD-SCNC: 107 MMOL/L (ref 94–109)
CO2 SERPL-SCNC: 26 MMOL/L (ref 20–32)
CREAT SERPL-MCNC: 1.66 MG/DL (ref 0.52–1.04)
ERYTHROCYTE [DISTWIDTH] IN BLOOD BY AUTOMATED COUNT: 13.1 % (ref 10–15)
GFR SERPL CREATININE-BSD FRML MDRD: 27 ML/MIN/1.73M2
GLUCOSE BLD-MCNC: 112 MG/DL (ref 70–99)
HCT VFR BLD AUTO: 33.8 % (ref 35–47)
HGB BLD-MCNC: 10.7 G/DL (ref 11.7–15.7)
LVEF ECHO: NORMAL
MCH RBC QN AUTO: 30.4 PG (ref 26.5–33)
MCHC RBC AUTO-ENTMCNC: 31.7 G/DL (ref 31.5–36.5)
MCV RBC AUTO: 96 FL (ref 78–100)
PLATELET # BLD AUTO: 251 10E3/UL (ref 150–450)
POTASSIUM BLD-SCNC: 4.7 MMOL/L (ref 3.4–5.3)
PROCALCITONIN SERPL-MCNC: 0.39 NG/ML
RBC # BLD AUTO: 3.52 10E6/UL (ref 3.8–5.2)
SODIUM SERPL-SCNC: 137 MMOL/L (ref 133–144)
WBC # BLD AUTO: 8.1 10E3/UL (ref 4–11)

## 2021-09-07 PROCEDURE — 93306 TTE W/DOPPLER COMPLETE: CPT | Mod: 26 | Performed by: INTERNAL MEDICINE

## 2021-09-07 PROCEDURE — 120N000001 HC R&B MED SURG/OB

## 2021-09-07 PROCEDURE — 36415 COLL VENOUS BLD VENIPUNCTURE: CPT | Performed by: HOSPITALIST

## 2021-09-07 PROCEDURE — 93306 TTE W/DOPPLER COMPLETE: CPT

## 2021-09-07 PROCEDURE — 250N000011 HC RX IP 250 OP 636: Performed by: HOSPITALIST

## 2021-09-07 PROCEDURE — 250N000013 HC RX MED GY IP 250 OP 250 PS 637: Performed by: INTERNAL MEDICINE

## 2021-09-07 PROCEDURE — 85014 HEMATOCRIT: CPT | Performed by: HOSPITALIST

## 2021-09-07 PROCEDURE — 80048 BASIC METABOLIC PNL TOTAL CA: CPT | Performed by: HOSPITALIST

## 2021-09-07 PROCEDURE — 250N000013 HC RX MED GY IP 250 OP 250 PS 637: Performed by: HOSPITALIST

## 2021-09-07 PROCEDURE — 99233 SBSQ HOSP IP/OBS HIGH 50: CPT | Performed by: INTERNAL MEDICINE

## 2021-09-07 RX ORDER — CARBOXYMETHYLCELLULOSE SODIUM 5 MG/ML
1 SOLUTION/ DROPS OPHTHALMIC
Status: DISCONTINUED | OUTPATIENT
Start: 2021-09-07 | End: 2021-09-09 | Stop reason: HOSPADM

## 2021-09-07 RX ORDER — CALCIUM POLYCARBOPHIL 625 MG 625 MG/1
625 TABLET ORAL EVERY EVENING
Status: DISCONTINUED | OUTPATIENT
Start: 2021-09-07 | End: 2021-09-07

## 2021-09-07 RX ORDER — ASPIRIN 81 MG/1
81 TABLET ORAL DAILY
Status: DISCONTINUED | OUTPATIENT
Start: 2021-09-07 | End: 2021-09-09 | Stop reason: HOSPADM

## 2021-09-07 RX ORDER — AMLODIPINE BESYLATE 2.5 MG/1
5 TABLET ORAL DAILY
Status: DISCONTINUED | OUTPATIENT
Start: 2021-09-07 | End: 2021-09-09 | Stop reason: HOSPADM

## 2021-09-07 RX ORDER — LACTOBACILLUS RHAMNOSUS GG 10B CELL
1 CAPSULE ORAL 2 TIMES DAILY
Status: DISCONTINUED | OUTPATIENT
Start: 2021-09-07 | End: 2021-09-09 | Stop reason: HOSPADM

## 2021-09-07 RX ADMIN — Medication 1 CAPSULE: at 21:16

## 2021-09-07 RX ADMIN — AZITHROMYCIN MONOHYDRATE 250 MG: 250 TABLET ORAL at 07:37

## 2021-09-07 RX ADMIN — FUROSEMIDE 40 MG: 10 INJECTION, SOLUTION INTRAMUSCULAR; INTRAVENOUS at 16:08

## 2021-09-07 RX ADMIN — FUROSEMIDE 40 MG: 10 INJECTION, SOLUTION INTRAMUSCULAR; INTRAVENOUS at 08:53

## 2021-09-07 RX ADMIN — AMLODIPINE BESYLATE 5 MG: 2.5 TABLET ORAL at 11:46

## 2021-09-07 RX ADMIN — Medication 1 DROP: at 13:31

## 2021-09-07 RX ADMIN — CEFTRIAXONE 1 G: 1 INJECTION, POWDER, FOR SOLUTION INTRAMUSCULAR; INTRAVENOUS at 12:57

## 2021-09-07 RX ADMIN — Medication 1 CAPSULE: at 11:46

## 2021-09-07 RX ADMIN — ESCITALOPRAM OXALATE 10 MG: 10 TABLET ORAL at 11:46

## 2021-09-07 RX ADMIN — ASPIRIN 81 MG: 81 TABLET ORAL at 11:46

## 2021-09-07 ASSESSMENT — ACTIVITIES OF DAILY LIVING (ADL)
ADLS_ACUITY_SCORE: 20

## 2021-09-07 ASSESSMENT — MIFFLIN-ST. JEOR: SCORE: 955.2

## 2021-09-07 NOTE — ED NOTES
EMS here, report to crew, pt en route. Patient's son, Chuck and receiving facility notified of departure.

## 2021-09-07 NOTE — PLAN OF CARE
To Do:  End of Shift Summary  For vital signs and complete assessments, please see documentation flowsheets.     Pertinent assessments: VSS. On RA. A/Ox4. Up 1a gb/w. Nonproductive cough, LS dim. HR 50s-60s. BLE edema/jennifer.   Major Shift Events: Echo completed.   Treatment Plan: Continue IV abx, IV diuretics today. Recheck BMP in AM. Several nephrotoxic meds on hold due to SHIVANI.

## 2021-09-07 NOTE — PLAN OF CARE
Pertinent assessments: A&Ox4 but slightly forgetful. Up ax1 with walker & gait belt. HR jose antonio in the 40s. Tele in pace. RA. LS dim. Incontinent of urine. +2 edema to BLE.     Major Shift Events: Uneventful    Treatment Plan: Tele, pulse ox monitor, IV Rocephin, PO Zithromax.     Bedside Nurse: Hannah Real RN

## 2021-09-07 NOTE — CONSULTS
Care Management Initial Consult    General Information  Assessment completed with: Patient, Children,    Type of CM/SW Visit: Initial Assessment    Primary Care Provider verified and updated as needed: Yes   Readmission within the last 30 days: no previous admission in last 30 days      Reason for Consult: care coordination/care conference, discharge planning  Advance Care Planning:            Communication Assessment  Patient's communication style: spoken language (English or Bilingual)    Hearing Difficulty or Deaf: no   Wear Glasses or Blind: yes    Cognitive  Cognitive/Neuro/Behavioral: .WDL except  Level of Consciousness: alert  Arousal Level: opens eyes spontaneously  Orientation: oriented x 4  Mood/Behavior: behavior appropriate to situation  Best Language: 0 - No aphasia  Speech: clear    Living Environment:   People in home: child(adeel), adult     Current living Arrangements: house      Able to return to prior arrangements: yes       Family/Social Support:  Care provided by: self, child(adeel)  Provides care for: no one     Children          Description of Support System: Supportive         Current Resources:   Patient receiving home care services: No     Community Resources: None  Equipment currently used at home: cane, straight, walker, rolling  Supplies currently used at home: None    Employment/Financial:  Employment Status: retired        Financial Concerns: No concerns identified           Lifestyle & Psychosocial Needs:  Social Determinants of Health     Tobacco Use: Low Risk      Smoking Tobacco Use: Never Smoker     Smokeless Tobacco Use: Never Used   Alcohol Use:      Frequency of Alcohol Consumption:      Average Number of Drinks:      Frequency of Binge Drinking:    Financial Resource Strain:      Difficulty of Paying Living Expenses:    Food Insecurity:      Worried About Running Out of Food in the Last Year:      Ran Out of Food in the Last Year:    Transportation Needs:      Lack of  Transportation (Medical):      Lack of Transportation (Non-Medical):    Physical Activity:      Days of Exercise per Week:      Minutes of Exercise per Session:    Stress:      Feeling of Stress :    Social Connections:      Frequency of Communication with Friends and Family:      Frequency of Social Gatherings with Friends and Family:      Attends Yazidism Services:      Active Member of Clubs or Organizations:      Attends Club or Organization Meetings:      Marital Status:    Intimate Partner Violence:      Fear of Current or Ex-Partner:      Emotionally Abused:      Physically Abused:      Sexually Abused:    Depression: Not at risk     PHQ-2 Score: 0   Housing Stability:      Unable to Pay for Housing in the Last Year:      Number of Places Lived in the Last Year:      Unstable Housing in the Last Year:        Functional Status:  Prior to admission patient needed assistance:    yes          Mental Health Status:  Mental Health Status: No Current Concerns       Chemical Dependency Status:  Chemical Dependency Status: No Current Concerns           Additional Information:  Pt is admitted with Generalized weakness.  Pt said she had started having back pain in June.  She went to a Pain Dr in [facility] and had received a steroid injection, but didn't improve. She also noticed she progressively wasn't able to sit up in bed to get to edge of bed and to go from a sit to stand by herself anymore either.  Pt does live with her Son.  She has no stairs.  She previously always used a cane.  However, after a recent fall, she has been using a walker.  Pt and family are agreeable to either Home care vs TCU.  If Home care, [facility] м[facility]ия,Angela or Taryn.  If TCU, [facility] in [place], [facility], [facility] or [facility] in [place].  Will follow along with PT.      Gracie Urban RN BSN   Inpatient Care Coordination  [facility]  [phone]      Marisela Urban, MONY

## 2021-09-07 NOTE — PHARMACY-ADMISSION MEDICATION HISTORY
Admission medication history interview status for this patient is complete. See [facility] admission navigator for allergy information, prior to admission medications and immunization status.     Medication history interview done, indicate source(s): Patient and her daughter Jessica  Medication history resources (including written lists, pill bottles, clinic record): [employer] dispense records  Pharmacy: [facility] [address] [place] [address] N [phone]    Changes made to PTA medication list:  Added: None  Changed: None  Removed: Cranberry; Cyclobenzaprine; Fibercon; Gabapentin; MVT; Naproxen; Vit D;     Actions taken by pharmacist (provider contacted, etc): Sticky note to MD     Additional medication history information:None    Medication reconciliation/reorder completed by provider prior to medication history?  Yes    Prior to Admission medications    Medication Sig Last Dose Taking? Auth Provider   amLODIPine (NORVASC) 5 MG tablet Take 1 tablet (5 mg) by mouth daily 9/6/2021 at AM Yes Rajesh Vidal MD   ASPIRIN 81 MG OR TABS Take 81 mg by mouth daily  9/6/2021 at AM Yes Reported, Patient   cefdinir (OMNICEF) 300 MG capsule Take 1 capsule (300 mg) by mouth 2 times daily for 7 days 9/6/2021 at AM Yes Reanna Irene MD   EQ LORATADINE 10 MG tablet Take 1 tablet (10 mg) by mouth daily 9/6/2021 at AM Yes Rajesh Vidal MD   escitalopram (LEXAPRO) 10 MG tablet Take 1 tablet (10 mg) by mouth daily 9/6/2021 at AM Yes Rajesh Vidal MD   furosemide (LASIX) 20 MG tablet Take 2 tablets (40 mg) by mouth daily 9/6/2021 at AM Yes Rajesh Vidal MD   hydrALAZINE (APRESOLINE) 50 MG tablet TAKE 1 TABLET BY MOUTH THREE TIMES DAILY 9/6/2021 at AM Yes Rajesh Vidal MD   hypromellose (ARTIFICIAL TEARS) 0.5 % SOLN ophthalmic solution Place 1 drop into both eyes 4 times daily as needed for dry eyes  Yes Unknown, Entered By History   lactobacillus rhamnosus, GG, (CULTURELL) capsule Take 1 capsule by mouth  2 times daily 9/6/2021 at AM Yes Reported, Patient   lisinopril (ZESTRIL) 20 MG tablet Take 1 tablet (20 mg) by mouth 2 times daily 9/6/2021 at AM Yes Rajesh Vidal MD   loperamide (IMODIUM A-D) 2 MG tablet 1/2 tab before meals  Yes Rajesh Vidal MD   metoprolol tartrate (LOPRESSOR) 25 MG tablet Take 1 tablet (25 mg) by mouth 2 times daily 9/6/2021 at AM Yes Rajesh Vidal MD   oxyCODONE (ROXICODONE) 5 MG tablet Take 0.5-1 tablets (2.5-5 mg) by mouth every 6 hours as needed for pain Past Week at Unknown time Yes Reanna Irene MD   tiotropium (SPIRIVA RESPIMAT) 2.5 MCG/ACT inhaler Inhale 2 puffs into the lungs daily 9/6/2021 at Unknown time Yes Rajesh Vidal MD

## 2021-09-07 NOTE — PLAN OF CARE
"Shift update [id number]     A/Ox4, up in chair most of shift resting and watching TV. IV lasix given. Tele - Sinus jose antonio. Voiding well. Possible discharge to tcu or homecare ?    Blood pressure (!) 173/48, pulse 69, temperature 98.8  F (37.1  C), temperature source Oral, resp. rate 18, height 1.615 m (5' 3.6\"), weight 56.2 kg (123 lb 12.8 oz), SpO2 95 %, not currently breastfeeding.    "

## 2021-09-07 NOTE — PROGRESS NOTES
[facility]  Hospitalist Progress Note  Art Levine MD 09/07/21    Reason for Stay (Diagnosis): CHF, possible pneumonia         Assessment and Plan:      Summary of Stay: Shiloh Covarrubias is a 89 year old female with a past medical history of hypertension, asthma, anxiety/depression who presented to outside ER for generalized weakness and SOB     Patient was initially seen on Friday 9/3 after she had a fall in which she hit her head.  She was reaching for a phone cord and lost her balance.  She did not have any neck or back pain.  CT of the head negative for acute hemorrhage.  Work-up in the ED did show mild leukocytosis to 14,000 and UA was grossly positive for UTI and she was started on oral antibiotic.  Urine culture did not grow out any definitive bacteria.    This presentation she came to the ER 9/6 with shortness of breath, weakness and some increased congestion with some subjective fevers at home.  In the ER she was initially febrile to 101.1 and tachypneic but otherwise vitally stable.  She was requiring 3 L supplemental oxygen.  Lab work-up was notable for creatinine of 2.27 up from 1.21 on 9/3 and BNP was elevated at 6000.  Chest x-ray showed pulmonary vascular congestion, a small right lung effusion and possible right lower lobe infiltrate.  She was given IV Lasix, ceftriaxone and azithromycin and I am asked to admit her for further care.  KG showed sinus bradycardia.     #Generalized weakness, SOB.  Suspect acute on chronic HFpEF vs. CAP vs. Symptomatic bradycardia:   -Clinically improving with IV diuresis and antibiotics.  -Continue IV Lasix 40 mg twice daily, recheck BMP in the morning  -Continue antibiotics, anticipate a relatively short course.  -TTE ordered  -Monitor on telemetry for any evidence of bradycardia, holding home metoprolol for now.     #SHIVANI:  Likely due to cardiorenal syndrome.  She does take NSAIDs as well.  Improving with IV diuresis.    -She is  "chronically on furosemide 40 mg daily.    Continue Lasix 40 mg IV twice daily and recheck a BMP in the morning  -Hold ACE-I and other nephrotoxic agents.  -Recheck BMP in AM  -Avoid NSAIDs.     #HTN: Await pharm med rec. Lasix BID for now. Holding ACE-I and beta blocker as above.  BP's OK.  #Anxiety: Continue home lexapro  #Asthma/COPD: No wheeze on exam. Continue home spiriva.  #Low back pain: Recent fall on Friday 9/3.  Degenerative changes noted without fracture.  As needed pain meds available.     DVT Prophylaxis: Pneumatic Compression Devices  Code Status: DNR / DNI. Confirmed on admission  Dispo:: Potentially home in 1 to 2 days with home services.        Interval History (Subjective):      Patient admitted last night, I assumed care today.  She is feeling better overall  Her daughter was present and updated at the bedside  Continuing IV Lasix  Weaned off of supplemental O2 at rest  Discussed with social work                  Physical Exam:      Last Vital Signs:  BP (!) 173/51   Pulse 55   Temp 98.4  F (36.9  C) (Oral)   Resp 18   Ht 1.615 m (5' 3.6\")   Wt 56.2 kg (123 lb 12.8 oz)   LMP  (LMP Unknown)   SpO2 95%   BMI 21.52 kg/m        Intake/Output Summary (Last 24 hours) at 9/7/2021 1330  Last data filed at 9/7/2021 1312  Gross per 24 hour   Intake 750 ml   Output 950 ml   Net -200 ml       General: Alert, awake, no acute distress.  HEENT: NC/AT, eyes anicteric, external occular movements intact, face symmetric.    Cardiac: RRR, S1, S2.  No murmurs appreciated.  Pulmonary: Normal chest rise, normal work of breathing.  Lungs CTA BL  Abdomen: soft, non-tender, non-distended.  Bowel Sounds Present.  No guarding.  Extremities: 1+ pitting edema bilaterally.  No deformities.  Warm, well perfused.  Skin: no rashes or lesions noted.  Warm and Dry.  Neuro: No focal deficits noted.  Speech clear.  Coordination and strength grossly normal.  Psych: Appropriate affect.         Medications:      All current " medications were reviewed with changes reflected in problem list.         Data:      All new lab and imaging data was reviewed.   Labs:  Recent Labs   Lab 09/07/21  0630      POTASSIUM 4.7   CHLORIDE 107   CO2 26   ANIONGAP 4   *   BUN 54*   CR 1.66*   GFRESTIMATED 27*   TANIA 9.3     Recent Labs   Lab 09/07/21  0630   WBC 8.1   HGB 10.7*   HCT 33.8*   MCV 96         Imaging:   Recent Results (from the past 48 hour(s))   XR Chest Port 1 View    Narrative    CHEST PORTABLE ONE VIEW   9/6/2021 4:07 PM     HISTORY: Shortness of breath.    COMPARISON: CT chest, abdomen and pelvis dated 9/18/2018, chest x-rays  dated 9/16/2015.    FINDINGS:  Cardiac silhouette is markedly enlarged in size since the  prior chest x-rays from 2015. There is hyperaeration. There is a  moderate-sized right pleural fluid collection with right basilar  atelectasis and/or infiltrate. Increased interstitial markings in the  right lung are noted and could represent vascular congestion or  possible subtle infiltrative process. No pneumothorax is identified.  No definite left pleural fluid collection. There are thoracic aortic  calcifications. Dextroconvex curvature of the thoracic spine is again  noted. No acute osseous fracture is identified. Probable right carotid  artery calcifications are noted.      Impression    IMPRESSION:  1. New right pleural fluid collection and right basilar atelectasis  and/or infiltrate. Probable vascular congestion and worsening  cardiomegaly indicate probable congestive heart failure. Superimposed  right basilar infiltrate is not excluded.  2. Hyperaeration is again noted.   3. Aortic calcifications again noted. Possible carotid artery  calcifications.    EVELYN PAIZ MD         SYSTEM ID:  [id number]   CT Head w/o Contrast    Narrative    CT SCAN OF THE HEAD WITHOUT CONTRAST   9/6/2021 4:08 PM     HISTORY: Weakness. Pain.    TECHNIQUE:  Axial images of the head and coronal reformations without  IV  contrast material. Radiation dose for this scan was reduced using  automated exposure control, adjustment of the mA and/or kV according  to patient size, or iterative reconstruction technique.    COMPARISON: Head CT 4/8/2019.    FINDINGS: There is no evidence of intracranial hemorrhage, mass, acute  infarct or anomaly. The ventricles are normal in size, shape and  configuration. Mild diffuse parenchymal volume loss. Mild patchy  periventricular white matter hypodensities which are nonspecific, but  likely related to chronic microvascular ischemic disease.     The visualized portions of the sinuses and mastoids appear normal. The  bony calvarium and bones of the skull base appear intact.       Impression    IMPRESSION:     1. No evidence of acute intracranial hemorrhage, mass, or herniation.  2. Mild diffuse parenchymal volume loss and white matter changes  likely due to chronic microvascular ischemic disease.    KEIRY JAVIER MD         SYSTEM ID:  [id number]   CT Lumbar Spine w/o Contrast    Narrative    CT LUMBAR SPINE WITHOUT CONTRAST  9/6/2021 4:09 PM     HISTORY: Fall. Back pain.      TECHNIQUE: Axial images of the lumbar spine were obtained without  intravenous contrast. Multiplanar reformations were performed.   Radiation dose for this scan was reduced using automated exposure  control, adjustment of the mA and/or kV according to patient size, or  iterative reconstruction technique.     COMPARISON: Lumbar spine CT 9/3/2021.     FINDINGS:  There are 5 lumbar-type vertebral bodies assumed for the  purposes of this dictation.     Mild grade 1 anterolisthesis of L3 on L4 and L4 on L5. Leftward  listhesis of L4 on L5. No acute lucent fracture lines. No significant  loss of vertebral body height. Marked degenerative endplate changes  and loss of disc height at L5-S1. Mild degenerative endplate changes  and loss of disc height at the other levels. Prominent facet  hypertrophy in the lower lumbar  spine.    Degenerative changes in the lower lumbar spine, most pronounced at the  L4-L5 level where there is moderate-to-severe spinal canal narrowing  as well as moderate-to-severe right and moderate left neural foraminal  narrowing. At L5-S1, there is moderate bilateral neural foraminal  narrowing.    Visualized paraspinous tissues: Right pleural effusion partially  visualized.       Impression    IMPRESSION:    1. No evidence of acute fracture or subluxation in the lumbar spine.  2. Degenerative changes in the lower lumbar spine as detailed above,  most pronounced at L4-5 and L5-S1. Findings appear similar to recent  CT 9/3/2021.  3. Right pleural effusion partially visualized.     KEIRY JAVIER MD         SYSTEM ID:  [id number]   CT Cervical Spine w/o Contrast    Narrative    CT CERVICAL SPINE WITHOUT CONTRAST 9/6/2021 4:09 PM     HISTORY: Fall. Pain.     TECHNIQUE: Axial images of the cervical spine were obtained without  intravenous contrast. Multiplanar reformations were performed.   Radiation dose for this scan was reduced using automated exposure  control, adjustment of the mA and/or kV according to patient size, or  iterative reconstruction technique.    COMPARISON: Cervical spine x-ray 1/30/2008.    FINDINGS: Mild grade 1 anterolisthesis of C4 on C5. No significant  loss of vertebral body height. No acute lucent fracture line  visualized. Marked degenerative endplate changes and loss of disc  height at C5-6 and C6-7. Moderate degenerative endplate changes and  loss of disc height at C3-4 and C4-5. Fusion of the right facets at  C2-C3. Mild spinal canal narrowings at C5-6 and C6-7. Multiple levels  of neural foraminal narrowing, most pronounced, left greater than  right, at C3-4 and bilaterally at C5-6 and C6-7.    Visualized paraspinous tissues: Partially visualized pleural effusion  at the right lung apex. Atherosclerotic calcifications in the carotid  arteries.      Impression    IMPRESSION:   1. No  evidence of acute fracture or subluxation in the cervical spine.  2. Degenerative changes in the cervical spine as described above.    KEIRY JAVIER MD         SYSTEM ID:  [id number]         Art Levine MD , MD.

## 2021-09-07 NOTE — H&P
[facility]    History and Physical  Hospitalist       Date of Admission:  9/6/2021    Assessment & Plan   Shiloh Covarrubias is a 89 year old female with a past medical history of hypertension, asthma, anxiety/depression who presented to outside ER for generalized weakness and SOB    Patient was initially seen on Friday 9/3 after she had a fall in which she hit her head.  She was reaching for a phone cord and lost her balance.  She did not have any neck or back pain.  CT of the head negative for acute hemorrhage.  Work-up in the ED did show mild leukocytosis to 14,000 and UA was grossly positive for UTI and she was started on oral antibiotic.  Urine culture did not grow out any definitive bacteria.    #Generalized weakness, SOB.  Suspect acute on chronic HFpEF vs. CAP vs. Symptomatic bradycardia: She re-presents to ED for increasing generalized weakness.  She has also noted shortness of breath.  No cough or sore throat.  No chest pain.  He denies any specific PND or orthopnea.  At baseline she uses a cane for ambulation and lives in an in-law suite with her son.  She has noted some low back pain without radiation down the leg.  She has felt generally weak but does not have any unilateral weakness.  No change in speech.  No problems swallowing.  No sensory losses.  She is Covid-19 vaccinated.  No palpitations.  She has not noted any weight gain or loss.  - ED, patient febrile to 101.1.,  Tachypneic.  Normotensive to hypertensive.  Patient needing 3 L oxygen via nasal cannula to maintain saturations.  BMP showed creatinine of 2.27 up from 1.21 on 9/3.  Lactic acid within normal limits.  BNP elevated at 6000.  CBC without leukocytosis.  Repeat urinalysis clean.  Chest x-ray showed new right pleural fluid collection and right basilar infiltrate vs. atelectasis.  Also showed probable vascular congestion likely indicating heart failure.  She had an EKG that showed sinus bradycardia without ischemic  changes.  -Suspect patient's symptoms are either secondary to community acquired pneumonia versus heart failure exacerbation.  She is also bradycardic into 40s (chronically on metoprolol) which could be contributing to her symptoms.  Her EF was noted to be 70% on stress test done back in 2018.  She is chronically on Lasix.  Her BNP is elevated and her chest x-ray does show vascular congestion  -We will treat with ceftriaxone and azithromycin for now.  -Obtain TTE.  I will treat with Lasix 40 mg twice daily.  Daily weights and strict I's and O's  -We will place her on telemetry overnight.  If sustained significant bradycardia then consider cardiology eval.  Hold home metoprolol therapy.   -Continuous pulse oximetry    #SHIVANI: Given evidence of elevated BNP with vascular congestion and lower extremity edema, suspect she is actually volume up. Probable cardiorenal syndrome.  She does take NSAIDS which could be contributing.  -She is chronically on furosemide 40 mg daily.  We will treat for now with Lasix 40 mg IV twice daily  -Hold ACE-I and other nephrotoxic agents.  -Recheck BMP in AM    #HTN: Await pharm med rec. Lasix BID for now. Holding ACE-I and beta blocker as above.  BP's OK.  #Anxiety: Continue home lexapro  #Asthma/COPD: No wheeze on exam. Continue home spiriva.  #Low back pain: Recent fall on Friday 9/3.  Degenerative changes noted without fracture.  As needed pain meds available.    DVT Prophylaxis: Pneumatic Compression Devices  Code Status: DNR / DNI. Confirmed on admission  Dispo: Admit to inpatient    Noel Escobar MD    Primary Care Physician   Rajesh Vidal    Chief Complaint   Generalized weakness    History is obtained from the patient and patient's chart    History of Present Illness   Shiloh Covarrubias is a 89 year old female with a past medical history of hypertension, asthma, anxiety/depression who presented to outside ER for generalized weakness.    Patient was initially seen on Friday 9/3 after  she had a fall in which she hit her head.  She was reaching for a phone cord and lost her balance.  She did not have any neck or back pain.  CT of the head negative for acute hemorrhage.  Work-up in the ED did show mild leukocytosis to 14,000 and UA was grossly positive for UTI and she was started on oral antibiotic.  Urine culture did not grow out any definitive bacteria.    She represents to ED for increasing generalized weakness.  She is also noted shortness of breath.  No cough or sore throat.  No chest pain.  He denies any specific PND or orthopnea.  At baseline she uses a cane for ambulation and lives in an in-law suite with her son.  She has noted some low back pain without radiation down the leg.  She has felt generally weak but does not have any unilateral weakness.  No change in speech.  No problems swallowing.  No sensory losses.  She is Covid-19 vaccinated.  No palpitations.  She has not noted any weight gain or loss.    In the ED, patient febrile to 101.1.,  Tachypneic.  Normotensive to hypertensive.  Patient needing 3 L oxygen via nasal cannula to maintain saturations.  BMP showed creatinine of 2.27 up from 1.21 on 9/3.  Lactic acid within normal limits.  BNP elevated at 6000.  CBC without leukocytosis.  Repeat urinalysis clean.  Chest x-ray showed new right pleural fluid collection and right basilar infiltrate.  Also showed probable vascular congestion likely indicating heart failure.  CT of the head negative for acute changes.  CT of the lumbar and cervical spine showing no evidence of fracture but degenerative changes noted.  She had an EKG that showed sinus bradycardia without ischemic changes.    Past Medical History    I have reviewed this patient's medical history and updated it with pertinent information if needed.   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  Past Surgical History:   Procedure Laterality Date     ESOPHAGOSCOPY, GASTROSCOPY, DUODENOSCOPY (EGD), COMBINED  2011    COMBINED ESOPHAGOSCOPY, GASTROSCOPY, DUODENOSCOPY (EGD), BIOPSY SINGLE OR MULTIPLE performed by ANGY LIMA at  GI     ESOPHAGOSCOPY, GASTROSCOPY, DUODENOSCOPY (EGD), DILATATION, COMBINED  2011    COMBINED ESOPHAGOSCOPY, GASTROSCOPY, DUODENOSCOPY (EGD), DILATATION performed by ANGY LIMA at  GI     [facility] UGI ENDOSCOPY, SIMPLE EXAM  02/23/10       Prior to Admission Medications   Prior to Admission Medications   Prescriptions Last Dose Informant Patient Reported? Taking?   ASPIRIN 81 MG OR TABS  Self Yes No   Si TABLET DAILY   Calcium Polycarbophil (FIBERCON PO)  Self Yes No   Sig: Take 0.5 tablets by mouth every evening With a meal   Cranberry 450 MG CAPS  Self Yes No   EQ LORATADINE 10 MG tablet  Self No No   Sig: Take 1 tablet (10 mg) by mouth daily   Multiple Vitamins-Minerals (MULTI VITAMIN/MINERALS PO)  Self Yes No   Sig: Take 1 tablet by mouth daily.   amLODIPine (NORVASC) 5 MG tablet  Self No No   Sig: Take 1 tablet (5 mg) by mouth daily   cefdinir (OMNICEF) 300 MG capsule  Self No No   Sig: Take 1 capsule (300 mg) by mouth 2 times daily for 7 days   cholecalciferol (VITAMIN D3) 25 mcg (1000 units) capsule  Self Yes No   Sig: Take 1 capsule by mouth daily   cyclobenzaprine (FLEXERIL) 5 MG tablet  Self No No   Sig: Take 1 tablet (5 mg) by mouth 3 times daily as needed for muscle spasms   escitalopram (LEXAPRO) 10 MG tablet  Self No No   Sig: Take 1 tablet (10 mg) by mouth daily   furosemide (LASIX) 20 MG tablet  Self No No   Sig: Take 2 tablets (40 mg) by mouth daily   gabapentin (NEURONTIN) 100 MG capsule  Self Yes No   Sig: Take 100 mg by mouth 2 times daily   hydrALAZINE  (APRESOLINE) 50 MG tablet  Self No No   Sig: TAKE 1 TABLET BY MOUTH THREE TIMES DAILY   Patient taking differently: Take 50 mg by mouth 3 times daily    lactobacillus rhamnosus, GG, (CULTURELL) capsule  Self Yes No   Sig: Take 1 capsule by mouth 2 times daily   lisinopril (ZESTRIL) 20 MG tablet  Self No No   Sig: Take 1 tablet (20 mg) by mouth 2 times daily   loperamide (IMODIUM A-D) 2 MG tablet  Self No No   Si/2 tab before meals   metoprolol tartrate (LOPRESSOR) 25 MG tablet  Self No No   Sig: Take 1 tablet (25 mg) by mouth 2 times daily   naproxen sodium 220 MG capsule  Self Yes No   Sig: Take 220 mg by mouth 2 times daily (with meals)   oxyCODONE (ROXICODONE) 5 MG tablet  Self No No   Sig: Take 0.5-1 tablets (2.5-5 mg) by mouth every 6 hours as needed for pain   tiotropium (SPIRIVA RESPIMAT) 2.5 MCG/ACT inhaler  Self No No   Sig: Inhale 2 puffs into the lungs daily      Facility-Administered Medications: None     Allergies   Allergies   Allergen Reactions     Clonidine Derivatives      Severe side effects       Entocort [Corticosteroids]      Macrobid [Nitrofurantoin]      Diarrhea       Sulfa Drugs Itching     itch       Social History   I have reviewed this patient's social history and updated it with pertinent information if needed. Shiloh Covarrubias  reports that she has never smoked. She has never used smokeless tobacco. She reports current alcohol use. She reports that she does not use drugs.    Family History   I have reviewed this patient's family history and updated it with pertinent information if needed.   Family History   Problem Relation Age of Onset     Arthritis Mother      Osteoporosis Mother      Thyroid Disease Mother         Hypothyroid.     Depression Maternal Uncle         Bouts of depression     Hypertension No family hx of      C.A.D. No family hx of        Review of Systems   The 10 point Review of Systems is negative other than noted in the HPI or here.     Physical Exam   Temp:  98.2  F (36.8  C) Temp src: Oral BP: (!) 135/36 Pulse: (!) 43   Resp: 24 SpO2: 91 % O2 Device: None (Room air)    Vital Signs with Ranges  Temp:  [98.2  F (36.8  C)-101.1  F (38.4  C)] 98.2  F (36.8  C)  Pulse:  [43-54] 43  Resp:  [20-42] 24  BP: ()/(36-74) 135/36  SpO2:  [89 %-98 %] 91 %  122 lbs 4.8 oz    Constitutional: Elderly.  Answers appropriately.  Nasal cannula in place.  HEENT: Normocephalic, MMM, moderate elevation of JVD noted.  Respiratory: Nl WOB, diminished breath sounds at both bases.  She has bilateral inspiratory crackles noted at the bases.  No wheezes.  Cardiovascular: Bradycardic regular, +sysotlic murmur  GI: BS+, NT, ND, no rebound or guarding  Lymph/Hematologic: No bruising. No cervical LAD  Skin: No rash  Musculoskeletal: Nl Tone, Mild-moderate edema bilaterally noted.   Neurologic: A&Ox3, Answers appropriately. CNII-XII intact. Moves all extremities. No tremor  Psychiatric: Calm    Data   Data reviewed today:  I personally reviewed   Recent Labs   Lab 09/06/21  1503 09/03/21  1744   WBC 9.7 14.0*   HGB 11.4* 12.0   MCV 93 92    170   INR 1.28*  --     136   POTASSIUM 4.2 3.8   CHLORIDE 101 102   CO2 26 27   BUN 63* 27   CR 2.27* 1.21*   ANIONGAP 8 7   TANIA 9.0 8.8   * 124*   ALBUMIN 2.8* 2.9*   PROTTOTAL 7.4 6.9   BILITOTAL 0.5 1.0   ALKPHOS 107 81   ALT 40 28   AST 44 26   TROPONIN <0.015  --        Recent Results (from the past 24 hour(s))   XR Chest Port 1 View    Narrative    CHEST PORTABLE ONE VIEW   9/6/2021 4:07 PM     HISTORY: Shortness of breath.    COMPARISON: CT chest, abdomen and pelvis dated 9/18/2018, chest x-rays  dated 9/16/2015.    FINDINGS:  Cardiac silhouette is markedly enlarged in size since the  prior chest x-rays from 2015. There is hyperaeration. There is a  moderate-sized right pleural fluid collection with right basilar  atelectasis and/or infiltrate. Increased interstitial markings in the  right lung are noted and could represent vascular  congestion or  possible subtle infiltrative process. No pneumothorax is identified.  No definite left pleural fluid collection. There are thoracic aortic  calcifications. Dextroconvex curvature of the thoracic spine is again  noted. No acute osseous fracture is identified. Probable right carotid  artery calcifications are noted.      Impression    IMPRESSION:  1. New right pleural fluid collection and right basilar atelectasis  and/or infiltrate. Probable vascular congestion and worsening  cardiomegaly indicate probable congestive heart failure. Superimposed  right basilar infiltrate is not excluded.  2. Hyperaeration is again noted.   3. Aortic calcifications again noted. Possible carotid artery  calcifications.    EVELYN PAIZ MD         SYSTEM ID:  [id number]   CT Head w/o Contrast    Narrative    CT SCAN OF THE HEAD WITHOUT CONTRAST   9/6/2021 4:08 PM     HISTORY: Weakness. Pain.    TECHNIQUE:  Axial images of the head and coronal reformations without  IV contrast material. Radiation dose for this scan was reduced using  automated exposure control, adjustment of the mA and/or kV according  to patient size, or iterative reconstruction technique.    COMPARISON: Head CT 4/8/2019.    FINDINGS: There is no evidence of intracranial hemorrhage, mass, acute  infarct or anomaly. The ventricles are normal in size, shape and  configuration. Mild diffuse parenchymal volume loss. Mild patchy  periventricular white matter hypodensities which are nonspecific, but  likely related to chronic microvascular ischemic disease.     The visualized portions of the sinuses and mastoids appear normal. The  bony calvarium and bones of the skull base appear intact.       Impression    IMPRESSION:     1. No evidence of acute intracranial hemorrhage, mass, or herniation.  2. Mild diffuse parenchymal volume loss and white matter changes  likely due to chronic microvascular ischemic disease.    KEIRY JAVIER MD         SYSTEM ID:  [id number]    CT Lumbar Spine w/o Contrast    Narrative    CT LUMBAR SPINE WITHOUT CONTRAST  9/6/2021 4:09 PM     HISTORY: Fall. Back pain.      TECHNIQUE: Axial images of the lumbar spine were obtained without  intravenous contrast. Multiplanar reformations were performed.   Radiation dose for this scan was reduced using automated exposure  control, adjustment of the mA and/or kV according to patient size, or  iterative reconstruction technique.     COMPARISON: Lumbar spine CT 9/3/2021.     FINDINGS:  There are 5 lumbar-type vertebral bodies assumed for the  purposes of this dictation.     Mild grade 1 anterolisthesis of L3 on L4 and L4 on L5. Leftward  listhesis of L4 on L5. No acute lucent fracture lines. No significant  loss of vertebral body height. Marked degenerative endplate changes  and loss of disc height at L5-S1. Mild degenerative endplate changes  and loss of disc height at the other levels. Prominent facet  hypertrophy in the lower lumbar spine.    Degenerative changes in the lower lumbar spine, most pronounced at the  L4-L5 level where there is moderate-to-severe spinal canal narrowing  as well as moderate-to-severe right and moderate left neural foraminal  narrowing. At L5-S1, there is moderate bilateral neural foraminal  narrowing.    Visualized paraspinous tissues: Right pleural effusion partially  visualized.       Impression    IMPRESSION:    1. No evidence of acute fracture or subluxation in the lumbar spine.  2. Degenerative changes in the lower lumbar spine as detailed above,  most pronounced at L4-5 and L5-S1. Findings appear similar to recent  CT 9/3/2021.  3. Right pleural effusion partially visualized.     KEIRY JAVIER MD         SYSTEM ID:  [id number]   CT Cervical Spine w/o Contrast    Narrative    CT CERVICAL SPINE WITHOUT CONTRAST 9/6/2021 4:09 PM     HISTORY: Fall. Pain.     TECHNIQUE: Axial images of the cervical spine were obtained without  intravenous contrast. Multiplanar reformations were  performed.   Radiation dose for this scan was reduced using automated exposure  control, adjustment of the mA and/or kV according to patient size, or  iterative reconstruction technique.    COMPARISON: Cervical spine x-ray 1/30/2008.    FINDINGS: Mild grade 1 anterolisthesis of C4 on C5. No significant  loss of vertebral body height. No acute lucent fracture line  visualized. Marked degenerative endplate changes and loss of disc  height at C5-6 and C6-7. Moderate degenerative endplate changes and  loss of disc height at C3-4 and C4-5. Fusion of the right facets at  C2-C3. Mild spinal canal narrowings at C5-6 and C6-7. Multiple levels  of neural foraminal narrowing, most pronounced, left greater than  right, at C3-4 and bilaterally at C5-6 and C6-7.    Visualized paraspinous tissues: Partially visualized pleural effusion  at the right lung apex. Atherosclerotic calcifications in the carotid  arteries.      Impression    IMPRESSION:   1. No evidence of acute fracture or subluxation in the cervical spine.  2. Degenerative changes in the cervical spine as described above.    KEIRY JAVIER MD         SYSTEM ID:  [id number]       Clinically Significant Risk Factors Present on Admission             # Coagulation Defect: INR = 1.28 (Ref range: 0.85 - 1.15) and/or PTT = N/A on admission, will monitor for bleeding  # Platelet Defect: home medication list includes an antiplatelet medication

## 2021-09-07 NOTE — PLAN OF CARE
Pertinent assessments: VSS on room air. Appears baseline HR is in the mid 40s. Tele monitor in place. Afebrile. Up with 1, gait belt, and walker. Wears a brief at baseline. Incontinent at times. A&Ox4; forgetful. +2 BLE edema noted.     Home Spiriva inhaler in room lock box. Pharmacy staff to send label up to [facility]. Medication reviewed with this RN and Robel from pharmacy via telephone.    Major Shift Events: direct admit from [facility].    Treatment Plan: Tele, pulse ox monitor, IV Rocephin, PO Zithromax.

## 2021-09-08 ENCOUNTER — APPOINTMENT (OUTPATIENT)
Dept: PHYSICAL THERAPY | Facility: CLINIC | Age: 86
DRG: 291 | End: 2021-09-08
Attending: INTERNAL MEDICINE
Payer: MEDICARE

## 2021-09-08 ENCOUNTER — TELEPHONE (OUTPATIENT)
Dept: INTERNAL MEDICINE | Facility: CLINIC | Age: 86
End: 2021-09-08

## 2021-09-08 LAB
ANION GAP SERPL CALCULATED.3IONS-SCNC: 8 MMOL/L (ref 3–14)
BUN SERPL-MCNC: 40 MG/DL (ref 7–30)
CALCIUM SERPL-MCNC: 9.3 MG/DL (ref 8.5–10.1)
CHLORIDE BLD-SCNC: 104 MMOL/L (ref 94–109)
CO2 SERPL-SCNC: 27 MMOL/L (ref 20–32)
CREAT SERPL-MCNC: 1.2 MG/DL (ref 0.52–1.04)
GFR SERPL CREATININE-BSD FRML MDRD: 40 ML/MIN/1.73M2
GLUCOSE BLD-MCNC: 110 MG/DL (ref 70–99)
POTASSIUM BLD-SCNC: 3.6 MMOL/L (ref 3.4–5.3)
SODIUM SERPL-SCNC: 139 MMOL/L (ref 133–144)

## 2021-09-08 PROCEDURE — 999N000157 HC STATISTIC RCP TIME EA 10 MIN

## 2021-09-08 PROCEDURE — 97161 PT EVAL LOW COMPLEX 20 MIN: CPT | Mod: GP | Performed by: PHYSICAL THERAPIST

## 2021-09-08 PROCEDURE — 80048 BASIC METABOLIC PNL TOTAL CA: CPT | Performed by: INTERNAL MEDICINE

## 2021-09-08 PROCEDURE — 250N000011 HC RX IP 250 OP 636: Performed by: INTERNAL MEDICINE

## 2021-09-08 PROCEDURE — 97530 THERAPEUTIC ACTIVITIES: CPT | Mod: GP | Performed by: PHYSICAL THERAPIST

## 2021-09-08 PROCEDURE — 120N000001 HC R&B MED SURG/OB

## 2021-09-08 PROCEDURE — 250N000013 HC RX MED GY IP 250 OP 250 PS 637: Performed by: HOSPITALIST

## 2021-09-08 PROCEDURE — 94640 AIRWAY INHALATION TREATMENT: CPT | Mod: 76

## 2021-09-08 PROCEDURE — 99233 SBSQ HOSP IP/OBS HIGH 50: CPT | Performed by: INTERNAL MEDICINE

## 2021-09-08 PROCEDURE — 36415 COLL VENOUS BLD VENIPUNCTURE: CPT | Performed by: INTERNAL MEDICINE

## 2021-09-08 PROCEDURE — 250N000011 HC RX IP 250 OP 636: Performed by: HOSPITALIST

## 2021-09-08 PROCEDURE — 999N000105 HC STATISTIC NO DOCUMENTATION TO SUPPORT CHARGE

## 2021-09-08 PROCEDURE — 250N000013 HC RX MED GY IP 250 OP 250 PS 637: Performed by: INTERNAL MEDICINE

## 2021-09-08 RX ORDER — HYDRALAZINE HYDROCHLORIDE 50 MG/1
50 TABLET, FILM COATED ORAL 3 TIMES DAILY
Status: DISCONTINUED | OUTPATIENT
Start: 2021-09-08 | End: 2021-09-09 | Stop reason: HOSPADM

## 2021-09-08 RX ORDER — LISINOPRIL 5 MG/1
5 TABLET ORAL 2 TIMES DAILY
Status: DISCONTINUED | OUTPATIENT
Start: 2021-09-08 | End: 2021-09-09

## 2021-09-08 RX ADMIN — HYDRALAZINE HYDROCHLORIDE 50 MG: 50 TABLET, FILM COATED ORAL at 08:54

## 2021-09-08 RX ADMIN — Medication 1 CAPSULE: at 21:29

## 2021-09-08 RX ADMIN — LISINOPRIL 5 MG: 5 TABLET ORAL at 21:29

## 2021-09-08 RX ADMIN — HYDRALAZINE HYDROCHLORIDE 50 MG: 50 TABLET, FILM COATED ORAL at 17:47

## 2021-09-08 RX ADMIN — CEFTRIAXONE 1 G: 1 INJECTION, POWDER, FOR SOLUTION INTRAMUSCULAR; INTRAVENOUS at 12:59

## 2021-09-08 RX ADMIN — FUROSEMIDE 40 MG: 10 INJECTION, SOLUTION INTRAMUSCULAR; INTRAVENOUS at 08:57

## 2021-09-08 RX ADMIN — ESCITALOPRAM OXALATE 10 MG: 10 TABLET ORAL at 08:55

## 2021-09-08 RX ADMIN — ASPIRIN 81 MG: 81 TABLET ORAL at 08:54

## 2021-09-08 RX ADMIN — AZITHROMYCIN MONOHYDRATE 250 MG: 250 TABLET ORAL at 08:55

## 2021-09-08 RX ADMIN — HYDRALAZINE HYDROCHLORIDE 50 MG: 50 TABLET, FILM COATED ORAL at 21:29

## 2021-09-08 RX ADMIN — FUROSEMIDE 40 MG: 10 INJECTION, SOLUTION INTRAMUSCULAR; INTRAVENOUS at 17:47

## 2021-09-08 RX ADMIN — LISINOPRIL 5 MG: 5 TABLET ORAL at 08:55

## 2021-09-08 RX ADMIN — AMLODIPINE BESYLATE 5 MG: 2.5 TABLET ORAL at 08:54

## 2021-09-08 ASSESSMENT — ACTIVITIES OF DAILY LIVING (ADL)
ADLS_ACUITY_SCORE: 19

## 2021-09-08 NOTE — PROGRESS NOTES
Care Management Follow Up    Length of Stay (days): 2    Expected Discharge Date: 09/09/2021     Concerns to be Addressed: Needs TCU       Patient plan of care discussed at interdisciplinary rounds: Yes    Anticipated Discharge Disposition:Transitional Care     Anticipated Discharge Services: PT/OT  Anticipated Discharge DME: Walker    Patient/family educated on Medicare website which has current facility and service quality ratings: yes  Education Provided on the Discharge Plan:  Yes  Patient/Family in Agreement with the Plan: yes    Referrals Placed by CM/SW: TCU     Additional Information:  Therapy is recommending TCU.  I talked with Pt and Loyda.  They are agreeable that I send it to [facility] for tomorrow. Pt is vaccinated.  Family can provide transport.    ADDENDUM 11:35: [facility] can't take admissions due to staffing.      11:58.  [facility] can't accept due to staffing till next week.     13:15 Dtg updated, she is agreeable that I send referral to [facility] and [facility].     1318: [facility] has a shared female bed. They have covid in the building so no visitors for two weeks. Leilani [phone].  [facility] has offered Pt a bed for tomorrow private room no cost until shared room opens up then cost. Dtg will call me back.    1340: Dtg would like [facility] TCU bed for tomorrow.  [facility] can accept Pt tomorrow after 1300.  Family will provide transport at 1300.  MD notified.     PAS-RR    D: Per DHS regulation, SW completed and submitted PAS-RR to [place] [employer] Direct Connect via the [email or web address] LinkAge Line.  PAS-RR confirmation # is : [id number]    P: Further questions may be directed to [facility] LinkAge Line at #[phone], option #4 for PAS-RR staff.          Marisela Urban RN

## 2021-09-08 NOTE — PROGRESS NOTES
[facility]  Hospitalist Progress Note  Art Levine MD 09/08/2021    Reason for Stay (Diagnosis): CHF, possible pneumonia         Assessment and Plan:      Summary of Stay: Shiloh Covarrubias is a 89 year old female with a past medical history of hypertension, asthma, anxiety/depression who presented to outside ER for generalized weakness and SOB.     Patient was initially seen on Friday 9/3 after she had a fall in which she hit her head.  She was reaching for a phone cord and lost her balance.  She did not have any neck or back pain.  CT of the head negative for acute hemorrhage.  Work-up in the ED did show mild leukocytosis to 14,000 and UA was grossly positive for UTI and she was started on oral antibiotic.  Urine culture did not grow out any definitive bacteria.     This presentation she came to the ER 9/6 with shortness of breath, weakness and some increased congestion with some subjective fevers at home.  In the ER she was initially febrile to 101.1 and tachypneic but otherwise vitally stable.  She was requiring 3 L supplemental oxygen.  Lab work-up was notable for creatinine of 2.27 up from 1.21 on 9/3 and BNP was elevated at 6000.  Chest x-ray showed pulmonary vascular congestion, a small right lung effusion and possible right lower lobe infiltrate.  She was given IV Lasix, ceftriaxone and azithromycin and admitted for further care.      Here in the hospital she has done well with IV diuresis.  She has been weaned off of supplemental oxygen but still does have lower extremity edema.  Acute kidney injury has resolved with diuretics suggesting that she had cardiorenal syndrome.     She is nearing hospital discharge pending TCU placement.     #Generalized weakness, SOB.  Suspect acute on chronic HFpEF vs. CAP vs. Symptomatic bradycardia:   -Clinically improving with IV diuresis and antibiotics.  -Continue IV Lasix 40 mg twice daily today, recheck BMP in the morning.  Currently  "planning to transition to 60 mg oral Lasix in the morning.  She was on 40 mg daily prior to admission.  -Continue antibiotics given fever upon presentation, anticipate a relatively short course.  ?5 days total.  -TTE shows intact EF  -Monitor on telemetry for any evidence of bradycardia, holding home metoprolol for now.     #SHIVANI:  Likely due to cardiorenal syndrome.  She does take NSAIDs as well.  Improving with IV diuresis.    -restarting lisinopril at reduced dose from home (5 mg bid from 20 mg bid).    -Recheck BMP in AM  -Avoid NSAIDs.  -diuretics as above.     #HTN: resume hydralazine 50 mg TID. Lasix as above. reduced ACE-I and holding beta blocker as above.  BP's OK.  #Anxiety: Continue home lexapro  #Asthma/COPD: No wheeze on exam. Continue home spiriva.  #Low back pain: Recent fall on Friday 9/3.  Degenerative changes noted without fracture.  As needed pain meds available.     DVT Prophylaxis: Pneumatic Compression Devices  Code Status: DNR / DNI. Confirmed on admission  Dispo:: tcu vs home w/ services in 1-2 days.        Interval History (Subjective):        Overall feeling better, kidney function improving  Continuing IV diuretics today.  Tentatively planning to transition to oral tomorrow.  Working on discharge planning, potentially TCU in a day or 2  Restarted home hydralazine and reduce dose of Lasix                    Physical Exam:      Last Vital Signs:  BP (!) 171/55 (BP Location: Right arm)   Pulse 90   Temp 98.7  F (37.1  C) (Oral)   Resp 18   Ht 1.615 m (5' 3.6\")   Wt 56.2 kg (123 lb 12.8 oz)   LMP  (LMP Unknown)   SpO2 96%   BMI 21.52 kg/m        General: Alert, awake, no acute distress.  HEENT: NC/AT, eyes anicteric, external occular movements intact, face symmetric.    Cardiac: RRR, S1, S2.  No murmurs appreciated.  Pulmonary: Normal chest rise, normal work of breathing.  Lungs CTA BL  Abdomen: soft, non-tender, non-distended.  Bowel Sounds Present.  No guarding.  Extremities: 1+ " pitting edema bilaterally.  No deformities.  Warm, well perfused.  Skin: no rashes or lesions noted.  Warm and Dry.  Neuro: No focal deficits noted.  Speech clear.  Coordination and strength grossly normal.  Psych: Appropriate affect.         Medications:      All current medications were reviewed with changes reflected in problem list.         Data:      All new lab and imaging data was reviewed.   Labs:  Recent Labs   Lab 09/08/21  0642      POTASSIUM 3.6   CHLORIDE 104   CO2 27   ANIONGAP 8   *   BUN 40*   CR 1.20*   GFRESTIMATED 40*   TANIA 9.3     Recent Labs   Lab 09/07/21  0630   WBC 8.1   HGB 10.7*   HCT 33.8*   MCV 96         Imaging:   Recent Results (from the past 48 hour(s))   XR Chest Port 1 View    Narrative    CHEST PORTABLE ONE VIEW   9/6/2021 4:07 PM     HISTORY: Shortness of breath.    COMPARISON: CT chest, abdomen and pelvis dated 9/18/2018, chest x-rays  dated 9/16/2015.    FINDINGS:  Cardiac silhouette is markedly enlarged in size since the  prior chest x-rays from 2015. There is hyperaeration. There is a  moderate-sized right pleural fluid collection with right basilar  atelectasis and/or infiltrate. Increased interstitial markings in the  right lung are noted and could represent vascular congestion or  possible subtle infiltrative process. No pneumothorax is identified.  No definite left pleural fluid collection. There are thoracic aortic  calcifications. Dextroconvex curvature of the thoracic spine is again  noted. No acute osseous fracture is identified. Probable right carotid  artery calcifications are noted.      Impression    IMPRESSION:  1. New right pleural fluid collection and right basilar atelectasis  and/or infiltrate. Probable vascular congestion and worsening  cardiomegaly indicate probable congestive heart failure. Superimposed  right basilar infiltrate is not excluded.  2. Hyperaeration is again noted.   3. Aortic calcifications again noted. Possible carotid  artery  calcifications.    EVELYN PAIZ MD         SYSTEM ID:  [id number]   CT Head w/o Contrast    Narrative    CT SCAN OF THE HEAD WITHOUT CONTRAST   9/6/2021 4:08 PM     HISTORY: Weakness. Pain.    TECHNIQUE:  Axial images of the head and coronal reformations without  IV contrast material. Radiation dose for this scan was reduced using  automated exposure control, adjustment of the mA and/or kV according  to patient size, or iterative reconstruction technique.    COMPARISON: Head CT 4/8/2019.    FINDINGS: There is no evidence of intracranial hemorrhage, mass, acute  infarct or anomaly. The ventricles are normal in size, shape and  configuration. Mild diffuse parenchymal volume loss. Mild patchy  periventricular white matter hypodensities which are nonspecific, but  likely related to chronic microvascular ischemic disease.     The visualized portions of the sinuses and mastoids appear normal. The  bony calvarium and bones of the skull base appear intact.       Impression    IMPRESSION:     1. No evidence of acute intracranial hemorrhage, mass, or herniation.  2. Mild diffuse parenchymal volume loss and white matter changes  likely due to chronic microvascular ischemic disease.    KEIRY JAVIER MD         SYSTEM ID:  [id number]   CT Lumbar Spine w/o Contrast    Narrative    CT LUMBAR SPINE WITHOUT CONTRAST  9/6/2021 4:09 PM     HISTORY: Fall. Back pain.      TECHNIQUE: Axial images of the lumbar spine were obtained without  intravenous contrast. Multiplanar reformations were performed.   Radiation dose for this scan was reduced using automated exposure  control, adjustment of the mA and/or kV according to patient size, or  iterative reconstruction technique.     COMPARISON: Lumbar spine CT 9/3/2021.     FINDINGS:  There are 5 lumbar-type vertebral bodies assumed for the  purposes of this dictation.     Mild grade 1 anterolisthesis of L3 on L4 and L4 on L5. Leftward  listhesis of L4 on L5. No acute lucent fracture  lines. No significant  loss of vertebral body height. Marked degenerative endplate changes  and loss of disc height at L5-S1. Mild degenerative endplate changes  and loss of disc height at the other levels. Prominent facet  hypertrophy in the lower lumbar spine.    Degenerative changes in the lower lumbar spine, most pronounced at the  L4-L5 level where there is moderate-to-severe spinal canal narrowing  as well as moderate-to-severe right and moderate left neural foraminal  narrowing. At L5-S1, there is moderate bilateral neural foraminal  narrowing.    Visualized paraspinous tissues: Right pleural effusion partially  visualized.       Impression    IMPRESSION:    1. No evidence of acute fracture or subluxation in the lumbar spine.  2. Degenerative changes in the lower lumbar spine as detailed above,  most pronounced at L4-5 and L5-S1. Findings appear similar to recent  CT 9/3/2021.  3. Right pleural effusion partially visualized.     KEIRY JAVIER MD         SYSTEM ID:  [id number]   CT Cervical Spine w/o Contrast    Narrative    CT CERVICAL SPINE WITHOUT CONTRAST 9/6/2021 4:09 PM     HISTORY: Fall. Pain.     TECHNIQUE: Axial images of the cervical spine were obtained without  intravenous contrast. Multiplanar reformations were performed.   Radiation dose for this scan was reduced using automated exposure  control, adjustment of the mA and/or kV according to patient size, or  iterative reconstruction technique.    COMPARISON: Cervical spine x-ray 1/30/2008.    FINDINGS: Mild grade 1 anterolisthesis of C4 on C5. No significant  loss of vertebral body height. No acute lucent fracture line  visualized. Marked degenerative endplate changes and loss of disc  height at C5-6 and C6-7. Moderate degenerative endplate changes and  loss of disc height at C3-4 and C4-5. Fusion of the right facets at  C2-C3. Mild spinal canal narrowings at C5-6 and C6-7. Multiple levels  of neural foraminal narrowing, most pronounced, left  greater than  right, at C3-4 and bilaterally at C5-6 and C6-7.    Visualized paraspinous tissues: Partially visualized pleural effusion  at the right lung apex. Atherosclerotic calcifications in the carotid  arteries.      Impression    IMPRESSION:   1. No evidence of acute fracture or subluxation in the cervical spine.  2. Degenerative changes in the cervical spine as described above.    KEIRY JAVIER MD         SYSTEM ID:  [id number]         Art Levine MD , MD.

## 2021-09-08 NOTE — PROGRESS NOTES
09/08/21 1111   Quick Adds   Type of Visit Initial PT Evaluation   Living Environment   Living Environment Comments Pt lives in add on suite at son's home. No stairs to manage. Pt is typically ind with all mobility at baseline. Performs cooking, laundry, cleaning by herself.    Self-Care   Usual Activity Tolerance good   Current Activity Tolerance fair   Equipment Currently Used at Home grab bar, toilet;grab bar, tub/shower;walker, rolling;cane, straight   Disability/Function   Fall history within last six months yes   Number of times patient has fallen within last six months 1   General Information   Onset of Illness/Injury or Date of Surgery 09/06/21   Referring Physician Art Levine MD   Patient/Family Therapy Goals Statement (PT) To return home   Pertinent History of Current Problem (include personal factors and/or comorbidities that impact the POC) Pt is a 89 year old female with a past medical history of hypertension, asthma, anxiety/depression who presented to outside ER for generalized weakness and SOB. Pt with fall on 9/3.    Existing Precautions/Restrictions fall   Weight-Bearing Status - LLE full weight-bearing   Weight-Bearing Status - RLE full weight-bearing   Cognition   Orientation Status (Cognition) oriented x 4   Affect/Mental Status (Cognition) WFL   Follows Commands (Cognition) WFL   Pain Assessment   Patient Currently in Pain No   Range of Motion (ROM)   ROM Comment B LE WFL   Strength   Strength Comments B LE grossly 3+/5   Bed Mobility   Comment (Bed Mobility) Kayli supine <> sit   Transfers   Transfer Safety Comments CGA sit <> stand   Gait/Stairs (Locomotion)   Distance in Feet (Required for LE Total Joints) 16   Pattern (Gait) step-to   Deviations/Abnormal Patterns (Gait) gait speed decreased  (flexed posture)   Balance   Balance Comments fair+ sitting EOC; fair standing with FWW   Clinical Impression   Criteria for Skilled Therapeutic Intervention yes, treatment  indicated   PT Diagnosis (PT) impaired functional mobility   Influenced by the following impairments impaired balance, decreased functional strength   Functional limitations due to impairments impaired bed mobility, transfers, ambulation   Clinical Presentation Stable/Uncomplicated   Clinical Presentation Rationale Pt is medically stable   Clinical Decision Making (Complexity) low complexity   Therapy Frequency (PT) 5x/week   Predicted Duration of Therapy Intervention (days/wks) 3 days   Planned Therapy Interventions (PT) bed mobility training;balance training;gait training;home exercise program;patient/family education;strengthening;transfer training   Anticipated Equipment Needs at Discharge (PT) walker, rolling   Risk & Benefits of therapy have been explained evaluation/treatment results reviewed;care plan/treatment goals reviewed;risks/benefits reviewed;current/potential barriers reviewed;participants voiced agreement with care plan;participants included;patient   PT Discharge Planning    PT Discharge Recommendation (DC Rec) Transitional Care Facility   PT Rationale for DC Rec Patient is below baseline level of mobility and would be unsafe to return home at this time. Pt would require 24/7 assistance for mobility and use of FWW. Would benefit from continued skilled PT services at TCU setting to improve ind in order to return home.    PT Brief overview of current status  Ax1 with FWW   Total Evaluation Time   Total Evaluation Time (Minutes) 6

## 2021-09-08 NOTE — TELEPHONE ENCOUNTER
Daughter calling per patients request that patient is hospitalized and that they looking to discharge to a nursing home to rehab before she can go home. Informed daughter that there is a note in her chart that provider is aware that she is hospitalized and that she may need rehabbing after her discharge. Carolee Go LPN

## 2021-09-08 NOTE — PLAN OF CARE
End of Shift Summary  For vital signs and complete assessments, please see documentation flowsheets.     Pertinent assessments: VSS. On RA. A/Ox4. Up with Ax1 with walker and gait belt. BLE edema.    Major Shift Events: N/A     Treatment Plan: Continue IV abx, IV diuretics. Recheck BMP this AM. Several nephrotoxic meds on hold due to SHIVANI.     Bedside Nurse: Kelsey Melendez RN

## 2021-09-08 NOTE — PLAN OF CARE
To Do:  End of Shift Summary  For vital signs and complete assessments, please see documentation flowsheets.     Pertinent assessments: VSS except elevated SBP. On RA. A/Ox4. Up with Ax1 with walker and gait belt. Denies BLE pitting edema. Sinus rhythm per tele tech.     Major Shift Events: Tele discontinued.       Treatment Plan: Continue IV abx. IV diuretics administered, and will change to PO 9/9/21. Patient discharging to [facility] TCU.     Bedside Nurse: Lizzette Herrera RN

## 2021-09-09 ENCOUNTER — LAB REQUISITION (OUTPATIENT)
Dept: LAB | Facility: CLINIC | Age: 86
End: 2021-09-09

## 2021-09-09 VITALS
RESPIRATION RATE: 18 BRPM | HEIGHT: 64 IN | SYSTOLIC BLOOD PRESSURE: 169 MMHG | BODY MASS INDEX: 21.13 KG/M2 | HEART RATE: 97 BPM | DIASTOLIC BLOOD PRESSURE: 56 MMHG | OXYGEN SATURATION: 94 % | TEMPERATURE: 99.1 F | WEIGHT: 123.8 LBS

## 2021-09-09 DIAGNOSIS — Z11.1 ENCOUNTER FOR SCREENING FOR RESPIRATORY TUBERCULOSIS: ICD-10-CM

## 2021-09-09 LAB
ANION GAP SERPL CALCULATED.3IONS-SCNC: 4 MMOL/L (ref 3–14)
BUN SERPL-MCNC: 29 MG/DL (ref 7–30)
CALCIUM SERPL-MCNC: 9.5 MG/DL (ref 8.5–10.1)
CHLORIDE BLD-SCNC: 99 MMOL/L (ref 94–109)
CO2 SERPL-SCNC: 35 MMOL/L (ref 20–32)
CREAT SERPL-MCNC: 1.03 MG/DL (ref 0.52–1.04)
GFR SERPL CREATININE-BSD FRML MDRD: 48 ML/MIN/1.73M2
GLUCOSE BLD-MCNC: 111 MG/DL (ref 70–99)
POTASSIUM BLD-SCNC: 3.2 MMOL/L (ref 3.4–5.3)
SODIUM SERPL-SCNC: 138 MMOL/L (ref 133–144)

## 2021-09-09 PROCEDURE — 999N000157 HC STATISTIC RCP TIME EA 10 MIN

## 2021-09-09 PROCEDURE — 250N000013 HC RX MED GY IP 250 OP 250 PS 637: Performed by: INTERNAL MEDICINE

## 2021-09-09 PROCEDURE — 99239 HOSP IP/OBS DSCHRG MGMT >30: CPT | Performed by: HOSPITALIST

## 2021-09-09 PROCEDURE — 250N000013 HC RX MED GY IP 250 OP 250 PS 637: Performed by: HOSPITALIST

## 2021-09-09 PROCEDURE — 36415 COLL VENOUS BLD VENIPUNCTURE: CPT | Performed by: INTERNAL MEDICINE

## 2021-09-09 PROCEDURE — 80048 BASIC METABOLIC PNL TOTAL CA: CPT | Performed by: INTERNAL MEDICINE

## 2021-09-09 RX ORDER — OXYCODONE HYDROCHLORIDE 5 MG/1
2.5-5 TABLET ORAL EVERY 6 HOURS PRN
Qty: 12 TABLET | Refills: 0 | Status: SHIPPED | OUTPATIENT
Start: 2021-09-09 | End: 2021-09-13

## 2021-09-09 RX ORDER — CEFUROXIME AXETIL 500 MG/1
500 TABLET ORAL 2 TIMES DAILY
DISCHARGE
Start: 2021-09-09 | End: 2021-09-14

## 2021-09-09 RX ORDER — FUROSEMIDE 20 MG
60 TABLET ORAL DAILY
Qty: 180 TABLET | Refills: 3 | DISCHARGE
Start: 2021-09-09 | End: 2021-09-16

## 2021-09-09 RX ORDER — LISINOPRIL 10 MG/1
10 TABLET ORAL 2 TIMES DAILY
Status: DISCONTINUED | OUTPATIENT
Start: 2021-09-09 | End: 2021-09-09

## 2021-09-09 RX ORDER — LISINOPRIL 20 MG/1
20 TABLET ORAL 2 TIMES DAILY
Status: DISCONTINUED | OUTPATIENT
Start: 2021-09-09 | End: 2021-09-09 | Stop reason: HOSPADM

## 2021-09-09 RX ORDER — AZITHROMYCIN 250 MG/1
250 TABLET, FILM COATED ORAL DAILY
DISCHARGE
Start: 2021-09-09 | End: 2021-09-13

## 2021-09-09 RX ADMIN — AMLODIPINE BESYLATE 5 MG: 2.5 TABLET ORAL at 10:05

## 2021-09-09 RX ADMIN — AZITHROMYCIN MONOHYDRATE 250 MG: 250 TABLET ORAL at 10:05

## 2021-09-09 RX ADMIN — FUROSEMIDE 60 MG: 40 TABLET ORAL at 10:05

## 2021-09-09 RX ADMIN — HYDRALAZINE HYDROCHLORIDE 50 MG: 50 TABLET, FILM COATED ORAL at 10:05

## 2021-09-09 RX ADMIN — ESCITALOPRAM OXALATE 10 MG: 10 TABLET ORAL at 10:05

## 2021-09-09 RX ADMIN — LISINOPRIL 20 MG: 20 TABLET ORAL at 10:05

## 2021-09-09 RX ADMIN — ASPIRIN 81 MG: 81 TABLET ORAL at 10:05

## 2021-09-09 ASSESSMENT — ACTIVITIES OF DAILY LIVING (ADL)
ADLS_ACUITY_SCORE: 19

## 2021-09-09 NOTE — DISCHARGE SUMMARY
Hospitalist Discharge Summary  [facility]    Shiloh Covarrubias MRN# [id number]   YOB: 1929 Age: 89 year old     Date of Admission:  9/6/2021  Date of Discharge:  9/9/2021  Admitting Physician:  Noel Escobar MD  Discharge Physician:  Everardo Russell DO  Discharging Service:  Hospitalist     Primary Provider: Rajesh Vidal          Discharge Diagnosis:     Generalized weakness  Acute on chronic diastolic CHF  Community-acquired pneumonia  Symptomatic bradycardia  Acute kidney injury  Hypertension  Anxiety  Asthma/COPD  Low back pain             Discharge Disposition:     Discharged to rehabilitation facility           Allergies:     Allergies   Allergen Reactions     Clonidine Derivatives      Severe side effects       Entocort [Corticosteroids]      Macrobid [Nitrofurantoin]      Diarrhea       Sulfa Drugs Itching     itch              Discharge Medications:     Current Discharge Medication List      START taking these medications    Details   azithromycin (ZITHROMAX) 250 MG tablet Take 1 tablet (250 mg) by mouth daily for 3 days    Associated Diagnoses: SOB (shortness of breath)      cefuroxime (CEFTIN) 500 MG tablet Take 1 tablet (500 mg) by mouth 2 times daily for 5 days    Associated Diagnoses: SOB (shortness of breath)         CONTINUE these medications which have CHANGED    Details   furosemide (LASIX) 20 MG tablet Take 3 tablets (60 mg) by mouth daily  Qty: 180 tablet, Refills: 3    Associated Diagnoses: SOB (shortness of breath)      oxyCODONE (ROXICODONE) 5 MG tablet Take 0.5-1 tablets (2.5-5 mg) by mouth every 6 hours as needed for pain  Qty: 12 tablet, Refills: 0    Associated Diagnoses: Generalized weakness         CONTINUE these medications which have NOT CHANGED    Details   amLODIPine (NORVASC) 5 MG tablet Take 1 tablet (5 mg) by mouth daily  Qty: 90 tablet, Refills: 3    Comments: Profile  Associated Diagnoses: Essential hypertension with goal blood pressure  "less than 130/80      ASPIRIN 81 MG OR TABS Take 81 mg by mouth daily       EQ LORATADINE 10 MG tablet Take 1 tablet (10 mg) by mouth daily  Qty: 90 tablet, Refills: 3    Comments: Profile  Associated Diagnoses: Chronic seasonal allergic rhinitis due to pollen      escitalopram (LEXAPRO) 10 MG tablet Take 1 tablet (10 mg) by mouth daily  Qty: 90 tablet, Refills: 3    Comments: Profile  Associated Diagnoses: LOUIS (generalized anxiety disorder)      hydrALAZINE (APRESOLINE) 50 MG tablet TAKE 1 TABLET BY MOUTH THREE TIMES DAILY  Qty: 270 tablet, Refills: 3    Comments: Profile      hypromellose (ARTIFICIAL TEARS) 0.5 % SOLN ophthalmic solution Place 1 drop into both eyes 4 times daily as needed for dry eyes      lactobacillus rhamnosus, GG, (CULTURELL) capsule Take 1 capsule by mouth 2 times daily      lisinopril (ZESTRIL) 20 MG tablet Take 1 tablet (20 mg) by mouth 2 times daily  Qty: 180 tablet, Refills: 3    Comments: Profile  Associated Diagnoses: Essential hypertension with goal blood pressure less than 130/80      loperamide (IMODIUM A-D) 2 MG tablet 1/2 tab before meals  Qty: 20 tablet, Refills: 2    Associated Diagnoses: Chronic diarrhea      tiotropium (SPIRIVA RESPIMAT) 2.5 MCG/ACT inhaler Inhale 2 puffs into the lungs daily  Qty: 2 Inhaler, Refills: 5    Associated Diagnoses: Chronic obstructive pulmonary disease, unspecified COPD type (H)         STOP taking these medications       cefdinir (OMNICEF) 300 MG capsule Comments:   Reason for Stopping:         metoprolol tartrate (LOPRESSOR) 25 MG tablet Comments:   Reason for Stopping:                      Condition on Discharge:     Discharge condition: Stable   Discharge vitals: Blood pressure (!) 169/56, pulse 97, temperature 99.1  F (37.3  C), resp. rate 18, height 1.615 m (5' 3.6\"), weight 56.2 kg (123 lb 12.8 oz), SpO2 94 %, not currently breastfeeding.   Code status on discharge: DNR / DNI      BASIC PHYSICAL EXAMINATION:  GENERAL: No apparent " distress.  CARDIOVASCULAR: Regular rate and rhythm without murmurs.  PULMONARY: Clear to auscultation bilaterally.   GASTROINTESTINAL: Abdomen soft, non-tender.  EXTREMITIES: No edema, pulses intact.  NEUROLOGIC: No focal deficits.            History of Illness:   See detailed admission note for full details.               Procedures excluding imaging which is summarized below:     Please see details in the electronic medical record.           Consultations:     CARE MANAGEMENT / SOCIAL WORK IP CONSULT  PHYSICAL THERAPY ADULT IP CONSULT  PHYSICAL THERAPY ADULT IP CONSULT  OCCUPATIONAL THERAPY ADULT IP CONSULT          Significant Results:     Results for orders placed or performed during the hospital encounter of 21   Echocardiogram Complete     Value    LVEF  65-70%    Narrative    [id number]  [id number]  [id number]  [id number]^RODNEY^WILFRIDO     [facility]  Echocardiography Laboratory  [address] [place] [address]     Name: AKANKSHA LOPEZ  MRN: [id number]  : 1929  Study Date: 2021 01:37 PM  Age: 89 yrs  Gender: Female  Patient Location: [facility]  Reason For Study: SOB  Ordering Physician: WILFRIDO STEVENS  Referring Physician: Rajesh Vidal MD  Performed By: Harmony Carmichael RDCS     BSA: 1.6 m2  Height: 63 in  Weight: 122 lb  HR: 66  BP: 135/36 mmHg  ______________________________________________________________________________  ______________________________________________________________________________  Interpretation Summary     Left ventricular systolic function is normal.The visual ejection fraction is  65-70%.  The right ventricular systolic function is normal.  The left atrium is moderate to severely dilated.  Mild valvular aortic stenosis.There is mild (1+) aortic regurgitation.There is  moderate mitral annular calcification.There is mild (1+) mitral regurgitation.  Pulmonary hypertension- RVSP 48 mm hg  +RA.  ______________________________________________________________________________  Left Ventricle  The left ventricle is normal in size. There is normal left ventricular wall  thickness. Left ventricular systolic function is normal. The visual ejection  fraction is 65-70%. Diastolic function not assessed due to significant mitral  annular calcification. No regional wall motion abnormalities noted.     Right Ventricle  The right ventricle is normal size. The right ventricular systolic function is  normal.     Atria  The left atrium is moderate to severely dilated. Right atrial size is normal.  There is no color Doppler evidence of an atrial shunt.     Mitral Valve  There is moderate mitral annular calcification. There is mild (1+) mitral  regurgitation. There is no mitral valve stenosis.     Tricuspid Valve  There is mild (1+) tricuspid regurgitation. The right ventricular systolic  pressure is approximated at 47.9 mmHg plus the right atrial pressure.  Pulmonary hypertension.     Aortic Valve  There is mild (1+) aortic regurgitation. Mild valvular aortic stenosis. peak  aortic valve velocity 2.4 m/sec, mean gradients 13 mm hg, INESSA 1.8 cm2.     Pulmonic Valve  There is trace pulmonic valvular regurgitation. There is no pulmonic valvular  stenosis.     Vessels  The aortic root is normal size. Normal size ascending aorta. Dilation of the  inferior vena cava is present with abnormal respiratory variation in diameter.     Pericardium  There is no pericardial effusion.     ______________________________________________________________________________  MMode/2D Measurements & Calculations  IVSd: 0.85 cm  LVIDd: 3.8 cm  LVIDs: 1.9 cm  LVPWd: 0.93 cm  FS: 48.6 %  LV mass(C)d: 98.1 grams  LV mass(C)dI: 62.6 grams/m2     Ao root diam: 3.2 cm  LA dimension: 3.0 cm  asc Aorta Diam: 3.2 cm  LA/Ao: 0.94  LVOT diam: 1.9 cm  LVOT area: 2.8 cm2  LA Volume (BP): 76.0 ml  LA Volume Index (BP): 48.4 ml/m2  RWT: 0.50     Doppler  Measurements & Calculations  MV E max scott: 148.0 cm/sec  MV A max scott: 108.0 cm/sec  MV E/A: 1.4  MV max P.5 mmHg  MV mean P.8 mmHg  MV V2 VTI: 53.4 cm  MVA(VTI): 1.8 cm2     MV dec time: 0.23 sec  Ao V2 max: 237.0 cm/sec  Ao max P.0 mmHg  Ao V2 mean: 170.0 cm/sec  Ao mean P.0 mmHg  Ao V2 VTI: 52.4 cm  INESSA(I,D): 1.8 cm2  INESSA(V,D): 1.8 cm2  AI P1/2t: 370.8 msec  LV V1 max P.0 mmHg  LV V1 max: 150.0 cm/sec  LV V1 VTI: 33.5 cm  SV(LVOT): 95.0 ml  SI(LVOT): 60.6 ml/m2  PA acc time: 0.17 sec  TR max scott: 346.0 cm/sec  TR max P.9 mmHg  AV Scott Ratio (DI): 0.63  INESSA Index (cm2/m2): 1.2  E/E' av.5  Lateral E/e': 16.9  Medial E/e': 26.2     ______________________________________________________________________________  Report approved by: Sharmin Olivares 2021 02:29 PM               Transthoracic Echocardiogram Results:  No results found for this or any previous visit (from the past 4320 hour(s)).             Pending Results:     Unresulted Labs Ordered in the Past 30 Days of this Admission     No orders found from 2021 to 2021.                      Discharge Instructions and Follow-Up:     Discharge instructions and follow-up:   Discharge Procedure Orders   Care Coordination Referral   Standing Status: Future   Referral Priority: Routine Referral Type: Care Coordination   Number of Visits Requested: 1     General info for SNF   Order Comments: Length of Stay Estimate: Short Term Care: Estimated # of Days <30  Condition at Discharge: Stable  Level of care:skilled   Rehabilitation Potential: Fair  Admission H&P remains valid and up-to-date: Yes  Recent Chemotherapy: N/A  Use Nursing Home Standing Orders: Yes     Mantoux instructions   Order Comments: Give two-step Mantoux (PPD) Per Facility Policy Yes     Follow Up and recommended labs and tests   Order Comments: Follow up with longterm physician.  The following labs/tests are recommended: BMP in 3 days.  Monitor  BP.  Consider adjusting diuretics and adding back beta blocker (would consider coreg instead of metoprolol).     Activity - Up with nursing assistance     Order Specific Question Answer Comments   Is discharge order? Yes      No CPR- Do NOT Intubate     Order Specific Question Answer Comments   Code status determined by: Discussion with patient/ legal decision maker      Physical Therapy Adult Consult   Order Comments: Evaluate and treat as clinically indicated.    Reason:  fall     Occupational Therapy Adult Consult   Order Comments: Evaluate and treat as clinically indicated.    Reason:  fall     Fall precautions     Advance Diet as Tolerated   Order Comments: Follow this diet upon discharge: Orders Placed This Encounter      Combination Diet Low Saturated Fat Na <2400mg Diet, No Caffeine Diet     Order Specific Question Answer Comments   Is discharge order? Yes              Hospital Course:     Shiloh Covarrubias is a 89 year old female with a past medical history of hypertension, asthma, anxiety/depression who presented to outside ER for generalized weakness and SOB.     Patient was initially seen on Friday 9/3 after she had a fall in which she hit her head.  She was reaching for a phone cord and lost her balance.  She did not have any neck or back pain.  CT of the head negative for acute hemorrhage.  Work-up in the ED did show mild leukocytosis to 14,000 and UA was grossly positive for UTI and she was started on oral antibiotic.  Urine culture did not grow out any definitive bacteria.     This presentation she came to the ER 9/6 with shortness of breath, weakness and some increased congestion with some subjective fevers at home.  In the ER she was initially febrile to 101.1 and tachypneic but otherwise vitally stable.  She was requiring 3 L supplemental oxygen.  Lab work-up was notable for creatinine of 2.27 up from 1.21 on 9/3 and BNP was elevated at 6000.  Chest x-ray showed pulmonary vascular congestion, a  small right lung effusion and possible right lower lobe infiltrate.  She was given IV Lasix, ceftriaxone and azithromycin and admitted for further care.       Here in the hospital she has done well with IV diuresis.  She has been weaned off of supplemental oxygen but still does have lower extremity edema.  Acute kidney injury has resolved with diuretics suggesting that she had cardiorenal syndrome.     She is ready for hospital discharge and will pursue TCU placement.     Problem List:  #Generalized weakness, SOB.  Suspect acute on chronic HFpEF vs. CAP vs. Symptomatic bradycardia:   -Clinically improving with IV diuresis and antibiotics.  -Now transitioning to 60 mg oral Lasix daily this morning.  She was on 40 mg daily prior to admission.  -Continue antibiotics given fever upon presentation, anticipate a relatively short course.  3 more days of azithromycin and 5 more days of Ceftin.  -TTE shows intact EF.  -Holding PTA metoprolol due to some concern for initial bradycardia.     #SHIVANI:  Likely due to cardiorenal syndrome.  She does take NSAIDs as well.  Improved with IV diuresis and resume oral Lasix today.    -Restarting lisinopril 20 mg BID as blood pressure fairly elevated and renal function normalized.  -Recheck BMP in the coming days at TCU.  -Avoid NSAIDs.  -Starting Lasix 60 mg PO daily (increase from 40 PTA).     #HTN: Resumed hydralazine 50 mg TID and lisinopril 20 mg BID.  Lasix as above.  Stopped metoprolol, can monitor BP at TCU and if remains uncontrolled would consider resuming (but could use Coreg instead for less effect on HR).    #Anxiety: Continued home lexapro.    #Asthma/COPD: No wheeze on exam. Continue home spiriva.    #Low back pain: Recent fall on Friday 9/3.  Degenerative changes noted without fracture.  As needed pain meds available.    The patient was seen, examined, and counseled on this day. The hospitalization and plan of care was reviewed with the patient extensively. All questions  were addressed and the patient agreed to follow-up as noted above.      Total time spent in face to face contact with the patient and coordinating discharge was:  35 Minutes    Everardo Russell DO, MPH  [facility] Hospitalist  [address] [place] [address]  09/09/2021

## 2021-09-09 NOTE — PROGRESS NOTES
Care Management Discharge Note    Discharge Date: 09/09/2021       Discharge Disposition:Transitional Care    Discharge Services: PT/OT    Discharge DME: Walker    Discharge Transportation: family or friend will provide    Private pay costs discussed: private room/amenity fees    PAS Confirmation Code: [id number]  Patient/family educated on Medicare website which has current facility and service quality ratings: yes    Education Provided on the Discharge Plan:  Pt and Dtg Jessica  Persons Notified of Discharge Plans:Pt and Dtg Jessica  Patient/Family in Agreement with the Plan: yes    Additional Information:  [facility]  Family will provide transportation at 1PM.  Admissions team aware.  MD paged for orders if medically stable.    ADDENDUM: 8:32 I faxed orders.  Family will transport.      Gracie Urban RN BSN   Inpatient Care Coordination  [facility]  [phone]          Marisela Urban RN

## 2021-09-09 NOTE — PLAN OF CARE
Physical Therapy Discharge Summary    Reason for therapy discharge:    Discharged to transitional care facility.    Progress towards therapy goal(s). See goals on Care Plan in [facility] electronic health record for goal details.  Goals not met.  Barriers to achieving goals:   discharge from facility.    Therapy recommendation(s):    TCU was recommended.    **Pt not seen by discharging therapist on this date, note written based on previous treating therapist's notes and recommendations.

## 2021-09-09 NOTE — PROGRESS NOTES
Pt to D/C to [facility] TCU.  Pt provided with d/c instructions, including new medications, when medications were last given, and when to take them again. Copy of paperwork sent with pt.  Oxycodone script included in the packet for discharge. Daughter to provide transport.  All personal belongings sent with pt.

## 2021-09-09 NOTE — PLAN OF CARE
To Do:  End of Shift Summary  For vital signs and complete assessments, please see documentation flowsheets.     Pertinent assessments: Patient is Aox4 this shift, denies having any pain. Active bowel sounds, passing gas. Ambulating in room without difficulties with staff. Denies nausea and SOB.     Major Shift Events: Uneventful     Treatment Plan: Plan to discharge to TCU tomorrow after lunch.     Bedside Nurse: Leilani Ferreira RN

## 2021-09-09 NOTE — PLAN OF CARE
End of Shift Summary  For vital signs and complete assessments, please see documentation flowsheets.     Pertinent assessments: Patient is Aox4 this shift, denies pain. Active bowel sounds, passing gas. Ambulating in room without difficulties with staff. Denies nausea and SOB.     Major Shift Events: Uneventful     Treatment Plan: Plan to discharge to TCU today after lunch.     Bedside Nurse: Jackie BIGGS RN

## 2021-09-10 ENCOUNTER — PATIENT OUTREACH (OUTPATIENT)
Dept: FAMILY MEDICINE | Facility: CLINIC | Age: 86
End: 2021-09-10
Attending: HOSPITALIST
Payer: MEDICARE

## 2021-09-10 DIAGNOSIS — R53.1 GENERALIZED WEAKNESS: ICD-10-CM

## 2021-09-10 PROCEDURE — U0003 INFECTIOUS AGENT DETECTION BY NUCLEIC ACID (DNA OR RNA); SEVERE ACUTE RESPIRATORY SYNDROME CORONAVIRUS 2 (SARS-COV-2) (CORONAVIRUS DISEASE [COVID-19]), AMPLIFIED PROBE TECHNIQUE, MAKING USE OF HIGH THROUGHPUT TECHNOLOGIES AS DESCRIBED BY CMS-2020-01-R: HCPCS | Performed by: NURSE PRACTITIONER

## 2021-09-10 PROCEDURE — 36415 COLL VENOUS BLD VENIPUNCTURE: CPT | Performed by: NURSE PRACTITIONER

## 2021-09-10 PROCEDURE — 86481 TB AG RESPONSE T-CELL SUSP: CPT | Performed by: NURSE PRACTITIONER

## 2021-09-10 NOTE — PROGRESS NOTES
Clinic Care Coordination Contact  Care Coordination Transition Communication      Clinical Data: Patient was hospitalized at [facility] from 9/6/2021 to 9/9/2021 with diagnosis of generalized weakness acute on chronic heart failure community acquired and pneumonia.     Transition to Facility:              Facility Name: [facility] (Phone: [phone] Admissions: [phone] Fax: [phone])              Contact name and phone number/fax:     Plan: RN/SW Care Coordinator will await notification from facility staff informing RN/SW Care Coordinator of patient's discharge plans/needs. RN/SW Care Coordinator will review chart and outreach to facility staff every 4 weeks and as needed.         Shanae PENA, RN, PHN, CCM  Primary Clinic Care Coordination    [facility]  Primary Care Clinics  [email or web address]   Office: [phone]  Employed by [facility]

## 2021-09-10 NOTE — PROGRESS NOTES
[facility]  INITIAL VISIT NOTE  September 13, 2021    PRIMARY CARE PROVIDER AND CLINIC:  Rajesh Vidal [address] [facility] [address]    CHIEF COMPLAINT:  Hospital follow-up/Initial visit    HPI:    Shiloh Covarrubias is a 89 year old  (11/3/1929) female who was seen at [facility] on September 13, 2021 for an initial visit.     Medical history is notable for LV diastolic dysfunction, pulmonary hypertension, COPD/asthma, hypertension, dyslipidemia, PVD, CKD, TIA, bowel obstruction, microscopic colitis, essential tremor, depression, and anxiety.    Summary of hospital course:  Patient was hospitalized at [facility] from September 6 through September 9, 2021 after presenting with dyspnea and generalized weakness.      Notably, she had been recently seen in the emergency department on September 3 for fall.  CT lumbar spine was negative for acute fracture or subluxation.  She was noted to have mild leukocytosis and UA was markedly abnormal and she was therefore prescribed cefdinir for 7 days.  Of note, urine culture grew more than 100,000 colonies per mL mixture of urogenital juan carlos.      She presented again to the emergency department on September 6 with generalized weakness, shortness of breath, and increased congestion.  In the ER she was febrile to 101.1  F and was hypoxic requiring 3 L of supplemental oxygen.  Blood work showed creatinine of 2.27, up from 1.1 on September 3 and BNP was elevated at 6,057.  Procalcitonin was 0.39.  Troponin I was less than 0.015.  CBC showed white count of 9.7 and hemoglobin of 11.4.  Test for COVID-19 was negative.  Chest x-ray showed new right pleural fluid collection and right basilar atelectasis and/or infiltrate and probable vascular congestion and worsening cardiomegaly indicating probable CHF.  CT head was negative for acute intracranial pathology.  CT cervical and lumbar spine was negative for acute fracture or subluxation.   EKG showed sinus bradycardia with a heart rate of 51 bpm.  Echocardiogram, September 7, showed normal LV systolic function with EF of 65-70%, mild aortic regurgitation, mild mitral regurgitation, and pulmonary hypertension.  She was treated with IV furosemide for acute CHF and started on ceftriaxone and azithromycin for right basilar pneumonia.  PTA metoprolol was held due to bradycardia.  Renal function improved with diuretic therapy.  Patient responded favorably to medical treatment and was weaned off oxygen.  TCU was recommended per therapies.    Patient is admitted to this facility for medical management, nursing care, and rehab.     Of note, history was obtained from patient, facility RN, and extensive review of the chart necessitated by complex hospitalization.    Today's visit:  Patient was seen in her room, while sitting in a chair.  She appears frail but comfortable.  She believes that her respiration is doing well.  She reports no fever, chills, cough, sputum, chest pain, palpitation, dyspnea, nausea, vomiting, abdominal pain, or urinary symptoms.  She denies any significant back pain.  She at times has loose bowel movements.  Her blood pressure is running within the normal range.      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 UGI ENDOSCOPY, SIMPLE EXAM  02/23/10       FAMILY HISTORY:   Family History   Problem Relation Age of Onset     Arthritis Mother      Osteoporosis Mother      Thyroid Disease Mother         Hypothyroid.     Depression Maternal Uncle         Bouts of depression     Hypertension No family hx of      C.A.D. No family hx of        SOCIAL HISTORY:  Social History     Tobacco Use     Smoking status: Never Smoker     Smokeless tobacco: Never Used   Substance Use Topics     Alcohol use: Yes     Alcohol/week: 0.0 standard drinks     Comment: 3 per  year       MEDICATIONS:  Current Outpatient Medications   Medication Sig Dispense Refill     amLODIPine (NORVASC) 5 MG tablet Take 1 tablet (5 mg) by mouth daily 90 tablet 3     ASPIRIN 81 MG OR TABS Take 81 mg by mouth daily        azithromycin (ZITHROMAX) 250 MG tablet Take 1 tablet (250 mg) by mouth daily for 3 days       cefuroxime (CEFTIN) 500 MG tablet Take 1 tablet (500 mg) by mouth 2 times daily for 5 days       EQ LORATADINE 10 MG tablet Take 1 tablet (10 mg) by mouth daily 90 tablet 3     escitalopram (LEXAPRO) 10 MG tablet Take 1 tablet (10 mg) by mouth daily 90 tablet 3     furosemide (LASIX) 20 MG tablet Take 3 tablets (60 mg) by mouth daily 180 tablet 3     hydrALAZINE (APRESOLINE) 50 MG tablet TAKE 1 TABLET BY MOUTH THREE TIMES DAILY 270 tablet 3     hypromellose (ARTIFICIAL TEARS) 0.5 % SOLN ophthalmic solution Place 1 drop into both eyes 4 times daily as needed for dry eyes       lactobacillus rhamnosus,  "GG, (CULTURELL) capsule Take 1 capsule by mouth 2 times daily       lisinopril (ZESTRIL) 20 MG tablet Take 1 tablet (20 mg) by mouth 2 times daily 180 tablet 3     loperamide (IMODIUM A-D) 2 MG tablet 1/2 tab before meals 20 tablet 2     oxyCODONE (ROXICODONE) 5 MG tablet Take 0.5-1 tablets (2.5-5 mg) by mouth every 6 hours as needed for pain 12 tablet 0     tiotropium (SPIRIVA RESPIMAT) 2.5 MCG/ACT inhaler Inhale 2 puffs into the lungs daily 2 Inhaler 5       ALLERGIES:  Allergies   Allergen Reactions     Clonidine Derivatives      Severe side effects       Entocort [Corticosteroids]      Macrobid [Nitrofurantoin]      Diarrhea       Sulfa Drugs Itching     itch       ROS:  10 point ROS were negative other than the symptoms noted above in the HPI.    PHYSICAL EXAM:  Vital signs were reviewed in the chart.  Vital Signs: /63   Pulse 71   Temp 98.2  F (36.8  C)   Resp 16   Ht 1.6 m (5' 3\")   Wt 49 kg (108 lb)   LMP  (LMP Unknown)   SpO2 96%   BMI 19.13 kg/m    General: Frail appearing but comfortable and in no acute distress  HEENT: Normocephalic; oropharynx clear  Cardiovascular: Normal S1, S2, RRR  Respiratory: Lungs clear to auscultation bilaterally  GI: Abdomen soft, non-tender, non-distended, +BS  Extremities: Trace to 1+ bilateral LE edema  Neuro: CX II-XII grossly intact; ROM in all four extremities grossly intact  Psych: Alert and oriented x3; normal affect  Skin: No acute rash    LABORATORY/IMAGING DATA:    Most Recent 3 CBC's:Recent Labs   Lab Test 09/07/21  0630 09/06/21  1503 09/03/21  1744   WBC 8.1 9.7 14.0*   HGB 10.7* 11.4* 12.0   MCV 96 93 92    250 170     Most Recent 3 BMP's:Recent Labs   Lab Test 09/09/21  0716 09/08/21  0642 09/07/21  0630    139 137   POTASSIUM 3.2* 3.6 4.7   CHLORIDE 99 104 107   CO2 35* 27 26   BUN 29 40* 54*   CR 1.03 1.20* 1.66*   ANIONGAP 4 8 4   TANIA 9.5 9.3 9.3   * 110* 112*     Most Recent 2 LFT's:Recent Labs   Lab Test 09/06/21  1503 " 09/03/21  1744   AST 44 26   ALT 40 28   ALKPHOS 107 81   BILITOTAL 0.5 1.0     Most Recent 3 Troponin's:Recent Labs   Lab Test 09/06/21  1503 04/08/19  1303 09/16/15  1240   TROPI  --  <0.015 <0.015  The 99th percentile for upper reference range is 0.045 ug/L.  Troponin values in   the range of 0.045 - 0.120 ug/L may be associated with risks of adverse   clinical events.     TROPONIN <0.015  --   --      Most Recent 3 BNP's:Recent Labs   Lab Test 09/06/21  1503 05/05/17  1029 09/16/15  1240   NTBNPI 6,057*  --  2,760*   NTBNP  --  1,937*  --      Most Recent Cholesterol Panel:Recent Labs   Lab Test 01/30/14  0857   CHOL 199   LDL 79   HDL 83   TRIG 184*     Most Recent 6 Bacteria Isolates From Any Culture (See EPIC Reports for Culture Details):Recent Labs   Lab Test 06/22/16  1157 06/12/16  0904 03/17/15  1151 11/17/14  1200 06/06/14  0832   CULT 10,000 to 50,000 colonies/mL Mixed gram positive juan carlos 10,000 to 50,000 colonies/mL Mixed gram negative and positive juan carlos <10,000 colonies/mL Mixed gram positive juna carlos No beta hemolytic Streptococcus Group A isolated No beta hemolytic Streptococcus Group A isolated     Most Recent TSH and T4:Recent Labs   Lab Test 08/27/18  1039   TSH 1.94     Most Recent Hemoglobin A1c:Recent Labs   Lab Test 08/27/18  1039   A1C 5.3         ASSESSMENT/PLAN:  Acute on chronic HFpEF ( LVEF 65-70%),  Moderate pulmonary hypertension.  Patient was treated with IV furosemide.  PTA furosemide dose was increased to 60 mg daily and metoprolol was discontinued due to bradycardia.  She is stable from a cardiac standpoint.  She currently weighs 108 LBS.  Plan:  Continue furosemide 60 mg p.o. daily and lisinopril 20 mg p.o. twice daily  Monitor volume status  Monitor renal function and electrolytes periodically  Follow-up with PCP as outpatient    Right basilar community-acquired pneumonia,  [place]-COPD:   Acute respiratory failure with hypoxia, resolved.  Patient was treated with ceftriaxone and  azithromycin in the hospital.  Patient completed 5 days of azithromycin today, September 13.  She is currently afebrile and off supplemental oxygen.  Plan:  Continue cefuroxime 500 mg p.o. twice daily through September 14, 2021  Continue tiotropium 2 puffs daily  SLP evaluation for aspiration risk  Monitor respiratory status    SHIVANI, superimposed on CKD stage IIIb.  Cardiorenal with peak creatinine of 2.27 on September 6; improved with diuretic therapy.  Baseline creatinine 1-1.2.  Creatinine was 1.03 upon discharge from hospital.  Plan:  Avoid NSAIDs and nephrotoxins  Monitor renal function periodically    Hypokalemia.  Last potassium was 3.2 on September 9.  Plan:  We will follow-up on BMP from today, September 13, and determine whether the patient needs to be on maintenance potassium supplement    Essential hypertension,  Sinus bradycardia.  PTA metoprolol was discontinued in the hospital due to bradycardia which has now resolved.  Blood pressure is currently controlled.  Goal SBP<160.  Plan:  Continue amlodipine 5 mg p.o. daily, hydralazine 50 mg p.o. 3 times daily, furosemide 60 mg daily, and lisinopril 20 mg p.o. twice daily  Monitor blood pressure  Adjust BP regimen as indicated    Anemia.  Mild, normochromic normocytic.  Baseline hemoglobin previously in the range of 12-14.  Last hemoglobin was 9.7 on September 7, down from 12 on September 3.  No signs or symptoms of gross blood loss.  Plan:  Monitor for signs and symptoms of GI bleed  Monitor hemoglobin periodically    Dyslipidemia,  History of TIA in January 2011,  PVD.  Not on statin therapy likely due to advanced age.  Plan:  Continue aspirin 81 mg p.o. daily    Microscopic colitis.  Plan:  Continue loperamide 1 mg p.o. before meals    Depression,  Anxiety.  Plan:  Continue PTA Lexapro 10 mg p.o. daily    History of fall on September 3, 2021,  Degenerative disc disease of lumbar spine,  Physical deconditioning.  She reports no significant back pain at  this juncture.  Plan:  Discontinue oxycodone  Order acetaminophen 650 mg p.o. every 6 hours as needed for pain  Continue PT/OT evaluation and therapy         Orders written by provider at facility:  CBC on September 14, DX: Pneumonia  SLP evaluation for aspiration risk assessment  Discontinue oxycodone  Acetaminophen 650 mg p.o. every 6 hours as needed for pain      Recommendation by provider at facility:  Follow-up on BMP from today, September 13      Addendum:  BMP has been rescheduled for September 14         Total unit/floor time of 45 minutes consisted of the following: examination of patient, reviewing the record including pertinent labs and imaging. More than 50% of this time was spent in coordination of care with the patient, nursing staff, and other healthcare providers. This time was spent on discussing the care plan including labs, management of back pain, discharge/safe placement, and specialty follow up.          Electronically signed by:  Noe Alcantara MD

## 2021-09-12 ENCOUNTER — LAB REQUISITION (OUTPATIENT)
Dept: LAB | Facility: CLINIC | Age: 86
End: 2021-09-12

## 2021-09-12 DIAGNOSIS — U07.1 COVID-19: ICD-10-CM

## 2021-09-12 LAB
GAMMA INTERFERON BACKGROUND BLD IA-ACNC: 0 IU/ML
M TB IFN-G BLD-IMP: NEGATIVE
M TB IFN-G CD4+ BCKGRND COR BLD-ACNC: 0.69 IU/ML
MITOGEN IGNF BCKGRD COR BLD-ACNC: 0.01 IU/ML
MITOGEN IGNF BCKGRD COR BLD-ACNC: 0.01 IU/ML
QUANTIFERON MITOGEN: 0.69 IU/ML
QUANTIFERON NIL TUBE: 0 IU/ML
QUANTIFERON TB1 TUBE: 0.01 IU/ML
QUANTIFERON TB2 TUBE: 0.01

## 2021-09-13 ENCOUNTER — TRANSITIONAL CARE UNIT VISIT (OUTPATIENT)
Dept: GERIATRICS | Facility: CLINIC | Age: 86
End: 2021-09-13
Payer: MEDICARE

## 2021-09-13 ENCOUNTER — LAB REQUISITION (OUTPATIENT)
Dept: LAB | Facility: CLINIC | Age: 86
End: 2021-09-13

## 2021-09-13 VITALS
OXYGEN SATURATION: 96 % | SYSTOLIC BLOOD PRESSURE: 121 MMHG | HEART RATE: 71 BPM | WEIGHT: 108 LBS | HEIGHT: 63 IN | DIASTOLIC BLOOD PRESSURE: 63 MMHG | RESPIRATION RATE: 16 BRPM | BODY MASS INDEX: 19.14 KG/M2 | TEMPERATURE: 98.2 F

## 2021-09-13 DIAGNOSIS — R53.1 WEAKNESS: ICD-10-CM

## 2021-09-13 DIAGNOSIS — J45.909 UNCOMPLICATED ASTHMA, UNSPECIFIED ASTHMA SEVERITY, UNSPECIFIED WHETHER PERSISTENT: ICD-10-CM

## 2021-09-13 DIAGNOSIS — R53.81 PHYSICAL DECONDITIONING: ICD-10-CM

## 2021-09-13 DIAGNOSIS — F41.9 ANXIETY: ICD-10-CM

## 2021-09-13 DIAGNOSIS — N18.32 STAGE 3B CHRONIC KIDNEY DISEASE (H): ICD-10-CM

## 2021-09-13 DIAGNOSIS — D64.9 ANEMIA, UNSPECIFIED TYPE: ICD-10-CM

## 2021-09-13 DIAGNOSIS — I73.9 PVD (PERIPHERAL VASCULAR DISEASE) (H): ICD-10-CM

## 2021-09-13 DIAGNOSIS — J96.01 ACUTE RESPIRATORY FAILURE WITH HYPOXIA (H): ICD-10-CM

## 2021-09-13 DIAGNOSIS — E78.5 DYSLIPIDEMIA: ICD-10-CM

## 2021-09-13 DIAGNOSIS — I50.33 ACUTE ON CHRONIC HEART FAILURE WITH PRESERVED EJECTION FRACTION (H): Primary | ICD-10-CM

## 2021-09-13 DIAGNOSIS — Z86.73 HISTORY OF TIA (TRANSIENT ISCHEMIC ATTACK): ICD-10-CM

## 2021-09-13 DIAGNOSIS — M51.369 DDD (DEGENERATIVE DISC DISEASE), LUMBAR: ICD-10-CM

## 2021-09-13 DIAGNOSIS — N17.9 ACUTE KIDNEY INJURY (H): ICD-10-CM

## 2021-09-13 DIAGNOSIS — F32.A DEPRESSION, UNSPECIFIED DEPRESSION TYPE: ICD-10-CM

## 2021-09-13 DIAGNOSIS — R00.1 SINUS BRADYCARDIA: ICD-10-CM

## 2021-09-13 DIAGNOSIS — J18.9 COMMUNITY ACQUIRED PNEUMONIA OF RIGHT LOWER LOBE OF LUNG: ICD-10-CM

## 2021-09-13 DIAGNOSIS — I27.20 PULMONARY HYPERTENSION (H): ICD-10-CM

## 2021-09-13 DIAGNOSIS — I10 ESSENTIAL HYPERTENSION: ICD-10-CM

## 2021-09-13 DIAGNOSIS — K52.839 MICROSCOPIC COLITIS, UNSPECIFIED MICROSCOPIC COLITIS TYPE: ICD-10-CM

## 2021-09-13 LAB — SARS-COV-2 RNA RESP QL NAA+PROBE: NEGATIVE

## 2021-09-13 PROCEDURE — 99306 1ST NF CARE HIGH MDM 50: CPT | Performed by: INTERNAL MEDICINE

## 2021-09-13 RX ORDER — ACETAMINOPHEN 325 MG/1
650 TABLET ORAL EVERY 6 HOURS PRN
COMMUNITY
End: 2021-10-06

## 2021-09-13 ASSESSMENT — MIFFLIN-ST. JEOR: SCORE: 874.01

## 2021-09-13 NOTE — LETTER
9/13/2021        RE: Shiloh Covarrubias  [address] [facility] [address]        [facility]  INITIAL VISIT NOTE  September 13, 2021    PRIMARY CARE PROVIDER AND CLINIC:  Rajesh Vidal [address] [facility] [address]    CHIEF COMPLAINT:  Hospital follow-up/Initial visit    HPI:    Shiloh Covarrubias is a 89 year old  (11/3/1929) female who was seen at [facility] on September 13, 2021 for an initial visit.     Medical history is notable for LV diastolic dysfunction, pulmonary hypertension, COPD/asthma, hypertension, dyslipidemia, PVD, CKD, TIA, bowel obstruction, microscopic colitis, essential tremor, depression, and anxiety.    Summary of hospital course:  Patient was hospitalized at [facility] from September 6 through September 9, 2021 after presenting with dyspnea and generalized weakness.      Notably, she had been recently seen in the emergency department on September 3 for fall.  CT lumbar spine was negative for acute fracture or subluxation.  She was noted to have mild leukocytosis and UA was markedly abnormal and she was therefore prescribed cefdinir for 7 days.  Of note, urine culture grew more than 100,000 colonies per mL mixture of urogenital juan carlos.      She presented again to the emergency department on September 6 with generalized weakness, shortness of breath, and increased congestion.  In the ER she was febrile to 101.1  F and was hypoxic requiring 3 L of supplemental oxygen.  Blood work showed creatinine of 2.27, up from 1.1 on September 3 and BNP was elevated at 6,057.  Procalcitonin was 0.39.  Troponin I was less than 0.015.  CBC showed white count of 9.7 and hemoglobin of 11.4.  Test for COVID-19 was negative.  Chest x-ray showed new right pleural fluid collection and right basilar atelectasis and/or infiltrate and probable vascular congestion and worsening cardiomegaly indicating probable CHF.  CT head was negative for acute intracranial  pathology.  CT cervical and lumbar spine was negative for acute fracture or subluxation.  EKG showed sinus bradycardia with a heart rate of 51 bpm.  Echocardiogram, September 7, showed normal LV systolic function with EF of 65-70%, mild aortic regurgitation, mild mitral regurgitation, and pulmonary hypertension.  She was treated with IV furosemide for acute CHF and started on ceftriaxone and azithromycin for right basilar pneumonia.  PTA metoprolol was held due to bradycardia.  Renal function improved with diuretic therapy.  Patient responded favorably to medical treatment and was weaned off oxygen.  TCU was recommended per therapies.    Patient is admitted to this facility for medical management, nursing care, and rehab.     Of note, history was obtained from patient, facility RN, and extensive review of the chart necessitated by complex hospitalization.    Today's visit:  Patient was seen in her room, while sitting in a chair.  She appears frail but comfortable.  She believes that her respiration is doing well.  She reports no fever, chills, cough, sputum, chest pain, palpitation, dyspnea, nausea, vomiting, abdominal pain, or urinary symptoms.  She denies any significant back pain.  She at times has loose bowel movements.  Her blood pressure is running within the normal range.      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     Unspecified  asthma(493.90)      Unspecified intestinal obstruction 02/17/10    D/C 02/20/10       PAST SURGICAL HISTORY:   Past Surgical History:   Procedure Laterality Date     ESOPHAGOSCOPY, GASTROSCOPY, DUODENOSCOPY (EGD), COMBINED  2/8/2011    COMBINED ESOPHAGOSCOPY, GASTROSCOPY, DUODENOSCOPY (EGD), BIOPSY SINGLE OR MULTIPLE performed by ANGY LIMA at  GI     ESOPHAGOSCOPY, GASTROSCOPY, DUODENOSCOPY (EGD), DILATATION, COMBINED  2/8/2011    COMBINED ESOPHAGOSCOPY, GASTROSCOPY, DUODENOSCOPY (EGD), DILATATION performed by ANGY LIMA at  GI     [facility]GI ENDOSCOPY, SIMPLE EXAM  02/23/10       FAMILY HISTORY:   Family History   Problem Relation Age of Onset     Arthritis Mother      Osteoporosis Mother      Thyroid Disease Mother         Hypothyroid.     Depression Maternal Uncle         Bouts of depression     Hypertension No family hx of      C.A.D. No family hx of        SOCIAL HISTORY:  Social History     Tobacco Use     Smoking status: Never Smoker     Smokeless tobacco: Never Used   Substance Use Topics     Alcohol use: Yes     Alcohol/week: 0.0 standard drinks     Comment: 3 per  year       MEDICATIONS:  Current Outpatient Medications   Medication Sig Dispense Refill     amLODIPine (NORVASC) 5 MG tablet Take 1 tablet (5 mg) by mouth daily 90 tablet 3     ASPIRIN 81 MG OR TABS Take 81 mg by mouth daily        azithromycin (ZITHROMAX) 250 MG tablet Take 1 tablet (250 mg) by mouth daily for 3 days       cefuroxime (CEFTIN) 500 MG tablet Take 1 tablet (500 mg) by mouth 2 times daily for 5 days       EQ LORATADINE 10 MG tablet Take 1 tablet (10 mg) by mouth daily 90 tablet 3     escitalopram (LEXAPRO) 10 MG tablet Take 1 tablet (10 mg) by mouth daily 90 tablet 3     furosemide (LASIX) 20 MG tablet Take 3 tablets (60 mg) by mouth daily 180 tablet 3     hydrALAZINE (APRESOLINE) 50 MG tablet TAKE 1 TABLET BY MOUTH THREE TIMES DAILY 270 tablet 3     hypromellose (ARTIFICIAL TEARS) 0.5 % SOLN ophthalmic solution Place  "1 drop into both eyes 4 times daily as needed for dry eyes       lactobacillus rhamnosus, GG, (CULTURELL) capsule Take 1 capsule by mouth 2 times daily       lisinopril (ZESTRIL) 20 MG tablet Take 1 tablet (20 mg) by mouth 2 times daily 180 tablet 3     loperamide (IMODIUM A-D) 2 MG tablet 1/2 tab before meals 20 tablet 2     oxyCODONE (ROXICODONE) 5 MG tablet Take 0.5-1 tablets (2.5-5 mg) by mouth every 6 hours as needed for pain 12 tablet 0     tiotropium (SPIRIVA RESPIMAT) 2.5 MCG/ACT inhaler Inhale 2 puffs into the lungs daily 2 Inhaler 5       ALLERGIES:  Allergies   Allergen Reactions     Clonidine Derivatives      Severe side effects       Entocort [Corticosteroids]      Macrobid [Nitrofurantoin]      Diarrhea       Sulfa Drugs Itching     itch       ROS:  10 point ROS were negative other than the symptoms noted above in the HPI.    PHYSICAL EXAM:  Vital signs were reviewed in the chart.  Vital Signs: /63   Pulse 71   Temp 98.2  F (36.8  C)   Resp 16   Ht 1.6 m (5' 3\")   Wt 49 kg (108 lb)   LMP  (LMP Unknown)   SpO2 96%   BMI 19.13 kg/m    General: Frail appearing but comfortable and in no acute distress  HEENT: Normocephalic; oropharynx clear  Cardiovascular: Normal S1, S2, RRR  Respiratory: Lungs clear to auscultation bilaterally  GI: Abdomen soft, non-tender, non-distended, +BS  Extremities: Trace to 1+ bilateral LE edema  Neuro: CX II-XII grossly intact; ROM in all four extremities grossly intact  Psych: Alert and oriented x3; normal affect  Skin: No acute rash    LABORATORY/IMAGING DATA:    Most Recent 3 CBC's:Recent Labs   Lab Test 09/07/21  0630 09/06/21  1503 09/03/21  1744   WBC 8.1 9.7 14.0*   HGB 10.7* 11.4* 12.0   MCV 96 93 92    250 170     Most Recent 3 BMP's:Recent Labs   Lab Test 09/09/21  0716 09/08/21  0642 09/07/21  0630    139 137   POTASSIUM 3.2* 3.6 4.7   CHLORIDE 99 104 107   CO2 35* 27 26   BUN 29 40* 54*   CR 1.03 1.20* 1.66*   ANIONGAP 4 8 4   TANIA 9.5 9.3 " 9.3   * 110* 112*     Most Recent 2 LFT's:Recent Labs   Lab Test 09/06/21  1503 09/03/21  1744   AST 44 26   ALT 40 28   ALKPHOS 107 81   BILITOTAL 0.5 1.0     Most Recent 3 Troponin's:Recent Labs   Lab Test 09/06/21  1503 04/08/19  1303 09/16/15  1240   TROPI  --  <0.015 <0.015  The 99th percentile for upper reference range is 0.045 ug/L.  Troponin values in   the range of 0.045 - 0.120 ug/L may be associated with risks of adverse   clinical events.     TROPONIN <0.015  --   --      Most Recent 3 BNP's:Recent Labs   Lab Test 09/06/21  1503 05/05/17  1029 09/16/15  1240   NTBNPI 6,057*  --  2,760*   NTBNP  --  1,937*  --      Most Recent Cholesterol Panel:Recent Labs   Lab Test 01/30/14  0857   CHOL 199   LDL 79   HDL 83   TRIG 184*     Most Recent 6 Bacteria Isolates From Any Culture (See EPIC Reports for Culture Details):Recent Labs   Lab Test 06/22/16  1157 06/12/16  0904 03/17/15  1151 11/17/14  1200 06/06/14  0832   CULT 10,000 to 50,000 colonies/mL Mixed gram positive juan carlos 10,000 to 50,000 colonies/mL Mixed gram negative and positive juan carlos <10,000 colonies/mL Mixed gram positive juan carlos No beta hemolytic Streptococcus Group A isolated No beta hemolytic Streptococcus Group A isolated     Most Recent TSH and T4:Recent Labs   Lab Test 08/27/18  1039   TSH 1.94     Most Recent Hemoglobin A1c:Recent Labs   Lab Test 08/27/18  1039   A1C 5.3         ASSESSMENT/PLAN:  Acute on chronic HFpEF ( LVEF 65-70%),  Moderate pulmonary hypertension.  Patient was treated with IV furosemide.  PTA furosemide dose was increased to 60 mg daily and metoprolol was discontinued due to bradycardia.  She is stable from a cardiac standpoint.  She currently weighs 108 LBS.  Plan:  Continue furosemide 60 mg p.o. daily and lisinopril 20 mg p.o. twice daily  Monitor volume status  Monitor renal function and electrolytes periodically  Follow-up with PCP as outpatient    Right basilar community-acquired pneumonia,  [place]-COPD:   Acute  respiratory failure with hypoxia, resolved.  Patient was treated with ceftriaxone and azithromycin in the hospital.  Patient completed 5 days of azithromycin today, September 13.  She is currently afebrile and off supplemental oxygen.  Plan:  Continue cefuroxime 500 mg p.o. twice daily through September 14, 2021  Continue tiotropium 2 puffs daily  SLP evaluation for aspiration risk  Monitor respiratory status    SHIVANI, superimposed on CKD stage IIIb.  Cardiorenal with peak creatinine of 2.27 on September 6; improved with diuretic therapy.  Baseline creatinine 1-1.2.  Creatinine was 1.03 upon discharge from hospital.  Plan:  Avoid NSAIDs and nephrotoxins  Monitor renal function periodically    Essential hypertension,  Sinus bradycardia.  PTA metoprolol was discontinued in the hospital due to bradycardia which has now resolved.  Blood pressure is currently controlled.  Goal SBP<160.  Plan:  Continue amlodipine 5 mg p.o. daily, hydralazine 50 mg p.o. 3 times daily, furosemide 60 mg daily, and lisinopril 20 mg p.o. twice daily  Monitor blood pressure  Adjust BP regimen as indicated    Anemia.  Mild, normochromic normocytic.  Baseline hemoglobin previously in the range of 12-14.  Last hemoglobin was 9.7 on September 7, down from 12 on September 3.  No signs or symptoms of gross blood loss.  Plan:  Monitor for signs and symptoms of GI bleed  Monitor hemoglobin periodically    Dyslipidemia,  History of TIA in January 2011,  PVD.  Not on statin therapy likely due to advanced age.  Plan:  Continue aspirin 81 mg p.o. daily    Microscopic colitis.  Plan:  Continue loperamide 1 mg p.o. before meals    Depression,  Anxiety.  Plan:  Continue PTA Lexapro 10 mg p.o. daily    History of fall on September 3, 2021,  Degenerative disc disease of lumbar spine,  Physical deconditioning.  She reports no significant back pain at this juncture.  Plan:  Discontinue oxycodone  Order acetaminophen 650 mg p.o. every 6 hours as needed for  pain  Continue PT/OT evaluation and therapy         Orders written by provider at facility:  CBC on September 14, DX: Pneumonia  SLP evaluation for aspiration risk assessment  Discontinue oxycodone  Acetaminophen 650 mg p.o. every 6 hours as needed for pain      Recommendation by provider at facility:  Follow-up on BMP from today, September 13        Total unit/floor time of 45 minutes consisted of the following: examination of patient, reviewing the record including pertinent labs and imaging. More than 50% of this time was spent in coordination of care with the patient, nursing staff, and other healthcare providers. This time was spent on discussing the care plan including labs, management of back pain, discharge/safe placement, and specialty follow up.        Electronically signed by:  Noe Alcantara MD                          Sincerely,        Noe Alcantara MD

## 2021-09-13 NOTE — PATIENT INSTRUCTIONS
Orders for Shiloh Covarrubias (11/3/1929), MR# [id number]:    1. CBC on September 14, DX: Pneumonia  2. SLP evaluation for aspiration risk assessment  3. Discontinue oxycodone  4. Acetaminophen 650 mg p.o. every 6 hours as needed for pain    Noe Alcantara MD  [facility]

## 2021-09-14 ENCOUNTER — LAB REQUISITION (OUTPATIENT)
Dept: LAB | Facility: CLINIC | Age: 86
End: 2021-09-14

## 2021-09-14 DIAGNOSIS — U07.1 COVID-19: ICD-10-CM

## 2021-09-14 LAB
ANION GAP SERPL CALCULATED.3IONS-SCNC: 1 MMOL/L (ref 3–14)
BUN SERPL-MCNC: 40 MG/DL (ref 7–30)
CALCIUM SERPL-MCNC: 9 MG/DL (ref 8.5–10.1)
CHLORIDE BLD-SCNC: 99 MMOL/L (ref 94–109)
CO2 SERPL-SCNC: 36 MMOL/L (ref 20–32)
CREAT SERPL-MCNC: 1.44 MG/DL (ref 0.52–1.04)
GFR SERPL CREATININE-BSD FRML MDRD: 32 ML/MIN/1.73M2
GLUCOSE BLD-MCNC: 87 MG/DL (ref 70–99)
POTASSIUM BLD-SCNC: 4.6 MMOL/L (ref 3.4–5.3)
SARS-COV-2 RNA RESP QL NAA+PROBE: NEGATIVE
SODIUM SERPL-SCNC: 136 MMOL/L (ref 133–144)

## 2021-09-14 PROCEDURE — U0005 INFEC AGEN DETEC AMPLI PROBE: HCPCS | Performed by: NURSE PRACTITIONER

## 2021-09-14 PROCEDURE — 80048 BASIC METABOLIC PNL TOTAL CA: CPT | Performed by: NURSE PRACTITIONER

## 2021-09-14 PROCEDURE — P9604 ONE-WAY ALLOW PRORATED TRIP: HCPCS | Performed by: NURSE PRACTITIONER

## 2021-09-14 PROCEDURE — 36415 COLL VENOUS BLD VENIPUNCTURE: CPT | Performed by: NURSE PRACTITIONER

## 2021-09-15 ENCOUNTER — LAB REQUISITION (OUTPATIENT)
Dept: LAB | Facility: CLINIC | Age: 86
End: 2021-09-15

## 2021-09-15 DIAGNOSIS — E87.6 HYPOKALEMIA: ICD-10-CM

## 2021-09-16 ENCOUNTER — TRANSITIONAL CARE UNIT VISIT (OUTPATIENT)
Dept: GERIATRICS | Facility: CLINIC | Age: 86
End: 2021-09-16
Payer: MEDICARE

## 2021-09-16 VITALS
TEMPERATURE: 98.4 F | BODY MASS INDEX: 19.56 KG/M2 | OXYGEN SATURATION: 93 % | RESPIRATION RATE: 16 BRPM | SYSTOLIC BLOOD PRESSURE: 177 MMHG | DIASTOLIC BLOOD PRESSURE: 64 MMHG | HEART RATE: 80 BPM | WEIGHT: 110.4 LBS | HEIGHT: 63 IN

## 2021-09-16 DIAGNOSIS — R53.81 PHYSICAL DECONDITIONING: ICD-10-CM

## 2021-09-16 DIAGNOSIS — E78.5 DYSLIPIDEMIA: ICD-10-CM

## 2021-09-16 DIAGNOSIS — F41.9 ANXIETY: ICD-10-CM

## 2021-09-16 DIAGNOSIS — I73.9 PVD (PERIPHERAL VASCULAR DISEASE) (H): ICD-10-CM

## 2021-09-16 DIAGNOSIS — I10 ESSENTIAL HYPERTENSION: ICD-10-CM

## 2021-09-16 DIAGNOSIS — N18.32 STAGE 3B CHRONIC KIDNEY DISEASE (H): ICD-10-CM

## 2021-09-16 DIAGNOSIS — I50.33 ACUTE ON CHRONIC HEART FAILURE WITH PRESERVED EJECTION FRACTION (H): Primary | ICD-10-CM

## 2021-09-16 DIAGNOSIS — R00.1 SINUS BRADYCARDIA: ICD-10-CM

## 2021-09-16 DIAGNOSIS — I27.20 PULMONARY HYPERTENSION (H): ICD-10-CM

## 2021-09-16 DIAGNOSIS — F32.A DEPRESSION, UNSPECIFIED DEPRESSION TYPE: ICD-10-CM

## 2021-09-16 DIAGNOSIS — Z86.73 HISTORY OF TIA (TRANSIENT ISCHEMIC ATTACK): ICD-10-CM

## 2021-09-16 DIAGNOSIS — K52.839 MICROSCOPIC COLITIS, UNSPECIFIED MICROSCOPIC COLITIS TYPE: ICD-10-CM

## 2021-09-16 DIAGNOSIS — J18.9 COMMUNITY ACQUIRED PNEUMONIA OF RIGHT LOWER LOBE OF LUNG: ICD-10-CM

## 2021-09-16 DIAGNOSIS — D64.9 ANEMIA, UNSPECIFIED TYPE: ICD-10-CM

## 2021-09-16 DIAGNOSIS — J45.909 UNCOMPLICATED ASTHMA, UNSPECIFIED ASTHMA SEVERITY, UNSPECIFIED WHETHER PERSISTENT: ICD-10-CM

## 2021-09-16 DIAGNOSIS — M51.369 DDD (DEGENERATIVE DISC DISEASE), LUMBAR: ICD-10-CM

## 2021-09-16 DIAGNOSIS — J96.01 ACUTE RESPIRATORY FAILURE WITH HYPOXIA (H): ICD-10-CM

## 2021-09-16 LAB
ANION GAP SERPL CALCULATED.3IONS-SCNC: 5 MMOL/L (ref 3–14)
BUN SERPL-MCNC: 40 MG/DL (ref 7–30)
CALCIUM SERPL-MCNC: 10.2 MG/DL (ref 8.5–10.1)
CHLORIDE BLD-SCNC: 100 MMOL/L (ref 94–109)
CO2 SERPL-SCNC: 31 MMOL/L (ref 20–32)
CREAT SERPL-MCNC: 1.39 MG/DL (ref 0.52–1.04)
GFR SERPL CREATININE-BSD FRML MDRD: 33 ML/MIN/1.73M2
GLUCOSE BLD-MCNC: 82 MG/DL (ref 70–99)
POTASSIUM BLD-SCNC: 4.7 MMOL/L (ref 3.4–5.3)
SODIUM SERPL-SCNC: 136 MMOL/L (ref 133–144)

## 2021-09-16 PROCEDURE — 36415 COLL VENOUS BLD VENIPUNCTURE: CPT | Performed by: NURSE PRACTITIONER

## 2021-09-16 PROCEDURE — 99309 SBSQ NF CARE MODERATE MDM 30: CPT | Performed by: NURSE PRACTITIONER

## 2021-09-16 PROCEDURE — 80048 BASIC METABOLIC PNL TOTAL CA: CPT | Performed by: NURSE PRACTITIONER

## 2021-09-16 RX ORDER — POTASSIUM CHLORIDE 1500 MG/1
20 TABLET, EXTENDED RELEASE ORAL DAILY
Start: 2021-09-16 | End: 2021-09-21

## 2021-09-16 RX ORDER — FUROSEMIDE 20 MG
40 TABLET ORAL DAILY
Qty: 180 TABLET | Refills: 3
Start: 2021-09-16 | End: 2021-12-02

## 2021-09-16 ASSESSMENT — MIFFLIN-ST. JEOR: SCORE: 884.9

## 2021-09-16 NOTE — PROGRESS NOTES
[facility]    Chief Complaint   Patient presents with     RECHECK     HPI:  Shiloh Covarrubias is a 89 year old  (11/3/1929), who is being seen today for an episodic care visit at: [facility]  ([facility]) [[id number]].      Shiloh Covarrubias with PMH significant for pulmonary HTN, COPD/asthma, HTN, dyslipidemia, PVD, CKD, Microscopic colitis, depression, anxiety, history of TIA.      Seen in ED on 9/3/21 after a fall and weakness. Had CT of lumbar/spine that was negative for acute fracture or subluxation. Was treated with cefdinir for abnormal UA and leukocytosis. UC  >100,000 colonies mixture of urogenital juan carlos.    Hospitalized most recently from 9/6-9/9 after SOB, weakness. She required 3L of oxygen, had temp of 101.1. With SHIVANI: Creatinine was elevated to 2.27. Chest xray with new right pleural fluid collection and right basilar atelectasis and/or infilitrate and probable vascular congestion and worsening cardiomegaly showing probable CHF. Was treated with antibiotics for right basilar pneumonia and treated with IV lasix for acute CHF. CT head negative for acute findings. CT cervical and lumbar spine negative for acute fracture or subluxation. Was found to be bradycardic and metoprolol was held. Kidney function improved after diuresis. She was weaned off oxygen prior to discharge. Discharged to [facility] for physical rehabilitation and medical management.     Creatinine increased to 1.44 on 9/14, lasix dose was reduced from 60 mg on admission to 40 mg daily. She has BMP pending today.    Matilde is sitting on edge of bed this morning. She tells me therapy is going well. She uses cane for ambulation. Lives in apartment connected to her family. Denies SOB, CP, denies edema to legs. Bowels moving well- denies any loose stools or constipation, or black or bloody stools. Denies dysuria or trouble voiding.     Allergies, and PMH/PSH reviewed in [facility] today.  REVIEW OF SYSTEMS:  4 point ROS including  "Respiratory, CV, GI and , other than that noted in the HPI,  is negative    Objective:   BP (!) 177/64   Pulse 80   Temp 98.4  F (36.9  C)   Resp 16   Ht 1.6 m (5' 3\")   Wt 50.1 kg (110 lb 6.4 oz)   LMP  (LMP Unknown)   SpO2 93%   BMI 19.56 kg/m    GENERAL APPEARANCE:  Alert, in NAD  HEENT: normocephalic, moist mucous membranes, nose without drainage or crusting  RESP:  respiratory effort normal, no respiratory distress, Lung sounds clear, patient is on RA  CV: auscultation of heart done, rate and rhythm regular.   ABDOMEN: + bowel sounds, soft, nontender, no grimacing or guarding with palpation.  M/S:   Gait and station abnormal: uses cane for ambulation; no lower extremity edema  NEURO: cranial nerves 2-12 grossly intact and at patient's baseline; no facial asymmetry, no speech deficits and able to follow directions  PSYCH: oriented x 3,  affect and mood ok    [facility]. Please refer to them using [facility] Everywhere. and Recent labs in [facility] reviewed by me today.     Assessment/Plan:  (I50.33) Acute on chronic heart failure with preserved ejection fraction (H)  (primary encounter diagnosis)  (I27.20) Pulmonary hypertension (H)  Comment: no signs of ongoing exacerbation today  -ECHO 9/7: \"Left ventricular systolic function is normal.The visual ejection fraction is 65-70%.  The right ventricular systolic function is normal.  The left atrium is moderate to severely dilated.  Mild valvular aortic stenosis.There is mild (1+) aortic regurgitation.There is moderate mitral annular calcification.There is mild (1+) mitral regurgitation.Pulmonary hypertension- RVSP 48 mm hg +RA.\"  Wt Readings from Last 4 Encounters:   09/16/21 50.1 kg (110 lb 6.4 oz)   09/13/21 49 kg (108 lb)   09/07/21 56.2 kg (123 lb 12.8 oz)   09/06/21 56.7 kg (125 lb)     -lasix dose was reduced to 40 mg daily, from discharge dose of 60 mg daily  Plan: Continue lasix 40 mg daily, lisinopril 20 mg BID. BMP pending " today. Daily weights. Notify provider with weight gain >2 lbs in a day, >5 lbs in on week. 2 gram Na diet.     (J18.9) Community acquired pneumonia of right lower lobe of lung  (J96.01) Acute respiratory failure with hypoxia (H)  (J45.909) Uncomplicated asthma, unspecified asthma severity, unspecified whether persistent  Comment: denies SOB, lungs clear today, afebrile, was weaned off oxygen prior to admission to facility.   -treated with ceftriaxone/cefuroxime and azithromycin and completed in TCU  Plan: CBC not checked for unknown reason earlier this week. CBC 9/20. Continue tiotoprium. Monitor clinically    (N18.32) Stage 3b chronic kidney disease  Comment: with elevated creatinine at check on 9/14  -suspected to be secondary to increased lasix dose of 60 mg daily  -also with SHIVANI on admission to hospital that improved with diuretics  -baseline creatinine 1-1.2  Creatinine   Date Value Ref Range Status   09/14/2021 1.44 (H) 0.52 - 1.04 mg/dL Final   04/09/2021 1.24 (H) 0.52 - 1.04 mg/dL Final     Plan: BMP pending today. Renally dose medications.     (R00.1) Sinus bradycardia-resolved  Comment: HR 82, 80, 64  -PTA metoprolol was discontinued in the hospital  Plan: Monitor HR.    (I10) Essential hypertension  Comment: variable control; 115/65, 177/64, 146/55, 111/61, 169/71   -due to age, risk for falls, ok with higher BP goal of SBP<160  Plan: Change amlodipine administration to at bedtime. Continue hydralazine 50 mg TID, lasix 40 mg daily and lisinopril 20 mg BID. VS per policy. Adjust medications as needed.    (D64.9) Anemia, unspecified type  Comment:  no s/sx of bleeding  Hemoglobin   Date Value Ref Range Status   09/07/2021 10.7 (L) 11.7 - 15.7 g/dL Final   09/06/2021 11.4 (L) 11.7 - 15.7 g/dL Final   04/09/2021 13.0 11.7 - 15.7 g/dL Final   04/08/2019 14.0 11.7 - 15.7 g/dL Final     Plan: CBC 9/20. Monitor for s/sx of bleeding.     (M51.36) DDD (degenerative disc disease), lumbar  Comment: no complaints of  pain today  -oxycodone discontinued in TCU  Plan: Continue tylenol PRN.     (I73.9) PVD (peripheral vascular disease) (H)  (Z86.73) History of TIA (transient ischemic attack)  (E78.5) Dyslipidemia  Comment: chronic   Plan: Continue baby asa. Is not on statin.     (K52.839) Microscopic colitis, unspecified microscopic colitis type  Comment: is receiving 1 full tab of immodium before meals instead of home dose of 1/2 tab, due to pharmacy not being able to supply 1/2 tab  -reports bowels moving fine. Denies constipation  Plan: Continue imodium with meals. Monitor bowels closely to avoid constipation    (F32.9) Depression, unspecified depression type  (F41.9) Anxiety  Comment: mood and spirits seem good today  Plan: Continue lexapro. Monitor mood. ACP PRN    (R53.81) Physical deconditioning  Comment: Acute, secondary to recent hospitalization, medical conditions as above  Plan: Encourage participation in physical therapy/occupational therapy for strengthening and deconditioning. Discharge planning per their recommendation. Social work to assist with d/c planning.      Electronically signed by: CORNELIUS Dick CNP

## 2021-09-16 NOTE — LETTER
9/16/2021        RE: Shiloh Covarrubias  [address] [facility] [address]        [facility] GERIATRIC SERVICES    Chief Complaint   Patient presents with     RECHECK     HPI:  Shiloh Covarrubias is a 89 year old  (11/3/1929), who is being seen today for an episodic care visit at: [facility]  ([facility]) [[id number]].      Shiloh Covarrubias with PMH significant for pulmonary HTN, COPD/asthma, HTN, dyslipidemia, PVD, CKD, Microscopic colitis, depression, anxiety, history of TIA.      Seen in ED on 9/3/21 after a fall and weakness. Had CT of lumbar/spine that was negative for acute fracture or subluxation. Was treated with cefdinir for abnormal UA and leukocytosis. UC  >100,000 colonies mixture of urogenital juan carlos.    Hospitalized most recently from 9/6-9/9 after SOB, weakness. She required 3L of oxygen, had temp of 101.1. With SHIVANI: Creatinine was elevated to 2.27. Chest xray with new right pleural fluid collection and right basilar atelectasis and/or infilitrate and probable vascular congestion and worsening cardiomegaly showing probable CHF. Was treated with antibiotics for right basilar pneumonia and treated with IV lasix for acute CHF. CT head negative for acute findings. CT cervical and lumbar spine negative for acute fracture or subluxation. Was found to be bradycardic and metoprolol was held. Kidney function improved after diuresis. She was weaned off oxygen prior to discharge. Discharged to [facility] for physical rehabilitation and medical management.     Creatinine increased to 1.44 on 9/14, lasix dose was reduced from 60 mg on admission to 40 mg daily. She has BMP pending today.    Matilde is sitting on edge of bed this morning. She tells me therapy is going well. She uses cane for ambulation. Lives in apartment connected to her family. Denies SOB, CP, denies edema to legs. Bowels moving well- denies any loose stools or constipation, or black or bloody stools. Denies dysuria or trouble voiding.  "    Allergies, and PMH/PSH reviewed in EPIC today.  REVIEW OF SYSTEMS:  4 point ROS including Respiratory, CV, GI and , other than that noted in the HPI,  is negative    Objective:   BP (!) 177/64   Pulse 80   Temp 98.4  F (36.9  C)   Resp 16   Ht 1.6 m (5' 3\")   Wt 50.1 kg (110 lb 6.4 oz)   LMP  (LMP Unknown)   SpO2 93%   BMI 19.56 kg/m    GENERAL APPEARANCE:  Alert, in NAD  HEENT: normocephalic, moist mucous membranes, nose without drainage or crusting  RESP:  respiratory effort normal, no respiratory distress, Lung sounds clear, patient is on RA  CV: auscultation of heart done, rate and rhythm regular.   ABDOMEN: + bowel sounds, soft, nontender, no grimacing or guarding with palpation.  M/S:   Gait and station abnormal: uses cane for ambulation; no lower extremity edema  NEURO: cranial nerves 2-12 grossly intact and at patient's baseline; no facial asymmetry, no speech deficits and able to follow directions  PSYCH: oriented x 3,  affect and mood ok    Labs done in SNF are in [facility]. Please refer to them using Grid20/20/Care Everywhere. and Recent labs in EPIC reviewed by me today.     Assessment/Plan:  (I50.33) Acute on chronic heart failure with preserved ejection fraction (H)  (primary encounter diagnosis)  (I27.20) Pulmonary hypertension (H)  Comment: no signs of ongoing exacerbation today  -ECHO 9/7: \"Left ventricular systolic function is normal.The visual ejection fraction is 65-70%.  The right ventricular systolic function is normal.  The left atrium is moderate to severely dilated.  Mild valvular aortic stenosis.There is mild (1+) aortic regurgitation.There is moderate mitral annular calcification.There is mild (1+) mitral regurgitation.Pulmonary hypertension- RVSP 48 mm hg +RA.\"  Wt Readings from Last 4 Encounters:   09/16/21 50.1 kg (110 lb 6.4 oz)   09/13/21 49 kg (108 lb)   09/07/21 56.2 kg (123 lb 12.8 oz)   09/06/21 56.7 kg (125 lb)     -lasix dose was reduced to 40 mg daily, from " discharge dose of 60 mg daily  Plan: Continue lasix 40 mg daily, lisinopril 20 mg BID. BMP pending today. Daily weights. Notify provider with weight gain >2 lbs in a day, >5 lbs in on week. 2 gram Na diet.     (J18.9) Community acquired pneumonia of right lower lobe of lung  (J96.01) Acute respiratory failure with hypoxia (H)  (J45.909) Uncomplicated asthma, unspecified asthma severity, unspecified whether persistent  Comment: denies SOB, lungs clear today, afebrile, was weaned off oxygen prior to admission to facility.   -treated with ceftriaxone/cefuroxime and azithromycin and completed in TCU  Plan: CBC not checked for unknown reason earlier this week. CBC 9/20. Continue tiotoprium. Monitor clinically    (N18.32) Stage 3b chronic kidney disease  Comment: with elevated creatinine at check on 9/14  -suspected to be secondary to increased lasix dose of 60 mg daily  -also with SHIVANI on admission to hospital that improved with diuretics  -baseline creatinine 1-1.2  Creatinine   Date Value Ref Range Status   09/14/2021 1.44 (H) 0.52 - 1.04 mg/dL Final   04/09/2021 1.24 (H) 0.52 - 1.04 mg/dL Final     Plan: BMP pending today. Renally dose medications.     (R00.1) Sinus bradycardia-resolved  Comment: HR 82, 80, 64  -PTA metoprolol was discontinued in the hospital  Plan: Monitor HR.    (I10) Essential hypertension  Comment: variable control; 115/65, 177/64, 146/55, 111/61, 169/71   -due to age, risk for falls, ok with higher BP goal of SBP<160  Plan: Change amlodipine administration to at bedtime. Continue hydralazine 50 mg TID, lasix 40 mg daily and lisinopril 20 mg BID. VS per policy. Adjust medications as needed.    (D64.9) Anemia, unspecified type  Comment:  no s/sx of bleeding  Hemoglobin   Date Value Ref Range Status   09/07/2021 10.7 (L) 11.7 - 15.7 g/dL Final   09/06/2021 11.4 (L) 11.7 - 15.7 g/dL Final   04/09/2021 13.0 11.7 - 15.7 g/dL Final   04/08/2019 14.0 11.7 - 15.7 g/dL Final     Plan: CBC 9/20. Monitor for  s/sx of bleeding.     (M51.36) DDD (degenerative disc disease), lumbar  Comment: no complaints of pain today  -oxycodone discontinued in TCU  Plan: Continue tylenol PRN.     (I73.9) PVD (peripheral vascular disease) (H)  (Z86.73) History of TIA (transient ischemic attack)  (E78.5) Dyslipidemia  Comment: chronic   Plan: Continue baby asa. Is not on statin.     (K52.839) Microscopic colitis, unspecified microscopic colitis type  Comment: is receiving 1 full tab of immodium before meals instead of home dose of 1/2 tab, due to pharmacy not being able to supply 1/2 tab  -reports bowels moving fine. Denies constipation  Plan: Continue imodium with meals. Monitor bowels closely to avoid constipation    (F32.9) Depression, unspecified depression type  (F41.9) Anxiety  Comment: mood and spirits seem good today  Plan: Continue lexapro. Monitor mood. ACP PRN    (R53.81) Physical deconditioning  Comment: Acute, secondary to recent hospitalization, medical conditions as above  Plan: Encourage participation in physical therapy/occupational therapy for strengthening and deconditioning. Discharge planning per their recommendation. Social work to assist with d/c planning.      Electronically signed by: CORNELIUS Dick CNP             Sincerely,        CORNELIUS Dick CNP

## 2021-09-17 ENCOUNTER — LAB REQUISITION (OUTPATIENT)
Dept: LAB | Facility: CLINIC | Age: 86
End: 2021-09-17

## 2021-09-17 DIAGNOSIS — U07.1 COVID-19: ICD-10-CM

## 2021-09-17 PROCEDURE — U0003 INFECTIOUS AGENT DETECTION BY NUCLEIC ACID (DNA OR RNA); SEVERE ACUTE RESPIRATORY SYNDROME CORONAVIRUS 2 (SARS-COV-2) (CORONAVIRUS DISEASE [COVID-19]), AMPLIFIED PROBE TECHNIQUE, MAKING USE OF HIGH THROUGHPUT TECHNOLOGIES AS DESCRIBED BY CMS-2020-01-R: HCPCS | Performed by: NURSE PRACTITIONER

## 2021-09-18 LAB — SARS-COV-2 RNA RESP QL NAA+PROBE: NEGATIVE

## 2021-09-19 ENCOUNTER — LAB REQUISITION (OUTPATIENT)
Dept: LAB | Facility: CLINIC | Age: 86
End: 2021-09-19

## 2021-09-19 ENCOUNTER — HEALTH MAINTENANCE LETTER (OUTPATIENT)
Age: 86
End: 2021-09-19

## 2021-09-19 DIAGNOSIS — J18.9 PNEUMONIA, UNSPECIFIED ORGANISM: ICD-10-CM

## 2021-09-20 ENCOUNTER — LAB REQUISITION (OUTPATIENT)
Dept: LAB | Facility: CLINIC | Age: 86
End: 2021-09-20

## 2021-09-20 DIAGNOSIS — D64.9 ANEMIA, UNSPECIFIED: ICD-10-CM

## 2021-09-20 LAB
ERYTHROCYTE [DISTWIDTH] IN BLOOD BY AUTOMATED COUNT: 12.8 % (ref 10–15)
HCT VFR BLD AUTO: 36.1 % (ref 35–47)
HGB BLD-MCNC: 11 G/DL (ref 11.7–15.7)
MCH RBC QN AUTO: 29.7 PG (ref 26.5–33)
MCHC RBC AUTO-ENTMCNC: 30.5 G/DL (ref 31.5–36.5)
MCV RBC AUTO: 98 FL (ref 78–100)
PLATELET # BLD AUTO: 284 10E3/UL (ref 150–450)
RBC # BLD AUTO: 3.7 10E6/UL (ref 3.8–5.2)
WBC # BLD AUTO: 6.2 10E3/UL (ref 4–11)

## 2021-09-20 PROCEDURE — P9604 ONE-WAY ALLOW PRORATED TRIP: HCPCS | Performed by: NURSE PRACTITIONER

## 2021-09-20 PROCEDURE — 36415 COLL VENOUS BLD VENIPUNCTURE: CPT | Performed by: NURSE PRACTITIONER

## 2021-09-20 PROCEDURE — 85027 COMPLETE CBC AUTOMATED: CPT | Performed by: NURSE PRACTITIONER

## 2021-09-21 ENCOUNTER — DISCHARGE SUMMARY NURSING HOME (OUTPATIENT)
Dept: GERIATRICS | Facility: CLINIC | Age: 86
End: 2021-09-21
Payer: MEDICARE

## 2021-09-21 VITALS
WEIGHT: 110.8 LBS | OXYGEN SATURATION: 95 % | HEART RATE: 76 BPM | BODY MASS INDEX: 19.63 KG/M2 | HEIGHT: 63 IN | DIASTOLIC BLOOD PRESSURE: 67 MMHG | RESPIRATION RATE: 16 BRPM | SYSTOLIC BLOOD PRESSURE: 124 MMHG | TEMPERATURE: 98.2 F

## 2021-09-21 DIAGNOSIS — J18.9 COMMUNITY ACQUIRED PNEUMONIA OF RIGHT LOWER LOBE OF LUNG: ICD-10-CM

## 2021-09-21 DIAGNOSIS — D64.9 ANEMIA, UNSPECIFIED TYPE: ICD-10-CM

## 2021-09-21 DIAGNOSIS — M51.369 DDD (DEGENERATIVE DISC DISEASE), LUMBAR: ICD-10-CM

## 2021-09-21 DIAGNOSIS — K52.839 MICROSCOPIC COLITIS, UNSPECIFIED MICROSCOPIC COLITIS TYPE: ICD-10-CM

## 2021-09-21 DIAGNOSIS — J96.01 ACUTE RESPIRATORY FAILURE WITH HYPOXIA (H): ICD-10-CM

## 2021-09-21 DIAGNOSIS — F41.9 ANXIETY: ICD-10-CM

## 2021-09-21 DIAGNOSIS — F32.A DEPRESSION, UNSPECIFIED DEPRESSION TYPE: ICD-10-CM

## 2021-09-21 DIAGNOSIS — I73.9 PVD (PERIPHERAL VASCULAR DISEASE) (H): ICD-10-CM

## 2021-09-21 DIAGNOSIS — R53.81 PHYSICAL DECONDITIONING: ICD-10-CM

## 2021-09-21 DIAGNOSIS — R00.1 SINUS BRADYCARDIA: ICD-10-CM

## 2021-09-21 DIAGNOSIS — Z86.73 HISTORY OF TIA (TRANSIENT ISCHEMIC ATTACK): ICD-10-CM

## 2021-09-21 DIAGNOSIS — N18.32 STAGE 3B CHRONIC KIDNEY DISEASE (H): ICD-10-CM

## 2021-09-21 DIAGNOSIS — I10 ESSENTIAL HYPERTENSION: ICD-10-CM

## 2021-09-21 DIAGNOSIS — I27.20 PULMONARY HYPERTENSION (H): ICD-10-CM

## 2021-09-21 DIAGNOSIS — N17.9 AKI (ACUTE KIDNEY INJURY) (H): Primary | ICD-10-CM

## 2021-09-21 DIAGNOSIS — J45.909 UNCOMPLICATED ASTHMA, UNSPECIFIED ASTHMA SEVERITY, UNSPECIFIED WHETHER PERSISTENT: ICD-10-CM

## 2021-09-21 DIAGNOSIS — E78.5 DYSLIPIDEMIA: ICD-10-CM

## 2021-09-21 DIAGNOSIS — I50.33 ACUTE ON CHRONIC HEART FAILURE WITH PRESERVED EJECTION FRACTION (H): ICD-10-CM

## 2021-09-21 LAB
ANION GAP SERPL CALCULATED.3IONS-SCNC: 6 MMOL/L (ref 3–14)
BUN SERPL-MCNC: 53 MG/DL (ref 7–30)
CALCIUM SERPL-MCNC: 9.3 MG/DL (ref 8.5–10.1)
CHLORIDE BLD-SCNC: 100 MMOL/L (ref 94–109)
CO2 SERPL-SCNC: 32 MMOL/L (ref 20–32)
CREAT SERPL-MCNC: 1.56 MG/DL (ref 0.52–1.04)
ERYTHROCYTE [DISTWIDTH] IN BLOOD BY AUTOMATED COUNT: 12.8 % (ref 10–15)
GFR SERPL CREATININE-BSD FRML MDRD: 29 ML/MIN/1.73M2
GLUCOSE BLD-MCNC: 71 MG/DL (ref 70–99)
HCT VFR BLD AUTO: 36 % (ref 35–47)
HGB BLD-MCNC: 11.2 G/DL (ref 11.7–15.7)
MCH RBC QN AUTO: 30.5 PG (ref 26.5–33)
MCHC RBC AUTO-ENTMCNC: 31.1 G/DL (ref 31.5–36.5)
MCV RBC AUTO: 98 FL (ref 78–100)
PLATELET # BLD AUTO: 274 10E3/UL (ref 150–450)
POTASSIUM BLD-SCNC: 5 MMOL/L (ref 3.4–5.3)
RBC # BLD AUTO: 3.67 10E6/UL (ref 3.8–5.2)
SODIUM SERPL-SCNC: 138 MMOL/L (ref 133–144)
WBC # BLD AUTO: 6.2 10E3/UL (ref 4–11)

## 2021-09-21 PROCEDURE — 99207 PR CDG-CODE CATEGORY CHANGED: CPT | Performed by: NURSE PRACTITIONER

## 2021-09-21 PROCEDURE — 36415 COLL VENOUS BLD VENIPUNCTURE: CPT | Performed by: NURSE PRACTITIONER

## 2021-09-21 PROCEDURE — 99316 NF DSCHRG MGMT 30 MIN+: CPT | Performed by: NURSE PRACTITIONER

## 2021-09-21 PROCEDURE — P9604 ONE-WAY ALLOW PRORATED TRIP: HCPCS | Performed by: NURSE PRACTITIONER

## 2021-09-21 PROCEDURE — 85027 COMPLETE CBC AUTOMATED: CPT | Performed by: NURSE PRACTITIONER

## 2021-09-21 PROCEDURE — 80048 BASIC METABOLIC PNL TOTAL CA: CPT | Performed by: NURSE PRACTITIONER

## 2021-09-21 RX ORDER — LOPERAMIDE HYDROCHLORIDE 2 MG/1
TABLET ORAL
Qty: 20 TABLET | Refills: 2
Start: 2021-09-21 | End: 2021-12-03

## 2021-09-21 RX ORDER — AMLODIPINE BESYLATE 5 MG/1
5 TABLET ORAL AT BEDTIME
Qty: 90 TABLET | Refills: 3
Start: 2021-09-21 | End: 2021-11-16

## 2021-09-21 RX ORDER — AMLODIPINE BESYLATE 2.5 MG/1
2.5 TABLET ORAL AT BEDTIME
Start: 2021-09-21 | End: 2021-09-21

## 2021-09-21 RX ORDER — LISINOPRIL 20 MG/1
20 TABLET ORAL DAILY
Qty: 180 TABLET | Refills: 3
Start: 2021-09-21 | End: 2021-09-21

## 2021-09-21 RX ORDER — LISINOPRIL 20 MG/1
20 TABLET ORAL DAILY
Qty: 180 TABLET | Refills: 3
Start: 2021-09-21 | End: 2021-12-02

## 2021-09-21 ASSESSMENT — MIFFLIN-ST. JEOR: SCORE: 886.72

## 2021-09-21 NOTE — LETTER
9/21/2021        RE: Shiloh Covarrubias  [address] [facility] [address]        [facility] GERIATRIC SERVICES DISCHARGE SUMMARY  PATIENT'S NAME: Shiloh Covarrubias  YOB: 1929  MEDICAL RECORD NUMBER:  [id number]  Place of Service where encounter took place:  [facility]  ([facility]) [[id number]]    PRIMARY CARE PROVIDER AND CLINIC RESPONSIBLE AFTER TRANSFER:   Rajesh Vidal MD, [address] [facility] [id number]    [facility] Provider     Transferring providers: CORNELIUS Dick CNP, Noe Alcantara MD  Recent Hospitalization/ED:  [facility] stay 9/6/21 to 9/9/21.  Date of SNF Admission: September 09, 2021  Date of SNF (anticipated) Discharge: September 22, 2021  Discharged to: previous independent home  Cognitive Scores: BIMS: 15/15  Physical Function: Ambulating 450 ft with walker      CODE STATUS/ADVANCE DIRECTIVES DISCUSSION:  No CPR- Do NOT Intubate   ALLERGIES: Clonidine derivatives, Entocort [corticosteroids], Macrobid [nitrofurantoin], and Sulfa drugs    NURSING FACILITY COURSE     Shiloh Covarrubias with PMH significant for pulmonary HTN, COPD/asthma, HTN, dyslipidemia, PVD, CKD, Microscopic colitis, depression, anxiety, history of TIA.       Seen in ED on 9/3/21 after a fall and weakness. Had CT of lumbar/spine that was negative for acute fracture or subluxation. Was treated with cefdinir for abnormal UA and leukocytosis. UC  >100,000 colonies mixture of urogenital juan carlos.     Hospitalized most recently from 9/6-9/9 after SOB, weakness. She required 3L of oxygen, had temp of 101.1. With SHIVANI: Creatinine was elevated to 2.27. Chest xray with new right pleural fluid collection and right basilar atelectasis and/or infilitrate and probable vascular congestion and worsening cardiomegaly showing probable CHF. Was treated with antibiotics for right basilar pneumonia and treated with IV lasix for acute CHF. CT head negative for acute findings. CT cervical  "and lumbar spine negative for acute fracture or subluxation. Was found to be bradycardic and metoprolol was held. Kidney function improved after diuresis. She was weaned off oxygen prior to discharge. Discharged to TCU for physical rehabilitation and medical management.     Summary of nursing facility stay:   While in TCU her creatinine spiked and lasix dose was reduced from 60 mg daily to 40 mg daily. Unfortunately after this adjustment her creatinine did not improve. Creatinine today 1.56. She has no edema and no SOB. She should hold lisinopril 9/22 and 9/23 and then on 9/24 resume at 20 mg daily. Will reduce lasix to 20 mg 9/22 and resume lasix 40 mg daily on 9/23 with plan to recheck BMP on 9/24 with results to PCP for review. She will need close follow up with PCP, as this may need to be further adjusted. Weight has been stable since admission to TCU around 108-110 lbs. Otherwise while in TCU she made progress with therapy and met goals. She will discharge back home with therapy through home care as below.    Specific problems addressed in TCU:    (SHIVANI  (N18.32) Stage 3b chronic kidney disease  Comment: with elevated creatinine  -on admission lasix dose reduced to 60 mg daily, unfortunately continues to have elevated creatinine worse today  -baseline creatinine 1-1.2  Creatinine   Date Value Ref Range Status   09/21/2021 1.56 (H) 0.52 - 1.04 mg/dL Final   04/09/2021 1.24 (H) 0.52 - 1.04 mg/dL Final     Plan: Hold lisinopril 9/22 and 9/23 and resume at 20 mg daily on 9/24. Will reduce lasix tomorrow to 20 mg daily and then resume at 40 mg daily 9/23. BMP 9/24 through home care. Renally dose medications. Follow up with PCP within 1 week.     (I50.33) Acute on chronic heart failure with preserved ejection fraction (H)    (I27.20) Pulmonary hypertension (H)  Comment: no signs of ongoing exacerbation  -ECHO 9/7: \"Left ventricular systolic function is normal.The visual ejection fraction is 65-70%.  The right " "ventricular systolic function is normal.  The left atrium is moderate to severely dilated.  Mild valvular aortic stenosis.There is mild (1+) aortic regurgitation.There is moderate mitral annular calcification.There is mild (1+) mitral regurgitation.Pulmonary hypertension- RVSP 48 mm hg +RA.\"  Wt Readings from Last 4 Encounters:   09/21/21 50.3 kg (110 lb 12.8 oz)   09/16/21 50.1 kg (110 lb 6.4 oz)   09/13/21 49 kg (108 lb)   09/07/21 56.2 kg (123 lb 12.8 oz)     -lasix dose was reduced to 40 mg daily, from discharge dose of 60 mg daily  Plan: Due to SHIVANI, reduce lisinopril as above and lasix 20 mg tomorrow, and then resume 20 mg daily. BMP 9/24 through home care. Daily weights. Notify provider with weight gain >2 lbs in a day, >5 lbs in on week. 2 gram Na diet.      (J18.9) Community acquired pneumonia of right lower lobe of lung  (J96.01) Acute respiratory failure with hypoxia (H)  (J45.909) Uncomplicated asthma, unspecified asthma severity, unspecified whether persistent  Comment: denies SOB, lungs clear today, afebrile, was weaned off oxygen prior to admission to facility.   -treated with ceftriaxone/cefuroxime and azithromycin and completed in TCU  Plan: Continue tiotoprium. Monitor clinically      (R00.1) Sinus bradycardia-resolved  Comment: HR 76, 74, 74  -PTA metoprolol was discontinued in the hospital  Plan: Monitor HR.     (I10) Essential hypertension  Comment: controlled, but with SHIVANI as above  124/67, 117/68, 132/63, 109/57  -due to age, risk for falls, ok with higher BP goal of SBP<160  -while in TCU amlodipine administration was changed to bedtime  Plan: Due to SHIVANI as above reduce lisinopril to 20 mg daily and hold 9/22 and 9/23. Continue amlodipine 5 mg at bedtime. Continue hydralazine 50 mg TID, lasix 40 mg daily. VS per policy. Adjust medications as needed.     (D64.9) Anemia, unspecified type  Comment:  no s/sx of bleeding  Hemoglobin   Date Value Ref Range Status   09/21/2021 11.2 (L) 11.7 - 15.7 " g/dL Final   09/20/2021 11.0 (L) 11.7 - 15.7 g/dL Final   04/09/2021 13.0 11.7 - 15.7 g/dL Final   04/08/2019 14.0 11.7 - 15.7 g/dL Final     Plan: CBC PRN. Monitor for s/sx of bleeding.      (M51.36) DDD (degenerative disc disease), lumbar  Comment: no complaints of pain today  -oxycodone discontinued in TCU  Plan: Continue tylenol PRN.      (I73.9) PVD (peripheral vascular disease) (H)  (Z86.73) History of TIA (transient ischemic attack)  (E78.5) Dyslipidemia  Comment: chronic   Plan: Continue baby asa. Is not on statin.      (K52.839) Microscopic colitis, unspecified microscopic colitis type  Comment: is receiving 1 full tab of immodium before meals instead of home dose of 1/2 tab, due to pharmacy not being able to supply 1/2 tab, she would like to continue this at discharge, as it is working well and no signs of constipation.  Plan: Continue imodium with meals. Monitor bowels closely to avoid constipation     (F32.9) Depression, unspecified depression type  (F41.9) Anxiety  Comment: mood and spirits seem good today  Plan: Continue lexapro. Monitor mood. ACP PRN     (R53.81) Physical deconditioning  Comment: Acute, secondary to recent hospitalization, medical conditions as above  -met goals with therapy  Plan: Continue therapy through home care.        Discharge Medications:  Current Outpatient Medications   Medication Sig Dispense Refill     acetaminophen (TYLENOL) 325 MG tablet Take 650 mg by mouth every 6 hours as needed for pain       amLODIPine (NORVASC) 5 MG tablet Take 1 tablet (5 mg) by mouth At Bedtime 90 tablet 3     ASPIRIN 81 MG OR TABS Take 81 mg by mouth daily        EQ LORATADINE 10 MG tablet Take 1 tablet (10 mg) by mouth daily 90 tablet 3     escitalopram (LEXAPRO) 10 MG tablet Take 1 tablet (10 mg) by mouth daily 90 tablet 3     furosemide (LASIX) 20 MG tablet Take 2 tablets (40 mg) by mouth daily 180 tablet 3     hydrALAZINE (APRESOLINE) 50 MG tablet TAKE 1 TABLET BY MOUTH THREE TIMES DAILY 270  "tablet 3     hypromellose (ARTIFICIAL TEARS) 0.5 % SOLN ophthalmic solution Place 1 drop into both eyes 4 times daily as needed for dry eyes       lactobacillus rhamnosus, GG, (CULTURELL) capsule Take 1 capsule by mouth 2 times daily       lisinopril (ZESTRIL) 20 MG tablet Take 1 tablet (20 mg) by mouth daily RESUME ON 9/24. 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  GENERAL APPEARANCE:  Alert, in NAD  HEENT: normocephalic, moist mucous membranes, nose without drainage or crusting  RESP:  respiratory effort normal, no respiratory distress, Lung sounds clear, patient is on RA  CV: auscultation of heart done, rate and rhythm regular.   ABDOMEN: + bowel sounds, soft, nontender, no grimacing or guarding with palpation.  M/S:   Gait and station abnormal: uses cane for ambulation; no lower extremity edema  NEURO: cranial nerves 2-12 grossly intact and at patient's baseline; no facial asymmetry, no speech deficits and able to follow directions  PSYCH: oriented x 3,  affect and mood ok    SNF labs: Labs done in SNF are in [place] EPIC. Please refer to them using Zones/Care Everywhere. and Recent labs in [facility] reviewed by me today.     DISCHARGE PLAN:    Follow up labs: BMP 9/24 through home care and faxed to PCP    Medical Follow Up:      Follow up with primary care provider in 1 " week    Discharge Services: Home Care:  Occupational Therapy, Physical Therapy, Registered Nurse, Home Health Aide and From:  [facility]    Discharge Instructions Weight bearing restrictions:  Weight bearing as tolerated.     Continue to follow your diet:  2 gram sodium.     Weigh yourself daily in the morning and keep a record. Call your primary clinic: a) if you are more short of breath, or b) if your weight changes more than 2 pounds in one day or more than 5 pounds in one week.     Your kidney function is elevated currently. Your lisinopril dose was reduced because of this and you should restart it at lower dose (20 mg daily) on 9/24. You will have labs checked by home care on 9/24. You will need to follow up with PCP early next week.     TOTAL DISCHARGE TIME:   Greater than 30 minutes  Electronically signed by:  CORNELIUS Dick CNP     Home care Face to Face documentation done in EPIC attached to Home care orders for [facility].                 Sincerely,        CORNELIUS Dick CNP

## 2021-09-21 NOTE — PROGRESS NOTES
[facility] GERIATRIC SERVICES DISCHARGE SUMMARY  PATIENT'S NAME: Shiloh Covarrubias  YOB: 1929  MEDICAL RECORD NUMBER:  [id number]  Place of Service where encounter took place:  [facility]  ([facility]) [[id number]]    PRIMARY CARE PROVIDER AND CLINIC RESPONSIBLE AFTER TRANSFER:   Rajesh Vidal MD, [address] [facility] [id number]    [facility] Provider     Transferring providers: CORNELIUS Dick CNP, Noe Alcantara MD  Recent Hospitalization/ED:  [facility] stay 9/6/21 to 9/9/21.  Date of SNF Admission: September 09, 2021  Date of SNF (anticipated) Discharge: September 22, 2021  Discharged to: previous independent home  Cognitive Scores: BIMS: 15/15  Physical Function: Ambulating 450 ft with walker      CODE STATUS/ADVANCE DIRECTIVES DISCUSSION:  No CPR- Do NOT Intubate   ALLERGIES: Clonidine derivatives, Entocort [corticosteroids], Macrobid [nitrofurantoin], and Sulfa drugs    NURSING FACILITY COURSE     Shiloh Covarrubias with PMH significant for pulmonary HTN, COPD/asthma, HTN, dyslipidemia, PVD, CKD, Microscopic colitis, depression, anxiety, history of TIA.       Seen in ED on 9/3/21 after a fall and weakness. Had CT of lumbar/spine that was negative for acute fracture or subluxation. Was treated with cefdinir for abnormal UA and leukocytosis. UC  >100,000 colonies mixture of urogenital juan carlos.     Hospitalized most recently from 9/6-9/9 after SOB, weakness. She required 3L of oxygen, had temp of 101.1. With SHIVANI: Creatinine was elevated to 2.27. Chest xray with new right pleural fluid collection and right basilar atelectasis and/or infilitrate and probable vascular congestion and worsening cardiomegaly showing probable CHF. Was treated with antibiotics for right basilar pneumonia and treated with IV lasix for acute CHF. CT head negative for acute findings. CT cervical and lumbar spine negative for acute fracture or subluxation. Was found to be bradycardic and  "metoprolol was held. Kidney function improved after diuresis. She was weaned off oxygen prior to discharge. Discharged to TCU for physical rehabilitation and medical management.     Summary of nursing facility stay:   While in TCU her creatinine spiked and lasix dose was reduced from 60 mg daily to 40 mg daily. Unfortunately after this adjustment her creatinine did not improve. Creatinine today 1.56. She has no edema and no SOB. She should hold lisinopril 9/22 and 9/23 and then on 9/24 resume at 20 mg daily. Will reduce lasix to 20 mg 9/22 and resume lasix 40 mg daily on 9/23 with plan to recheck BMP on 9/24 with results to PCP for review. She will need close follow up with PCP, as this may need to be further adjusted. Weight has been stable since admission to TCU around 108-110 lbs. Otherwise while in TCU she made progress with therapy and met goals. She will discharge back home with therapy through home care as below.    Specific problems addressed in TCU:    (SHIVANI  (N18.32) Stage 3b chronic kidney disease  Comment: with elevated creatinine  -on admission lasix dose reduced to 60 mg daily, unfortunately continues to have elevated creatinine worse today  -baseline creatinine 1-1.2  Creatinine   Date Value Ref Range Status   09/21/2021 1.56 (H) 0.52 - 1.04 mg/dL Final   04/09/2021 1.24 (H) 0.52 - 1.04 mg/dL Final     Plan: Hold lisinopril 9/22 and 9/23 and resume at 20 mg daily on 9/24. Will reduce lasix tomorrow to 20 mg daily and then resume at 40 mg daily 9/23. BMP 9/24 through home care. Renally dose medications. Follow up with PCP within 1 week.     (I50.33) Acute on chronic heart failure with preserved ejection fraction (H)    (I27.20) Pulmonary hypertension (H)  Comment: no signs of ongoing exacerbation  -ECHO 9/7: \"Left ventricular systolic function is normal.The visual ejection fraction is 65-70%.  The right ventricular systolic function is normal.  The left atrium is moderate to severely dilated.  Mild " "valvular aortic stenosis.There is mild (1+) aortic regurgitation.There is moderate mitral annular calcification.There is mild (1+) mitral regurgitation.Pulmonary hypertension- RVSP 48 mm hg +RA.\"  Wt Readings from Last 4 Encounters:   09/21/21 50.3 kg (110 lb 12.8 oz)   09/16/21 50.1 kg (110 lb 6.4 oz)   09/13/21 49 kg (108 lb)   09/07/21 56.2 kg (123 lb 12.8 oz)     -lasix dose was reduced to 40 mg daily, from discharge dose of 60 mg daily  Plan: Due to SHIVANI, reduce lisinopril as above and lasix 20 mg tomorrow, and then resume 20 mg daily. BMP 9/24 through home care. Daily weights. Notify provider with weight gain >2 lbs in a day, >5 lbs in on week. 2 gram Na diet.      (J18.9) Community acquired pneumonia of right lower lobe of lung  (J96.01) Acute respiratory failure with hypoxia (H)  (J45.909) Uncomplicated asthma, unspecified asthma severity, unspecified whether persistent  Comment: denies SOB, lungs clear today, afebrile, was weaned off oxygen prior to admission to facility.   -treated with ceftriaxone/cefuroxime and azithromycin and completed in TCU  Plan: Continue tiotoprium. Monitor clinically      (R00.1) Sinus bradycardia-resolved  Comment: HR 76, 74, 74  -PTA metoprolol was discontinued in the hospital  Plan: Monitor HR.     (I10) Essential hypertension  Comment: controlled, but with SHIVANI as above  124/67, 117/68, 132/63, 109/57  -due to age, risk for falls, ok with higher BP goal of SBP<160  -while in TCU amlodipine administration was changed to bedtime  Plan: Due to SHIVANI as above reduce lisinopril to 20 mg daily and hold 9/22 and 9/23. Continue amlodipine 5 mg at bedtime. Continue hydralazine 50 mg TID, lasix 40 mg daily. VS per policy. Adjust medications as needed.     (D64.9) Anemia, unspecified type  Comment:  no s/sx of bleeding  Hemoglobin   Date Value Ref Range Status   09/21/2021 11.2 (L) 11.7 - 15.7 g/dL Final   09/20/2021 11.0 (L) 11.7 - 15.7 g/dL Final   04/09/2021 13.0 11.7 - 15.7 g/dL Final "   04/08/2019 14.0 11.7 - 15.7 g/dL Final     Plan: CBC PRN. Monitor for s/sx of bleeding.      (M51.36) DDD (degenerative disc disease), lumbar  Comment: no complaints of pain today  -oxycodone discontinued in TCU  Plan: Continue tylenol PRN.      (I73.9) PVD (peripheral vascular disease) (H)  (Z86.73) History of TIA (transient ischemic attack)  (E78.5) Dyslipidemia  Comment: chronic   Plan: Continue baby asa. Is not on statin.      (K52.839) Microscopic colitis, unspecified microscopic colitis type  Comment: is receiving 1 full tab of immodium before meals instead of home dose of 1/2 tab, due to pharmacy not being able to supply 1/2 tab, she would like to continue this at discharge, as it is working well and no signs of constipation.  Plan: Continue imodium with meals. Monitor bowels closely to avoid constipation     (F32.9) Depression, unspecified depression type  (F41.9) Anxiety  Comment: mood and spirits seem good today  Plan: Continue lexapro. Monitor mood. ACP PRN     (R53.81) Physical deconditioning  Comment: Acute, secondary to recent hospitalization, medical conditions as above  -met goals with therapy  Plan: Continue therapy through home care.        Discharge Medications:  Current Outpatient Medications   Medication Sig Dispense Refill     acetaminophen (TYLENOL) 325 MG tablet Take 650 mg by mouth every 6 hours as needed for pain       amLODIPine (NORVASC) 5 MG tablet Take 1 tablet (5 mg) by mouth At Bedtime 90 tablet 3     ASPIRIN 81 MG OR TABS Take 81 mg by mouth daily        EQ LORATADINE 10 MG tablet Take 1 tablet (10 mg) by mouth daily 90 tablet 3     escitalopram (LEXAPRO) 10 MG tablet Take 1 tablet (10 mg) by mouth daily 90 tablet 3     furosemide (LASIX) 20 MG tablet Take 2 tablets (40 mg) by mouth daily 180 tablet 3     hydrALAZINE (APRESOLINE) 50 MG tablet TAKE 1 TABLET BY MOUTH THREE TIMES DAILY 270 tablet 3     hypromellose (ARTIFICIAL TEARS) 0.5 % SOLN ophthalmic solution Place 1 drop into  "both eyes 4 times daily as needed for dry eyes       lactobacillus rhamnosus, GG, (CULTURELL) capsule Take 1 capsule by mouth 2 times daily       lisinopril (ZESTRIL) 20 MG tablet Take 1 tablet (20 mg) by mouth daily RESUME ON 9/24. 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  GENERAL APPEARANCE:  Alert, in NAD  HEENT: normocephalic, moist mucous membranes, nose without drainage or crusting  RESP:  respiratory effort normal, no respiratory distress, Lung sounds clear, patient is on RA  CV: auscultation of heart done, rate and rhythm regular.   ABDOMEN: + bowel sounds, soft, nontender, no grimacing or guarding with palpation.  M/S:   Gait and station abnormal: uses cane for ambulation; no lower extremity edema  NEURO: cranial nerves 2-12 grossly intact and at patient's baseline; no facial asymmetry, no speech deficits and able to follow directions  PSYCH: oriented x 3,  affect and mood ok    SNF labs: [facility] Please refer to them using EPIC/Care Everywhere. and Recent labs in [facility] reviewed by me today.     DISCHARGE PLAN:    Follow up labs: BMP 9/24 through home care and faxed to PCP    Medical Follow Up:      Follow up with primary care provider in 1 week    Discharge Services: Home Care:  Occupational Therapy, Physical Therapy, Registered Nurse, Home " Health Aide and From:  [facility]    Discharge Instructions Weight bearing restrictions:  Weight bearing as tolerated.     Continue to follow your diet:  2 gram sodium.     Weigh yourself daily in the morning and keep a record. Call your primary clinic: a) if you are more short of breath, or b) if your weight changes more than 2 pounds in one day or more than 5 pounds in one week.     Your kidney function is elevated currently. Your lisinopril dose was reduced because of this and you should restart it at lower dose (20 mg daily) on 9/24. You will have labs checked by home care on 9/24. You will need to follow up with PCP early next week.     TOTAL DISCHARGE TIME:   Greater than 30 minutes  Electronically signed by:  CORNELIUS Dick CNP     Home care Face to Face documentation done in EPIC attached to Home care orders for [facility].

## 2021-09-21 NOTE — PATIENT INSTRUCTIONS
Shiloh Covarrubias  YOB: 1929                                 Discharge Date: 9/22/2021   PHYSICIAN's DISCHARGE SUMMARY / ORDER SHEET  1. Discharge to: home   2. Medications:      Current Outpatient Medications   Medication Sig Dispense Refill     acetaminophen (TYLENOL) 325 MG tablet Take 650 mg by mouth every 6 hours as needed for pain       amLODIPine (NORVASC) 5 MG tablet Take 1 tablet (5 mg) by mouth At Bedtime 90 tablet 3     ASPIRIN 81 MG OR TABS Take 81 mg by mouth daily        EQ LORATADINE 10 MG tablet Take 1 tablet (10 mg) by mouth daily 90 tablet 3     escitalopram (LEXAPRO) 10 MG tablet Take 1 tablet (10 mg) by mouth daily 90 tablet 3     furosemide (LASIX) 20 MG tablet Take 2 tablets (40 mg) by mouth daily 180 tablet 3     hydrALAZINE (APRESOLINE) 50 MG tablet TAKE 1 TABLET BY MOUTH THREE TIMES DAILY 270 tablet 3     hypromellose (ARTIFICIAL TEARS) 0.5 % SOLN ophthalmic solution Place 1 drop into both eyes 4 times daily as needed for dry eyes       lactobacillus rhamnosus, GG, (CULTURELL) capsule Take 1 capsule by mouth 2 times daily       lisinopril (ZESTRIL) 20 MG tablet Take 1 tablet (20 mg) by mouth daily RESUME ON 9/24. 180 tablet 3     loperamide (IMODIUM A-D) 2 MG tablet Take 1 tab before meals 20 tablet 2     tiotropium (SPIRIVA RESPIMAT) 2.5 MCG/ACT inhaler Inhale 2 puffs into the lungs daily 2 Inhaler 5          DISCHARGE PLAN:    Follow up labs: [facility] 9/24 through home care and faxed to PCP    Medical Follow Up:      Follow up with primary care provider in 1 week    Discharge Services: Home Care:  Occupational Therapy, Physical Therapy, Registered Nurse, Home Health Aide and From:  [facility]    Discharge Instructions Weight bearing restrictions:  Weight bearing as tolerated.     Continue to follow your diet:  2 gram sodium.     Weigh yourself daily in the morning and keep a record. Call your primary clinic: a) if you are more short of breath, or b) if your weight changes  more than 2 pounds in one day or more than 5 pounds in one week.     Your kidney function is elevated currently. Your lisinopril dose was reduced because of this and you should restart it at lower dose (20 mg daily) on 9/24. You will have labs checked by home care on 9/24. You will need to follow up with PCP early next week. Make sure you are staying hydrated.     Discharge patient to home with current medications and treatments  Discharge Home with Home care as above  - Nursing call in 30 days supply of needed meds to pharmacy of choice upon discharge  - please send home with original of these discharge instructions and copy for chart.       ______________________________  CORNELIUS Dick [facility]                   9/21/2021

## 2021-09-21 NOTE — Clinical Note
Plan is for patient to discharge from TCU tomorrow. Her creatinine spiked again today and I made a few med adjustments. She has labs ordered on 9/24 with home care. She should follow up with you early next week. Home care services have been arranged. Please let me know if you have any questions.   Sherry Avila, EJ  [facility]

## 2021-09-22 ENCOUNTER — MYC MEDICAL ADVICE (OUTPATIENT)
Dept: INTERNAL MEDICINE | Facility: CLINIC | Age: 86
End: 2021-09-22

## 2021-09-27 ENCOUNTER — PATIENT OUTREACH (OUTPATIENT)
Dept: CARE COORDINATION | Facility: CLINIC | Age: 86
End: 2021-09-27

## 2021-09-27 DIAGNOSIS — Z71.89 COUNSELING AND COORDINATION OF CARE: Primary | ICD-10-CM

## 2021-09-27 NOTE — PROGRESS NOTES
Clinic Care Coordination Contact    Situation: Patient chart reviewed by care coordinator.    Background: Patient was hospitalized at [facility] from 9/6/2021 to 9/9/2021 with diagnosis of generalized weakness acute on chronic heart failure community acquired and pneumonia.     Assessment: Pt was discharged home with home care services.     Plan/Recommendations: RNCC will have CHW out reach to patient to introduce care coordination for possible enrollment.      Shanae PENA, RN, PHN, CCM  Primary Clinic Care Coordination    [facility]  Primary Care Clinics  [email or web address]   Office: [phone]  Employed by [facility]

## 2021-09-29 ENCOUNTER — PATIENT OUTREACH (OUTPATIENT)
Dept: CARE COORDINATION | Facility: CLINIC | Age: 86
End: 2021-09-29

## 2021-09-29 ENCOUNTER — OFFICE VISIT (OUTPATIENT)
Dept: INTERNAL MEDICINE | Facility: CLINIC | Age: 86
End: 2021-09-29
Payer: MEDICARE

## 2021-09-29 VITALS
HEART RATE: 52 BPM | TEMPERATURE: 98.1 F | RESPIRATION RATE: 16 BRPM | SYSTOLIC BLOOD PRESSURE: 134 MMHG | BODY MASS INDEX: 20.02 KG/M2 | DIASTOLIC BLOOD PRESSURE: 60 MMHG | WEIGHT: 113 LBS | OXYGEN SATURATION: 96 %

## 2021-09-29 DIAGNOSIS — I10 ESSENTIAL HYPERTENSION WITH GOAL BLOOD PRESSURE LESS THAN 130/80: Primary | ICD-10-CM

## 2021-09-29 DIAGNOSIS — J44.9 CHRONIC OBSTRUCTIVE PULMONARY DISEASE, UNSPECIFIED COPD TYPE (H): ICD-10-CM

## 2021-09-29 DIAGNOSIS — Z23 NEED FOR INFLUENZA VACCINATION: ICD-10-CM

## 2021-09-29 DIAGNOSIS — M62.81 GENERALIZED MUSCLE WEAKNESS: ICD-10-CM

## 2021-09-29 DIAGNOSIS — N18.30 STAGE 3 CHRONIC KIDNEY DISEASE, UNSPECIFIED WHETHER STAGE 3A OR 3B CKD (H): ICD-10-CM

## 2021-09-29 LAB
ALBUMIN SERPL-MCNC: 3.4 G/DL (ref 3.4–5)
ALP SERPL-CCNC: 61 U/L (ref 40–150)
ALT SERPL W P-5'-P-CCNC: 19 U/L (ref 0–50)
ANION GAP SERPL CALCULATED.3IONS-SCNC: 4 MMOL/L (ref 3–14)
AST SERPL W P-5'-P-CCNC: 16 U/L (ref 0–45)
BILIRUB SERPL-MCNC: 0.3 MG/DL (ref 0.2–1.3)
BUN SERPL-MCNC: 54 MG/DL (ref 7–30)
CALCIUM SERPL-MCNC: 9.3 MG/DL (ref 8.5–10.1)
CHLORIDE BLD-SCNC: 107 MMOL/L (ref 94–109)
CO2 SERPL-SCNC: 27 MMOL/L (ref 20–32)
CREAT SERPL-MCNC: 1.7 MG/DL (ref 0.52–1.04)
GFR SERPL CREATININE-BSD FRML MDRD: 26 ML/MIN/1.73M2
GLUCOSE BLD-MCNC: 111 MG/DL (ref 70–99)
POTASSIUM BLD-SCNC: 5.8 MMOL/L (ref 3.4–5.3)
PROT SERPL-MCNC: 7.3 G/DL (ref 6.8–8.8)
SODIUM SERPL-SCNC: 138 MMOL/L (ref 133–144)

## 2021-09-29 PROCEDURE — 90682 RIV4 VACC RECOMBINANT DNA IM: CPT | Performed by: INTERNAL MEDICINE

## 2021-09-29 PROCEDURE — 36415 COLL VENOUS BLD VENIPUNCTURE: CPT | Performed by: INTERNAL MEDICINE

## 2021-09-29 PROCEDURE — 99214 OFFICE O/P EST MOD 30 MIN: CPT | Mod: 25 | Performed by: INTERNAL MEDICINE

## 2021-09-29 PROCEDURE — G0008 ADMIN INFLUENZA VIRUS VAC: HCPCS | Performed by: INTERNAL MEDICINE

## 2021-09-29 PROCEDURE — 80053 COMPREHEN METABOLIC PANEL: CPT | Performed by: INTERNAL MEDICINE

## 2021-09-29 ASSESSMENT — PAIN SCALES - GENERAL: PAINLEVEL: NO PAIN (0)

## 2021-09-29 NOTE — PROGRESS NOTES
Clinic Care Coordination Contact  [facility]/Voicemail       Clinical Data: CHW Outreach  Outreach attempted x 1. Left message on patient's voicemail with call back information and requested return call.    Plan: CHW will try to reach patient again in 1-2 business days.    Notes:  -schedule with RN (CORAL)  -patient was discharged from TCU    Leilani Chaudhry  Community Health Worker   [facility]  Care Coordination  [facility], Iam Munoz [place], [facility]  [email or web address]   Office: [phone]  Fax: [phone]

## 2021-09-29 NOTE — PROGRESS NOTES
Prior to immunization administration, verified patients identity using patient s name and date of birth. Please see Immunization Activity for additional information.     Screening Questionnaire for Adult Immunization    Are you sick today?   No   Do you have allergies to medications, food, a vaccine component or latex?   Yes   Have you ever had a serious reaction after receiving a vaccination?   No   Do you have a long-term health problem with heart, lung, kidney, or metabolic disease (e.g., diabetes), asthma, a blood disorder, no spleen, complement component deficiency, a cochlear implant, or a spinal fluid leak?  Are you on long-term aspirin therapy?   No   Do you have cancer, leukemia, HIV/AIDS, or any other immune system problem?   No   Do you have a parent, brother, or sister with an immune system problem?   No   In the past 3 months, have you taken medications that affect  your immune system, such as prednisone, other steroids, or anticancer drugs; drugs for the treatment of rheumatoid arthritis, Crohn s disease, or psoriasis; or have you had radiation treatments?   No   Have you had a seizure, or a brain or other nervous system problem?   No   During the past year, have you received a transfusion of blood or blood    products, or been given immune (gamma) globulin or antiviral drug?   No   For women: Are you pregnant or is there a chance you could become       pregnant during the next month?   No   Have you received any vaccinations in the past 4 weeks?   No     Immunization questionnaire was positive for at least one answer.  Notified Yes.        Per orders of Dr. Rajesh Vidal, injection of HD Influenza given by Mary Kate Gallegos CMA. Patient instructed to remain in clinic for 15 minutes afterwards, and to report any adverse reaction to me immediately.       Screening performed by Mary Kate Gallegos CMA on 9/29/2021 at 3:45 PM.     Other

## 2021-10-01 ENCOUNTER — TELEPHONE (OUTPATIENT)
Dept: INTERNAL MEDICINE | Facility: CLINIC | Age: 86
End: 2021-10-01

## 2021-10-01 NOTE — TELEPHONE ENCOUNTER
Spoke to homecare nurse Gracie. She was out to see patient today. She has 2 things she wanted to send to Dr. Vidal.     Needs ongoing homecare orders:   Skilled nursing 1 time a week for 8 weeks.   OT evaluation     Gracie said patient's BP and pulse have been running low. BP yesterday at admission was 100/44 with a pulse of 44. Today patient's BP is 110/50 with a HR of 45. Gracie said patient reported she normally runs on the higher end. Gracie said both of these readings were a few hours she took her morning medications. Patient reports feeling good. Does have some mild dizziness with position changes.     Gracie needs approval for homecare orders. She also would like Dr. Vidal to know about the BP readings.     Will route to PCP to advise.     BECKY Ward, RN  [facility]

## 2021-10-01 NOTE — TELEPHONE ENCOUNTER
Spoke to RN and let her know she will call patient and let her know to remove the pills from her pill container.    Yesenia Costello on 10/1/2021 at 4:08 PM

## 2021-10-01 NOTE — TELEPHONE ENCOUNTER
Spoke to RN - she stated patient is also taking metroprolol 2 times per day, when I looked at her med list it looked like you had stopped that but I wanted to verify with you. She is also taking the amlodipine at bedtime and also the hydralazine 3x per day as well. I will call RN once sent back.    Yesenia Costello on 10/1/2021 at 2:55 PM

## 2021-10-01 NOTE — PROGRESS NOTES
Clinic Care Coordination Contact  Community Health Worker Initial Outreach            Patient accepts CC: No, The patient declined care coordination . Patient will be sent Care Coordination introduction letter for future reference.     The [facility] Community Health Worker spoke with the patient today at the request of the PCP to discuss possible Clinic Care Coordination enrollment. The service was described to the patient and immediate needs were discussed. The patient declined enrollment at this time. The PCP is encouraged to refer in the future if the patient's needs change.      The patient stated that she has been doing well since coming home. She did see her provider and everything is okay. The patient didn't have any questions or concerns for the RN CC or the SW CC at this time.     Leilani Chaudhry  Community Health Worker   [facility]  Care Coordination  [place], [place], [place], [facility]  [email or web address]   Office: [phone]  Fax: [phone]

## 2021-10-01 NOTE — TELEPHONE ENCOUNTER
Ok for orders,   She can cut the amlodipine in half to increase her bp. Nothing she is on causes the low pulse.

## 2021-10-05 ENCOUNTER — TELEPHONE (OUTPATIENT)
Dept: INTERNAL MEDICINE | Facility: CLINIC | Age: 86
End: 2021-10-05

## 2021-10-05 DIAGNOSIS — Z53.9 DIAGNOSIS NOT YET DEFINED: Primary | ICD-10-CM

## 2021-10-05 PROCEDURE — G0180 MD CERTIFICATION HHA PATIENT: HCPCS | Performed by: INTERNAL MEDICINE

## 2021-10-05 NOTE — TELEPHONE ENCOUNTER
Reason for Call:  Form, our goal is to have forms completed with 72 hours, however, some forms may require a visit or additional information.    Type of letter, form or note:  Home Health Certification    Who is the form from?: Home care    Where did the form come from: form was faxed in    What clinic location was the form placed at?: [facility]     Where the form was placed: Given to MA/RN RN    What number is listed as a contact on the form?: [phone]       Additional comments:     Call taken on 10/5/2021 at 11:51 AM by Anita Liu

## 2021-10-06 RX ORDER — METOPROLOL TARTRATE 25 MG/1
25 TABLET, FILM COATED ORAL 2 TIMES DAILY
COMMUNITY
End: 2021-10-01 | Stop reason: SINTOL

## 2021-10-06 RX ORDER — CALCIUM POLYCARBOPHIL 625 MG 625 MG/1
0.5 TABLET ORAL DAILY
COMMUNITY
End: 2021-10-11

## 2021-10-06 NOTE — TELEPHONE ENCOUNTER
Medication reconciliation completed by RN. Form and chart forwarded to PCP for signatures.  PCP:  MD Margaux River RN

## 2021-10-08 ENCOUNTER — LAB (OUTPATIENT)
Dept: LAB | Facility: CLINIC | Age: 86
End: 2021-10-08
Payer: MEDICARE

## 2021-10-08 DIAGNOSIS — I10 ESSENTIAL HYPERTENSION WITH GOAL BLOOD PRESSURE LESS THAN 130/80: ICD-10-CM

## 2021-10-08 LAB
ANION GAP SERPL CALCULATED.3IONS-SCNC: 5 MMOL/L (ref 3–14)
BUN SERPL-MCNC: 29 MG/DL (ref 7–30)
CALCIUM SERPL-MCNC: 9.5 MG/DL (ref 8.5–10.1)
CHLORIDE BLD-SCNC: 103 MMOL/L (ref 94–109)
CO2 SERPL-SCNC: 28 MMOL/L (ref 20–32)
CREAT SERPL-MCNC: 1.21 MG/DL (ref 0.52–1.04)
GFR SERPL CREATININE-BSD FRML MDRD: 39 ML/MIN/1.73M2
GLUCOSE BLD-MCNC: 98 MG/DL (ref 70–99)
POTASSIUM BLD-SCNC: 5.2 MMOL/L (ref 3.4–5.3)
SODIUM SERPL-SCNC: 136 MMOL/L (ref 133–144)

## 2021-10-08 PROCEDURE — 80048 BASIC METABOLIC PNL TOTAL CA: CPT

## 2021-10-08 PROCEDURE — 36415 COLL VENOUS BLD VENIPUNCTURE: CPT

## 2021-10-11 ENCOUNTER — TELEPHONE (OUTPATIENT)
Dept: INTERNAL MEDICINE | Facility: CLINIC | Age: 86
End: 2021-10-11

## 2021-10-11 ENCOUNTER — VIRTUAL VISIT (OUTPATIENT)
Dept: PHARMACY | Facility: CLINIC | Age: 86
End: 2021-10-11
Payer: COMMERCIAL

## 2021-10-11 DIAGNOSIS — I73.9 PERIPHERAL VASCULAR DISEASE (H): Primary | ICD-10-CM

## 2021-10-11 DIAGNOSIS — F41.1 ANXIETY STATE: ICD-10-CM

## 2021-10-11 DIAGNOSIS — Z78.9 TAKES DIETARY SUPPLEMENTS: ICD-10-CM

## 2021-10-11 DIAGNOSIS — I10 ESSENTIAL HYPERTENSION WITH GOAL BLOOD PRESSURE LESS THAN 130/80: ICD-10-CM

## 2021-10-11 DIAGNOSIS — J44.9 CHRONIC OBSTRUCTIVE PULMONARY DISEASE, UNSPECIFIED COPD TYPE (H): ICD-10-CM

## 2021-10-11 PROCEDURE — 99605 MTMS BY PHARM NP 15 MIN: CPT | Performed by: PHARMACIST

## 2021-10-11 PROCEDURE — 99607 MTMS BY PHARM ADDL 15 MIN: CPT | Performed by: PHARMACIST

## 2021-10-11 RX ORDER — CHLORHEXIDINE GLUCONATE 4 %
1 LIQUID (ML) TOPICAL DAILY
COMMUNITY
End: 2023-01-06

## 2021-10-11 NOTE — TELEPHONE ENCOUNTER
No stay without the metoprolol, ok for some swelling if stays at 1+, watch weight.  Would prefer to keep lasix the same at 20mg a day if possible.

## 2021-10-11 NOTE — PROGRESS NOTES
Medication Therapy Management (MTM) Encounter    ASSESSMENT:                            Medication Adherence/Access: No issues identified    Hypertension/Peripheral vascular disease: Stable.    COPD: Stable.    Anxiety: Stable per patient.    Supplements: Stable.    PLAN:                            1. Continue current medications and attend your upcoming appointments.    Follow-up: 2-4 weeks if needed    SUBJECTIVE/OBJECTIVE:                          Shiloh Covarrubias is a 89 year old female called for a transitions of care visit. She was discharged from [facility] on 9/21/2021 for general weakness.      Reason for visit: Hospital/Nursing Home Follow-Up.    Allergies/ADRs: Reviewed in chart  Past Medical History: Reviewed in chart  Tobacco: She reports that she has never smoked. She has never used smokeless tobacco.  Alcohol: Less than 1 beverage / month      Medication Adherence/Access: no issues reported    Hypertension/Peripheral vascular disease: Current medications include furosemide 20 mg daily, aspirin 81 mg daily, hydralazine 50 mg three times daily, amlodipine 5 mg daily and lisinopril 20 mg daily.  Patient has blood pressure monitored by RN. Patient reports no current medication side effects.  BP Readings from Last 3 Encounters:   09/29/21 134/60   09/21/21 124/67   09/16/21 (!) 177/64     COPD: Current medications: Spiriva Respimat - 2.5 mcg - two puffs daily and loratadine 10 mg daily for allergies. Finds to be effective.  Patient is not experiencing side effects.   Patient reports the following symptoms: none.  Patient does have an COPD Action Plan on file.   Has spirometry been completed: Yes     Anxiety: Currently taking escitalopram 10 mg daily. Pt finds this to be effective - helped her get through these last few weeks. Pt reports no current issues.       Supplements: Patient is taking a daily multivitmain and cranberry 400 mg every other day. States she started after recent urinary tract  infection. Denies any issues.     Today's Vitals: LMP  (LMP Unknown)      Wt Readings from Last 5 Encounters:   09/29/21 113 lb (51.3 kg)   09/21/21 110 lb 12.8 oz (50.3 kg)   09/16/21 110 lb 6.4 oz (50.1 kg)   09/13/21 108 lb (49 kg)   09/07/21 123 lb 12.8 oz (56.2 kg)     ----------------  Post Discharge Medication Reconciliation Status: discharge medications reconciled, continue medications without change.    I spent 18 minutes with this patient today. A copy of the visit note was provided to the patient's primary care provider.    The patient was sent via [email or web address] a summary of these recommendations.     Marco Uribe, ConcepcionD, [facility]  Medication Therapy Management Pharmacist  Pager: [phone]    Telemedicine Visit Details  Type of service:  Telephone visit  Start Time: 2:30 PM  End Time: 2:48 PM  Originating Location (patient location): [place]  Distant Location (provider location):  [facility]     Medication Therapy Recommendations  No medication therapy recommendations to display

## 2021-10-14 ENCOUNTER — TELEPHONE (OUTPATIENT)
Dept: INTERNAL MEDICINE | Facility: CLINIC | Age: 86
End: 2021-10-14

## 2021-10-14 DIAGNOSIS — H90.8 MIXED CONDUCTIVE AND SENSORINEURAL HEARING LOSS, UNSPECIFIED LATERALITY: Primary | ICD-10-CM

## 2021-10-14 NOTE — TELEPHONE ENCOUNTER
Message relayed.  Gracie states she is still concerned about the diastolic readings dipping into the 40's at times.      She is informed to make sure patient is taking in at least 4-6 full glasses of water every day.  Informed message will be sent to PCP just to be sure he is OK with the diastolic readings.  Mimi Zamora, HALIMAN, RN

## 2021-10-14 NOTE — PATIENT INSTRUCTIONS
It was so nice to speak with you on 10/11/2021.  I hope I was able to give you some useful information during our Medication Therapy Management (MTM) visit. The purpose of this visit with a clinical pharmacist was to review the medicines you received when discharged from the hospital/nursing home. We want to make sure that you know which medicines to take and what they are for. We also want to make sure all your medicines are working, safe, and as easy to take as possible.    Based upon our telephone conversation:    1. Continue current medications and attend your upcoming appointments.    Follow-up: 2-4 weeks if needed    Feel free to call if you have any questions or concerns. By working together with you and your doctor, I hope to help you feel confident managing your medicines and improving your quality of life.       Braulio nam,         Marco Uribe, PharmD, [facility]  Medication Therapy Management Pharmacist  Pager: [phone]

## 2021-10-14 NOTE — TELEPHONE ENCOUNTER
Reason for Call:  Other order for hearing evaluation    Detailed comments: pt has appt scheduled for hearing evaluation with Roseline scheduled on 11/11 and needs order to be placed    Phone Number Patient can be reached at: Home number on file [phone] (home)    Best Time: any    Can we leave a detailed message on this number? YES    Call taken on 10/14/2021 at 2:42 PM by Rossy Dias

## 2021-10-29 ENCOUNTER — TELEPHONE (OUTPATIENT)
Dept: INTERNAL MEDICINE | Facility: CLINIC | Age: 86
End: 2021-10-29
Payer: MEDICARE

## 2021-10-29 NOTE — TELEPHONE ENCOUNTER
Gracie calling wanting to give provider update that patient has been discharged from Home care today, 10/29/21. She is also wanting to inform that patient is planning on moving to Assisted Living Facility in [place] soon and will be establishing with a new provider. Carolee Go LPN

## 2021-11-08 ENCOUNTER — OFFICE VISIT (OUTPATIENT)
Dept: AUDIOLOGY | Facility: CLINIC | Age: 86
End: 2021-11-08
Payer: MEDICARE

## 2021-11-08 DIAGNOSIS — H90.3 SENSORINEURAL HEARING LOSS, BILATERAL: Primary | ICD-10-CM

## 2021-11-08 PROCEDURE — 92550 TYMPANOMETRY & REFLEX THRESH: CPT | Performed by: AUDIOLOGIST

## 2021-11-08 PROCEDURE — 99207 PR NO CHARGE LOS: CPT | Performed by: AUDIOLOGIST

## 2021-11-08 PROCEDURE — 92557 COMPREHENSIVE HEARING TEST: CPT | Performed by: AUDIOLOGIST

## 2021-11-08 NOTE — PROGRESS NOTES
AUDIOLOGY REPORT    SUBJECTIVE:  Shiloh Covarrubias is a 89 year old female who was seen in the Audiology Clinic at the [facility] for audiologic evaluation, referred by KADIE Vidal M.D. The patient reports longstanding hearing loss and tinnitus, however recently she was very ill and notices a pressure sensation (like after flying and you can t pop your ears) left ear worse than right. Her hearing has not returned to her baseline.  She did report a fall, which occurred at the beginning of her illness UTI an pneumonia, she was hospitalized. She will also be moving to an assisted living facility in [place] at the end of the month.  The patient notes difficulty with communication in a variety of listening situations, she struggles to hear conversation in quiet and in groups.  They were accompanied today by their son.    OBJECTIVE:  Otoscopic exam indicates cerumen in the right ear and clear left ear with visible landmarks. Right ear wax was significant but not occluding.     Pure Tone Thresholds assessed using conventional audiometry with good reliability from 250-8000 Hz bilaterally using circumaural headphones     RIGHT:  mild sloping to moderate sensorineural hearing loss    LEFT:    mild sloping to moderate sensorineural hearing loss    Tympanogram:    RIGHT: normal eardrum mobility    LEFT:   normal eardrum mobility    Reflexes (reported by stimulus ear):  RIGHT: Ipsilateral is did not test wax  RIGHT: Contralateral is did not test wax  LEFT:   Ipsilateral is did not test wax  LEFT:   Contralateral is did not test wax    Speech Reception Threshold:    RIGHT: 45 dB HL    LEFT:   50 dB HL    Word Recognition Score:     RIGHT: 88% at 85 dB HL using NU-6 recorded word list.    LEFT:   80% at 90 dB HL using NU-6 recorded word list.    BINAURAL: 96% at 85 dB HL using NU-6 recorded word list.      ASSESSMENT:     ICD-10-CM    1. Sensorineural hearing loss, bilateral  H90.3 Cmprhn Audiometry  Threthanld Eval & Speech Recog ([id number])     Tymps / Reflex   ([id number])     Today s results were discussed with the patient in detail.     PLAN:    Patient was counseled regarding hearing loss and impact on communication.  {  Patient is a good candidate for amplification at this time. Handout on good communication strategies, and hearing aid use was given to patient.     It is recommended that the patient use an over the counter ear wax removal kit (advised to be sitting and to have her son present) if she is able to remove the wax that would be great, but if not the wax will be softer and easier to remove (it was very dried today). If she is not able to remove wax with the kit recommended ENT visit for ear cleaning. After right ear is clear recommend hearing aid consultation, advised her that she should look for a provider near [place] as she will need appointments to select, fit and make appropriate adjustments to the hearing aids.  Please call this clinic with questions regarding these results or recommendations.        Michael Blackwood.  [place] Licensed Audiologist #[id number]

## 2021-11-16 ENCOUNTER — OFFICE VISIT (OUTPATIENT)
Dept: INTERNAL MEDICINE | Facility: CLINIC | Age: 86
End: 2021-11-16
Payer: MEDICARE

## 2021-11-16 VITALS
OXYGEN SATURATION: 96 % | TEMPERATURE: 97.4 F | RESPIRATION RATE: 16 BRPM | WEIGHT: 110 LBS | BODY MASS INDEX: 19.49 KG/M2 | DIASTOLIC BLOOD PRESSURE: 64 MMHG | SYSTOLIC BLOOD PRESSURE: 132 MMHG | HEART RATE: 76 BPM

## 2021-11-16 DIAGNOSIS — F32.0 MAJOR DEPRESSIVE DISORDER, SINGLE EPISODE, MILD (H): ICD-10-CM

## 2021-11-16 DIAGNOSIS — J44.9 CHRONIC OBSTRUCTIVE PULMONARY DISEASE, UNSPECIFIED COPD TYPE (H): ICD-10-CM

## 2021-11-16 DIAGNOSIS — I10 ESSENTIAL HYPERTENSION WITH GOAL BLOOD PRESSURE LESS THAN 130/80: ICD-10-CM

## 2021-11-16 DIAGNOSIS — N18.31 STAGE 3A CHRONIC KIDNEY DISEASE (H): Primary | ICD-10-CM

## 2021-11-16 PROCEDURE — 99214 OFFICE O/P EST MOD 30 MIN: CPT | Performed by: INTERNAL MEDICINE

## 2021-11-16 NOTE — PROGRESS NOTES
Assessment & Plan    Patient is a rather healthy 89-year-old woman who is living in her home which was attached to her son's home. She had some episodes of weakness and had to be in a rehab center this fall she is now been moved to assisted living in [place] by her other children.    We did help fill out her POLST form she is DNR/DNI but would take selective treatments including IV antibiotics and a short duration of a feeding tube.        Stage 3 chronic kidney disease, unspecified whether stage 3a or 3b CKD (H)  Chronic kidney disease stage IIIa she is at her on her last blood test. Her fluid is better she is on appropriate medications.    Chronic obstructive pulmonary disease, unspecified COPD type (H)  COPD is stable her lungs are clear she does take her inhaler that helps her.    Essential hypertension with goal blood pressure less than 130/80  Pressures controlled she is off the amlodipine will stay off it to avoid any further swelling.    Major depressive disorder, single episode, mild (H)  Pression is stable with Lexapro, patient is a little teary-eyed as she is moving and will lose her Taoist, our clinic, and her friends but she will have her family there.                 Return in about 1 year (around 11/16/2022) for Physical Exam.    Rajesh Vidal MD  [facility] is a 89 year old who presents for the following health issues     HPI     Chief Complaint   Patient presents with     RECHECK     visit before moving to an assisted living in [place]     She is moving to assisted living in [place] by her children. Wants to stay with me for a physician until more care is needed.   She will get meals there. She will do her own laundry and pills.      Doing well otherwise.     BP is ok off amlodipine so will stay off it.      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      Unspecified asthma(493.90)      Unspecified intestinal obstruction 02/17/10    D/C 02/20/10     Current Outpatient Medications   Medication     ASPIRIN 81 MG OR TABS     Cranberry 400 MG CAPS     EQ LORATADINE 10 MG tablet     escitalopram (LEXAPRO) 10 MG tablet     furosemide (LASIX) 20 MG tablet     hydrALAZINE (APRESOLINE) 50 MG tablet     lisinopril (ZESTRIL) 20 MG tablet     loperamide (IMODIUM A-D) 2 MG tablet     Multiple Vitamins-Minerals (WOMENS MULTIVITAMIN) TABS     tiotropium (SPIRIVA RESPIMAT) 2.5 MCG/ACT inhaler     amLODIPine (NORVASC) 5 MG tablet     No current facility-administered medications for this visit.     Social History     Tobacco Use     Smoking status: Never Smoker     Smokeless tobacco: Never Used   Substance Use Topics     Alcohol use: Yes     Alcohol/week: 0.0 standard drinks     Comment: 3 per  year     Drug use: No         Review of Systems         Objective    LMP  (LMP Unknown)   Body mass index is 19.49 kg/m .  Physical Exam   No acute distress  Heart is regular rate and rhythm with a slight murmur  Gait is with a cane  Extremities have trace edema but improved

## 2021-11-26 ENCOUNTER — VIRTUAL VISIT (OUTPATIENT)
Dept: FAMILY MEDICINE | Facility: CLINIC | Age: 86
End: 2021-11-26
Payer: MEDICARE

## 2021-11-26 DIAGNOSIS — J44.9 CHRONIC OBSTRUCTIVE PULMONARY DISEASE, UNSPECIFIED COPD TYPE (H): Primary | ICD-10-CM

## 2021-11-26 DIAGNOSIS — J44.9 CHRONIC OBSTRUCTIVE PULMONARY DISEASE, UNSPECIFIED COPD TYPE (H): ICD-10-CM

## 2021-11-26 PROCEDURE — 99441 PR PHYSICIAN TELEPHONE EVALUATION 5-10 MIN: CPT | Mod: 95 | Performed by: FAMILY MEDICINE

## 2021-11-26 ASSESSMENT — ASTHMA QUESTIONNAIRES
QUESTION_4 LAST FOUR WEEKS HOW OFTEN HAVE YOU USED YOUR RESCUE INHALER OR NEBULIZER MEDICATION (SUCH AS ALBUTEROL): NOT AT ALL
ACT_TOTALSCORE: 22
QUESTION_1 LAST FOUR WEEKS HOW MUCH OF THE TIME DID YOUR ASTHMA KEEP YOU FROM GETTING AS MUCH DONE AT WORK, SCHOOL OR AT HOME: NONE OF THE TIME
QUESTION_5 LAST FOUR WEEKS HOW WOULD YOU RATE YOUR ASTHMA CONTROL: WELL CONTROLLED
QUESTION_3 LAST FOUR WEEKS HOW OFTEN DID YOUR ASTHMA SYMPTOMS (WHEEZING, COUGHING, SHORTNESS OF BREATH, CHEST TIGHTNESS OR PAIN) WAKE YOU UP AT NIGHT OR EARLIER THAN USUAL IN THE MORNING: NOT AT ALL
QUESTION_2 LAST FOUR WEEKS HOW OFTEN HAVE YOU HAD SHORTNESS OF BREATH: THREE TO SIX TIMES A WEEK

## 2021-11-26 NOTE — TELEPHONE ENCOUNTER
Prescription approved per [facility] Refill Protocol.  \Rae Rankin RN on 11/26/2021 at 2:47 PM

## 2021-11-26 NOTE — PROGRESS NOTES
KENNA is a 89 year old who is being evaluated via a billable telephone visit.      What phone number would you like to be contacted at? [phone]-terri  How would you like to obtain your AVS? MyChart    Assessment & Plan     Chronic obstructive pulmonary disease, unspecified COPD type (H)[  - tiotropium (SPIRIVA RESPIMAT) 2.5 MCG/ACT inhaler  Dispense: 8 g; Refill: 2  Refill was put in and sent to the pharmacy.  Encouraged her to call and let us know if she is having any other concerns.      No follow-ups on file.    Yasmani Alcaraz MD  [facility]   KENNA is a 89 year old who presents for the following health issues  accompanied by her son.    HPI   She has a history of COPD and is on Spiriva Respimat. Having no major concerns but noted that she needs a refill of the medication. Has no side effects to the medication. Having no cough or difficulty breathing.  Requesting for a refill.    Family History   Problem Relation Age of Onset     Arthritis Mother      Osteoporosis Mother      Thyroid Disease Mother         Hypothyroid.     Depression Maternal Uncle         Bouts of depression     Hypertension No family hx of      C.A.D. No family hx of       Social History     Socioeconomic History     Marital status:      Spouse name: Edilson Haque     Number of children: 5     Years of education: 9     Highest education level: Not on file   Occupational History     Occupation: reitred worked for [employer]   Tobacco Use     Smoking status: Never Smoker     Smokeless tobacco: Never Used   Substance and Sexual Activity     Alcohol use: Yes     Alcohol/week: 0.0 standard drinks     Comment: 3 per  year     Drug use: No     Sexual activity: Never     Partners: Male   Other Topics Concern      Service No     Blood Transfusions No     Caffeine Concern No     Comment: Drinks decaf at home. Drinks alot of tea.     Occupational Exposure No     Hobby Hazards No      Sleep Concern No     Stress Concern Yes     Comment: sold their home, then rented, and now built town home.     Weight Concern No     Special Diet No     Back Care No     Comment: Goes to Chiropractor once every 8 weeks.     Exercise Yes     Comment: She goes to [employer] and she walks     Bike Helmet Not Asked     Comment: Does not ride a biicycle anymore.     Seat Belt Yes     Comment: Always uses a seat belt.     Self-Exams No     Parent/sibling w/ CABG, MI or angioplasty before 65F 55M? No   Social History Narrative     Not on file     Social Determinants of Health     Financial Resource Strain: Not on file   Food Insecurity: Not on file   Transportation Needs: Not on file   Physical Activity: Not on file   Stress: Not on file   Social Connections: Not on file   Intimate Partner Violence: Not on file   Housing Stability: Not on file      Past Surgical History:   Procedure Laterality Date     ESOPHAGOSCOPY, GASTROSCOPY, DUODENOSCOPY (EGD), COMBINED  2/8/2011    COMBINED ESOPHAGOSCOPY, GASTROSCOPY, DUODENOSCOPY (EGD), BIOPSY SINGLE OR MULTIPLE performed by ANGY LIMA at  GI     ESOPHAGOSCOPY, GASTROSCOPY, DUODENOSCOPY (EGD), DILATATION, COMBINED  2/8/2011    COMBINED ESOPHAGOSCOPY, GASTROSCOPY, DUODENOSCOPY (EGD), DILATATION performed by ANGY LIMA at [facility]GI ENDOSCOPY, SIMPLE EXAM  02/23/10      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      Objective           Vitals:  No vitals were obtained today due to virtual visit.    Physical Exam   healthy, alert, no distress and cooperative  PSYCH: Alert and oriented times 3; coherent speech, normal   rate and volume for age.Her  affect is normal  RESP: No cough, no audible wheezing, able to talk in full sentences  Remainder of exam unable to be completed due to telephone visits          Phone call duration: 5  minutes

## 2021-11-26 NOTE — TELEPHONE ENCOUNTER
Reason for Call:  Medication or medication refill:    Do you use a [facility]?  Name of the pharmacy and phone number for the current request:  [facility]    Name of the medication requested: Jiana    Other request: Patient is completely out    Can we leave a detailed message on this number? YES    Phone number patient can be reached at: Home number on file [phone] (home)    Best Time: Any    Call taken on 11/26/2021 at 1:48 PM by Patricia Rutledge

## 2021-11-27 ASSESSMENT — ASTHMA QUESTIONNAIRES: ACT_TOTALSCORE: 22

## 2021-11-29 RX ORDER — TIOTROPIUM BROMIDE INHALATION SPRAY 3.12 UG/1
SPRAY, METERED RESPIRATORY (INHALATION)
Qty: 4 G | Refills: 0 | OUTPATIENT
Start: 2021-11-29

## 2021-12-02 ENCOUNTER — TELEPHONE (OUTPATIENT)
Dept: INTERNAL MEDICINE | Facility: CLINIC | Age: 86
End: 2021-12-02
Payer: MEDICARE

## 2021-12-02 DIAGNOSIS — J44.9 CHRONIC OBSTRUCTIVE PULMONARY DISEASE, UNSPECIFIED COPD TYPE (H): ICD-10-CM

## 2021-12-02 DIAGNOSIS — F41.1 ANXIETY STATE: ICD-10-CM

## 2021-12-02 DIAGNOSIS — N18.31 STAGE 3A CHRONIC KIDNEY DISEASE (H): ICD-10-CM

## 2021-12-02 DIAGNOSIS — I10 ESSENTIAL HYPERTENSION WITH GOAL BLOOD PRESSURE LESS THAN 130/80: Primary | ICD-10-CM

## 2021-12-02 DIAGNOSIS — F41.1 GAD (GENERALIZED ANXIETY DISORDER): ICD-10-CM

## 2021-12-02 DIAGNOSIS — J30.1 CHRONIC SEASONAL ALLERGIC RHINITIS DUE TO POLLEN: ICD-10-CM

## 2021-12-02 NOTE — TELEPHONE ENCOUNTER
Patient calling and stating she is switching her pharmacy to the [facility] mail order pharmacy. So she is needing all of her prescriptions sent to the new pharmacy. Carolee Go LPN

## 2021-12-03 RX ORDER — HYDRALAZINE HYDROCHLORIDE 50 MG/1
TABLET, FILM COATED ORAL
Qty: 270 TABLET | Refills: 3 | Status: SHIPPED | OUTPATIENT
Start: 2021-12-03 | End: 2021-12-06

## 2021-12-03 RX ORDER — LOPERAMIDE HYDROCHLORIDE 2 MG/1
TABLET ORAL
Qty: 20 TABLET | Refills: 2 | Status: SHIPPED | OUTPATIENT
Start: 2021-12-03 | End: 2021-12-23

## 2021-12-03 RX ORDER — LISINOPRIL 20 MG/1
20 TABLET ORAL DAILY
Qty: 180 TABLET | Refills: 3 | Status: SHIPPED | OUTPATIENT
Start: 2021-12-03 | End: 2022-07-28

## 2021-12-03 RX ORDER — FUROSEMIDE 20 MG
20 TABLET ORAL DAILY
Qty: 90 TABLET | Refills: 3 | Status: SHIPPED | OUTPATIENT
Start: 2021-12-03 | End: 2021-12-06

## 2021-12-03 RX ORDER — ESCITALOPRAM OXALATE 10 MG/1
10 TABLET ORAL DAILY
Qty: 90 TABLET | Refills: 3 | Status: SHIPPED | OUTPATIENT
Start: 2021-12-03 | End: 2021-12-06

## 2021-12-06 DIAGNOSIS — I10 ESSENTIAL HYPERTENSION WITH GOAL BLOOD PRESSURE LESS THAN 130/80: ICD-10-CM

## 2021-12-06 DIAGNOSIS — F41.1 GAD (GENERALIZED ANXIETY DISORDER): ICD-10-CM

## 2021-12-06 DIAGNOSIS — N18.31 STAGE 3A CHRONIC KIDNEY DISEASE (H): ICD-10-CM

## 2021-12-06 RX ORDER — HYDRALAZINE HYDROCHLORIDE 50 MG/1
TABLET, FILM COATED ORAL
Qty: 270 TABLET | Refills: 3 | Status: SHIPPED | OUTPATIENT
Start: 2021-12-06 | End: 2022-12-08

## 2021-12-06 RX ORDER — ESCITALOPRAM OXALATE 10 MG/1
10 TABLET ORAL DAILY
Qty: 90 TABLET | Refills: 3 | Status: SHIPPED | OUTPATIENT
Start: 2021-12-06 | End: 2023-01-06

## 2021-12-06 RX ORDER — FUROSEMIDE 20 MG
20 TABLET ORAL DAILY
Qty: 90 TABLET | Refills: 3 | Status: SHIPPED | OUTPATIENT
Start: 2021-12-06 | End: 2022-11-01

## 2021-12-23 DIAGNOSIS — F41.1 ANXIETY STATE: ICD-10-CM

## 2021-12-23 RX ORDER — LOPERAMIDE HYDROCHLORIDE 2 MG/1
TABLET ORAL
Qty: 60 TABLET | Refills: 1 | Status: SHIPPED | OUTPATIENT
Start: 2021-12-23 | End: 2022-02-21

## 2021-12-23 NOTE — TELEPHONE ENCOUNTER
Anti-diarrheal      Last Written Prescription Date:  12/3/2021  Last Fill Quantity: 20,   # refills: 2  Last Office Visit: 11/16/2021  Future Office visit:       Routing refill request to provider for review/approval because:  Drug not on the [facility], P or [facility] refill protocol or controlled substance

## 2022-01-19 ENCOUNTER — LAB REQUISITION (OUTPATIENT)
Dept: LAB | Facility: CLINIC | Age: 87
End: 2022-01-19
Payer: MEDICARE

## 2022-01-19 DIAGNOSIS — U07.1 COVID-19: ICD-10-CM

## 2022-01-19 PROCEDURE — U0005 INFEC AGEN DETEC AMPLI PROBE: HCPCS | Mod: ORL | Performed by: NURSE PRACTITIONER

## 2022-01-20 LAB — SARS-COV-2 RNA RESP QL NAA+PROBE: NEGATIVE

## 2022-01-23 ENCOUNTER — LAB REQUISITION (OUTPATIENT)
Dept: LAB | Facility: CLINIC | Age: 87
End: 2022-01-23
Payer: MEDICARE

## 2022-01-23 DIAGNOSIS — U07.1 COVID-19: ICD-10-CM

## 2022-01-24 PROCEDURE — U0005 INFEC AGEN DETEC AMPLI PROBE: HCPCS | Mod: ORL | Performed by: NURSE PRACTITIONER

## 2022-01-25 LAB
SARS-COV-2 RNA RESP QL NAA+PROBE: NORMAL
SARS-COV-2 RNA RESP QL NAA+PROBE: NOT DETECTED

## 2022-01-26 ENCOUNTER — LAB REQUISITION (OUTPATIENT)
Dept: LAB | Facility: CLINIC | Age: 87
End: 2022-01-26
Payer: MEDICARE

## 2022-01-26 DIAGNOSIS — U07.1 COVID-19: ICD-10-CM

## 2022-01-27 PROCEDURE — U0005 INFEC AGEN DETEC AMPLI PROBE: HCPCS | Mod: ORL | Performed by: NURSE PRACTITIONER

## 2022-01-28 LAB
SARS-COV-2 RNA RESP QL NAA+PROBE: NORMAL
SARS-COV-2 RNA RESP QL NAA+PROBE: NOT DETECTED

## 2022-01-31 ENCOUNTER — LAB REQUISITION (OUTPATIENT)
Dept: LAB | Facility: CLINIC | Age: 87
End: 2022-01-31
Payer: MEDICARE

## 2022-01-31 DIAGNOSIS — U07.1 COVID-19: ICD-10-CM

## 2022-01-31 LAB — SARS-COV-2 RNA RESP QL NAA+PROBE: NORMAL

## 2022-01-31 PROCEDURE — U0003 INFECTIOUS AGENT DETECTION BY NUCLEIC ACID (DNA OR RNA); SEVERE ACUTE RESPIRATORY SYNDROME CORONAVIRUS 2 (SARS-COV-2) (CORONAVIRUS DISEASE [COVID-19]), AMPLIFIED PROBE TECHNIQUE, MAKING USE OF HIGH THROUGHPUT TECHNOLOGIES AS DESCRIBED BY CMS-2020-01-R: HCPCS | Mod: ORL | Performed by: NURSE PRACTITIONER

## 2022-02-01 LAB — SARS-COV-2 RNA RESP QL NAA+PROBE: NOT DETECTED

## 2022-02-21 DIAGNOSIS — F41.1 ANXIETY STATE: ICD-10-CM

## 2022-02-21 RX ORDER — LOPERAMIDE HYDROCHLORIDE 2 MG/1
TABLET ORAL
Qty: 60 TABLET | Refills: 3 | Status: SHIPPED | OUTPATIENT
Start: 2022-02-21 | End: 2022-04-20

## 2022-02-21 NOTE — TELEPHONE ENCOUNTER
LOPERAMIDE      Last Written Prescription Date:  12/23/21  Last Fill Quantity: 60,   # refills: 1  Last Office Visit: 11/16/2021  Future Office visit:       Routing refill request to provider for review/approval because:  Drug not on the [facility], P or [facility] refill protocol or controlled substance

## 2022-04-18 DIAGNOSIS — F41.1 ANXIETY STATE: ICD-10-CM

## 2022-04-20 RX ORDER — LOPERAMIDE HYDROCHLORIDE 2 MG/1
TABLET ORAL
Qty: 240 TABLET | Refills: 1 | Status: SHIPPED | OUTPATIENT
Start: 2022-04-20 | End: 2022-09-28

## 2022-04-20 NOTE — TELEPHONE ENCOUNTER
Routing refill request to provider for review/approval because:  Drug not on the [facility] refill protocol     BECKY Ward, RN  [facility]

## 2022-05-01 ENCOUNTER — HEALTH MAINTENANCE LETTER (OUTPATIENT)
Age: 87
End: 2022-05-01

## 2022-05-12 ENCOUNTER — MYC MEDICAL ADVICE (OUTPATIENT)
Dept: INTERNAL MEDICINE | Facility: CLINIC | Age: 87
End: 2022-05-12
Payer: COMMERCIAL

## 2022-05-12 NOTE — TELEPHONE ENCOUNTER
Per other mychart encounter, appointment was schedule closer to new location. Closing this encounter.  Denisha De Leon, CMA

## 2022-06-13 ASSESSMENT — PATIENT HEALTH QUESTIONNAIRE - PHQ9: SUM OF ALL RESPONSES TO PHQ QUESTIONS 1-9: 1

## 2022-07-28 ENCOUNTER — OFFICE VISIT (OUTPATIENT)
Dept: INTERNAL MEDICINE | Facility: CLINIC | Age: 87
End: 2022-07-28
Payer: MEDICARE

## 2022-07-28 VITALS
RESPIRATION RATE: 16 BRPM | TEMPERATURE: 97.3 F | SYSTOLIC BLOOD PRESSURE: 124 MMHG | OXYGEN SATURATION: 97 % | WEIGHT: 131 LBS | DIASTOLIC BLOOD PRESSURE: 68 MMHG | HEART RATE: 71 BPM | BODY MASS INDEX: 23.21 KG/M2 | HEIGHT: 63 IN

## 2022-07-28 DIAGNOSIS — Z00.00 HEALTHCARE MAINTENANCE: Primary | ICD-10-CM

## 2022-07-28 DIAGNOSIS — Z13.220 SCREENING FOR HYPERLIPIDEMIA: ICD-10-CM

## 2022-07-28 DIAGNOSIS — I10 ESSENTIAL HYPERTENSION WITH GOAL BLOOD PRESSURE LESS THAN 130/80: ICD-10-CM

## 2022-07-28 LAB — HGB BLD-MCNC: 12.6 G/DL (ref 11.7–15.7)

## 2022-07-28 PROCEDURE — 36415 COLL VENOUS BLD VENIPUNCTURE: CPT | Performed by: NURSE PRACTITIONER

## 2022-07-28 PROCEDURE — 85018 HEMOGLOBIN: CPT | Performed by: NURSE PRACTITIONER

## 2022-07-28 PROCEDURE — G0438 PPPS, INITIAL VISIT: HCPCS | Performed by: NURSE PRACTITIONER

## 2022-07-28 PROCEDURE — 80048 BASIC METABOLIC PNL TOTAL CA: CPT | Performed by: NURSE PRACTITIONER

## 2022-07-28 PROCEDURE — 80061 LIPID PANEL: CPT | Performed by: NURSE PRACTITIONER

## 2022-07-28 PROCEDURE — 82043 UR ALBUMIN QUANTITATIVE: CPT | Performed by: NURSE PRACTITIONER

## 2022-07-28 RX ORDER — LISINOPRIL 20 MG/1
20 TABLET ORAL 2 TIMES DAILY
Qty: 180 TABLET | Refills: 3 | COMMUNITY
Start: 2022-07-28 | End: 2022-12-04

## 2022-07-28 RX ORDER — CALCIUM POLYCARBOPHIL 625 MG 625 MG/1
2 TABLET ORAL DAILY
COMMUNITY
Start: 2022-07-28 | End: 2023-01-05

## 2022-07-28 RX ORDER — AMLODIPINE BESYLATE 5 MG/1
5 TABLET ORAL DAILY
COMMUNITY
Start: 2022-07-28 | End: 2022-08-28

## 2022-07-28 ASSESSMENT — ENCOUNTER SYMPTOMS
WEAKNESS: 1
NERVOUS/ANXIOUS: 1
DIZZINESS: 0
NAUSEA: 0
FEVER: 0
PALPITATIONS: 0
DYSURIA: 0
JOINT SWELLING: 0
MYALGIAS: 0
SORE THROAT: 0
SHORTNESS OF BREATH: 1
HEADACHES: 0
EYE PAIN: 0
CHILLS: 0
HEMATURIA: 0
HEMATOCHEZIA: 0
COUGH: 0
PARESTHESIAS: 0
CONSTIPATION: 0
FREQUENCY: 0
ARTHRALGIAS: 0
ABDOMINAL PAIN: 0
DIARRHEA: 0
BREAST MASS: 0
HEARTBURN: 0

## 2022-07-28 ASSESSMENT — ACTIVITIES OF DAILY LIVING (ADL): CURRENT_FUNCTION: NO ASSISTANCE NEEDED

## 2022-07-28 ASSESSMENT — PATIENT HEALTH QUESTIONNAIRE - PHQ9
SUM OF ALL RESPONSES TO PHQ QUESTIONS 1-9: 1
10. IF YOU CHECKED OFF ANY PROBLEMS, HOW DIFFICULT HAVE THESE PROBLEMS MADE IT FOR YOU TO DO YOUR WORK, TAKE CARE OF THINGS AT HOME, OR GET ALONG WITH OTHER PEOPLE: NOT DIFFICULT AT ALL
SUM OF ALL RESPONSES TO PHQ QUESTIONS 1-9: 1

## 2022-07-28 NOTE — PROGRESS NOTES
"SUBJECTIVE:   Shiloh Covarrubias is a 89 year old female who presents for Preventive Visit.      Patient has been advised of split billing requirements and indicates understanding: Yes  Are you in the first 12 months of your Medicare coverage?  No    Healthy Habits:     In general, how would you rate your overall health?  Good    Frequency of exercise:  2-3 days/week    Duration of exercise:  15-30 minutes    Do you usually eat at least 4 servings of fruit and vegetables a day, include whole grains    & fiber and avoid regularly eating high fat or \"junk\" foods?  Yes    Taking medications regularly:  Yes    Medication side effects:  None    Ability to successfully perform activities of daily living:  No assistance needed    Home Safety:  No safety concerns identified    Hearing Impairment:  Difficulty following a conversation in a noisy restaurant or crowded room, need to ask people to speak up or repeat themselves and difficulty understanding soft or whispered speech    In the past 6 months, have you been bothered by leaking of urine? Yes    In general, how would you rate your overall mental or emotional health?  Good      PHQ-2 Total Score: 0    Additional concerns today:  Yes    Do you feel safe in your environment? Yes    Have you ever done Advance Care Planning? (For example, a Health Directive, POLST, or a discussion with a medical provider or your loved ones about your wishes): Yes, advance care planning is on file.         Fall risk  Fallen 2 or more times in the past year?: No  Any fall with injury in the past year?: No    Cognitive Screening   1) Repeat 3 items (Leader, Season, Table)    2) Clock draw: ABNORMAL hands wrong  3) 3 item recall: Recalls 3 objects  Results: ABNORMAL clock, 1-2 items recalled: PROBABLE COGNITIVE IMPAIRMENT, **INFORM PROVIDER**    Mini-CogTM Copyright JANELL Fernandez. Licensed by the author for use in [facility]; reprinted with permission (yari@.[email or web address]). All rights reserved. "      Do you have sleep apnea, excessive snoring or daytime drowsiness?: no    Reviewed and updated as needed this visit by clinical staff   Tobacco                  Reviewed and updated as needed this visit by Provider                   Social History     Tobacco Use     Smoking status: Never Smoker     Smokeless tobacco: Never Used   Substance Use Topics     Alcohol use: Yes     Alcohol/week: 0.0 standard drinks     Comment: 3 per  year     If you drink alcohol do you typically have >3 drinks per day or >7 drinks per week? No    Alcohol Use 7/28/2022   Prescreen: >3 drinks/day or >7 drinks/week? No       Current providers sharing in care for this patient include:   Patient Care Team:  Rajesh Vidal MD as PCP - General  Rajesh Vidal MD as Assigned PCP  Duglas Uribe RPH as Pharmacist (Pharmacist Clinician- Clinical Pharmacy Specialist)  Duglas Uribe RPH as Assigned MTM Pharmacist    The following health maintenance items are reviewed in Epic and correct as of today:  Health Maintenance Due   Topic Date Due     HF ACTION PLAN  Never done     ANNUAL REVIEW OF HM ORDERS  Never done     DTAP/TDAP/TD IMMUNIZATION (1 - Tdap) Never done     ZOSTER IMMUNIZATION (1 of 2) Never done     MEDICARE ANNUAL WELLNESS VISIT  Never done     DEXA  10/28/2011     LIPID  01/30/2015     ADVANCE CARE PLANNING  11/15/2018     BMP  04/08/2022     MICROALBUMIN  04/09/2022     HEMOGLOBIN  09/21/2022     BP Readings from Last 3 Encounters:   07/28/22 124/68   11/16/21 132/64   09/29/21 134/60    Wt Readings from Last 3 Encounters:   07/28/22 59.4 kg (131 lb)   11/16/21 49.9 kg (110 lb)   09/29/21 51.3 kg (113 lb)                  Patient Active Problem List   Diagnosis     Edema     Anxiety state     Essential and other specified forms of tremor     Senile cataract     Sebaceous cyst     Impacted cerumen     Chronic airway obstruction (H)     Osteoarthrosis, unspecified whether generalized or localized, involving lower leg      Hypertriglyceridemia     Chest pain     Palpitations     GERD (gastroesophageal reflux disease)     Hyperlipidemia LDL goal <100     Advanced directives, counseling/discussion     Major depressive disorder, single episode, mild (H)     Essential hypertension with goal blood pressure less than 130/80     Chronic kidney disease, stage 3 (H)     Peripheral vascular disease (H)     Generalized weakness     Past Surgical History:   Procedure Laterality Date     ESOPHAGOSCOPY, GASTROSCOPY, DUODENOSCOPY (EGD), COMBINED  2/8/2011    COMBINED ESOPHAGOSCOPY, GASTROSCOPY, DUODENOSCOPY (EGD), BIOPSY SINGLE OR MULTIPLE performed by ANGY LIMA at  GI     ESOPHAGOSCOPY, GASTROSCOPY, DUODENOSCOPY (EGD), DILATATION, COMBINED  2/8/2011    COMBINED ESOPHAGOSCOPY, GASTROSCOPY, DUODENOSCOPY (EGD), DILATATION performed by ANGY LIMA at  GI     [facility] UGI ENDOSCOPY, SIMPLE EXAM  02/23/10       Social History     Tobacco Use     Smoking status: Never Smoker     Smokeless tobacco: Never Used   Substance Use Topics     Alcohol use: Yes     Alcohol/week: 0.0 standard drinks     Comment: 3 per  year     Family History   Problem Relation Age of Onset     Arthritis Mother      Osteoporosis Mother      Thyroid Disease Mother         Hypothyroid.     Depression Maternal Uncle         Bouts of depression     Hypertension No family hx of      C.A.D. No family hx of          Current Outpatient Medications   Medication Sig Dispense Refill     amLODIPine (NORVASC) 5 MG tablet Take 1 tablet (5 mg) by mouth daily       ASPIRIN 81 MG OR TABS Take 81 mg by mouth daily        calcium polycarbophil (FIBERCON) 625 MG tablet Take 2 tablets (1,250 mg) by mouth daily       Cranberry 400 MG CAPS Take 1 capsule by mouth every other day       EQ LORATADINE 10 MG tablet Take 1 tablet (10 mg) by mouth daily 90 tablet 3     escitalopram (LEXAPRO) 10 MG tablet Take 1 tablet (10 mg) by mouth daily 90 tablet 3     furosemide (LASIX) 20 MG tablet Take 1  "tablet (20 mg) by mouth daily 90 tablet 3     hydrALAZINE (APRESOLINE) 50 MG tablet TAKE 1 TABLET BY MOUTH THREE TIMES DAILY 270 tablet 3     lisinopril (ZESTRIL) 20 MG tablet Take 1 tablet (20 mg) by mouth 2 times daily RESUME ON 9/24. 180 tablet 3     loperamide (GNP ANTI-DIARRHEAL) 2 MG tablet TAKE 1 TABLET BEFORE MEALS 240 tablet 1     Multiple Vitamins-Minerals (WOMENS MULTIVITAMIN) TABS Take 1 tablet by mouth daily       tiotropium (SPIRIVA RESPIMAT) 2.5 MCG/ACT inhaler Inhale 2 puffs into the lungs daily 8 g 2     Mammogram Screening: Mammogram Screening - Patient over age 75, has elected to discontinue screenings.      Pertinent mammograms are reviewed under the imaging tab.    Review of Systems   Constitutional: Negative for chills and fever.   HENT: Positive for hearing loss. Negative for congestion, ear pain and sore throat.    Eyes: Negative for pain and visual disturbance.   Respiratory: Positive for shortness of breath. Negative for cough.    Cardiovascular: Positive for peripheral edema. Negative for chest pain and palpitations.   Gastrointestinal: Negative for abdominal pain, constipation, diarrhea, heartburn, hematochezia and nausea.   Breasts:  Negative for tenderness, breast mass and discharge.   Genitourinary: Negative for dysuria, frequency, genital sores, hematuria, pelvic pain, urgency, vaginal bleeding and vaginal discharge.   Musculoskeletal: Negative for arthralgias, joint swelling and myalgias.   Skin: Negative for rash.   Neurological: Positive for weakness. Negative for dizziness, headaches and paresthesias.   Psychiatric/Behavioral: Negative for mood changes. The patient is nervous/anxious.      C/o easy bruising of thin sken, PM leg swelling, thick black toenail, itchy scalp    OBJECTIVE:   Pulse 71   Temp 97.3  F (36.3  C) (Oral)   Resp 16   Ht 1.588 m (5' 2.5\")   Wt 59.4 kg (131 lb)   LMP  (LMP Unknown)   SpO2 95%   Breastfeeding No   BMI 23.58 kg/m   Estimated body mass " "index is 23.58 kg/m  as calculated from the following:    Height as of this encounter: 1.588 m (5' 2.5\").    Weight as of this encounter: 59.4 kg (131 lb).  Physical Exam  GENERAL: alert, no distress, frail and elderly  EYES: Eyes grossly normal to inspection, PERRL and conjunctivae and sclerae normal  NECK: no adenopathy, no asymmetry, masses, or scars and thyroid normal to palpation  RESP: lungs clear to auscultation - no rales, rhonchi or wheezes  CV: regular rate and rhythm, normal S1 S2, no S3 or S4, no murmur, click or rub, no peripheral edema and peripheral pulses strong  ABDOMEN: soft, nontender, no hepatosplenomegaly, no masses and bowel sounds normal  SKIN: no suspicious lesions or rashes  PSYCH: mentation appears normal, affect normal/bright        ASSESSMENT / PLAN:       ICD-10-CM    1. Healthcare maintenance  Z00.00 REVIEW OF HEALTH MAINTENANCE PROTOCOL ORDERS     Hemoglobin     Hemoglobin   2. Essential hypertension with goal blood pressure less than 130/80  I10 lisinopril (ZESTRIL) 20 MG tablet     BASIC METABOLIC PANEL     Albumin Random Urine Quantitative with Creat Ratio     BASIC METABOLIC PANEL     Albumin Random Urine Quantitative with Creat Ratio   3. Screening for hyperlipidemia  Z13.220 Lipid panel reflex to direct LDL Non-fasting     Lipid panel reflex to direct LDL Non-fasting       Leg elevation, compression stockings  Limit hair products, shampoo regularly, HC to hairline      Patient has been advised of split billing requirements and indicates understanding: Yes    COUNSELING:  Reviewed preventive health counseling, as reflected in patient instructions    Estimated body mass index is 23.58 kg/m  as calculated from the following:    Height as of this encounter: 1.588 m (5' 2.5\").    Weight as of this encounter: 59.4 kg (131 lb).        She reports that she has never smoked. She has never used smokeless tobacco.      Appropriate preventive services were discussed with this patient, " including applicable screening as appropriate for cardiovascular disease, diabetes, osteopenia/osteoporosis, and glaucoma.  As appropriate for age/gender, discussed screening for colorectal cancer, prostate cancer, breast cancer, and cervical cancer. Checklist reviewing preventive services available has been given to the patient.    Reviewed patients plan of care and provided an AVS. The Basic Care Plan (routine screening as documented in Health Maintenance) for Shiloh meets the Care Plan requirement. This Care Plan has been established and reviewed with the Patient and daughter.    Counseling Resources:  ATP IV Guidelines  Pooled Cohorts Equation Calculator  Breast Cancer Risk Calculator  Breast Cancer: Medication to Reduce Risk  FRAX Risk Assessment  ICSI Preventive Guidelines  Dietary Guidelines for Americans, 2010  [employer] MyPlate  ASA Prophylaxis  Lung CA Screening    Bree Alexandre NP  [facility]    Identified Health Risks:  Answers for HPI/ROS submitted by the patient on 7/28/2022  If you checked off any problems, how difficult have these problems made it for you to do your work, take care of things at home, or get along with other people?: Not difficult at all  PHQ9 TOTAL SCORE: 1

## 2022-07-29 LAB
ANION GAP SERPL CALCULATED.3IONS-SCNC: 7 MMOL/L (ref 3–14)
BUN SERPL-MCNC: 37 MG/DL (ref 7–30)
CALCIUM SERPL-MCNC: 9.3 MG/DL (ref 8.5–10.1)
CHLORIDE BLD-SCNC: 104 MMOL/L (ref 94–109)
CHOLEST SERPL-MCNC: 182 MG/DL
CO2 SERPL-SCNC: 25 MMOL/L (ref 20–32)
CREAT SERPL-MCNC: 1.16 MG/DL (ref 0.52–1.04)
CREAT UR-MCNC: 32 MG/DL
FASTING STATUS PATIENT QL REPORTED: YES
GFR SERPL CREATININE-BSD FRML MDRD: 44 ML/MIN/1.73M2
GLUCOSE BLD-MCNC: 94 MG/DL (ref 70–99)
HDLC SERPL-MCNC: 76 MG/DL
LDLC SERPL CALC-MCNC: 80 MG/DL
MICROALBUMIN UR-MCNC: 39 MG/L
MICROALBUMIN/CREAT UR: 121.88 MG/G CR (ref 0–25)
NONHDLC SERPL-MCNC: 106 MG/DL
POTASSIUM BLD-SCNC: 4.7 MMOL/L (ref 3.4–5.3)
SODIUM SERPL-SCNC: 136 MMOL/L (ref 133–144)
TRIGL SERPL-MCNC: 130 MG/DL

## 2022-08-07 NOTE — TELEPHONE ENCOUNTER
It is anesthetized with lidocaine and the nail  from the skin with special surgical instruments, medication applied and rinsed, guaze dressing applied, written instructions for post op care.  Normal activities next day with some soaking.  Come to clinic to discuss more details.  We can remove multiple toenails and this would be determined when you are sitting in exam chair with Runde looking at the nails that you would like to have removed.     No

## 2022-08-25 DIAGNOSIS — I10 ESSENTIAL HYPERTENSION WITH GOAL BLOOD PRESSURE LESS THAN 130/80: Primary | ICD-10-CM

## 2022-08-27 NOTE — TELEPHONE ENCOUNTER
Pending Prescriptions:                       Disp   Refills    amLODIPine (NORVASC) 5 MG tablet                           Sig: Take 1 tablet (5 mg) by mouth daily    Routing refill request to provider for review/approval because:  Routing to primary care provider for review

## 2022-08-28 RX ORDER — AMLODIPINE BESYLATE 5 MG/1
5 TABLET ORAL DAILY
Qty: 90 TABLET | Refills: 1 | Status: SHIPPED | OUTPATIENT
Start: 2022-08-28 | End: 2022-12-02

## 2022-09-27 DIAGNOSIS — F41.1 ANXIETY STATE: ICD-10-CM

## 2022-09-28 RX ORDER — LOPERAMIDE HYDROCHLORIDE 2 MG/1
TABLET ORAL
Qty: 240 TABLET | Refills: 1 | Status: SHIPPED | OUTPATIENT
Start: 2022-09-28 | End: 2023-07-20

## 2022-09-28 NOTE — TELEPHONE ENCOUNTER
Requested Prescriptions   Pending Prescriptions Disp Refills     loperamide (ANTI-DIARRHEAL) 2 MG tablet [Pharmacy Med Name: ANTI-DIARRHEAL 2 MG Tablet] 240 tablet 1     Sig: TAKE 1 TABLET BEFORE MEALS AS DIRECTED       Routing refill request to provider for review/approval because:  Drug not on the [facility] or [facility] refill protocol or controlled substance

## 2022-10-26 DIAGNOSIS — N18.31 STAGE 3A CHRONIC KIDNEY DISEASE (H): ICD-10-CM

## 2022-10-26 DIAGNOSIS — J44.9 CHRONIC OBSTRUCTIVE PULMONARY DISEASE, UNSPECIFIED COPD TYPE (H): ICD-10-CM

## 2022-11-01 RX ORDER — FUROSEMIDE 20 MG
20 TABLET ORAL DAILY
Qty: 90 TABLET | Refills: 3 | Status: SHIPPED | OUTPATIENT
Start: 2022-11-01 | End: 2023-01-05

## 2022-11-01 RX ORDER — TIOTROPIUM BROMIDE INHALATION SPRAY 3.12 UG/1
SPRAY, METERED RESPIRATORY (INHALATION)
Qty: 12 G | Refills: 3 | Status: SHIPPED | OUTPATIENT
Start: 2022-11-01 | End: 2023-02-16

## 2022-11-01 NOTE — TELEPHONE ENCOUNTER
Lasix  Routing refill request to provider for review/approval because:   Failed - Normal serum creatinine on file in past 12 months    Recent Labs   Lab Test 07/28/22  1046   CR 1.16*           spiriva   Failed - Asthma control assessment score within normal limits in last 6 months    Please review ACT score.        Margaux Whitman RN

## 2022-11-09 ENCOUNTER — OFFICE VISIT (OUTPATIENT)
Dept: INTERNAL MEDICINE | Facility: CLINIC | Age: 87
End: 2022-11-09
Payer: MEDICARE

## 2022-11-09 VITALS
HEIGHT: 63 IN | BODY MASS INDEX: 24.08 KG/M2 | DIASTOLIC BLOOD PRESSURE: 49 MMHG | HEART RATE: 66 BPM | RESPIRATION RATE: 20 BRPM | WEIGHT: 135.9 LBS | OXYGEN SATURATION: 96 % | SYSTOLIC BLOOD PRESSURE: 142 MMHG | TEMPERATURE: 97.7 F

## 2022-11-09 DIAGNOSIS — M79.674 PAIN OF TOE OF RIGHT FOOT: Primary | ICD-10-CM

## 2022-11-09 PROCEDURE — 99213 OFFICE O/P EST LOW 20 MIN: CPT

## 2022-11-09 ASSESSMENT — PAIN SCALES - GENERAL: PAINLEVEL: EXTREME PAIN (8)

## 2022-11-09 NOTE — PROGRESS NOTES
"  Assessment & Plan     Pain of toe of right foot  Patient has pain on tip of R foot which to me resembles a corn but it is in an odd location. It is on distal tip of toe and somewhat under great toenail. Since she has been wearing later shoes the pain has improved. I recommend corn band aids. I refer to podiatry to see if there is anything additional that needs to be done.   - Orthopedic  Referral; Future    I spent a total of 18 minutes on the day of the visit.   Time spent doing chart review, history and exam, documentation and further activities per the note}    Return in about 8 months (around 7/9/2023) for Routine preventive.    Zhen Campbell NP  [facility]    Justin Clayton is a 89 year old accompanied by her self, presenting for the following health issues:  Pain (Right big toe is in pain for months and it keeps getting worse. No injury. She states that it is not ingrown toenail.)      HPI     Patient has had toe problem ongoing for several months but recently started getting worse  Patients toe does not hurt unless she is not wearing tight shoes, it can sometimes throb when she is in bed at times   Pain is at tip of toe  Has been soaking her feet in Epson salt which helps in the moment   Wearing bigger shoes has also provided relief to patient     Review of Systems   Constitutional, HEENT, cardiovascular, pulmonary, GI, , musculoskeletal, neuro, skin, endocrine and psych systems are negative, except as otherwise noted.      Objective    BP (!) 142/49 (BP Location: Right arm, Patient Position: Sitting, Cuff Size: Adult Regular)   Pulse 66   Temp 97.7  F (36.5  C) (Tympanic)   Resp 20   Ht 1.588 m (5' 2.5\")   Wt 61.6 kg (135 lb 14.4 oz)   LMP  (LMP Unknown)   SpO2 96%   BMI 24.46 kg/m    Body mass index is 24.46 kg/m .  Physical Exam   GENERAL: alert and no distress  EYES: Eyes grossly normal to inspection, PERRL and conjunctivae and sclerae normal  NECK: no " adenopathy, no asymmetry, masses, or scars and thyroid normal to palpation  RESP: lungs clear to auscultation - no rales, rhonchi or wheezes  CV: regular rate and rhythm, normal S1 S2, no S3 or S4, no murmur, click or rub, no peripheral edema and peripheral pulses strong  ABDOMEN: soft, nontender, no hepatosplenomegaly, no masses and bowel sounds normal  MS: no gross musculoskeletal defects noted, BLE trace edema  SKIN: no suspicious lesions or rashes, on R great toe there is a hard pea size lump with a red spot in it, red/warm bilateral feet  NEURO: Normal strength and tone, mentation intact and speech normal  PSYCH: mentation appears normal, affect normal/bright

## 2022-11-25 ENCOUNTER — OFFICE VISIT (OUTPATIENT)
Dept: PODIATRY | Facility: CLINIC | Age: 87
End: 2022-11-25
Payer: MEDICARE

## 2022-11-25 VITALS — SYSTOLIC BLOOD PRESSURE: 182 MMHG | BODY MASS INDEX: 23.58 KG/M2 | WEIGHT: 131 LBS | DIASTOLIC BLOOD PRESSURE: 60 MMHG

## 2022-11-25 DIAGNOSIS — L84 CALLUS OF FOOT: ICD-10-CM

## 2022-11-25 DIAGNOSIS — M79.674 PAIN OF TOE OF RIGHT FOOT: ICD-10-CM

## 2022-11-25 DIAGNOSIS — R60.0 BILATERAL LEG EDEMA: ICD-10-CM

## 2022-11-25 DIAGNOSIS — L60.0 INGROWN TOENAIL OF RIGHT FOOT: Primary | ICD-10-CM

## 2022-11-25 PROCEDURE — 99203 OFFICE O/P NEW LOW 30 MIN: CPT | Performed by: PODIATRIST

## 2022-11-25 NOTE — PROGRESS NOTES
ASSESSMENT:  Encounter Diagnoses   Name Primary?     Pain of toe of right foot      Ingrown toenail of right foot Yes     Callus of foot      Bilateral leg edema      MEDICAL DECISION MAKING:  I think her pain is soft tissue related.  It is secondary to pressure at the distal medial right hallux.  Contributing to this includes the length of the toe, the interphalangeal joint extension, and close toed compression stockings and perhaps the nail plate.    I agree with her attempt to wear shoes with more toebox room.  She is agreeable to using an open toed surgical shoe for 2 to 4 weeks.  This can be used when at home.    I recommend trying open toed knee-high compression stockings.  2 pair were ordered.    I support her ongoing soaking of her foot and suggested in lukewarm soap water.    I also pared the hyperkeratotic skin down and encouraged her to do this with a pumice stone.    Follow-up in 1 month.  If no improvement, will consider imaging to look for a subungual bone spur.  Nail removal is an option, yet would warrant noninvasive vascular studies first.    Disclaimer: This note consists of symbols derived from keyboarding, dictation and/or voice recognition software. As a result, there may be errors in the script that have gone undetected. Please consider this when interpreting information found in this chart.    Jai Arias DPM, FACFAS, MS    [facility]le [facility]      ____________________________________________________________________    HPI:       I was asked by Zhen Campbell NP to evaluate Shiloh Covarrubias in consultation for right foot toe pain.     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     Unspecified asthma(493.90)      Unspecified intestinal obstruction 02/17/10    D/C 02/20/10   *  *  Past Surgical History:   Procedure Laterality Date     ESOPHAGOSCOPY, GASTROSCOPY, DUODENOSCOPY (EGD), COMBINED  2/8/2011    COMBINED ESOPHAGOSCOPY, GASTROSCOPY, DUODENOSCOPY (EGD), BIOPSY SINGLE OR MULTIPLE performed by ANGY LIMA at  GI     ESOPHAGOSCOPY, GASTROSCOPY, DUODENOSCOPY (EGD), DILATATION, COMBINED  2/8/2011    COMBINED ESOPHAGOSCOPY, GASTROSCOPY, DUODENOSCOPY (EGD), DILATATION performed by ANGY LIMA at  GI     [facility] UGI ENDOSCOPY, SIMPLE EXAM  02/23/10   *  *  Current Outpatient Medications   Medication Sig Dispense Refill     amLODIPine (NORVASC) 5 MG tablet Take 1 tablet (5 mg) by mouth daily 90 tablet 1     ASPIRIN 81 MG OR TABS Take 81 mg by mouth daily        calcium polycarbophil (FIBERCON) 625 MG tablet Take 2 tablets (1,250 mg) by mouth daily       Cranberry 400 MG CAPS Take 1 capsule by mouth every other day       EQ LORATADINE 10 MG tablet Take 1 tablet (10 mg) by mouth daily 90 tablet 3     escitalopram (LEXAPRO) 10 MG tablet Take 1 tablet (10 mg) by mouth daily 90 tablet 3     furosemide (LASIX) 20 MG tablet TAKE 1 TABLET (20 MG) BY MOUTH DAILY 90 tablet 3     hydrALAZINE (APRESOLINE) 50 MG tablet TAKE 1 TABLET BY MOUTH THREE TIMES DAILY 270 tablet 3     lisinopril (ZESTRIL) 20 MG tablet Take 1 tablet (20 mg) by mouth 2 times daily RESUME ON 9/24. 180 tablet 3     loperamide (ANTI-DIARRHEAL) 2 MG tablet TAKE 1 TABLET BEFORE MEALS AS DIRECTED 240 tablet 1     Multiple Vitamins-Minerals (WOMENS MULTIVITAMIN) TABS Take 1 tablet by mouth daily       SPIRIVA RESPIMAT 2.5 MCG/ACT inhaler INHALE 2 PUFFS INTO THE LUNGS DAILY 12 g 3         EXAM:    Vitals: Wt 59.4 kg (131 lb)   LMP  (LMP Unknown)   BMI 23.58 kg/m    BMI: Body mass index is 23.58 kg/m .    Constitutional:  Shiloh Covarrubias is in no apparent distress,  appears well-nourished.  Cooperative with history and physical exam.    Vascular:  Pedal pulses are not readily palpable for both the DP and PT arteries.  CFT < 3 sec.  No edema.      Neuro: Light touch sensation is intact to the L4, L5, S1 distributions  No evidence of weakness, spasticity, or contracture in the lower extremities.     Derm: Normal texture and turgor.  No erythema, ecchymosis, or cyanosis.  No open lesions.  There is hyperkeratotic skin at the hyponychial region distal medial, right hallux.  This is her focus of pain.  No erythema, edema or drainage.    Musculoskeletal:    Her left hallux is the longest toe and and rests in an IPJ extension.

## 2022-11-25 NOTE — PATIENT INSTRUCTIONS
Thank you for choosing [facility] / [facility]!    DR. RICHARDS'S CLINIC LOCATIONS:     [facility] TRIAGE LINE: [phone]   [address] [place] [address] APPOINTMENTS: [phone]   [place] [address] RADIOLOGY: [phone]   (Every other Tues - Wed - Fri PM) SET UP SURGERY: [phone]    PHYSICAL THERAPY: [phone]   [place] SPECIALTY BILLING QUESTIONS: [phone]   [address] FAX: [phone]   [place] [address]    (Thurs & Fri AM)      Follow up: 1 month    Next steps:     Soak in luke-warm soap water  Try to file the callus down with a pumice stone  Use the open toe surgical shoe for 2-4 weeks at home  Open toe compression stockings were ordered  Please return in 4 weeks    [facility]  [place]  [address]   [place] [address]  Ph: [phone]  Fax: [phone] [place] [facility])  [address]  [place] [address]  Ph: [phone]  Fax: [phone]   [place]  [address]   [place] [address]  Ph: [phone]  Fax: [phone] [place]  [address]  [place] [address]   Ph: [phone]    [place]  [address]  [place] [address]   Ph: [phone]  [place]  [address]  [place] [address]   Ph: [phone]      [place]  [address]  [place] [address]  Ph: [phone]  [place]  [address]   [place] [address]  Ph: [phone]  Fax: [phone]

## 2022-11-25 NOTE — LETTER
11/25/2022         RE: Shiloh Covarrubias  [address]  [facility] [address]        Dear Colleague,    Thank you for referring your patient, Shiloh Covarrubias, to the [facility] POD[facility]. Please see a copy of my visit note below.    ASSESSMENT:  Encounter Diagnoses   Name Primary?     Pain of toe of right foot      Ingrown toenail of right foot Yes     Callus of foot      Bilateral leg edema      MEDICAL DECISION MAKING:  I think her pain is soft tissue related.  It is secondary to pressure at the distal medial right hallux.  Contributing to this includes the length of the toe, the interphalangeal joint extension, and close toed compression stockings and perhaps the nail plate.    I agree with her attempt to wear shoes with more toebox room.  She is agreeable to using an open toed surgical shoe for 2 to 4 weeks.  This can be used when at home.    I recommend trying open toed knee-high compression stockings.  2 pair were ordered.    I support her ongoing soaking of her foot and suggested in lukewarm soap water.    I also pared the hyperkeratotic skin down and encouraged her to do this with a pumice stone.    Follow-up in 1 month.  If no improvement, will consider imaging to look for a subungual bone spur.  Nail removal is an option, yet would warrant noninvasive vascular studies first.    Disclaimer: This note consists of symbols derived from keyboarding, dictation and/or voice recognition software. As a result, there may be errors in the script that have gone undetected. Please consider this when interpreting information found in this chart.    Jai Arias DPM, FACFAS, [facility] Department of Podiatry/Foot & Ankle Surgery      ____________________________________________________________________    HPI:       I was asked by Zhen Campbell NP to evaluate Shiloh Covarrubias in consultation for right foot toe pain.     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     Unspecified asthma(493.90)      Unspecified intestinal obstruction 02/17/10    D/C 02/20/10   *  *  Past Surgical History:   Procedure Laterality Date     ESOPHAGOSCOPY, GASTROSCOPY, DUODENOSCOPY (EGD), COMBINED  2011    COMBINED ESOPHAGOSCOPY, GASTROSCOPY, DUODENOSCOPY (EGD), BIOPSY SINGLE OR MULTIPLE performed by ANGY LIMA at  GI     ESOPHAGOSCOPY, GASTROSCOPY, DUODENOSCOPY (EGD), DILATATION, COMBINED  2011    COMBINED ESOPHAGOSCOPY, GASTROSCOPY, DUODENOSCOPY (EGD), DILATATION performed by ANGY LIMA at  GI     [facility] UGI ENDOSCOPY, SIMPLE EXAM  02/23/10   *  *  Current Outpatient Medications   Medication Sig Dispense Refill     amLODIPine (NORVASC) 5 MG tablet Take 1 tablet (5 mg) by mouth daily 90 tablet 1     ASPIRIN 81 MG OR TABS Take 81 mg by mouth daily        calcium polycarbophil (FIBERCON) 625 MG tablet Take 2 tablets (1,250 mg) by mouth daily       Cranberry 400 MG CAPS Take 1 capsule by mouth every other day       EQ LORATADINE 10 MG tablet Take 1 tablet (10 mg) by mouth daily 90 tablet 3     escitalopram (LEXAPRO) 10 MG tablet Take 1 tablet (10 mg) by mouth daily 90 tablet 3     furosemide (LASIX) 20 MG tablet TAKE 1 TABLET (20 MG) BY MOUTH DAILY 90 tablet 3     hydrALAZINE (APRESOLINE) 50 MG tablet TAKE 1 TABLET BY MOUTH THREE TIMES DAILY 270 tablet 3     lisinopril (ZESTRIL) 20 MG tablet Take 1 tablet (20 mg) by mouth 2 times daily RESUME ON . 180 tablet 3     loperamide (ANTI-DIARRHEAL) 2 MG tablet TAKE 1 TABLET BEFORE MEALS AS DIRECTED 240 tablet 1     Multiple Vitamins-Minerals (WOMENS MULTIVITAMIN) TABS Take 1 tablet by mouth daily        SPIRIVA RESPIMAT 2.5 MCG/ACT inhaler INHALE 2 PUFFS INTO THE LUNGS DAILY 12 g 3         EXAM:    Vitals: Wt 59.4 kg (131 lb)   LMP  (LMP Unknown)   BMI 23.58 kg/m    BMI: Body mass index is 23.58 kg/m .    Constitutional:  Shiloh Covarrubias is in no apparent distress, appears well-nourished.  Cooperative with history and physical exam.    Vascular:  Pedal pulses are not readily palpable for both the DP and PT arteries.  CFT < 3 sec.  No edema.      Neuro: Light touch sensation is intact to the L4, L5, S1 distributions  No evidence of weakness, spasticity, or contracture in the lower extremities.     Derm: Normal texture and turgor.  No erythema, ecchymosis, or cyanosis.  No open lesions.  There is hyperkeratotic skin at the hyponychial region distal medial, right hallux.  This is her focus of pain.  No erythema, edema or drainage.    Musculoskeletal:    Her left hallux is the longest toe and and rests in an IPJ extension.        Again, thank you for allowing me to participate in the care of your patient.        Sincerely,        Jai Arias DPM

## 2022-11-30 DIAGNOSIS — I10 ESSENTIAL HYPERTENSION WITH GOAL BLOOD PRESSURE LESS THAN 130/80: ICD-10-CM

## 2022-11-30 DIAGNOSIS — J30.1 CHRONIC SEASONAL ALLERGIC RHINITIS DUE TO POLLEN: ICD-10-CM

## 2022-12-02 RX ORDER — AMLODIPINE BESYLATE 5 MG/1
TABLET ORAL
Qty: 90 TABLET | Refills: 1 | Status: SHIPPED | OUTPATIENT
Start: 2022-12-02 | End: 2023-01-06

## 2022-12-02 RX ORDER — LORATADINE 10 MG/1
TABLET ORAL
Qty: 90 TABLET | Refills: 3 | Status: SHIPPED | OUTPATIENT
Start: 2022-12-02 | End: 2023-01-06

## 2022-12-03 DIAGNOSIS — I10 ESSENTIAL HYPERTENSION WITH GOAL BLOOD PRESSURE LESS THAN 130/80: ICD-10-CM

## 2022-12-03 NOTE — TELEPHONE ENCOUNTER
Routing refill request to provider for review/approval because:  Drug not active on patient's medication list  Labs not current:  CR  BP monitoring    John May RN

## 2022-12-04 RX ORDER — LISINOPRIL 20 MG/1
TABLET ORAL
Qty: 90 TABLET | Refills: 1 | Status: SHIPPED | OUTPATIENT
Start: 2022-12-04 | End: 2023-01-06

## 2022-12-05 DIAGNOSIS — I10 ESSENTIAL HYPERTENSION WITH GOAL BLOOD PRESSURE LESS THAN 130/80: ICD-10-CM

## 2022-12-06 NOTE — TELEPHONE ENCOUNTER
Routing refill request to provider for review/approval because:    Requested Prescriptions   Pending Prescriptions Disp Refills    hydrALAZINE (APRESOLINE) 50 MG tablet [Pharmacy Med Name: HYDRALAZINE HYDROCHLORIDE 50 MG Tablet] 270 tablet 3     Sig: TAKE 1 TABLET THREE TIMES DAILY       Vasodilators Failed - 12/5/2022  3:58 PM        Failed - Most recent BP less than 140/90 on record     BP Readings from Last 3 Encounters:   11/25/22 (!) 182/60   11/09/22 (!) 142/49   07/28/22 124/68

## 2022-12-08 DIAGNOSIS — I10 ESSENTIAL HYPERTENSION WITH GOAL BLOOD PRESSURE LESS THAN 130/80: ICD-10-CM

## 2022-12-08 RX ORDER — HYDRALAZINE HYDROCHLORIDE 50 MG/1
TABLET, FILM COATED ORAL
Qty: 270 TABLET | Refills: 3 | Status: SHIPPED | OUTPATIENT
Start: 2022-12-08 | End: 2023-03-08

## 2022-12-12 RX ORDER — HYDRALAZINE HYDROCHLORIDE 50 MG/1
TABLET, FILM COATED ORAL
Qty: 270 TABLET | Refills: 3 | OUTPATIENT
Start: 2022-12-12

## 2023-01-04 ENCOUNTER — DOCUMENTATION ONLY (OUTPATIENT)
Dept: OTHER | Facility: CLINIC | Age: 88
End: 2023-01-04

## 2023-01-04 VITALS
BODY MASS INDEX: 23.28 KG/M2 | OXYGEN SATURATION: 95 % | HEART RATE: 58 BPM | HEIGHT: 63 IN | SYSTOLIC BLOOD PRESSURE: 174 MMHG | RESPIRATION RATE: 18 BRPM | TEMPERATURE: 98.2 F | WEIGHT: 131.4 LBS | DIASTOLIC BLOOD PRESSURE: 46 MMHG

## 2023-01-04 PROBLEM — J45.909 ASTHMA: Status: ACTIVE | Noted: 2023-01-04

## 2023-01-04 PROBLEM — I50.9 HEART FAILURE (H): Status: ACTIVE | Noted: 2023-01-04

## 2023-01-04 PROBLEM — M62.81 GENERALIZED MUSCLE WEAKNESS: Status: ACTIVE | Noted: 2023-01-04

## 2023-01-04 PROBLEM — E07.9 DISORDER OF THYROID: Status: ACTIVE | Noted: 2023-01-04

## 2023-01-04 PROBLEM — J44.9 COPD (CHRONIC OBSTRUCTIVE PULMONARY DISEASE) (H): Status: ACTIVE | Noted: 2023-01-04

## 2023-01-04 PROBLEM — C44.329 SQUAMOUS CELL CARCINOMA OF SKIN OF LEFT TEMPLE: Status: ACTIVE | Noted: 2023-01-04

## 2023-01-04 PROBLEM — I25.10 CAD (CORONARY ARTERY DISEASE): Status: ACTIVE | Noted: 2023-01-04

## 2023-01-04 PROBLEM — I63.9 CEREBRAL INFARCTION (H): Status: ACTIVE | Noted: 2023-01-04

## 2023-01-04 PROBLEM — E11.9 TYPE 2 DIABETES MELLITUS (H): Status: ACTIVE | Noted: 2023-01-04

## 2023-01-04 RX ORDER — FUROSEMIDE 40 MG
40 TABLET ORAL DAILY
COMMUNITY
End: 2023-01-06

## 2023-01-04 NOTE — PROGRESS NOTES
"[facility]  PRIMARY CARE PROVIDER AND CLINIC:  Israel Scott, CORNELIUS CNP, [address] [address]  Chief Complaint   Patient presents with     \[facility] [place] [facility]\"" Care     [place] Medical Record Number:  [id number]  Place of Service where encounter took place:  [facility] [[id number]]    Shiloh Covarrubias  is a 89 year old  (11/3/1929), living in above facility since 11/19/21 and now choosing to change PCPs to [facility]. .  Admitted to this facility for  rehab, medical management and nursing care.    HPI:    HPI information obtained from: facility chart records and patient report.     \"Matilde\" seen today on her way to \"happy hour\". Alert, oriented, appears spry moving about her apartment with a cane. Denies any acute concerns. In chart review has several JOSE A with family up to the [place]. Will get staff assist with medications saying \"one less thing to worry about\" so moving to onsite provider services. Has slow weight gain since admission. Was 113 lbs so about 18 lbs since admission. Her son says prior was living with him and had a bad UTI, wasn't eating, falls and this has improved with transition into AL.    CODE STATUS/ADVANCE DIRECTIVES DISCUSSION:   DNR / DNI  Patient's living condition: lives in an assisted living facility  ALLERGIES: Clonidine derivatives, Entocort [corticosteroids], Macrobid [nitrofurantoin], and Sulfa drugs  PAST MEDICAL HISTORY:  has a past medical history of Asthma (1/4/2023), CAD (coronary artery disease) (1/4/2023), Cerebral infarction (H) (1/4/2023), Chronic kidney disease, stage 3 (H) (11/16/2020), COPD (chronic obstructive pulmonary disease) (H) (1/4/2023), Disorder of thyroid (1/4/2023), Edema, Essential hypertension with goal blood pressure less than 130/80 (5/24/2016), Generalized muscle weakness (1/4/2023), GERD (gastroesophageal reflux disease) (1/19/2011), Heart failure (H) (1/4/2023), Hyperlipidemia LDL goal <100 (3/30/2011), " Hypertension, Hypertriglyceridemia (5/18/2010), Major depressive disorder, single episode, mild (H) (2/15/2016), Osteoarthrosis, unspecified whether generalized or localized, involving lower leg (1/8/2007), Peripheral vascular disease (H) (3/30/2021), Squamous cell carcinoma (2015), 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    She has no past medical history of Congestive heart failure, unspecified or Unspecified cerebral artery occlusion with cerebral infarction.  PAST SURGICAL HISTORY:   has a past surgical history that includes UPPER GI ENDOSCOPY,EXAM (02/23/10); Esophagoscopy, gastroscopy, duodenoscopy (EGD), combined (2/8/2011); and Esophagoscopy, gastroscopy, duodenoscopy (EGD), dilatation, combined (2/8/2011).  FAMILY HISTORY: family history includes Arthritis in her mother; Depression in her maternal uncle; Osteoporosis in her mother; Thyroid Disease in her mother.  SOCIAL HISTORY:   reports that she has never smoked. She has never used smokeless tobacco. She reports current alcohol use. She reports that she does not use drugs.    Post Discharge Medication Reconciliation Status: discharge medications reconciled, continue medications without change    Current Outpatient Medications   Medication Sig Dispense Refill     hydrALAZINE (APRESOLINE) 50 MG tablet TAKE 1 TABLET THREE TIMES DAILY 270 tablet 3     loperamide (ANTI-DIARRHEAL) 2 MG tablet TAKE 1 TABLET BEFORE MEALS AS DIRECTED 240 tablet 1     NONFORMULARY Renew Life Probiotic. Take 1 capsule daily. Family to supply       SPIRIVA RESPIMAT 2.5 MCG/ACT inhaler INHALE 2 PUFFS INTO THE LUNGS DAILY 12 g 3     amLODIPine (NORVASC) 5 MG tablet TAKE 1 TABLET BY MOUTH AT BEDTIME 90 tablet 97     ASPIRIN LOW DOSE 81 MG EC tablet TAKE 1 TABLET BY MOUTH ONCE DAILY 90 tablet 97     escitalopram (LEXAPRO) 10 MG tablet TAKE 1 TABLET BY  "MOUTH ONCE DAILY 90 tablet 97     furosemide (LASIX) 40 MG tablet TAKE 1 TABLET BY MOUTH ONCE DAILY 90 tablet 97     lisinopril (ZESTRIL) 20 MG tablet TAKE 1 TABLET BY MOUTH ONCE DAILY 90 tablet 97     loratadine (CLARITIN) 10 MG tablet TAKE 1 TABLET BY MOUTH ONCE DAILY 90 tablet 97     Multiple Vitamin (TAB-A-VITA) TABS TAKE 1 TABLET BY MOUTH ONCE DAILY 90 tablet 97     ROS:  4 point ROS including Respiratory, CV, GI and , other than that noted in the HPI,  is negative    Vitals:  BP (!) 174/46   Pulse 58   Temp 98.2  F (36.8  C)   Resp 18   Ht 1.588 m (5' 2.5\")   Wt 59.6 kg (131 lb 6.4 oz)   LMP  (LMP Unknown)   SpO2 95%   BMI 23.65 kg/m    Exam:  GENERAL APPEARANCE:  Alert, cooperative  ENT:  Mouth and posterior oropharynx normal, moist mucous membranes, [place], wears hearing aids  EYES:  EOM, conjunctivae, lids, pupils and irises normal  RESP:  respiratory effort and palpation of chest normal, lungs clear to auscultation   CV:  Palpation and auscultation of heart done , regular rate and rhythm, no murmur, rub, or gallop, LE edema with compression socks on   ABDOMEN:  no guarding or rebound, bowel sounds normal  M/S:   gait and station at bseline with cane  SKIN:  limited but intact to visulaized areas  NEURO:   Cranial nerves 2-12 are normal tested and grossly at patient's baseline  PSYCH:  oriented X 3    Lab/Diagnostic data:  CBC RESULTS: Recent Labs   Lab Test 07/28/22  1046 09/21/21  0815 09/20/21  0608   WBC  --  6.2 6.2   RBC  --  3.67* 3.70*   HGB 12.6 11.2* 11.0*   HCT  --  36.0 36.1   MCV  --  98 98   MCH  --  30.5 29.7   MCHC  --  31.1* 30.5*   RDW  --  12.8 12.8   PLT  --  274 284       Last Basic Metabolic Panel:  Recent Labs   Lab Test 07/28/22  1046 10/08/21  1312    136   POTASSIUM 4.7 5.2   CHLORIDE 104 103   TANIA 9.3 9.5   CO2 25 28   BUN 37* 29   CR 1.16* 1.21*   GLC 94 98       Liver Function Studies -   Recent Labs   Lab Test 09/29/21  1553 09/06/21  1503   PROTTOTAL 7.3 7.4 "   ALBUMIN 3.4 2.8*   BILITOTAL 0.3 0.5   ALKPHOS 61 107   AST 16 44   ALT 19 40       TSH   Date Value Ref Range Status   08/27/2018 1.94 0.40 - 4.00 mU/L Final   09/05/2017 2.60 0.40 - 4.00 mU/L Final       Lab Results   Component Value Date    A1C 5.3 08/27/2018     Echo 9/2021  Interpretation Summary     Left ventricular systolic function is normal.The visual ejection fraction is  65-70%.  The right ventricular systolic function is normal.  The left atrium is moderate to severely dilated.  Mild valvular aortic stenosis.There is mild (1+) aortic regurgitation.There is  moderate mitral annular calcification.There is mild (1+) mitral regurgitation.  Pulmonary hypertension- RVSP 48 mm hg +RA.    ASSESSMENT/PLAN:  (I10) Essential hypertension  (primary encounter diagnosis)  (I27.20) Pulmonary hypertension (H)  (R60.0) Lower extremity edema  (I73.9) Peripheral vascular disease (H)  Comment: BP elevated and times with lower HR in facility chart review  Plan:   -hydralazine 50 mg po TID  -Norvasc 5 mg po daily at HS  -Lasix 40 mg po daily (not on K+ prior use but elevated)  -lisinopril 20 mg po daily  -ASA 81 mg po daily   -BMP periodically   -weight monthly from facility with VS    (N18.31) Stage 3a chronic kidney disease (H)  Comment: stable   Plan:   -renal dose medication   -follow BMP    (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, single episode, mild (H)  Comment: in good spirits   Plan:   -escitalopram 10 mg po daily    (J44.9) Chronic obstructive pulmonary disease, unspecified COPD type (H)  Comment: son says has difficulty at times with inhaler  Plan:   -Spiriva Respimat 2 puffs daily. Staff assist if needed    (E11.59) Type 2 diabetes mellitus with other circulatory complication, without long-term current use of insulin (H)  Comment: diet controlled. Last A1C was below goal for geriatrics  Plan:   -A1C with next set of labs    (J30.9) Chronic allergic rhinitis  Comment: continue to take daily  Plan:   -loratadine 10 mg  po daily       Total time spent with patient visit at the skilled nursing facility was >60 including patient visit, review of past records and phone call to patient contact. Greater than 50% of total time spent with counseling and coordinating care due to review with nursing, review or records, medications.      Electronically signed by:  CORNELIUS Kenyon CNP

## 2023-01-05 ENCOUNTER — ASSISTED LIVING VISIT (OUTPATIENT)
Dept: GERIATRICS | Facility: CLINIC | Age: 88
End: 2023-01-05
Payer: MEDICARE

## 2023-01-05 DIAGNOSIS — I27.20 PULMONARY HYPERTENSION (H): ICD-10-CM

## 2023-01-05 DIAGNOSIS — F32.0 MAJOR DEPRESSIVE DISORDER, SINGLE EPISODE, MILD (H): ICD-10-CM

## 2023-01-05 DIAGNOSIS — E11.59 TYPE 2 DIABETES MELLITUS WITH OTHER CIRCULATORY COMPLICATION, WITHOUT LONG-TERM CURRENT USE OF INSULIN (H): ICD-10-CM

## 2023-01-05 DIAGNOSIS — J44.9 CHRONIC OBSTRUCTIVE PULMONARY DISEASE, UNSPECIFIED COPD TYPE (H): ICD-10-CM

## 2023-01-05 DIAGNOSIS — J30.9 CHRONIC ALLERGIC RHINITIS: ICD-10-CM

## 2023-01-05 DIAGNOSIS — N18.31 STAGE 3A CHRONIC KIDNEY DISEASE (H): ICD-10-CM

## 2023-01-05 DIAGNOSIS — I63.9 CEREBRAL INFARCTION (H): ICD-10-CM

## 2023-01-05 DIAGNOSIS — J30.1 CHRONIC SEASONAL ALLERGIC RHINITIS DUE TO POLLEN: ICD-10-CM

## 2023-01-05 DIAGNOSIS — I73.9 PERIPHERAL VASCULAR DISEASE (H): ICD-10-CM

## 2023-01-05 DIAGNOSIS — F41.1 GAD (GENERALIZED ANXIETY DISORDER): ICD-10-CM

## 2023-01-05 DIAGNOSIS — I10 ESSENTIAL HYPERTENSION: Primary | ICD-10-CM

## 2023-01-05 DIAGNOSIS — Z78.9 TAKES DIETARY SUPPLEMENTS: ICD-10-CM

## 2023-01-05 DIAGNOSIS — I10 ESSENTIAL HYPERTENSION WITH GOAL BLOOD PRESSURE LESS THAN 130/80: ICD-10-CM

## 2023-01-05 DIAGNOSIS — R60.9 EDEMA, UNSPECIFIED TYPE: Primary | ICD-10-CM

## 2023-01-05 DIAGNOSIS — R60.0 LOWER EXTREMITY EDEMA: ICD-10-CM

## 2023-01-05 PROBLEM — J96.01 ACUTE RESPIRATORY FAILURE WITH HYPOXIA (H): Status: RESOLVED | Noted: 2023-01-05 | Resolved: 2023-01-05

## 2023-01-05 PROBLEM — E11.9 TYPE 2 DIABETES MELLITUS (H): Status: RESOLVED | Noted: 2023-01-04 | Resolved: 2023-01-05

## 2023-01-05 PROBLEM — J96.01 ACUTE RESPIRATORY FAILURE WITH HYPOXIA (H): Status: ACTIVE | Noted: 2023-01-05

## 2023-01-05 PROCEDURE — 99350 HOME/RES VST EST HIGH MDM 60: CPT | Performed by: NURSE PRACTITIONER

## 2023-01-05 NOTE — LETTER
"    1/5/2023        RE: Shiloh Covarrubias  C/o Chuck Covarrubias  [address] [address] [place] [id number]        [facility]  PRIMARY CARE PROVIDER AND CLINIC:  CORNELIUS Kenyon [facility], [address] [facility] [address] [address]  Chief Complaint   Patient presents with     [facility] Care     [place] Medical Record Number:  [id number]  Place of Service where encounter took place:  [facility] [[id number]]    Shiloh Covarrubias  is a 89 year old  (11/3/1929), living in above facility since 11/19/21 and now choosing to change PCPs to [facility]. .  Admitted to this facility for  rehab, medical management and nursing care.    HPI:    HPI information obtained from: facility chart records and patient report.     \"Matilde\" seen today on her way to \"happy hour\". Alert, oriented, appears spry moving about her apartment with a cane. Denies any acute concerns. In chart review has several JOSE A with family up to the [place]. Will get staff assist with medications saying \"one less thing to worry about\" so moving to onsite provider services. Has slow weight gain since admission. Was 113 lbs so about 18 lbs since admission. Her son says prior was living with him and had a bad UTI, wasn't eating, falls and this has improved with transition into AL.    CODE STATUS/ADVANCE DIRECTIVES DISCUSSION:   DNR / DNI  Patient's living condition: lives in an assisted living facility  ALLERGIES: Clonidine derivatives, Entocort [corticosteroids], Macrobid [nitrofurantoin], and Sulfa drugs  PAST MEDICAL HISTORY:  has a past medical history of Asthma (1/4/2023), CAD (coronary artery disease) (1/4/2023), Cerebral infarction (H) (1/4/2023), Chronic kidney disease, stage 3 (H) (11/16/2020), COPD (chronic obstructive pulmonary disease) (H) (1/4/2023), Disorder of thyroid (1/4/2023), Edema, Essential hypertension with goal blood pressure less than 130/80 (5/24/2016), Generalized muscle weakness (1/4/2023), GERD " (gastroesophageal reflux disease) (1/19/2011), Heart failure (H) (1/4/2023), Hyperlipidemia LDL goal <100 (3/30/2011), Hypertension, Hypertriglyceridemia (5/18/2010), Major depressive disorder, single episode, mild (H) (2/15/2016), Osteoarthrosis, unspecified whether generalized or localized, involving lower leg (1/8/2007), Peripheral vascular disease (H) (3/30/2021), Squamous cell carcinoma (2015), 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    She has no past medical history of Congestive heart failure, unspecified or Unspecified cerebral artery occlusion with cerebral infarction.  PAST SURGICAL HISTORY:   has a past surgical history that includes UPPER GI ENDOSCOPY,EXAM (02/23/10); Esophagoscopy, gastroscopy, duodenoscopy (EGD), combined (2/8/2011); and Esophagoscopy, gastroscopy, duodenoscopy (EGD), dilatation, combined (2/8/2011).  FAMILY HISTORY: family history includes Arthritis in her mother; Depression in her maternal uncle; Osteoporosis in her mother; Thyroid Disease in her mother.  SOCIAL HISTORY:   reports that she has never smoked. She has never used smokeless tobacco. She reports current alcohol use. She reports that she does not use drugs.    Post Discharge Medication Reconciliation Status: discharge medications reconciled, continue medications without change    Current Outpatient Medications   Medication Sig Dispense Refill     hydrALAZINE (APRESOLINE) 50 MG tablet TAKE 1 TABLET THREE TIMES DAILY 270 tablet 3     loperamide (ANTI-DIARRHEAL) 2 MG tablet TAKE 1 TABLET BEFORE MEALS AS DIRECTED 240 tablet 1     NONFORMULARY Renew Life Probiotic. Take 1 capsule daily. Family to supply       SPIRIVA RESPIMAT 2.5 MCG/ACT inhaler INHALE 2 PUFFS INTO THE LUNGS DAILY 12 g 3     amLODIPine (NORVASC) 5 MG tablet TAKE 1 TABLET BY MOUTH AT BEDTIME 90 tablet 97     ASPIRIN LOW DOSE  "81 MG EC tablet TAKE 1 TABLET BY MOUTH ONCE DAILY 90 tablet 97     escitalopram (LEXAPRO) 10 MG tablet TAKE 1 TABLET BY MOUTH ONCE DAILY 90 tablet 97     furosemide (LASIX) 40 MG tablet TAKE 1 TABLET BY MOUTH ONCE DAILY 90 tablet 97     lisinopril (ZESTRIL) 20 MG tablet TAKE 1 TABLET BY MOUTH ONCE DAILY 90 tablet 97     loratadine (CLARITIN) 10 MG tablet TAKE 1 TABLET BY MOUTH ONCE DAILY 90 tablet 97     Multiple Vitamin (TAB-A-VITA) TABS TAKE 1 TABLET BY MOUTH ONCE DAILY 90 tablet 97     ROS:  4 point ROS including Respiratory, CV, GI and , other than that noted in the HPI,  is negative    Vitals:  BP (!) 174/46   Pulse 58   Temp 98.2  F (36.8  C)   Resp 18   Ht 1.588 m (5' 2.5\")   Wt 59.6 kg (131 lb 6.4 oz)   LMP  (LMP Unknown)   SpO2 95%   BMI 23.65 kg/m    Exam:  GENERAL APPEARANCE:  Alert, cooperative  ENT:  Mouth and posterior oropharynx normal, moist mucous membranes, [place], wears hearing aids  EYES:  EOM, conjunctivae, lids, pupils and irises normal  RESP:  respiratory effort and palpation of chest normal, lungs clear to auscultation   CV:  Palpation and auscultation of heart done , regular rate and rhythm, no murmur, rub, or gallop, LE edema with compression socks on   ABDOMEN:  no guarding or rebound, bowel sounds normal  M/S:   gait and station at bseline with cane  SKIN:  limited but intact to visulaized areas  NEURO:   Cranial nerves 2-12 are normal tested and grossly at patient's baseline  PSYCH:  oriented X 3    Lab/Diagnostic data:  CBC RESULTS: Recent Labs   Lab Test 07/28/22  1046 09/21/21  0815 09/20/21  0608   WBC  --  6.2 6.2   RBC  --  3.67* 3.70*   HGB 12.6 11.2* 11.0*   HCT  --  36.0 36.1   MCV  --  98 98   MCH  --  30.5 29.7   MCHC  --  31.1* 30.5*   RDW  --  12.8 12.8   PLT  --  274 284       Last Basic Metabolic Panel:  Recent Labs   Lab Test 07/28/22  1046 10/08/21  1312    136   POTASSIUM 4.7 5.2   CHLORIDE 104 103   TANIA 9.3 9.5   CO2 25 28   BUN 37* 29   CR 1.16* 1.21* "   GLC 94 98       Liver Function Studies -   Recent Labs   Lab Test 09/29/21  1553 09/06/21  1503   PROTTOTAL 7.3 7.4   ALBUMIN 3.4 2.8*   BILITOTAL 0.3 0.5   ALKPHOS 61 107   AST 16 44   ALT 19 40       TSH   Date Value Ref Range Status   08/27/2018 1.94 0.40 - 4.00 mU/L Final   09/05/2017 2.60 0.40 - 4.00 mU/L Final       Lab Results   Component Value Date    A1C 5.3 08/27/2018     Echo 9/2021  Interpretation Summary     Left ventricular systolic function is normal.The visual ejection fraction is  65-70%.  The right ventricular systolic function is normal.  The left atrium is moderate to severely dilated.  Mild valvular aortic stenosis.There is mild (1+) aortic regurgitation.There is  moderate mitral annular calcification.There is mild (1+) mitral regurgitation.  Pulmonary hypertension- RVSP 48 mm hg +RA.    ASSESSMENT/PLAN:  (I10) Essential hypertension  (primary encounter diagnosis)  (I27.20) Pulmonary hypertension (H)  (R60.0) Lower extremity edema  (I73.9) Peripheral vascular disease (H)  Comment: BP elevated and times with lower HR in facility chart review  Plan:   -hydralazine 50 mg po TID  -Norvasc 5 mg po daily at HS  -Lasix 40 mg po daily (not on K+ prior use but elevated)  -lisinopril 20 mg po daily  -ASA 81 mg po daily   -BMP periodically   -weight monthly from facility with VS    (N18.31) Stage 3a chronic kidney disease (H)  Comment: stable   Plan:   -renal dose medication   -follow BMP    (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, single episode, mild (H)  Comment: in good spirits   Plan:   -escitalopram 10 mg po daily    (J44.9) Chronic obstructive pulmonary disease, unspecified COPD type (H)  Comment: son says has difficulty at times with inhaler  Plan:   -Spiriva Respimat 2 puffs daily. Staff assist if needed    (E11.59) Type 2 diabetes mellitus with other circulatory complication, without long-term current use of insulin (H)  Comment: diet controlled. Last A1C was below goal for geriatrics  Plan:   -A1C  with next set of labs    (J30.9) Chronic allergic rhinitis  Comment: continue to take daily  Plan:   -loratadine 10 mg po daily       Total time spent with patient visit at the skilled nursing facility was >60 including patient visit, review of past records and phone call to patient contact. Greater than 50% of total time spent with counseling and coordinating care due to review with nursing, review or records, medications.      Electronically signed by:  CORNELIUS Kenyon CNP                           Sincerely,        CORNELIUS Kenyon CNP

## 2023-01-05 NOTE — LETTER
Matilde (Shiloh) Satish orders    1. Ok for   NONFORMULARY Renew Life Probiotic. Take 1 capsule daily. Family to supply     2. Can facility confirm if she received the influenza vaccine for 2022. I don't see it in either Epic or [facility] but resident says she did receive this here.            Israel Scott, CORNELIUS CNP on 1/5/2023 at 4:27 PM

## 2023-01-06 RX ORDER — AMLODIPINE BESYLATE 5 MG/1
TABLET ORAL
Qty: 90 TABLET | Refills: 97 | Status: SHIPPED | OUTPATIENT
Start: 2023-01-06 | End: 2023-05-02

## 2023-01-06 RX ORDER — LORATADINE 10 MG/1
TABLET ORAL
Qty: 90 TABLET | Refills: 97 | Status: SHIPPED | OUTPATIENT
Start: 2023-01-06 | End: 2023-11-07

## 2023-01-06 RX ORDER — LISINOPRIL 20 MG/1
TABLET ORAL
Qty: 90 TABLET | Refills: 97 | Status: SHIPPED | OUTPATIENT
Start: 2023-01-06 | End: 2024-01-16

## 2023-01-06 RX ORDER — MULTIVITAMIN WITH FOLIC ACID 400 MCG
TABLET ORAL
Qty: 90 TABLET | Refills: 97 | Status: SHIPPED | OUTPATIENT
Start: 2023-01-06 | End: 2024-01-16

## 2023-01-06 RX ORDER — ASPIRIN 81 MG/1
TABLET, COATED ORAL
Qty: 90 TABLET | Refills: 97 | Status: SHIPPED | OUTPATIENT
Start: 2023-01-06 | End: 2024-01-16

## 2023-01-06 RX ORDER — FUROSEMIDE 40 MG
TABLET ORAL
Qty: 90 TABLET | Refills: 97 | Status: SHIPPED | OUTPATIENT
Start: 2023-01-06 | End: 2024-01-16

## 2023-01-06 RX ORDER — GINSENG 100 MG
CAPSULE ORAL
Qty: 84 CAPSULE | Refills: 97 | Status: SHIPPED | OUTPATIENT
Start: 2023-01-06 | End: 2023-01-06

## 2023-01-06 RX ORDER — ESCITALOPRAM OXALATE 10 MG/1
TABLET ORAL
Qty: 90 TABLET | Refills: 97 | Status: SHIPPED | OUTPATIENT
Start: 2023-01-06 | End: 2024-01-16

## 2023-02-16 DIAGNOSIS — Z78.9 TAKES DIETARY SUPPLEMENTS: Primary | ICD-10-CM

## 2023-02-16 DIAGNOSIS — J44.9 CHRONIC OBSTRUCTIVE PULMONARY DISEASE, UNSPECIFIED COPD TYPE (H): ICD-10-CM

## 2023-02-16 RX ORDER — TIOTROPIUM BROMIDE INHALATION SPRAY 3.12 UG/1
2 SPRAY, METERED RESPIRATORY (INHALATION) DAILY
Qty: 12 G | Refills: 3 | Status: SHIPPED | OUTPATIENT
Start: 2023-02-16 | End: 2024-01-18

## 2023-02-24 ENCOUNTER — ASSISTED LIVING VISIT (OUTPATIENT)
Dept: GERIATRICS | Facility: CLINIC | Age: 88
End: 2023-02-24
Payer: MEDICARE

## 2023-02-24 VITALS
TEMPERATURE: 98.4 F | DIASTOLIC BLOOD PRESSURE: 40 MMHG | RESPIRATION RATE: 18 BRPM | BODY MASS INDEX: 23.09 KG/M2 | HEIGHT: 63 IN | WEIGHT: 130.3 LBS | SYSTOLIC BLOOD PRESSURE: 148 MMHG | HEART RATE: 66 BPM | OXYGEN SATURATION: 96 %

## 2023-02-24 DIAGNOSIS — N18.32 STAGE 3B CHRONIC KIDNEY DISEASE (H): ICD-10-CM

## 2023-02-24 DIAGNOSIS — N18.32 TYPE 2 DIABETES MELLITUS WITH STAGE 3B CHRONIC KIDNEY DISEASE, WITHOUT LONG-TERM CURRENT USE OF INSULIN (H): ICD-10-CM

## 2023-02-24 DIAGNOSIS — I10 ESSENTIAL HYPERTENSION: Primary | ICD-10-CM

## 2023-02-24 DIAGNOSIS — F32.A ANXIETY AND DEPRESSION: ICD-10-CM

## 2023-02-24 DIAGNOSIS — E11.22 TYPE 2 DIABETES MELLITUS WITH STAGE 3B CHRONIC KIDNEY DISEASE, WITHOUT LONG-TERM CURRENT USE OF INSULIN (H): ICD-10-CM

## 2023-02-24 DIAGNOSIS — F41.9 ANXIETY AND DEPRESSION: ICD-10-CM

## 2023-02-24 DIAGNOSIS — I50.32 CHRONIC HEART FAILURE WITH PRESERVED EJECTION FRACTION (HFPEF) (H): ICD-10-CM

## 2023-02-24 DIAGNOSIS — J44.9 CHRONIC OBSTRUCTIVE PULMONARY DISEASE, UNSPECIFIED COPD TYPE (H): ICD-10-CM

## 2023-02-24 DIAGNOSIS — I27.20 PULMONARY HYPERTENSION (H): ICD-10-CM

## 2023-02-24 DIAGNOSIS — K52.839 MICROSCOPIC COLITIS, UNSPECIFIED MICROSCOPIC COLITIS TYPE: ICD-10-CM

## 2023-02-24 PROCEDURE — 99349 HOME/RES VST EST MOD MDM 40: CPT | Performed by: INTERNAL MEDICINE

## 2023-02-24 NOTE — LETTER
"    2023        RE: Shiloh Covarrubias  C/o Chuck Covarrubias  [address] [address] [place] [id number]        Shiloh Covarrubias is a 89 year old female seen 2023 at formerly [facility] where she has resided for one year (admit 2021) recently turned over to [facility] and seen for initial visit and to follow up HTN, CKD3, COPD, DM2 and depression/anxiety. Today she is seen in her apartment up to chair.  States \"I'm well, just some usual aches and pains.\"    Eats/sleeps okay    Wears compression stockings for ankle swelling.   On Lasix \"for years.\"   On Claritin which she reports has decreased number of colds she gets   Takes loperamide 45 minutes before she eats tid: \"Does wonders for me.\"   Has a once daily BM, no constipation   Panic attacks when   12 years ago.   Pt knows all her meds and why she takes them, but they are administered by AL staff.        By chart review, pt had a 2021 hospitalization following a fall at home.  No injuries found and she returned home after initial ED visit.  A few days later she presented with dyspnea and generalized weakness.     She was found to have a fever and hypoxia, treated with abx for CAP.   CXR also showed pulmonary vascular congestion and BNP 6000 with elevated Cr.   All improved with IV furosemide and adjustment of her medications   She was discharged to [facility] where she regained her previous mobility.  Furosemide dose adjusted.   She discharged home before transition to AL later that year.      Past Medical History:   Diagnosis Date     Asthma 2023     CAD (coronary artery disease) 2023     Cerebral infarction (H) 2023     Chronic kidney disease, stage 3 (H) 2020     COPD (chronic obstructive pulmonary disease) (H) 2023     Disorder of thyroid 2023     Edema     Peripheral edema     Essential hypertension with goal blood pressure less than 130/80 2016     Generalized muscle weakness 2023     GERD " (gastroesophageal reflux disease) 1/19/2011     Heart failure (H) 1/4/2023     Hyperlipidemia LDL goal <100 3/30/2011     Hypertension      Hypertriglyceridemia 5/18/2010     Major depressive disorder, single episode, mild (H) 2/15/2016     Osteoarthrosis, unspecified whether generalized or localized, involving lower leg 1/8/2007    Problem list name updated by automated process. Provider to review Replacing diagnoses that were inactivated after the 10/1/2021 regulatory import.     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     Type 2 diabetes mellitus (H) 1/4/2023     Unspecified asthma(493.90)      Unspecified intestinal obstruction 02/17/10    D/C 02/20/10       Past Surgical History:   Procedure Laterality Date     ESOPHAGOSCOPY, GASTROSCOPY, DUODENOSCOPY (EGD), COMBINED  2/8/2011    COMBINED ESOPHAGOSCOPY, GASTROSCOPY, DUODENOSCOPY (EGD), BIOPSY SINGLE OR MULTIPLE performed by ANGY LIMA at  GI     ESOPHAGOSCOPY, GASTROSCOPY, DUODENOSCOPY (EGD), DILATATION, COMBINED  2/8/2011    COMBINED ESOPHAGOSCOPY, GASTROSCOPY, DUODENOSCOPY (EGD), DILATATION performed by ANGY LIMA at  GI     [facility] UGI ENDOSCOPY, SIMPLE EXAM  02/23/10     SH:     3 sons and 2 daughters all in [place]   Son Chuck is first contact    Moved here from [place], had been living with her youngest son   Non smoker     ROS: Ambulatory without device     BIMS 15/15  Wt Readings from Last 5 Encounters:   02/24/23 59.1 kg (130 lb 4.8 oz)   01/04/23 59.6 kg (131 lb 6.4 oz)   11/25/22 59.4 kg (131 lb)   11/09/22 61.6 kg (135 lb 14.4 oz)   07/28/22 59.4 kg (131 lb)      BP Readings from Last 3 Encounters:   02/24/23 (!) 148/40   01/04/23 (!) 174/46   11/25/22 (!) 182/60      Pulse Readings from Last 4 Encounters:   02/24/23 66   01/04/23 58   11/09/22 66   07/28/22 71      EXAM: NAD  BP (!) 148/40   Pulse 66   Temp 98.4  F (36.9  C)   " Resp 18   Ht 1.588 m (5' 2.5\")   Wt 59.1 kg (130 lb 4.8 oz)   LMP  (LMP Unknown)   SpO2 96%   BMI 23.45 kg/m     Neck supple without adenopathy  Lungs clear bilaterally   Heart RRR s1s2 with II/VI ALICIA  Abd soft, NT, no distention or guarding, +BS  Ext without edema   Neuro: accurate historian, no focal findings  Psych: affect okay, very pleasant       Last Comprehensive Metabolic Panel:  Lab Results   Component Value Date     07/28/2022    POTASSIUM 4.7 07/28/2022    CHLORIDE 104 07/28/2022    CO2 25 07/28/2022    ANIONGAP 7 07/28/2022    GLC 94 07/28/2022    BUN 37 (H) 07/28/2022    CR 1.16 (H) 07/28/2022    GFRESTIMATED 44 (L) 07/28/2022    TANIA 9.3 07/28/2022     Lab Results   Component Value Date    HGB 12.6 07/28/2022     Lab Results   Component Value Date    CHOL 182 07/28/2022      HDL 76 07/28/2022      LDL 80 07/28/2022      TRIG 130 07/28/2022     ECHO 9/2021:    Left ventricular systolic function is normal.The visual ejection fraction is 65-70%.  The right ventricular systolic function is normal.  The left atrium is moderate to severely dilated.  Mild valvular aortic stenosis.There is mild (1+) aortic regurgitation.There is moderate mitral annular calcification.There is mild (1+) mitral regurgitation.  Pulmonary hypertension- RVSP 48 mm hg +RA.      IMP/PLAN:   (I10) Essential hypertension   (N18.32) Stage 3b chronic kidney disease (H)  Comment: BPs higher as above   Plan: amlodipine 5 mg/day, hydralazine 50 mg tid, lisinopril 20 mg/day   Follow BPs, if continued higher, would increase amlodipine to 7.5 mg/day     (I27.20) Pulmonary hypertension (H)  (I50.32) Chronic heart failure with preserved ejection fraction (HFpEF) (H)  Comment: stable volume status    She is on an ACEI, but not a beta blocker secondary to bradycardia     Plan: furosemide 40 mg/day   Follow symptoms, exam for diuretic adjustment     (E11.22,  N18.32) Type 2 diabetes mellitus with stage 3b chronic kidney disease, without " long-term current use of insulin (H)  Comment: albumin/ Cr 121     Lab Results   Component Value Date    A1C 5.3 08/27/2018     Plan: diet controlled, no recent checks  Would add A1C to next labs   She remains on a daily aspirin and an ACEI    (F41.9,  F32.A) Anxiety and depression  Comment: by history   Plan: escitalopram 10 mg/day     (J44.9) Chronic obstructive pulmonary disease, unspecified COPD type (H)  Comment: stable   Plan: Spiriva 2 puffs daily   Takes loratadine for upper respiratory symptoms     (K52.839) Microscopic colitis, unspecified microscopic colitis type  Comment: with diarrhea   Plan: symptomatic treatment with loperamide and Culturelle.  She is disinclined to change her regimen at all      Jenny Padilla MD         Sincerely,        Jenny Padilla MD       .  > Pls assist with ADL's  > Skin care as per protocol

## 2023-02-24 NOTE — PROGRESS NOTES
"Shiloh Covarrubias is a 89 year old female seen 2023 at [facility] where she has resided for one year (admit 2021) recently turned over to [facility] and seen for initial visit and to follow up HTN, CKD3, COPD, DM2 and depression/anxiety. Today she is seen in her apartment up to chair.  States \"I'm well, just some usual aches and pains.\"    Eats/sleeps okay    Wears compression stockings for ankle swelling.   On Lasix \"for years.\"   On Claritin which she reports has decreased number of colds she gets   Takes loperamide 45 minutes before she eats tid: \"Does wonders for me.\"   Has a once daily BM, no constipation   Panic attacks when   12 years ago.   Pt knows all her meds and why she takes them, but they are administered by AL staff.        By chart review, pt had a 2021 hospitalization following a fall at home.  No injuries found and she returned home after initial ED visit.  A few days later she presented with dyspnea and generalized weakness.     She was found to have a fever and hypoxia, treated with abx for CAP.   CXR also showed pulmonary vascular congestion and BNP 6000 with elevated Cr.   All improved with IV furosemide and adjustment of her medications   She was discharged to [facility] where she regained her previous mobility.  Furosemide dose adjusted.   She discharged home before transition to AL later that year.      Past Medical History:   Diagnosis Date     Asthma 2023     CAD (coronary artery disease) 2023     Cerebral infarction (H) 2023     Chronic kidney disease, stage 3 (H) 2020     COPD (chronic obstructive pulmonary disease) (H) 2023     Disorder of thyroid 2023     Edema     Peripheral edema     Essential hypertension with goal blood pressure less than 130/80 2016     Generalized muscle weakness 2023     GERD (gastroesophageal reflux disease) 2011     Heart failure (H) 2023     Hyperlipidemia LDL goal <100 " "3/30/2011     Hypertension      Hypertriglyceridemia 5/18/2010     Major depressive disorder, single episode, mild (H) 2/15/2016     Osteoarthrosis, unspecified whether generalized or localized, involving lower leg 1/8/2007    Problem list name updated by automated process. Provider to review Replacing diagnoses that were inactivated after the 10/1/2021 regulatory import.     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     Type 2 diabetes mellitus (H) 1/4/2023     Unspecified asthma(493.90)      Unspecified intestinal obstruction 02/17/10    D/C 02/20/10       Past Surgical History:   Procedure Laterality Date     ESOPHAGOSCOPY, GASTROSCOPY, DUODENOSCOPY (EGD), COMBINED  2/8/2011    COMBINED ESOPHAGOSCOPY, GASTROSCOPY, DUODENOSCOPY (EGD), BIOPSY SINGLE OR MULTIPLE performed by ANGY LIMA at  GI     ESOPHAGOSCOPY, GASTROSCOPY, DUODENOSCOPY (EGD), DILATATION, COMBINED  2/8/2011    COMBINED ESOPHAGOSCOPY, GASTROSCOPY, DUODENOSCOPY (EGD), DILATATION performed by ANGY LIMA at  GI     [facility] UGI ENDOSCOPY, SIMPLE EXAM  02/23/10     SH:     3 sons and 2 daughters all in [place]   Son Chuck is first contact    Moved here from [place], had been living with her youngest son   Non smoker     ROS: Ambulatory without device     BIMS 15/15  Wt Readings from Last 5 Encounters:   02/24/23 59.1 kg (130 lb 4.8 oz)   01/04/23 59.6 kg (131 lb 6.4 oz)   11/25/22 59.4 kg (131 lb)   11/09/22 61.6 kg (135 lb 14.4 oz)   07/28/22 59.4 kg (131 lb)      BP Readings from Last 3 Encounters:   02/24/23 (!) 148/40   01/04/23 (!) 174/46   11/25/22 (!) 182/60      Pulse Readings from Last 4 Encounters:   02/24/23 66   01/04/23 58   11/09/22 66   07/28/22 71      EXAM: NAD  BP (!) 148/40   Pulse 66   Temp 98.4  F (36.9  C)   Resp 18   Ht 1.588 m (5' 2.5\")   Wt 59.1 kg (130 lb 4.8 oz)   LMP  (LMP Unknown)   SpO2 96%   BMI " 23.45 kg/m     Neck supple without adenopathy  Lungs clear bilaterally   Heart RRR s1s2 with II/VI ALICIA  Abd soft, NT, no distention or guarding, +BS  Ext without edema   Neuro: accurate historian, no focal findings  Psych: affect okay, very pleasant       Last Comprehensive Metabolic Panel:  Lab Results   Component Value Date     07/28/2022    POTASSIUM 4.7 07/28/2022    CHLORIDE 104 07/28/2022    CO2 25 07/28/2022    ANIONGAP 7 07/28/2022    GLC 94 07/28/2022    BUN 37 (H) 07/28/2022    CR 1.16 (H) 07/28/2022    GFRESTIMATED 44 (L) 07/28/2022    TANIA 9.3 07/28/2022     Lab Results   Component Value Date    HGB 12.6 07/28/2022     Lab Results   Component Value Date    CHOL 182 07/28/2022      HDL 76 07/28/2022      LDL 80 07/28/2022      TRIG 130 07/28/2022     ECHO 9/2021:    Left ventricular systolic function is normal.The visual ejection fraction is 65-70%.  The right ventricular systolic function is normal.  The left atrium is moderate to severely dilated.  Mild valvular aortic stenosis.There is mild (1+) aortic regurgitation.There is moderate mitral annular calcification.There is mild (1+) mitral regurgitation.  Pulmonary hypertension- RVSP 48 mm hg +RA.      IMP/PLAN:   (I10) Essential hypertension   (N18.32) Stage 3b chronic kidney disease (H)  Comment: BPs higher as above   Plan: amlodipine 5 mg/day, hydralazine 50 mg tid, lisinopril 20 mg/day   Follow BPs, if continued higher, would increase amlodipine to 7.5 mg/day     (I27.20) Pulmonary hypertension (H)  (I50.32) Chronic heart failure with preserved ejection fraction (HFpEF) (H)  Comment: stable volume status    She is on an ACEI, but not a beta blocker secondary to bradycardia     Plan: furosemide 40 mg/day   Follow symptoms, exam for diuretic adjustment     (E11.22,  N18.32) Type 2 diabetes mellitus with stage 3b chronic kidney disease, without long-term current use of insulin (H)  Comment: albumin/ Cr 121     Lab Results   Component Value Date     A1C 5.3 08/27/2018     Plan: diet controlled, no recent checks  Would add A1C to next labs   She remains on a daily aspirin and an ACEI    (F41.9,  F32.A) Anxiety and depression  Comment: by history   Plan: escitalopram 10 mg/day     (J44.9) Chronic obstructive pulmonary disease, unspecified COPD type (H)  Comment: stable   Plan: Spiriva 2 puffs daily   Takes loratadine for upper respiratory symptoms     (K52.839) Microscopic colitis, unspecified microscopic colitis type  Comment: with diarrhea   Plan: symptomatic treatment with loperamide and Culturelle.  She is disinclined to change her regimen at all      Jenny Padilla MD

## 2023-03-08 DIAGNOSIS — I10 ESSENTIAL HYPERTENSION WITH GOAL BLOOD PRESSURE LESS THAN 130/80: ICD-10-CM

## 2023-03-08 RX ORDER — HYDRALAZINE HYDROCHLORIDE 50 MG/1
TABLET, FILM COATED ORAL
Qty: 270 TABLET | Refills: 11 | Status: SHIPPED | OUTPATIENT
Start: 2023-03-08 | End: 2024-04-08

## 2023-03-16 DIAGNOSIS — Z78.9 TAKES DIETARY SUPPLEMENTS: Primary | ICD-10-CM

## 2023-04-14 RX ORDER — VITAMIN B COMPLEX
1 TABLET ORAL DAILY
COMMUNITY
Start: 2023-04-14

## 2023-04-14 NOTE — PROGRESS NOTES
Wanting to take vitamin D. Ok to use own supply.     1. Start  Vitamin D3 (CHOLECALCIFEROL) 25 mcg (1000 units) tablet Take 1 tablet (25 mcg) by mouth daily     CORNELIUS Kenyon CNP on 4/14/2023 at 1:02 PM

## 2023-04-16 ENCOUNTER — HEALTH MAINTENANCE LETTER (OUTPATIENT)
Age: 88
End: 2023-04-16

## 2023-05-01 NOTE — PROGRESS NOTES
[place] [facility]  [place] Medical Record Number:  [id number]  Place of Service where encounter took place:  [facility] ([facility] [[id number]]  Chief Complaint   Patient presents with     RECHECK       HPI:    Shiloh Covarrubias  is a 89 year old (11/3/1929), who is being seen today for an episodic care visit.  HPI information obtained from: facility staff. Today's concern is:    Follow up to ongoing increased depression since the loss of her neighbor and resident at [facility]. Unfortunately was scheduled to meet with onsite psychology services but when the provider attempted to meet with her she declined.  Currently on Escitalopram 10 mg po daily.  QTc in 2021 was 449.      Past Medical and Surgical History reviewed in Epic today.    MEDICATIONS:    Current Outpatient Medications   Medication Sig Dispense Refill     amLODIPine (NORVASC) 5 MG tablet TAKE 1 TABLET BY MOUTH AT BEDTIME 90 tablet 97     ASPIRIN LOW DOSE 81 MG EC tablet TAKE 1 TABLET BY MOUTH ONCE DAILY 90 tablet 97     escitalopram (LEXAPRO) 10 MG tablet TAKE 1 TABLET BY MOUTH ONCE DAILY 90 tablet 97     furosemide (LASIX) 40 MG tablet TAKE 1 TABLET BY MOUTH ONCE DAILY 90 tablet 97     hydrALAZINE (APRESOLINE) 50 MG tablet TAKE 1 TABLET BY MOUTH THREE TIMES DAILY 270 tablet 11     Lactobacillus (CULTURELLE DIGESTIVE WOMENS) CAPS TAKE 1 CAPSULE BY MOUTH ONCE DAILY 30 capsule PRN     lisinopril (ZESTRIL) 20 MG tablet TAKE 1 TABLET BY MOUTH ONCE DAILY 90 tablet 97     loperamide (ANTI-DIARRHEAL) 2 MG tablet TAKE 1 TABLET BEFORE MEALS AS DIRECTED 240 tablet 1     loratadine (CLARITIN) 10 MG tablet TAKE 1 TABLET BY MOUTH ONCE DAILY 90 tablet 97     Multiple Vitamin (TAB-A-VITA) TABS TAKE 1 TABLET BY MOUTH ONCE DAILY 90 tablet 97     NONFORMULARY Renew Life Probiotic. Take 1 capsule daily. 30 each 11     tiotropium (SPIRIVA RESPIMAT) 2.5 MCG/ACT inhaler Inhale 2 puffs into the lungs daily 12 g 3     Vitamin D3 (CHOLECALCIFEROL) 25  "mcg (1000 units) tablet Take 1 tablet (25 mcg) by mouth daily       REVIEW OF SYSTEMS:  4 point ROS including Respiratory, CV, GI and , other than that noted in the HPI,  is negative    Objective:  BP (!) 168/44   Pulse 61   Temp 98.2  F (36.8  C)   Resp 18   Ht 1.575 m (5' 2\")   Wt 59.2 kg (130 lb 8 oz)   LMP  (LMP Unknown)   SpO2 97%   BMI 23.87 kg/m    Exam:  GENERAL APPEARANCE:  Alert, cooperative  ENT:  Mouth and posterior oropharynx normal, moist mucous membranes, [place], wears hearing aids  EYES:  EOM, conjunctivae, lids, pupils and irises normal  RESP:  respiratory effort and palpation of chest normal, lungs clear to auscultation   CV:  Palpation and auscultation of heart done , regular rate and rhythm, no murmur, rub, or gallop, LE edema with compression socks on   ABDOMEN:  no guarding or rebound, bowel sounds normal  M/S:   gait and station at baseline with cane  SKIN:  limited but intact to visualized areas, reddened face  NEURO:   Cranial nerves 2-12 are normal tested and grossly at patient's baseline  PSYCH:  oriented X 3    Labs:   CBC RESULTS: Recent Labs   Lab Test 07/28/22  1046 09/21/21  0815 09/20/21  0608   WBC  --  6.2 6.2   RBC  --  3.67* 3.70*   HGB 12.6 11.2* 11.0*   HCT  --  36.0 36.1   MCV  --  98 98   MCH  --  30.5 29.7   MCHC  --  31.1* 30.5*   RDW  --  12.8 12.8   PLT  --  274 284       Last Basic Metabolic Panel:  Recent Labs   Lab Test 07/28/22  1046 10/08/21  1312    136   POTASSIUM 4.7 5.2   CHLORIDE 104 103   TANIA 9.3 9.5   CO2 25 28   BUN 37* 29   CR 1.16* 1.21*   GLC 94 98       Liver Function Studies -   Recent Labs   Lab Test 09/29/21  1553 09/06/21  1503   PROTTOTAL 7.3 7.4   ALBUMIN 3.4 2.8*   BILITOTAL 0.3 0.5   ALKPHOS 61 107   AST 16 44   ALT 19 40       TSH   Date Value Ref Range Status   08/27/2018 1.94 0.40 - 4.00 mU/L Final   09/05/2017 2.60 0.40 - 4.00 mU/L Final       Lab Results   Component Value Date    A1C 5.3 08/27/2018    "       ASSESSMENT/PLAN:  (F41.9,  F32.A) Anxiety and depression  (primary encounter diagnosis)  Comment: worsening   Plan:   -ESCITALOPRAM 10 mg po daily   -adding mirtazapine 15 mg po daily at HS   -encouraged to meet with ACP    (I10) Essential hypertension  Comment: elevated BP  Plan:    -hydralazine 50 mg po TID  -Norvasc 5 mg po daily at HS increased today to 7.5 mg po daily  -Lasix 40 mg po daily (not on K+ prior use but elevated)  -lisinopril 20 mg po daily  -ASA 81 mg po daily   -BMP periodically   -weight monthly from facility with MARCELA Covarrubias ([facility]) orders  1. Increase to amLODIPine (NORVASC) 5 MG tablet Take 1.5 tablets (7.5 mg) by mouth At Bedtime   2. Begin mirtazapine (REMERON) 15 MG tablet Take 1 tablet (15 mg) by mouth At Bedtime     3. Bmp, cbc, LFTs next week on lab day for HTN    4. TSH for thyroid screen next week on lab day          Electronically signed by:  CORNELIUS Kenyon CNP

## 2023-05-02 ENCOUNTER — ASSISTED LIVING VISIT (OUTPATIENT)
Dept: GERIATRICS | Facility: CLINIC | Age: 88
End: 2023-05-02
Payer: COMMERCIAL

## 2023-05-02 VITALS
RESPIRATION RATE: 18 BRPM | TEMPERATURE: 98.2 F | DIASTOLIC BLOOD PRESSURE: 44 MMHG | BODY MASS INDEX: 24.01 KG/M2 | HEIGHT: 62 IN | HEART RATE: 61 BPM | SYSTOLIC BLOOD PRESSURE: 168 MMHG | WEIGHT: 130.5 LBS | OXYGEN SATURATION: 97 %

## 2023-05-02 DIAGNOSIS — F41.9 ANXIETY AND DEPRESSION: Primary | ICD-10-CM

## 2023-05-02 DIAGNOSIS — F32.A ANXIETY AND DEPRESSION: Primary | ICD-10-CM

## 2023-05-02 DIAGNOSIS — I10 ESSENTIAL HYPERTENSION: ICD-10-CM

## 2023-05-02 DIAGNOSIS — I10 ESSENTIAL HYPERTENSION WITH GOAL BLOOD PRESSURE LESS THAN 130/80: ICD-10-CM

## 2023-05-02 PROCEDURE — 99349 HOME/RES VST EST MOD MDM 40: CPT | Performed by: NURSE PRACTITIONER

## 2023-05-02 RX ORDER — MIRTAZAPINE 15 MG/1
15 TABLET, FILM COATED ORAL AT BEDTIME
Qty: 30 TABLET | Refills: 11 | Status: SHIPPED | OUTPATIENT
Start: 2023-05-02 | End: 2023-05-08

## 2023-05-02 RX ORDER — AMLODIPINE BESYLATE 5 MG/1
7.5 TABLET ORAL AT BEDTIME
Qty: 90 TABLET | Refills: 97 | Status: SHIPPED | OUTPATIENT
Start: 2023-05-02 | End: 2024-01-16

## 2023-05-02 NOTE — LETTER
Matilde Borden) Satish orders  1. Increase to amLODIPine (NORVASC) 5 MG tablet Take 1.5 tablets (7.5 mg) by mouth At Bedtime   2. Begin mirtazapine (REMERON) 15 MG tablet Take 1 tablet (15 mg) by mouth At Bedtime     3. Bmp, cbc, LFTs next week on lab day for HTN    4. TSH for thyroid screen next week on lab day      CORNELIUS Kenyon CNP on 5/2/2023 at 5:10 PM

## 2023-05-02 NOTE — LETTER
5/2/2023        RE: Shiloh Covarrubias  C/o Chuck Covarrubias  [address] [address] [place] [id number]        [place] [facility]  [place] Medical Record Number:  [id number]  Place of Service where encounter took place:  [facility] ([facility]) [[id number]]  Chief Complaint   Patient presents with     RECHECK       HPI:    Shiloh Covarrubias  is a 89 year old (11/3/1929), who is being seen today for an episodic care visit.  HPI information obtained from: facility staff. Today's concern is:    Follow up to ongoing increased depression since the loss of her neighbor and resident at [facility]. Unfortunately was scheduled to meet with onsite psychology services but when the provider attempted to meet with her she declined.  Currently on Escitalopram 10 mg po daily.  QTc in 2021 was 449.      Past Medical and Surgical History reviewed in Epic today.    MEDICATIONS:    Current Outpatient Medications   Medication Sig Dispense Refill     amLODIPine (NORVASC) 5 MG tablet TAKE 1 TABLET BY MOUTH AT BEDTIME 90 tablet 97     ASPIRIN LOW DOSE 81 MG EC tablet TAKE 1 TABLET BY MOUTH ONCE DAILY 90 tablet 97     escitalopram (LEXAPRO) 10 MG tablet TAKE 1 TABLET BY MOUTH ONCE DAILY 90 tablet 97     furosemide (LASIX) 40 MG tablet TAKE 1 TABLET BY MOUTH ONCE DAILY 90 tablet 97     hydrALAZINE (APRESOLINE) 50 MG tablet TAKE 1 TABLET BY MOUTH THREE TIMES DAILY 270 tablet 11     Lactobacillus (CULTURELLE DIGESTIVE WOMENS) CAPS TAKE 1 CAPSULE BY MOUTH ONCE DAILY 30 capsule PRN     lisinopril (ZESTRIL) 20 MG tablet TAKE 1 TABLET BY MOUTH ONCE DAILY 90 tablet 97     loperamide (ANTI-DIARRHEAL) 2 MG tablet TAKE 1 TABLET BEFORE MEALS AS DIRECTED 240 tablet 1     loratadine (CLARITIN) 10 MG tablet TAKE 1 TABLET BY MOUTH ONCE DAILY 90 tablet 97     Multiple Vitamin (TAB-A-VITA) TABS TAKE 1 TABLET BY MOUTH ONCE DAILY 90 tablet 97     NONFORMULARY Renew Life Probiotic. Take 1 capsule daily. 30 each 11     tiotropium (SPIRIVA  "RESPIMAT) 2.5 MCG/ACT inhaler Inhale 2 puffs into the lungs daily 12 g 3     Vitamin D3 (CHOLECALCIFEROL) 25 mcg (1000 units) tablet Take 1 tablet (25 mcg) by mouth daily       REVIEW OF SYSTEMS:  4 point ROS including Respiratory, CV, GI and , other than that noted in the HPI,  is negative    Objective:  BP (!) 168/44   Pulse 61   Temp 98.2  F (36.8  C)   Resp 18   Ht 1.575 m (5' 2\")   Wt 59.2 kg (130 lb 8 oz)   LMP  (LMP Unknown)   SpO2 97%   BMI 23.87 kg/m    Exam:  GENERAL APPEARANCE:  Alert, cooperative  ENT:  Mouth and posterior oropharynx normal, moist mucous membranes, [place], wears hearing aids  EYES:  EOM, conjunctivae, lids, pupils and irises normal  RESP:  respiratory effort and palpation of chest normal, lungs clear to auscultation   CV:  Palpation and auscultation of heart done , regular rate and rhythm, no murmur, rub, or gallop, LE edema with compression socks on   ABDOMEN:  no guarding or rebound, bowel sounds normal  M/S:   gait and station at baseline with cane  SKIN:  limited but intact to visualized areas, reddened face  NEURO:   Cranial nerves 2-12 are normal tested and grossly at patient's baseline  PSYCH:  oriented X 3    Labs:   CBC RESULTS: Recent Labs   Lab Test 07/28/22  1046 09/21/21  0815 09/20/21  0608   WBC  --  6.2 6.2   RBC  --  3.67* 3.70*   HGB 12.6 11.2* 11.0*   HCT  --  36.0 36.1   MCV  --  98 98   MCH  --  30.5 29.7   MCHC  --  31.1* 30.5*   RDW  --  12.8 12.8   PLT  --  274 284       Last Basic Metabolic Panel:  Recent Labs   Lab Test 07/28/22  1046 10/08/21  1312    136   POTASSIUM 4.7 5.2   CHLORIDE 104 103   TANIA 9.3 9.5   CO2 25 28   BUN 37* 29   CR 1.16* 1.21*   GLC 94 98       Liver Function Studies -   Recent Labs   Lab Test 09/29/21  1553 09/06/21  1503   PROTTOTAL 7.3 7.4   ALBUMIN 3.4 2.8*   BILITOTAL 0.3 0.5   ALKPHOS 61 107   AST 16 44   ALT 19 40       TSH   Date Value Ref Range Status   08/27/2018 1.94 0.40 - 4.00 mU/L Final   09/05/2017 2.60 0.40 - " 4.00 mU/L Final       Lab Results   Component Value Date    A1C 5.3 08/27/2018          ASSESSMENT/PLAN:  (F41.9,  F32.A) Anxiety and depression  (primary encounter diagnosis)  Comment: worsening   Plan:   -ESCITALOPRAM 10 mg po daily   -adding mirtazapine 15 mg po daily at HS   -encouraged to meet with ACP    (I10) Essential hypertension  Comment: elevated BP  Plan:    -hydralazine 50 mg po TID  -Norvasc 5 mg po daily at HS increased today to 7.5 mg po daily  -Lasix 40 mg po daily (not on K+ prior use but elevated)  -lisinopril 20 mg po daily  -ASA 81 mg po daily   -BMP periodically   -weight monthly from facility with MARCELA Covarrubias ([place]) orders  1. Increase to amLODIPine (NORVASC) 5 MG tablet Take 1.5 tablets (7.5 mg) by mouth At Bedtime   2. Begin mirtazapine (REMERON) 15 MG tablet Take 1 tablet (15 mg) by mouth At Bedtime     3. Bmp, cbc, LFTs next week on lab day for HTN    4. TSH for thyroid screen next week on lab day          Electronically signed by:  CORNELIUS Kenyon CNP             Sincerely,        CORNELIUS Kenyon CNP

## 2023-05-04 DIAGNOSIS — R19.7 DIARRHEA: Primary | ICD-10-CM

## 2023-05-04 RX ORDER — LOPERAMIDE HCL 2 MG
CAPSULE ORAL
Qty: 270 CAPSULE | Refills: 97 | Status: SHIPPED | OUTPATIENT
Start: 2023-05-04 | End: 2023-11-07

## 2023-05-09 ENCOUNTER — LAB REQUISITION (OUTPATIENT)
Dept: LAB | Facility: CLINIC | Age: 88
End: 2023-05-09
Payer: COMMERCIAL

## 2023-05-09 DIAGNOSIS — I10 ESSENTIAL (PRIMARY) HYPERTENSION: ICD-10-CM

## 2023-05-09 DIAGNOSIS — E03.9 HYPOTHYROIDISM, UNSPECIFIED: ICD-10-CM

## 2023-05-16 ENCOUNTER — ASSISTED LIVING VISIT (OUTPATIENT)
Dept: GERIATRICS | Facility: CLINIC | Age: 88
End: 2023-05-16
Payer: COMMERCIAL

## 2023-05-16 VITALS
DIASTOLIC BLOOD PRESSURE: 50 MMHG | RESPIRATION RATE: 18 BRPM | HEIGHT: 62 IN | OXYGEN SATURATION: 95 % | TEMPERATURE: 98.2 F | SYSTOLIC BLOOD PRESSURE: 120 MMHG | WEIGHT: 130.5 LBS | HEART RATE: 60 BPM | BODY MASS INDEX: 24.01 KG/M2

## 2023-05-16 DIAGNOSIS — J44.9 CHRONIC OBSTRUCTIVE PULMONARY DISEASE, UNSPECIFIED COPD TYPE (H): ICD-10-CM

## 2023-05-16 DIAGNOSIS — I27.20 PULMONARY HYPERTENSION (H): ICD-10-CM

## 2023-05-16 DIAGNOSIS — I10 ESSENTIAL HYPERTENSION: Primary | ICD-10-CM

## 2023-05-16 DIAGNOSIS — F41.9 ANXIETY AND DEPRESSION: ICD-10-CM

## 2023-05-16 DIAGNOSIS — F32.A ANXIETY AND DEPRESSION: ICD-10-CM

## 2023-05-16 DIAGNOSIS — R60.0 LOWER EXTREMITY EDEMA: ICD-10-CM

## 2023-05-16 DIAGNOSIS — Z71.85 VACCINE COUNSELING: ICD-10-CM

## 2023-05-16 DIAGNOSIS — E11.59 TYPE 2 DIABETES MELLITUS WITH OTHER CIRCULATORY COMPLICATION, WITHOUT LONG-TERM CURRENT USE OF INSULIN (H): ICD-10-CM

## 2023-05-16 DIAGNOSIS — N18.32 STAGE 3B CHRONIC KIDNEY DISEASE (H): ICD-10-CM

## 2023-05-16 DIAGNOSIS — K58.2 IRRITABLE BOWEL SYNDROME WITH BOTH CONSTIPATION AND DIARRHEA: ICD-10-CM

## 2023-05-16 DIAGNOSIS — J30.9 CHRONIC ALLERGIC RHINITIS: ICD-10-CM

## 2023-05-16 DIAGNOSIS — I73.9 PERIPHERAL VASCULAR DISEASE (H): ICD-10-CM

## 2023-05-16 LAB
ALBUMIN SERPL BCG-MCNC: 4.1 G/DL (ref 3.5–5.2)
ALP SERPL-CCNC: 80 U/L (ref 35–104)
ALT SERPL W P-5'-P-CCNC: 15 U/L (ref 10–35)
ANION GAP SERPL CALCULATED.3IONS-SCNC: 11 MMOL/L (ref 7–15)
AST SERPL W P-5'-P-CCNC: 25 U/L (ref 10–35)
BILIRUB DIRECT SERPL-MCNC: <0.2 MG/DL (ref 0–0.3)
BILIRUB SERPL-MCNC: 0.3 MG/DL
BUN SERPL-MCNC: 52.1 MG/DL (ref 8–23)
CALCIUM SERPL-MCNC: 9.8 MG/DL (ref 8.2–9.6)
CHLORIDE SERPL-SCNC: 100 MMOL/L (ref 98–107)
CREAT SERPL-MCNC: 1.49 MG/DL (ref 0.51–0.95)
DEPRECATED HCO3 PLAS-SCNC: 26 MMOL/L (ref 22–29)
ERYTHROCYTE [DISTWIDTH] IN BLOOD BY AUTOMATED COUNT: 13.2 % (ref 10–15)
GFR SERPL CREATININE-BSD FRML MDRD: 32 ML/MIN/1.73M2
GLUCOSE SERPL-MCNC: 102 MG/DL (ref 70–99)
HCT VFR BLD AUTO: 37.9 % (ref 35–47)
HGB BLD-MCNC: 12.1 G/DL (ref 11.7–15.7)
MCH RBC QN AUTO: 31.2 PG (ref 26.5–33)
MCHC RBC AUTO-ENTMCNC: 31.9 G/DL (ref 31.5–36.5)
MCV RBC AUTO: 98 FL (ref 78–100)
PLATELET # BLD AUTO: 218 10E3/UL (ref 150–450)
POTASSIUM SERPL-SCNC: 5.4 MMOL/L (ref 3.4–5.3)
PROT SERPL-MCNC: 7.3 G/DL (ref 6.4–8.3)
RBC # BLD AUTO: 3.88 10E6/UL (ref 3.8–5.2)
SODIUM SERPL-SCNC: 137 MMOL/L (ref 136–145)
TSH SERPL DL<=0.005 MIU/L-ACNC: 2.43 UIU/ML (ref 0.3–4.2)
WBC # BLD AUTO: 7.8 10E3/UL (ref 4–11)

## 2023-05-16 PROCEDURE — 85027 COMPLETE CBC AUTOMATED: CPT | Mod: ORL | Performed by: NURSE PRACTITIONER

## 2023-05-16 PROCEDURE — 84443 ASSAY THYROID STIM HORMONE: CPT | Mod: ORL | Performed by: NURSE PRACTITIONER

## 2023-05-16 PROCEDURE — 99349 HOME/RES VST EST MOD MDM 40: CPT | Performed by: NURSE PRACTITIONER

## 2023-05-16 PROCEDURE — 82248 BILIRUBIN DIRECT: CPT | Mod: ORL | Performed by: NURSE PRACTITIONER

## 2023-05-16 NOTE — LETTER
Shiloh Covarrubias orders: nursing to give when arrived from pharmacy    Tdap, tetanus-diptheria-acell pertussis, (BOOSTRIX) 5-2.5-18.5 LF-MCG/0.5 CASIE injection Inject 0.5 mLs into the muscle once for 1 dose       CORNELIUS Kenyon CNP on 5/16/2023 at 9:16 PM

## 2023-05-16 NOTE — LETTER
5/16/2023        RE: Shiloh Covarrubias  C/o Chuck Covarrubias  [address] [address] [place] [id number]        [facility]  Chief Complaint   Patient presents with     Annual Comprehensive Nursing Home     [facility] Medical Record Number:  [id number]  Place of Service where encounter took place:  [facility] [[id number]]    HPI:    Shiloh Covarrubias  is a 89 year old  (11/3/1929), who is being seen today for an annual comprehensive visit. HPI information obtained from: facility chart records, patient report and [facility] Epic chart review.  Today's concerns are:    Follow up to ongoing increased depression since the loss of her neighbor and resident at [facility]. Unfortunately was scheduled to meet with onsite psychology services but when the provider attempted to meet with her she declined.  Currently on Escitalopram 10 mg po daily.  QTc in 2021 was 449.  She was ordered mirtazapine but declined to take secondary to SE. Her son says overall doing better and she agrees.     BPs high at last visit so Amlodipine was increased. VS not checked with cost of care in [place] so checking today. Denies chest pain, SOB, lightheadedness.     ALLERGIES: Clonidine derivatives, Entocort [corticosteroids], Macrobid [nitrofurantoin], and Sulfa antibiotics  PAST MEDICAL HISTORY:  has a past medical history of Asthma (1/4/2023), CAD (coronary artery disease) (1/4/2023), Cerebral infarction (H) (1/4/2023), Chronic kidney disease, stage 3 (H) (11/16/2020), COPD (chronic obstructive pulmonary disease) (H) (1/4/2023), Disorder of thyroid (1/4/2023), Edema, Essential hypertension with goal blood pressure less than 130/80 (5/24/2016), Generalized muscle weakness (1/4/2023), GERD (gastroesophageal reflux disease) (1/19/2011), Heart failure (H) (1/4/2023), Hyperlipidemia LDL goal <100 (3/30/2011), Hypertension, Hypertriglyceridemia (5/18/2010), Major depressive disorder, single episode, mild (H) (2/15/2016),  Osteoarthrosis, unspecified whether generalized or localized, involving lower leg (1/8/2007), Peripheral vascular disease (H) (3/30/2021), Squamous cell carcinoma (2015), 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    She has no past medical history of Congestive heart failure, unspecified or Unspecified cerebral artery occlusion with cerebral infarction.  PAST SURGICAL HISTORY:  has a past surgical history that includes UPPER GI ENDOSCOPY,EXAM (02/23/10); Esophagoscopy, gastroscopy, duodenoscopy (EGD), combined (2/8/2011); and Esophagoscopy, gastroscopy, duodenoscopy (EGD), dilatation, combined (2/8/2011).  IMMUNIZATIONS:  Immunization History   Administered Date(s) Administered     COVID-19 Bivalent 12+ (Pfizer) 02/02/2023     COVID-19 MONOVALENT 12+ (Pfizer) 02/25/2021, 03/18/2021, 09/30/2021, 06/21/2022     Influenza (High Dose) 3 valent vaccine 10/14/2010, 10/07/2011     Influenza (IIV3) PF 11/20/2000, 10/22/2001, 10/16/2002, 10/28/2003, 10/20/2004, 10/13/2005, 11/06/2006, 10/29/2007, 10/14/2008, 09/18/2009, 10/02/2012, 10/01/2014     Influenza Vaccine 50-64 or 18-64 w/egg allergy (Flublok) 09/29/2021, 10/06/2022     Influenza Vaccine 65+ (Fluzone HD) 09/17/2020     Influenza Vaccine >6 months (Alfuria,Fluzone) 09/24/2018, 10/11/2019     Influenza Vaccine, 6+MO IM (QUADRIVALENT W/PRESERVATIVES) 09/23/2015     Pneumo Conj 13-V (2010&after) 12/28/2016     Pneumococcal 23 valent 09/24/2018     Above immunizations pulled from [facility]. [facility] and facility records also reconciled. Outstanding information sent to  to update [facility] .  Future immunizations needed:  TDAP    Current Outpatient Medications   Medication Sig Dispense Refill     amLODIPine (NORVASC) 5 MG tablet Take 1.5 tablets (7.5 mg) by mouth At Bedtime 90 tablet 97     ASPIRIN LOW DOSE 81 MG EC  tablet TAKE 1 TABLET BY MOUTH ONCE DAILY 90 tablet 97     escitalopram (LEXAPRO) 10 MG tablet TAKE 1 TABLET BY MOUTH ONCE DAILY 90 tablet 97     furosemide (LASIX) 40 MG tablet TAKE 1 TABLET BY MOUTH ONCE DAILY 90 tablet 97     hydrALAZINE (APRESOLINE) 50 MG tablet TAKE 1 TABLET BY MOUTH THREE TIMES DAILY 270 tablet 11     Lactobacillus (CULTURELLE DIGESTIVE WOMENS) CAPS TAKE 1 CAPSULE BY MOUTH ONCE DAILY 30 capsule PRN     lisinopril (ZESTRIL) 20 MG tablet TAKE 1 TABLET BY MOUTH ONCE DAILY 90 tablet 97     loperamide (ANTI-DIARRHEAL) 2 MG tablet TAKE 1 TABLET BEFORE MEALS AS DIRECTED 240 tablet 1     loperamide (IMODIUM) 2 MG capsule TAKE 1 CAPSULE BY MOUTH THREE TIMES DAILY BEFORE MEALS 270 capsule 97     loratadine (CLARITIN) 10 MG tablet TAKE 1 TABLET BY MOUTH ONCE DAILY 90 tablet 97     Multiple Vitamin (TAB-A-VITA) TABS TAKE 1 TABLET BY MOUTH ONCE DAILY 90 tablet 97     NONFORMULARY Renew Life Probiotic. Take 1 capsule daily. 30 each 11     tiotropium (SPIRIVA RESPIMAT) 2.5 MCG/ACT inhaler Inhale 2 puffs into the lungs daily 12 g 3     Vitamin D3 (CHOLECALCIFEROL) 25 mcg (1000 units) tablet Take 1 tablet (25 mcg) by mouth daily       Case Management:  I have reviewed the Assisted Living care plan, current immunizations and preventive care/cancer screening. .declines  Patient's desire to return to the community is currently living in a community environment. Current Level of Care is appropriate.    Advance Directive Discussion:    I reviewed the current advanced directives as reflected in EPIC, the POLST and the facility chart, and verified the congruency of orders. I contacted the first party son and discussed the plan of Care.  I did review the advance directives with the resident.     Team Discussion:  I communicated with the appropriate disciplines involved with the Plan of Care:   Nursing    Patient's goal is pain control and comfort. Treat reversible conditions   Information reviewed:  Medications, vital  "signs, orders, and nursing notes.     ROS:  4 point ROS including Respiratory, CV, GI and , other than that noted in the HPI,  is negative    Vitals:  /50   Pulse 60   Temp 98.2  F (36.8  C)   Resp 18   Ht 1.575 m (5' 2\")   Wt 59.2 kg (130 lb 8 oz)   LMP  (LMP Unknown)   SpO2 95%   BMI 23.87 kg/m   Body mass index is 23.87 kg/m .  Exam:  GENERAL APPEARANCE:  Alert, cooperative  ENT:  Mouth and posterior oropharynx normal, dry mucous membranes, [place], wears hearing aids  EYES:  EOM, conjunctivae, lids, pupils and irises normal  RESP:  respiratory effort and palpation of chest normal, lungs clear to auscultation   CV:  Palpation and auscultation of heart done , regular rate and rhythm, 2/6 murmur, LE edema with compression socks on   ABDOMEN:  no guarding or rebound, bowel sounds normal  M/S:   gait and station at baseline with cane  SKIN:  limited but intact to visualized areas  NEURO:   Cranial nerves 2-12 are normal tested and grossly at patient's baseline  PSYCH:  oriented X 3    Lab/Diagnostic data:   CBC RESULTS: Recent Labs   Lab Test 07/28/22  1046 09/21/21  0815 09/20/21  0608   WBC  --  6.2 6.2   RBC  --  3.67* 3.70*   HGB 12.6 11.2* 11.0*   HCT  --  36.0 36.1   MCV  --  98 98   MCH  --  30.5 29.7   MCHC  --  31.1* 30.5*   RDW  --  12.8 12.8   PLT  --  274 284       Last Basic Metabolic Panel:  Recent Labs   Lab Test 07/28/22  1046 10/08/21  1312    136   POTASSIUM 4.7 5.2   CHLORIDE 104 103   TANIA 9.3 9.5   CO2 25 28   BUN 37* 29   CR 1.16* 1.21*   GLC 94 98       Liver Function Studies -   Recent Labs   Lab Test 09/29/21  1553 09/06/21  1503   PROTTOTAL 7.3 7.4   ALBUMIN 3.4 2.8*   BILITOTAL 0.3 0.5   ALKPHOS 61 107   AST 16 44   ALT 19 40       TSH   Date Value Ref Range Status   05/16/2023 2.43 0.30 - 4.20 uIU/mL Final   08/27/2018 1.94 0.40 - 4.00 mU/L Final   09/05/2017 2.60 0.40 - 4.00 mU/L Final       Lab Results   Component Value Date    A1C 5.3 08/27/2018 "     ASSESSMENT/PLAN  (I10) Essential hypertension  (primary encounter diagnosis)  (I27.20) Pulmonary hypertension (H)  (R60.0) Lower extremity edema  (I73.9) Peripheral vascular disease (H)  Comment: BP elevated and times with lower HR in facility chart review  Plan:   -hydralazine 50 mg po TID  -Norvasc 5 mg po daily at HS increased to 7.5 mg po daily at HS at prior visit   -Lasix 40 mg po daily (not on K+ prior use but elevated)  -lisinopril 20 mg po daily  -ASA 81 mg po daily   -BMP periodically   -weight monthly from facility with VS  -labs ordered for 5/16    (F41.9, F32.A) Anxiety and depression   Comment: recent flare of sadness with death of neighbor but says feeling better and her son agrees   Plan:   -escitalopram 10 mg po daily  -refused mirtazapine secondary to side effects   -refused ACP therapy     (J44.9) Chronic obstructive pulmonary disease, unspecified COPD type (H)  Comment: son says has difficulty at times with inhaler. No recent flares  Plan:   -Spiriva Respimat 2 puffs daily. Staff assist if needed    (N18.31) Stage 3a chronic kidney disease (H)  Comment: stable   Plan:   -renal dose medication   -follow BMP    (E11.59) Type 2 diabetes mellitus with other circulatory complication, without long-term current use of insulin (H)  Comment: diet controlled. Last A1C was below goal for geriatrics  Plan:   -A1C with next set of labs     (J30.9) Chronic allergic rhinitis  Comment: continue to take daily  Plan:   -loratadine 10 mg po daily     (Z71.85) Vaccine counseling  Comment: due for TDAP  Plan:  -order TDAP     (K58.2) Irritable bowel syndrome with both constipation and diarrhea  Comment: says meds are not always given when needed but this has improved  Plan:  -continue current plan of care    Electronically signed by:  CORNELIUS Kenyon CNP             Sincerely,        CORNELIUS Kenyon CNP

## 2023-05-16 NOTE — PROGRESS NOTES
[facility]  Chief Complaint   Patient presents with     Annual Comprehensive Nursing Home     [facility] Medical Record Number:  [id number]  Place of Service where encounter took place:  [facility] [[id number]]    HPI:    Shiloh Covarrubias  is a 89 year old  (11/3/1929), who is being seen today for an annual comprehensive visit. HPI information obtained from: facility chart records, patient report and [facility] Epic chart review.  Today's concerns are:    Follow up to ongoing increased depression since the loss of her neighbor and resident at [facility]. Unfortunately was scheduled to meet with onsite psychology services but when the provider attempted to meet with her she declined.  Currently on Escitalopram 10 mg po daily.  QTc in 2021 was 449.  She was ordered mirtazapine but declined to take secondary to SE. Her son says overall doing better and she agrees.     BPs high at last visit so Amlodipine was increased. VS not checked with cost of care in [place] so checking today. Denies chest pain, SOB, lightheadedness.     ALLERGIES: Clonidine derivatives, Entocort [corticosteroids], Macrobid [nitrofurantoin], and Sulfa antibiotics  PAST MEDICAL HISTORY:  has a past medical history of Asthma (1/4/2023), CAD (coronary artery disease) (1/4/2023), Cerebral infarction (H) (1/4/2023), Chronic kidney disease, stage 3 (H) (11/16/2020), COPD (chronic obstructive pulmonary disease) (H) (1/4/2023), Disorder of thyroid (1/4/2023), Edema, Essential hypertension with goal blood pressure less than 130/80 (5/24/2016), Generalized muscle weakness (1/4/2023), GERD (gastroesophageal reflux disease) (1/19/2011), Heart failure (H) (1/4/2023), Hyperlipidemia LDL goal <100 (3/30/2011), Hypertension, Hypertriglyceridemia (5/18/2010), Major depressive disorder, single episode, mild (H) (2/15/2016), Osteoarthrosis, unspecified whether generalized or localized, involving lower leg (1/8/2007), Peripheral  vascular disease (H) (3/30/2021), Squamous cell carcinoma (2015), 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    She has no past medical history of Congestive heart failure, unspecified or Unspecified cerebral artery occlusion with cerebral infarction.  PAST SURGICAL HISTORY:  has a past surgical history that includes UPPER GI ENDOSCOPY,EXAM (02/23/10); Esophagoscopy, gastroscopy, duodenoscopy (EGD), combined (2/8/2011); and Esophagoscopy, gastroscopy, duodenoscopy (EGD), dilatation, combined (2/8/2011).  IMMUNIZATIONS:  Immunization History   Administered Date(s) Administered     COVID-19 Bivalent 12+ (Pfizer) 02/02/2023     COVID-19 MONOVALENT 12+ (Pfizer) 02/25/2021, 03/18/2021, 09/30/2021, 06/21/2022     Influenza (High Dose) 3 valent vaccine 10/14/2010, 10/07/2011     Influenza (IIV3) PF 11/20/2000, 10/22/2001, 10/16/2002, 10/28/2003, 10/20/2004, 10/13/2005, 11/06/2006, 10/29/2007, 10/14/2008, 09/18/2009, 10/02/2012, 10/01/2014     Influenza Vaccine 50-64 or 18-64 w/egg allergy (Flublok) 09/29/2021, 10/06/2022     Influenza Vaccine 65+ (Fluzone HD) 09/17/2020     Influenza Vaccine >6 months (Alfuria,Fluzone) 09/24/2018, 10/11/2019     Influenza Vaccine, 6+MO IM (QUADRIVALENT W/PRESERVATIVES) 09/23/2015     Pneumo Conj 13-V (2010&after) 12/28/2016     Pneumococcal 23 valent 09/24/2018     Above immunizations pulled from [employer]. MIIC and facility records also reconciled. Outstanding information sent to  to update [facility] .  Future immunizations needed:  TDAP    Current Outpatient Medications   Medication Sig Dispense Refill     amLODIPine (NORVASC) 5 MG tablet Take 1.5 tablets (7.5 mg) by mouth At Bedtime 90 tablet 97     ASPIRIN LOW DOSE 81 MG EC tablet TAKE 1 TABLET BY MOUTH ONCE DAILY 90 tablet 97     escitalopram (LEXAPRO) 10 MG tablet TAKE 1  TABLET BY MOUTH ONCE DAILY 90 tablet 97     furosemide (LASIX) 40 MG tablet TAKE 1 TABLET BY MOUTH ONCE DAILY 90 tablet 97     hydrALAZINE (APRESOLINE) 50 MG tablet TAKE 1 TABLET BY MOUTH THREE TIMES DAILY 270 tablet 11     Lactobacillus (CULTURELLE DIGESTIVE WOMENS) CAPS TAKE 1 CAPSULE BY MOUTH ONCE DAILY 30 capsule PRN     lisinopril (ZESTRIL) 20 MG tablet TAKE 1 TABLET BY MOUTH ONCE DAILY 90 tablet 97     loperamide (ANTI-DIARRHEAL) 2 MG tablet TAKE 1 TABLET BEFORE MEALS AS DIRECTED 240 tablet 1     loperamide (IMODIUM) 2 MG capsule TAKE 1 CAPSULE BY MOUTH THREE TIMES DAILY BEFORE MEALS 270 capsule 97     loratadine (CLARITIN) 10 MG tablet TAKE 1 TABLET BY MOUTH ONCE DAILY 90 tablet 97     Multiple Vitamin (TAB-A-VITA) TABS TAKE 1 TABLET BY MOUTH ONCE DAILY 90 tablet 97     NONFORMULARY Renew Life Probiotic. Take 1 capsule daily. 30 each 11     tiotropium (SPIRIVA RESPIMAT) 2.5 MCG/ACT inhaler Inhale 2 puffs into the lungs daily 12 g 3     Vitamin D3 (CHOLECALCIFEROL) 25 mcg (1000 units) tablet Take 1 tablet (25 mcg) by mouth daily       Case Management:  I have reviewed the Assisted Living care plan, current immunizations and preventive care/cancer screening. .declines  Patient's desire to return to the community is currently living in a community environment. Current Level of Care is appropriate.    Advance Directive Discussion:    I reviewed the current advanced directives as reflected in EPIC, the POLST and the facility chart, and verified the congruency of orders. I contacted the first party son and discussed the plan of Care.  I did review the advance directives with the resident.     Team Discussion:  I communicated with the appropriate disciplines involved with the Plan of Care:   Nursing    Patient's goal is pain control and comfort. Treat reversible conditions   Information reviewed:  Medications, vital signs, orders, and nursing notes.     ROS:  4 point ROS including Respiratory, CV, GI and , other  "than that noted in the HPI,  is negative    Vitals:  /50   Pulse 60   Temp 98.2  F (36.8  C)   Resp 18   Ht 1.575 m (5' 2\")   Wt 59.2 kg (130 lb 8 oz)   LMP  (LMP Unknown)   SpO2 95%   BMI 23.87 kg/m   Body mass index is 23.87 kg/m .  Exam:  GENERAL APPEARANCE:  Alert, cooperative  ENT:  Mouth and posterior oropharynx normal, dry mucous membranes, [place], wears hearing aids  EYES:  EOM, conjunctivae, lids, pupils and irises normal  RESP:  respiratory effort and palpation of chest normal, lungs clear to auscultation   CV:  Palpation and auscultation of heart done , regular rate and rhythm, 2/6 murmur, LE edema with compression socks on   ABDOMEN:  no guarding or rebound, bowel sounds normal  M/S:   gait and station at baseline with cane  SKIN:  limited but intact to visualized areas  NEURO:   Cranial nerves 2-12 are normal tested and grossly at patient's baseline  PSYCH:  oriented X 3    Lab/Diagnostic data:   CBC RESULTS: Recent Labs   Lab Test 07/28/22  1046 09/21/21  0815 09/20/21  0608   WBC  --  6.2 6.2   RBC  --  3.67* 3.70*   HGB 12.6 11.2* 11.0*   HCT  --  36.0 36.1   MCV  --  98 98   MCH  --  30.5 29.7   MCHC  --  31.1* 30.5*   RDW  --  12.8 12.8   PLT  --  274 284       Last Basic Metabolic Panel:  Recent Labs   Lab Test 07/28/22  1046 10/08/21  1312    136   POTASSIUM 4.7 5.2   CHLORIDE 104 103   TANIA 9.3 9.5   CO2 25 28   BUN 37* 29   CR 1.16* 1.21*   GLC 94 98       Liver Function Studies -   Recent Labs   Lab Test 09/29/21  1553 09/06/21  1503   PROTTOTAL 7.3 7.4   ALBUMIN 3.4 2.8*   BILITOTAL 0.3 0.5   ALKPHOS 61 107   AST 16 44   ALT 19 40       TSH   Date Value Ref Range Status   05/16/2023 2.43 0.30 - 4.20 uIU/mL Final   08/27/2018 1.94 0.40 - 4.00 mU/L Final   09/05/2017 2.60 0.40 - 4.00 mU/L Final       Lab Results   Component Value Date    A1C 5.3 08/27/2018     ASSESSMENT/PLAN  (I10) Essential hypertension  (primary encounter diagnosis)  (I27.20) Pulmonary hypertension " (H)  (R60.0) Lower extremity edema  (I73.9) Peripheral vascular disease (H)  Comment: BP elevated and times with lower HR in facility chart review  Plan:   -hydralazine 50 mg po TID  -Norvasc 5 mg po daily at HS increased to 7.5 mg po daily at HS at prior visit   -Lasix 40 mg po daily (not on K+ prior use but elevated)  -lisinopril 20 mg po daily  -ASA 81 mg po daily   -BMP periodically   -weight monthly from facility with VS  -labs ordered for 5/16    (F41.9, F32.A) Anxiety and depression   Comment: recent flare of sadness with death of neighbor but says feeling better and her son agrees   Plan:   -escitalopram 10 mg po daily  -refused mirtazapine secondary to side effects   -refused ACP therapy     (J44.9) Chronic obstructive pulmonary disease, unspecified COPD type (H)  Comment: son says has difficulty at times with inhaler. No recent flares  Plan:   -Spiriva Respimat 2 puffs daily. Staff assist if needed    (N18.31) Stage 3a chronic kidney disease (H)  Comment: stable   Plan:   -renal dose medication   -follow BMP    (E11.59) Type 2 diabetes mellitus with other circulatory complication, without long-term current use of insulin (H)  Comment: diet controlled. Last A1C was below goal for geriatrics  Plan:   -A1C with next set of labs     (J30.9) Chronic allergic rhinitis  Comment: continue to take daily  Plan:   -loratadine 10 mg po daily     (Z71.85) Vaccine counseling  Comment: due for TDAP  Plan:  -order TDAP     (K58.2) Irritable bowel syndrome with both constipation and diarrhea  Comment: says meds are not always given when needed but this has improved  Plan:  -continue current plan of care    Electronically signed by:  CORNELIUS Kenyon CNP

## 2023-07-06 ENCOUNTER — ASSISTED LIVING VISIT (OUTPATIENT)
Dept: GERIATRICS | Facility: CLINIC | Age: 88
End: 2023-07-06
Payer: COMMERCIAL

## 2023-07-06 VITALS
BODY MASS INDEX: 24.18 KG/M2 | WEIGHT: 131.4 LBS | TEMPERATURE: 98.4 F | OXYGEN SATURATION: 96 % | RESPIRATION RATE: 16 BRPM | HEART RATE: 59 BPM | HEIGHT: 62 IN | SYSTOLIC BLOOD PRESSURE: 130 MMHG | DIASTOLIC BLOOD PRESSURE: 50 MMHG

## 2023-07-06 DIAGNOSIS — R60.0 LOWER EXTREMITY EDEMA: ICD-10-CM

## 2023-07-06 DIAGNOSIS — I27.20 PULMONARY HYPERTENSION (H): ICD-10-CM

## 2023-07-06 DIAGNOSIS — R25.2 LEG CRAMPS: Primary | ICD-10-CM

## 2023-07-06 DIAGNOSIS — I10 ESSENTIAL HYPERTENSION: ICD-10-CM

## 2023-07-06 DIAGNOSIS — F41.9 ANXIETY AND DEPRESSION: ICD-10-CM

## 2023-07-06 DIAGNOSIS — N18.32 STAGE 3B CHRONIC KIDNEY DISEASE (H): ICD-10-CM

## 2023-07-06 DIAGNOSIS — F32.A ANXIETY AND DEPRESSION: ICD-10-CM

## 2023-07-06 PROCEDURE — 99349 HOME/RES VST EST MOD MDM 40: CPT | Performed by: NURSE PRACTITIONER

## 2023-07-06 NOTE — LETTER
Shiloh Covarrubias orders    -BMP, Magnesium, phosphate next week on lab day for fluid/electrolyte imbalance           Israel Scott, CORNELIUS CNP on 7/6/2023 at 12:24 PM

## 2023-07-06 NOTE — PROGRESS NOTES
[facility]  [facility] Record Number:  [id number]  Place of Service where encounter took place:  [facility] [[id number]]  No chief complaint on file.      HPI:    Shiloh Covarrubias  is a 89 year old (11/3/1929), who is being seen today for an episodic care visit.  HPI information obtained from: facility chart records, facility staff and patient report. Today's concern is:    Seen today for follow up to chronic conditions. Wearing compression for LE edema at baseline but was elevated when we had warmer temps. Sleeping ok, doing ok per review. Mood and spirits good. Wants to see the dentist for a chipped front tooth. No pain. Reviewed onsite dental services as another option. Bowels have been good. Has chronic muscle spasms in her legs and hands. Was never started on magnesium as concern could increase chronic diarrhea.     Past Medical and Surgical History reviewed in Epic today.    MEDICATIONS:    Current Outpatient Medications   Medication Sig Dispense Refill     amLODIPine (NORVASC) 5 MG tablet Take 1.5 tablets (7.5 mg) by mouth At Bedtime 90 tablet 97     ASPIRIN LOW DOSE 81 MG EC tablet TAKE 1 TABLET BY MOUTH ONCE DAILY 90 tablet 97     escitalopram (LEXAPRO) 10 MG tablet TAKE 1 TABLET BY MOUTH ONCE DAILY 90 tablet 97     furosemide (LASIX) 40 MG tablet TAKE 1 TABLET BY MOUTH ONCE DAILY 90 tablet 97     hydrALAZINE (APRESOLINE) 50 MG tablet TAKE 1 TABLET BY MOUTH THREE TIMES DAILY 270 tablet 11     Lactobacillus (CULTURELLE DIGESTIVE WOMENS) CAPS TAKE 1 CAPSULE BY MOUTH ONCE DAILY 30 capsule PRN     lisinopril (ZESTRIL) 20 MG tablet TAKE 1 TABLET BY MOUTH ONCE DAILY 90 tablet 97     loperamide (ANTI-DIARRHEAL) 2 MG tablet TAKE 1 TABLET BEFORE MEALS AS DIRECTED 240 tablet 1     loperamide (IMODIUM) 2 MG capsule TAKE 1 CAPSULE BY MOUTH THREE TIMES DAILY BEFORE MEALS 270 capsule 97     loratadine (CLARITIN) 10 MG tablet TAKE 1 TABLET BY MOUTH ONCE DAILY 90 tablet 97  "    Multiple Vitamin (TAB-A-VITA) TABS TAKE 1 TABLET BY MOUTH ONCE DAILY 90 tablet 97     NONFORMULARY Renew Life Probiotic. Take 1 capsule daily. 30 each 11     tiotropium (SPIRIVA RESPIMAT) 2.5 MCG/ACT inhaler Inhale 2 puffs into the lungs daily 12 g 3     Vitamin D3 (CHOLECALCIFEROL) 25 mcg (1000 units) tablet Take 1 tablet (25 mcg) by mouth daily         REVIEW OF SYSTEMS:  4 point ROS including Respiratory, CV, GI and , other than that noted in the HPI,  is negative    Objective:  /50   Pulse 59   Temp 98.4  F (36.9  C)   Resp 16   Ht 1.575 m (5' 2\")   Wt 59.6 kg (131 lb 6.4 oz)   LMP  (LMP Unknown)   SpO2 96%   BMI 24.03 kg/m    Exam:  GENERAL APPEARANCE:  Alert, chatty and bright   ENT:  Mouth and posterior oropharynx normal, dry mucous membranes, [place], wears hearing aids  EYES:  EOM, conjunctivae, lids, pupils and irises normal  RESP:  respiratory effort and palpation of chest normal, lungs clear to auscultation   CV:  Palpation and auscultation of heart done , regular rate and rhythm, 2/6 murmur, LE edema with compression socks on   ABDOMEN:  no guarding or rebound, bowel sounds normal  M/S:   gait and station at baseline with cane  SKIN:  limited but intact to visualized areas  NEURO:   Cranial nerves 2-12 are normal tested and grossly at patient's baseline  PSYCH:  oriented X 3    Labs:   CBC RESULTS: Recent Labs   Lab Test 05/16/23  1210 07/28/22  1046 09/21/21  0815   WBC 7.8  --  6.2   RBC 3.88  --  3.67*   HGB 12.1 12.6 11.2*   HCT 37.9  --  36.0   MCV 98  --  98   MCH 31.2  --  30.5   MCHC 31.9  --  31.1*   RDW 13.2  --  12.8     --  274       Last Basic Metabolic Panel:  Recent Labs   Lab Test 05/16/23  1210 07/28/22  1046    136   POTASSIUM 5.4* 4.7   CHLORIDE 100 104   TANIA 9.8* 9.3   CO2 26 25   BUN 52.1* 37*   CR 1.49* 1.16*   * 94       Liver Function Studies -   Recent Labs   Lab Test 05/16/23  1210 09/29/21  1553   PROTTOTAL 7.3 7.3   ALBUMIN 4.1 3.4 "   BILITOTAL 0.3 0.3   ALKPHOS 80 61   AST 25 16   ALT 15 19       TSH   Date Value Ref Range Status   05/16/2023 2.43 0.30 - 4.20 uIU/mL Final   08/27/2018 1.94 0.40 - 4.00 mU/L Final   09/05/2017 2.60 0.40 - 4.00 mU/L Final       Lab Results   Component Value Date    A1C 5.3 08/27/2018     ASSESSMENT/PLAN:    (R25.2) leg cramps  Comment: hx of now with some finger cramps when setting curlers in hair additionally   Comment:  -checking labs  -resistant to take any more pills     (N18.32) Stage 3b chronic kidney disease (H)  (primary encounter diagnosis)  Comment: chronic   Plan:   -follow labs, renal dose medications    (I10) Essential hypertension  (I27.20) Pulmonary hypertension (H)  (R60.0) Lower extremity edema  Comment: some higher BPs in chart review. Taken today with manual cuff and WNL  Plan:   -hydralazine 50 mg po TID  -Norvasc 5 mg po daily at HS increased to 7.5 mg po daily at HS at prior visit   -Lasix 40 mg po daily (not on K+ prior use but elevated)  -lisinopril 20 mg po daily  -ASA 81 mg po daily   -BMP periodically   -weight monthly from facility with VS  -labs ordered     (F41.9,  F32.A) Anxiety and depression  Comment: improved   Plan:   -escitalopram 10 mg po daily  -refused mirtazapine secondary to side effects   -refused ACP therapy              Electronically signed by:  CORNELIUS Kenyon CNP

## 2023-07-06 NOTE — LETTER
7/6/2023        RE: Shiloh Covarrubias  C/o Chuck Covarrubias  [address] [address] [place] [id number]        [facility]  [facility] Record Number:  [id number]  Place of Service where encounter took place:  [facility] [[id number]]  No chief complaint on file.      HPI:    Shiloh Covarrubias  is a 89 year old (11/3/1929), who is being seen today for an episodic care visit.  HPI information obtained from: facility chart records, facility staff and patient report. Today's concern is:    Seen today for follow up to chronic conditions. Wearing compression for LE edema at baseline but was elevated when we had warmer temps. Sleeping ok, doing ok per review. Mood and spirits good. Wants to see the dentist for a chipped front tooth. No pain. Reviewed onsite dental services as another option. Bowels have been good. Has chronic muscle spasms in her legs and hands. Was never started on magnesium as concern could increase chronic diarrhea.     Past Medical and Surgical History reviewed in Epic today.    MEDICATIONS:    Current Outpatient Medications   Medication Sig Dispense Refill     amLODIPine (NORVASC) 5 MG tablet Take 1.5 tablets (7.5 mg) by mouth At Bedtime 90 tablet 97     ASPIRIN LOW DOSE 81 MG EC tablet TAKE 1 TABLET BY MOUTH ONCE DAILY 90 tablet 97     escitalopram (LEXAPRO) 10 MG tablet TAKE 1 TABLET BY MOUTH ONCE DAILY 90 tablet 97     furosemide (LASIX) 40 MG tablet TAKE 1 TABLET BY MOUTH ONCE DAILY 90 tablet 97     hydrALAZINE (APRESOLINE) 50 MG tablet TAKE 1 TABLET BY MOUTH THREE TIMES DAILY 270 tablet 11     Lactobacillus (CULTURELLE DIGESTIVE WOMENS) CAPS TAKE 1 CAPSULE BY MOUTH ONCE DAILY 30 capsule PRN     lisinopril (ZESTRIL) 20 MG tablet TAKE 1 TABLET BY MOUTH ONCE DAILY 90 tablet 97     loperamide (ANTI-DIARRHEAL) 2 MG tablet TAKE 1 TABLET BEFORE MEALS AS DIRECTED 240 tablet 1     loperamide (IMODIUM) 2 MG capsule TAKE 1 CAPSULE BY MOUTH THREE TIMES DAILY  "BEFORE MEALS 270 capsule 97     loratadine (CLARITIN) 10 MG tablet TAKE 1 TABLET BY MOUTH ONCE DAILY 90 tablet 97     Multiple Vitamin (TAB-A-VITA) TABS TAKE 1 TABLET BY MOUTH ONCE DAILY 90 tablet 97     NONFORMULARY Renew Life Probiotic. Take 1 capsule daily. 30 each 11     tiotropium (SPIRIVA RESPIMAT) 2.5 MCG/ACT inhaler Inhale 2 puffs into the lungs daily 12 g 3     Vitamin D3 (CHOLECALCIFEROL) 25 mcg (1000 units) tablet Take 1 tablet (25 mcg) by mouth daily         REVIEW OF SYSTEMS:  4 point ROS including Respiratory, CV, GI and , other than that noted in the HPI,  is negative    Objective:  /50   Pulse 59   Temp 98.4  F (36.9  C)   Resp 16   Ht 1.575 m (5' 2\")   Wt 59.6 kg (131 lb 6.4 oz)   LMP  (LMP Unknown)   SpO2 96%   BMI 24.03 kg/m    Exam:  GENERAL APPEARANCE:  Alert, chatty and bright   ENT:  Mouth and posterior oropharynx normal, dry mucous membranes, [place], wears hearing aids  EYES:  EOM, conjunctivae, lids, pupils and irises normal  RESP:  respiratory effort and palpation of chest normal, lungs clear to auscultation   CV:  Palpation and auscultation of heart done , regular rate and rhythm, 2/6 murmur, LE edema with compression socks on   ABDOMEN:  no guarding or rebound, bowel sounds normal  M/S:   gait and station at baseline with cane  SKIN:  limited but intact to visualized areas  NEURO:   Cranial nerves 2-12 are normal tested and grossly at patient's baseline  PSYCH:  oriented X 3    Labs:   CBC RESULTS: Recent Labs   Lab Test 05/16/23  1210 07/28/22  1046 09/21/21  0815   WBC 7.8  --  6.2   RBC 3.88  --  3.67*   HGB 12.1 12.6 11.2*   HCT 37.9  --  36.0   MCV 98  --  98   MCH 31.2  --  30.5   MCHC 31.9  --  31.1*   RDW 13.2  --  12.8     --  274       Last Basic Metabolic Panel:  Recent Labs   Lab Test 05/16/23  1210 07/28/22  1046    136   POTASSIUM 5.4* 4.7   CHLORIDE 100 104   TANIA 9.8* 9.3   CO2 26 25   BUN 52.1* 37*   CR 1.49* 1.16*   * 94       Liver " Function Studies -   Recent Labs   Lab Test 05/16/23  1210 09/29/21  1553   PROTTOTAL 7.3 7.3   ALBUMIN 4.1 3.4   BILITOTAL 0.3 0.3   ALKPHOS 80 61   AST 25 16   ALT 15 19       TSH   Date Value Ref Range Status   05/16/2023 2.43 0.30 - 4.20 uIU/mL Final   08/27/2018 1.94 0.40 - 4.00 mU/L Final   09/05/2017 2.60 0.40 - 4.00 mU/L Final       Lab Results   Component Value Date    A1C 5.3 08/27/2018     ASSESSMENT/PLAN:    (R25.2) leg cramps  Comment: hx of now with some finger cramps when setting curlers in hair additionally   Comment:  -checking labs  -resistant to take any more pills     (N18.32) Stage 3b chronic kidney disease (H)  (primary encounter diagnosis)  Comment: chronic   Plan:   -follow labs, renal dose medications    (I10) Essential hypertension  (I27.20) Pulmonary hypertension (H)  (R60.0) Lower extremity edema  Comment: some higher BPs in chart review. Taken today with manual cuff and WNL  Plan:   -hydralazine 50 mg po TID  -Norvasc 5 mg po daily at HS increased to 7.5 mg po daily at HS at prior visit   -Lasix 40 mg po daily (not on K+ prior use but elevated)  -lisinopril 20 mg po daily  -ASA 81 mg po daily   -BMP periodically   -weight monthly from facility with VS  -labs ordered     (F41.9,  F32.A) Anxiety and depression  Comment: improved   Plan:   -escitalopram 10 mg po daily  -refused mirtazapine secondary to side effects   -refused ACP therapy              Electronically signed by:  CORNELIUS Kenyon CNP               Sincerely,        CORNELIUS Kenyon CNP

## 2023-07-07 ENCOUNTER — LAB REQUISITION (OUTPATIENT)
Dept: LAB | Facility: CLINIC | Age: 88
End: 2023-07-07
Payer: COMMERCIAL

## 2023-07-07 DIAGNOSIS — E87.8 OTHER DISORDERS OF ELECTROLYTE AND FLUID BALANCE, NOT ELSEWHERE CLASSIFIED: ICD-10-CM

## 2023-07-11 LAB
ANION GAP SERPL CALCULATED.3IONS-SCNC: 12 MMOL/L (ref 7–15)
BUN SERPL-MCNC: 43.7 MG/DL (ref 8–23)
CALCIUM SERPL-MCNC: 9.6 MG/DL (ref 8.2–9.6)
CHLORIDE SERPL-SCNC: 99 MMOL/L (ref 98–107)
CREAT SERPL-MCNC: 1.46 MG/DL (ref 0.51–0.95)
DEPRECATED HCO3 PLAS-SCNC: 25 MMOL/L (ref 22–29)
GFR SERPL CREATININE-BSD FRML MDRD: 33 ML/MIN/1.73M2
GLUCOSE SERPL-MCNC: 94 MG/DL (ref 70–99)
MAGNESIUM SERPL-MCNC: 1.9 MG/DL (ref 1.7–2.3)
PHOSPHATE SERPL-MCNC: 3.5 MG/DL (ref 2.5–4.5)
POTASSIUM SERPL-SCNC: 4.6 MMOL/L (ref 3.4–5.3)
SODIUM SERPL-SCNC: 136 MMOL/L (ref 136–145)

## 2023-07-11 PROCEDURE — 36415 COLL VENOUS BLD VENIPUNCTURE: CPT | Mod: ORL | Performed by: NURSE PRACTITIONER

## 2023-07-11 PROCEDURE — P9604 ONE-WAY ALLOW PRORATED TRIP: HCPCS | Mod: ORL | Performed by: NURSE PRACTITIONER

## 2023-07-11 PROCEDURE — 84100 ASSAY OF PHOSPHORUS: CPT | Mod: ORL | Performed by: NURSE PRACTITIONER

## 2023-07-11 PROCEDURE — 83735 ASSAY OF MAGNESIUM: CPT | Mod: ORL | Performed by: NURSE PRACTITIONER

## 2023-07-11 PROCEDURE — 80048 BASIC METABOLIC PNL TOTAL CA: CPT | Mod: ORL | Performed by: NURSE PRACTITIONER

## 2023-07-20 ENCOUNTER — OFFICE VISIT (OUTPATIENT)
Dept: PHARMACY | Facility: CLINIC | Age: 88
End: 2023-07-20
Payer: COMMERCIAL

## 2023-07-20 VITALS — DIASTOLIC BLOOD PRESSURE: 54 MMHG | SYSTOLIC BLOOD PRESSURE: 160 MMHG

## 2023-07-20 DIAGNOSIS — R25.2 LEG CRAMPS: ICD-10-CM

## 2023-07-20 DIAGNOSIS — J44.9 COPD (CHRONIC OBSTRUCTIVE PULMONARY DISEASE) (H): ICD-10-CM

## 2023-07-20 DIAGNOSIS — R60.9 EDEMA: ICD-10-CM

## 2023-07-20 DIAGNOSIS — K58.9 IRRITABLE BOWEL SYNDROME: ICD-10-CM

## 2023-07-20 DIAGNOSIS — I63.9 CEREBRAL INFARCTION (H): ICD-10-CM

## 2023-07-20 DIAGNOSIS — I25.10 CAD (CORONARY ARTERY DISEASE): ICD-10-CM

## 2023-07-20 DIAGNOSIS — J30.2 SEASONAL ALLERGIC RHINITIS: Primary | ICD-10-CM

## 2023-07-20 DIAGNOSIS — F41.1 ANXIETY STATE: ICD-10-CM

## 2023-07-20 DIAGNOSIS — Z78.9 TAKES DIETARY SUPPLEMENTS: ICD-10-CM

## 2023-07-20 DIAGNOSIS — F32.0 MAJOR DEPRESSIVE DISORDER, SINGLE EPISODE, MILD (H): ICD-10-CM

## 2023-07-20 DIAGNOSIS — I10 ESSENTIAL HYPERTENSION WITH GOAL BLOOD PRESSURE LESS THAN 130/80: ICD-10-CM

## 2023-07-20 PROCEDURE — 99607 MTMS BY PHARM ADDL 15 MIN: CPT | Performed by: PHARMACIST

## 2023-07-20 PROCEDURE — 99605 MTMS BY PHARM NP 15 MIN: CPT | Performed by: PHARMACIST

## 2023-07-20 RX ORDER — LOPERAMIDE HCL 2 MG
2 CAPSULE ORAL DAILY PRN
COMMUNITY
End: 2024-06-11

## 2023-07-20 NOTE — LETTER
_  Medication List        Prepared on: 07/26/2023     Bring your Medication List when you go to the doctor, hospital, or   emergency room. And, share it with your family or caregivers.     Note any changes to how you take your medications.  Cross out medications when you no longer use them.    Medication How I take it Why I use it Prescriber   amLODIPine (NORVASC) 5 MG tablet Take 1.5 tablets (7.5 mg) by mouth At Bedtime Essential hypertension  CORNELIUS Meléndez CNP   ASPIRIN LOW DOSE 81 MG EC tablet TAKE 1 TABLET BY MOUTH ONCE DAILY Cerebral Infarction (H) CORNELIUS Meléndez CNP   escitalopram (LEXAPRO) 10 MG tablet TAKE 1 TABLET BY MOUTH ONCE DAILY [place] (Generalized Anxiety Disorder) CORNELIUS Meléndez CNP   furosemide (LASIX) 40 MG tablet TAKE 1 TABLET BY MOUTH ONCE DAILY Edema, unspecified type CORNELIUS Meléndez CNP   hydrALAZINE (APRESOLINE) 50 MG tablet TAKE 1 TABLET BY MOUTH THREE TIMES DAILY Essential hypertension  CORNELIUS Meléndez CNP   Lactobacillus (CULTURELLE DIGESTIVE WOMENS) CAPS TAKE 1 CAPSULE BY MOUTH ONCE DAILY Takes dietary supplements CORNELIUS Meléndez CNP   lisinopril (ZESTRIL) 20 MG tablet TAKE 1 TABLET BY MOUTH ONCE DAILY Essential hypertension  CORNELIUS Meléndez CNP   loperamide (IMODIUM) 2 MG capsule TAKE 1 CAPSULE BY MOUTH THREE TIMES DAILY BEFORE MEALS Diarrhea CORNELIUS Meléndez CNP   loperamide (IMODIUM) 2 MG capsule Take 2 mg by mouth daily as needed for diarrhea  diarrhea Patient Reported   loratadine (CLARITIN) 10 MG tablet TAKE 1 TABLET BY MOUTH ONCE DAILY Chronic seasonal allergic rhinitis due to pollen CORNELIUS Meléndez CNP   Multiple Vitamin (TAB-A-VITA) TABS TAKE 1 TABLET BY MOUTH ONCE DAILY Takes dietary supplements CORNELIUS Meléndez CNP   tiotropium (SPIRIVA RESPIMAT) 2.5 MCG/ACT inhaler Inhale 2 puffs into the lungs daily Chronic obstructive pulmonary disease, unspecified COPD type (H) CORNELIUS Meléndez  CNP   Vitamin D3 (CHOLECALCIFEROL) 25 mcg (1000 units) tablet Take 1 tablet (25 mcg) by mouth daily  supplement CORNELIUS Meléndez CNP         Add new medications, over-the-counter drugs, herbals, vitamins, or  minerals in the blank rows below.    Medication How I take it Why I use it Prescriber                                      Allergies:      clonidine derivatives; entocort [corticosteroids]; macrobid [nitrofurantoin]; sulfa antibiotics        Side effects I have had:               Other Information:              My notes and questions:

## 2023-07-20 NOTE — PATIENT INSTRUCTIONS
"Recommendations from today's MTM visit:                                                    MTM (medication therapy management) is a service provided by a clinical pharmacist designed to help you get the most of out of your medicines.   Today we reviewed what your medicines are for, how to know if they are working, that your medicines are safe and how to make your medicine regimen as easy as possible.      Provider may consider changing loratadine to 10mg every other day, and change administration time from morning to evening.    Provider may consider stopping multivitamin.  Consider vitamin D level, B12 level with next labs.    Follow-up: 3-6 months, sooner if necessary      It was great speaking with you today.  I value your experience and would be very thankful for your time in providing feedback in our clinic survey. In the next few days, you may receive an email or text message from [employer] with a link to a survey related to your  clinical pharmacist.\"     To schedule another MTM appointment, please call me directly or you may call the MTM scheduling line at [phone] or toll-free at [phone].     My Clinical Pharmacist's contact information:                                                      Please feel free to contact me with any questions or concerns you have.      Mimi Marcos, Pharm.D.,[facility] [place]  Board Certified Geriatric Pharmacist  Medication Therapy Management Pharmacist  [phone]      "

## 2023-07-20 NOTE — LETTER
"Recommended To-Do List      Prepared on: 07/26/2023       You can get the best results from your medications by completing the items on this \"To-Do List.\"      Bring your To-Do List when you go to your doctor. And, share it with your family or caregivers.    My To-Do List:  What we talked about: What I should do:   Your medication dosage being too high    Decrease how often you take loratadine (CLARITIN) to 10mg every other day (and take prior to bedtime instead of in the morning) - IF provider agrees          What we talked about: What I should do:   A medication that you may no longer need    Stop taking Tab-A-Vin (multivitamin) - If provider agrees          What we talked about: What I should do:   Needing additional monitoring    Get the following lab test(s): vitamin D and vitamin B12 - IF provider agrees           What we talked about: What I should do:                       "

## 2023-07-20 NOTE — PROGRESS NOTES
Medication Therapy Management (MTM) Encounter    ASSESSMENT:                            Medication Adherence/Access: No issues identified    Hypertension, Edema, CAD, h/o CVA: Systolic blood pressure elevated today >150, and diastolic <60-65mmHg.  Continue to monitor.  Goal BP <150/90mmHg reasonable in this very elderly patient at risk of hypotension, orthostasis, dizziness, falls, tissue/cerebral hypoperfusion; do not want too tight control.  Of note, BP taken today just prior to receiving 11am hydralazine dose, and patient does not report any recent symptoms of high or low blood pressure.     Depression, Anxiety:  Stable.    Diarrhea/IBS:  Stable.  Discussed risks of scheduled loperamide use, including anticholinergic side effects, but patient feels she is at lowest effective dose, and denies side effects.    COPD, Rhinitis: Stable.  Due to CrCl<30ml/min, recommended loratadine dose is 10mg every 48hr; may benefit from reduction in frequency, and switch from morning to bedtime dosing, which may help reduce fatigue as well.    Leg cramps:  Stable.     Supplements:  Due to patient noting well-rounded diet, may benefit from d'c of MVI.  May also benefit from checking vitamin D and B12 level with next labs to ensure vitamin D level >30, B12>400.    PLAN:                              Provider may consider changing loratadine to 10mg every other day, and change administration time from morning to evening.    Provider may consider stopping multivitamin.  Consider vitamin D level, B12 level with next labs.    Follow-up: 3-6 months, sooner if necessary    SUBJECTIVE/OBJECTIVE:                          Matilde Covarrubias is a 89 year old female seen for an initial visit. She was referred to me from Israel Scott NP.      Reason for visit: initial MTM.    Allergies/ADRs: Reviewed in chart  Past Medical History: Reviewed in chart  Tobacco: She reports that she has never smoked. She has never used smokeless tobacco.  Alcohol: not  "currently using  Assistive Devices: cane for ambulation.  Not using walker.  Denies falls.  Balance \"pretty good.\"  Has assistance with laundry, housekeeping, meds    Medication Adherence/Access: assisted living     Hypertension, Edema, CAD, h/o CVA:   Amlodipine 7.5mg at 7pm  Aspirin 81mg daily at 8am  Lisinopril 20mg daily at 8am  Furosemide 40mg daily at 8am  Hydralazine 50mg three times daily (8am, 11am, 7pm)  No dizziness, chest pain, edema.  Occasional shortness of breath (sam on hot day or when air quality was bad) - just with activity.  Denies palpitations, headaches.  No urinary frequency/incontinence.  Does use secret incontinence pads.  Occasional bruising.  No black, tarry stools.  Wears compression stockings, although not wearing today.  Typically wears daily, unless wearing shorter pants.    BP Readings from Last 3 Encounters:   07/20/23 (!) 160/54   07/06/23 130/50   05/16/23 120/50     Pulse Readings from Last 3 Encounters:   07/06/23 59   05/16/23 60   05/02/23 61     Depression, Anxiety:  escitalopram 10mg daily at 7pm  Feels mood and anxiety controlled \"very well.\"  No depressive symptoms or anxiety.  Sleeping well at night; \"I'm very fortunate.\"  Does get fatigued during the day.  Doesn't nap.    Diarrhea/IBS:  Loperamide 2mg three times daily before meals & once daily as needed  Culturelle daily at 11am  Occasionally will forget a dose of loperamide, and still without symptoms - feels current dose is lowest effective dose.  Currently without symptoms of constipation, diarrhea.  Occasional gi upset depending on what she eats.  Corn can contribute.  Started taking Culturelle when having trouble with diarrhea; kept on using, and would like to continue.    COPD, Rhinitis:   LAMA - Spiriva Respimat 2.5 mcg - two puffs once daily  Loratadine 10mg daily at 8am  Patient reports the following symptoms: shortness of breath with more activity, no wheezing, coughing.  Occasional mucus production.  Currently " "feels symptoms controlled.  No congestion, runny nose.    Leg cramps:  No current meds.   Occasional leg cramping, has improved.  Only occurs at night while in bed.  Will get up and walk around until it goes away.  Does not feel need for additional medication for this.    Supplements:  Vitamin D3 1000u daily at 8am  MVI daily at 11am  Appetite good.  Doesn't skip meals.  Feels gets a well rounded diet.  Doesn't always finish meals, \"when I'm full, I'm full.\"  Typically eats at least 75% of meals.  Started vitamin D on 4/14/23.    Estimated Creatinine Clearance: 22.7 mL/min (A) (based on SCr of 1.46 mg/dL (H)).    Today's Vitals: BP (!) 160/54 (BP Location: Left arm, Patient Position: Sitting)   LMP  (LMP Unknown)      ----------------      I spent 27 minutes with this patient today. A copy of the visit note was provided to the patient's provider(s).    A summary of these recommendations was sent via [employer] [facility].    Mimi Marcos, Pharm.D.,[facility] [place]  Board Certified Geriatric Pharmacist  Medication Therapy Management Pharmacist  [phone]         Medication Therapy Recommendations  Seasonal allergic rhinitis    Current Medication: loratadine (CLARITIN) 10 MG tablet   Rationale: Frequency inappropriate - Dosage too high - Safety   Recommendation: Decrease Frequency   Status: Contact Provider - Awaiting Response   Note: consider decreasing to every other day (every 48hr) and change from morning to bedtime administration         Takes dietary supplements    Current Medication: Multiple Vitamin (TAB-A-VITA) TABS   Rationale: Nonmedication therapy more appropriate - Unnecessary medication therapy - Indication   Recommendation: Discontinue Medication   Status: Contact Provider - Awaiting Response          Current Medication: Vitamin D3 (CHOLECALCIFEROL) 25 mcg (1000 units) tablet   Rationale: Medication requires monitoring - Needs additional monitoring   Recommendation: Order Lab   Status: Contact Provider - Awaiting Response    "

## 2023-07-20 NOTE — LETTER
July 26, 2023  Shiloh Covarrubias  C/O DUSTIN COVARRUBIAS  [address] [address] [place] [address]    Dear Ms. Covarrubias, [facility]     Thank you for talking with me on Jul 20, 2023 about your health and medications. As a follow-up to our conversation, I have included two documents:      Your Recommended To-Do List has steps you should take to get the best results from your medications.  Your Medication List will help you keep track of your medications and how to take them.    If you want to talk about these documents, please call Mimi Marcos RPH at phone: [phone], Monday-Friday 8-4:30pm.    I look forward to working with you and your doctors to make sure your medications work well for you.    Sincerely,  Mimi Marcos RPH  [facility] Pharmacist, [facility]

## 2023-08-16 DIAGNOSIS — R19.7 DIARRHEA: Primary | ICD-10-CM

## 2023-08-16 RX ORDER — LOPERAMIDE HCL 2 MG
2 CAPSULE ORAL
Qty: 270 CAPSULE | Refills: 3 | Status: SHIPPED | OUTPATIENT
Start: 2023-08-16 | End: 2024-06-11

## 2023-08-29 ENCOUNTER — ASSISTED LIVING VISIT (OUTPATIENT)
Dept: GERIATRICS | Facility: CLINIC | Age: 88
End: 2023-08-29
Payer: COMMERCIAL

## 2023-08-29 VITALS
HEIGHT: 63 IN | HEART RATE: 67 BPM | BODY MASS INDEX: 22.68 KG/M2 | TEMPERATURE: 98.2 F | SYSTOLIC BLOOD PRESSURE: 155 MMHG | WEIGHT: 128 LBS | DIASTOLIC BLOOD PRESSURE: 44 MMHG | RESPIRATION RATE: 14 BRPM | OXYGEN SATURATION: 95 %

## 2023-08-29 DIAGNOSIS — R60.0 LOWER EXTREMITY EDEMA: ICD-10-CM

## 2023-08-29 DIAGNOSIS — I27.20 PULMONARY HYPERTENSION (H): ICD-10-CM

## 2023-08-29 DIAGNOSIS — I10 ESSENTIAL HYPERTENSION: ICD-10-CM

## 2023-08-29 DIAGNOSIS — Z87.440 PERSONAL HISTORY OF URINARY TRACT INFECTION: Primary | ICD-10-CM

## 2023-08-29 DIAGNOSIS — F32.A ANXIETY AND DEPRESSION: ICD-10-CM

## 2023-08-29 DIAGNOSIS — F41.9 ANXIETY AND DEPRESSION: ICD-10-CM

## 2023-08-29 PROCEDURE — 99349 HOME/RES VST EST MOD MDM 40: CPT | Performed by: NURSE PRACTITIONER

## 2023-08-29 NOTE — PROGRESS NOTES
[facility]  [facility] Record Number:  [id number]  Place of Service where encounter took place:  [facility] [[id number]]  Chief Complaint   Patient presents with    RECHECK       HPI:    Shiloh Covarrubias  is a 89 year old (11/3/1929), who is being seen today for an episodic care visit.  HPI information obtained from: patient report. Today's concern is:    Seen today per request. She is very worried about having another UTI. Denies dysuria, hematuria, N/V, abdominal pain, backache. VSS, weights stable. Says had a UTI 2021 and was very difficult. She is questioning a recheck of her urine.      Past Medical and Surgical History reviewed in Epic today.    MEDICATIONS:    Current Outpatient Medications   Medication Sig Dispense Refill    amLODIPine (NORVASC) 5 MG tablet Take 1.5 tablets (7.5 mg) by mouth At Bedtime 90 tablet 97    ASPIRIN LOW DOSE 81 MG EC tablet TAKE 1 TABLET BY MOUTH ONCE DAILY 90 tablet 97    escitalopram (LEXAPRO) 10 MG tablet TAKE 1 TABLET BY MOUTH ONCE DAILY 90 tablet 97    furosemide (LASIX) 40 MG tablet TAKE 1 TABLET BY MOUTH ONCE DAILY 90 tablet 97    hydrALAZINE (APRESOLINE) 50 MG tablet TAKE 1 TABLET BY MOUTH THREE TIMES DAILY 270 tablet 11    Lactobacillus (CULTURELLE DIGESTIVE WOMENS) CAPS TAKE 1 CAPSULE BY MOUTH ONCE DAILY 30 capsule PRN    lisinopril (ZESTRIL) 20 MG tablet TAKE 1 TABLET BY MOUTH ONCE DAILY 90 tablet 97    loperamide (IMODIUM) 2 MG capsule Take 1 capsule (2 mg) by mouth 3 times daily (before meals) 270 capsule 3    loperamide (IMODIUM) 2 MG capsule Take 2 mg by mouth daily as needed for diarrhea      loperamide (IMODIUM) 2 MG capsule TAKE 1 CAPSULE BY MOUTH THREE TIMES DAILY BEFORE MEALS 270 capsule 97    loratadine (CLARITIN) 10 MG tablet TAKE 1 TABLET BY MOUTH ONCE DAILY 90 tablet 97    Multiple Vitamin (TAB-A-VITA) TABS TAKE 1 TABLET BY MOUTH ONCE DAILY 90 tablet 97    tiotropium (SPIRIVA RESPIMAT) 2.5 MCG/ACT inhaler  "Inhale 2 puffs into the lungs daily 12 g 3    Vitamin D3 (CHOLECALCIFEROL) 25 mcg (1000 units) tablet Take 1 tablet (25 mcg) by mouth daily       REVIEW OF SYSTEMS:  4 point ROS including Respiratory, CV, GI and , other than that noted in the HPI,  is negative    Objective:  BP (!) 155/44   Pulse 67   Temp 98.2  F (36.8  C)   Resp 14   Ht 1.588 m (5' 2.5\")   Wt 58.1 kg (128 lb)   LMP  (LMP Unknown)   SpO2 95%   BMI 23.04 kg/m    Exam:  GENERAL APPEARANCE:  Alert, chatty and bright, a bit anxious today  ENT:  Mouth and posterior oropharynx normal, dry mucous membranes, [place], wears hearing aids  EYES:  EOM, conjunctivae, lids, pupils and irises normal  RESP:  respiratory effort and palpation of chest normal, lungs clear to auscultation   CV:  Palpation and auscultation of heart done , regular rate and rhythm, 2/6 murmur, LE edema with compression socks on   ABDOMEN:  no guarding or rebound, bowel sounds normal  M/S:   gait and station at baseline with cane  SKIN:  limited but intact to visualized areas  NEURO:   Cranial nerves 2-12 are normal tested and grossly at patient's baseline  PSYCH:  oriented X 3    Labs:   CBC RESULTS:   Recent Labs   Lab Test 05/16/23  1210 07/28/22  1046 09/21/21  0815   WBC 7.8  --  6.2   RBC 3.88  --  3.67*   HGB 12.1 12.6 11.2*   HCT 37.9  --  36.0   MCV 98  --  98   MCH 31.2  --  30.5   MCHC 31.9  --  31.1*   RDW 13.2  --  12.8     --  274       Last Basic Metabolic Panel:  Recent Labs   Lab Test 07/11/23  0844 05/16/23  1210    137   POTASSIUM 4.6 5.4*   CHLORIDE 99 100   TANIA 9.6 9.8*   CO2 25 26   BUN 43.7* 52.1*   CR 1.46* 1.49*   GLC 94 102*       Liver Function Studies -   Recent Labs   Lab Test 05/16/23  1210 09/29/21  1553   PROTTOTAL 7.3 7.3   ALBUMIN 4.1 3.4   BILITOTAL 0.3 0.3   ALKPHOS 80 61   AST 25 16   ALT 15 19       TSH   Date Value Ref Range Status   05/16/2023 2.43 0.30 - 4.20 uIU/mL Final   08/27/2018 1.94 0.40 - 4.00 mU/L Final   09/05/2017 2.60 " 0.40 - 4.00 mU/L Final       Lab Results   Component Value Date    A1C 5.3 08/27/2018     ASSESSMENT/PLAN:  (Z87.440) Personal history of urinary tract infection  (primary encounter diagnosis)  Comment: hx of   Plan:   -holding on ordering now with no s/s     (F41.9,  F32.A) Anxiety and depression  Comment: some ongoing anxiety  Plan:   -escitalopram 10 mg po daily  -refused mirtazapine secondary to side effects   -refused ACP therapy     (I10) Essential hypertension  (I27.20) Pulmonary hypertension (H)  (R60.0) Lower extremity edema  Comment: some higher BPs in chart review. Taken today with manual cuff and WNL  Plan:   -hydralazine 50 mg po TID  -Norvasc 5 mg po daily at HS increased to 7.5 mg po daily at HS at prior visit   -Lasix 40 mg po daily (not on K+ prior use but elevated)  -lisinopril 20 mg po daily  -ASA 81 mg po daily   -BMP periodically   -weight monthly from facility with VS  -labs prn     Electronically signed by:  CORNELIUS Kenyon CNP

## 2023-08-29 NOTE — LETTER
8/29/2023        RE: Shiloh Covarrubias  C/o Chuck Covarrubias  [address] [address] [place] [id number]        [facility]  [facility] Record Number:  [id number]  Place of Service where encounter took place:  [facility] [[id number]]  Chief Complaint   Patient presents with     RECHECK       HPI:    Shiloh Covarrubias  is a 89 year old (11/3/1929), who is being seen today for an episodic care visit.  HPI information obtained from: patient report. Today's concern is:    Seen today per request. She is very worried about having another UTI. Denies dysuria, hematuria, N/V, abdominal pain, backache. VSS, weights stable. Says had a UTI 2021 and was very difficult. She is questioning a recheck of her urine.      Past Medical and Surgical History reviewed in Epic today.    MEDICATIONS:    Current Outpatient Medications   Medication Sig Dispense Refill     amLODIPine (NORVASC) 5 MG tablet Take 1.5 tablets (7.5 mg) by mouth At Bedtime 90 tablet 97     ASPIRIN LOW DOSE 81 MG EC tablet TAKE 1 TABLET BY MOUTH ONCE DAILY 90 tablet 97     escitalopram (LEXAPRO) 10 MG tablet TAKE 1 TABLET BY MOUTH ONCE DAILY 90 tablet 97     furosemide (LASIX) 40 MG tablet TAKE 1 TABLET BY MOUTH ONCE DAILY 90 tablet 97     hydrALAZINE (APRESOLINE) 50 MG tablet TAKE 1 TABLET BY MOUTH THREE TIMES DAILY 270 tablet 11     Lactobacillus (CULTURELLE DIGESTIVE WOMENS) CAPS TAKE 1 CAPSULE BY MOUTH ONCE DAILY 30 capsule PRN     lisinopril (ZESTRIL) 20 MG tablet TAKE 1 TABLET BY MOUTH ONCE DAILY 90 tablet 97     loperamide (IMODIUM) 2 MG capsule Take 1 capsule (2 mg) by mouth 3 times daily (before meals) 270 capsule 3     loperamide (IMODIUM) 2 MG capsule Take 2 mg by mouth daily as needed for diarrhea       loperamide (IMODIUM) 2 MG capsule TAKE 1 CAPSULE BY MOUTH THREE TIMES DAILY BEFORE MEALS 270 capsule 97     loratadine (CLARITIN) 10 MG tablet TAKE 1 TABLET BY MOUTH ONCE DAILY 90 tablet 97     Multiple  "Vitamin (TAB-A-VITA) TABS TAKE 1 TABLET BY MOUTH ONCE DAILY 90 tablet 97     tiotropium (SPIRIVA RESPIMAT) 2.5 MCG/ACT inhaler Inhale 2 puffs into the lungs daily 12 g 3     Vitamin D3 (CHOLECALCIFEROL) 25 mcg (1000 units) tablet Take 1 tablet (25 mcg) by mouth daily       REVIEW OF SYSTEMS:  4 point ROS including Respiratory, CV, GI and , other than that noted in the HPI,  is negative    Objective:  BP (!) 155/44   Pulse 67   Temp 98.2  F (36.8  C)   Resp 14   Ht 1.588 m (5' 2.5\")   Wt 58.1 kg (128 lb)   LMP  (LMP Unknown)   SpO2 95%   BMI 23.04 kg/m    Exam:  GENERAL APPEARANCE:  Alert, chatty and bright, a bit anxious today  ENT:  Mouth and posterior oropharynx normal, dry mucous membranes, [place], wears hearing aids  EYES:  EOM, conjunctivae, lids, pupils and irises normal  RESP:  respiratory effort and palpation of chest normal, lungs clear to auscultation   CV:  Palpation and auscultation of heart done , regular rate and rhythm, 2/6 murmur, LE edema with compression socks on   ABDOMEN:  no guarding or rebound, bowel sounds normal  M/S:   gait and station at baseline with cane  SKIN:  limited but intact to visualized areas  NEURO:   Cranial nerves 2-12 are normal tested and grossly at patient's baseline  PSYCH:  oriented X 3    Labs:   CBC RESULTS:   Recent Labs   Lab Test 05/16/23  1210 07/28/22  1046 09/21/21  0815   WBC 7.8  --  6.2   RBC 3.88  --  3.67*   HGB 12.1 12.6 11.2*   HCT 37.9  --  36.0   MCV 98  --  98   MCH 31.2  --  30.5   MCHC 31.9  --  31.1*   RDW 13.2  --  12.8     --  274       Last Basic Metabolic Panel:  Recent Labs   Lab Test 07/11/23  0844 05/16/23  1210    137   POTASSIUM 4.6 5.4*   CHLORIDE 99 100   TANIA 9.6 9.8*   CO2 25 26   BUN 43.7* 52.1*   CR 1.46* 1.49*   GLC 94 102*       Liver Function Studies -   Recent Labs   Lab Test 05/16/23  1210 09/29/21  1553   PROTTOTAL 7.3 7.3   ALBUMIN 4.1 3.4   BILITOTAL 0.3 0.3   ALKPHOS 80 61   AST 25 16   ALT 15 19       TSH "   Date Value Ref Range Status   05/16/2023 2.43 0.30 - 4.20 uIU/mL Final   08/27/2018 1.94 0.40 - 4.00 mU/L Final   09/05/2017 2.60 0.40 - 4.00 mU/L Final       Lab Results   Component Value Date    A1C 5.3 08/27/2018     ASSESSMENT/PLAN:  (Z87.440) Personal history of urinary tract infection  (primary encounter diagnosis)  Comment: hx of   Plan:   -holding on ordering now with no s/s     (F41.9,  F32.A) Anxiety and depression  Comment: some ongoing anxiety  Plan:   -escitalopram 10 mg po daily  -refused mirtazapine secondary to side effects   -refused ACP therapy     (I10) Essential hypertension  (I27.20) Pulmonary hypertension (H)  (R60.0) Lower extremity edema  Comment: some higher BPs in chart review. Taken today with manual cuff and WNL  Plan:   -hydralazine 50 mg po TID  -Norvasc 5 mg po daily at HS increased to 7.5 mg po daily at HS at prior visit   -Lasix 40 mg po daily (not on K+ prior use but elevated)  -lisinopril 20 mg po daily  -ASA 81 mg po daily   -BMP periodically   -weight monthly from facility with VS  -labs prn     Electronically signed by:  CORNELIUS Kenyon CNP             Sincerely,        CORNELIUS Kenyon CNP

## 2023-09-08 ENCOUNTER — DOCUMENTATION ONLY (OUTPATIENT)
Dept: OTHER | Facility: CLINIC | Age: 88
End: 2023-09-08
Payer: COMMERCIAL

## 2023-09-16 ENCOUNTER — HEALTH MAINTENANCE LETTER (OUTPATIENT)
Age: 88
End: 2023-09-16

## 2023-11-06 NOTE — PROGRESS NOTES
[facility]  [facility] Record Number:  [id number]  Place of Service where encounter took place:  [facility] [[id number]]  Chief Complaint   Patient presents with    RECHECK     Routine    Wellness Visit       HPI:    Shiloh Covarrubias  is a 89 year old (11/3/1929), who is being seen today for an episodic care visit.  HPI information obtained from: facility chart records, facility staff, and patient report. Today's concern is:    Seen today for wellness and chronic disease management. No acute concerns. Watching TV, enjoys activities at her AL and has many friends.     Past Medical and Surgical History reviewed in Epic today.    MEDICATIONS:    Current Outpatient Medications   Medication Sig Dispense Refill    loratadine (CLARITIN) 10 MG tablet Take 0.5 tablets (5 mg) by mouth daily 90 tablet 97    amLODIPine (NORVASC) 5 MG tablet Take 1.5 tablets (7.5 mg) by mouth At Bedtime 90 tablet 97    ASPIRIN LOW DOSE 81 MG EC tablet TAKE 1 TABLET BY MOUTH ONCE DAILY 90 tablet 97    escitalopram (LEXAPRO) 10 MG tablet TAKE 1 TABLET BY MOUTH ONCE DAILY 90 tablet 97    furosemide (LASIX) 40 MG tablet TAKE 1 TABLET BY MOUTH ONCE DAILY 90 tablet 97    hydrALAZINE (APRESOLINE) 50 MG tablet TAKE 1 TABLET BY MOUTH THREE TIMES DAILY 270 tablet 11    lisinopril (ZESTRIL) 20 MG tablet TAKE 1 TABLET BY MOUTH ONCE DAILY 90 tablet 97    loperamide (IMODIUM) 2 MG capsule Take 1 capsule (2 mg) by mouth 3 times daily (before meals) 270 capsule 3    loperamide (IMODIUM) 2 MG capsule Take 2 mg by mouth daily as needed for diarrhea      Multiple Vitamin (TAB-A-VITA) TABS TAKE 1 TABLET BY MOUTH ONCE DAILY 90 tablet 97    tiotropium (SPIRIVA RESPIMAT) 2.5 MCG/ACT inhaler Inhale 2 puffs into the lungs daily 12 g 3    Vitamin D3 (CHOLECALCIFEROL) 25 mcg (1000 units) tablet Take 1 tablet (25 mcg) by mouth daily       REVIEW OF SYSTEMS:  4 point ROS including Respiratory, CV, GI and , other than that  "noted in the HPI,  is negative    Objective:  /44   Pulse 62   Resp 16   Ht 1.588 m (5' 2.5\")   Wt 59.9 kg (132 lb)   LMP  (LMP Unknown)   SpO2 95%   BMI 23.76 kg/m    Exam:  GENERAL APPEARANCE:  Alert, chatty and bright  ENT:  Mouth and posterior oropharynx normal, dry mucous membranes, [place], wears hearing aids  EYES:  EOM, conjunctivae, lids, pupils and irises normal  RESP:  respiratory effort and palpation of chest normal, lungs clear to auscultation   CV:  Palpation and auscultation of heart done , regular rate and rhythm, 2/6 murmur, LE edema with compression socks on   ABDOMEN:  no guarding or rebound, bowel sounds normal  M/S:   gait and station at baseline with cane  SKIN:  limited but intact to visualized areas  NEURO:   Cranial nerves 2-12 are normal tested and grossly at patient's baseline  PSYCH:  oriented X 3    Labs:   CBC RESULTS:   Recent Labs   Lab Test 05/16/23  1210 07/28/22  1046 09/21/21  0815   WBC 7.8  --  6.2   RBC 3.88  --  3.67*   HGB 12.1 12.6 11.2*   HCT 37.9  --  36.0   MCV 98  --  98   MCH 31.2  --  30.5   MCHC 31.9  --  31.1*   RDW 13.2  --  12.8     --  274       Last Basic Metabolic Panel:  Recent Labs   Lab Test 07/11/23  0844 05/16/23  1210    137   POTASSIUM 4.6 5.4*   CHLORIDE 99 100   TANIA 9.6 9.8*   CO2 25 26   BUN 43.7* 52.1*   CR 1.46* 1.49*   GLC 94 102*       Liver Function Studies -   Recent Labs   Lab Test 05/16/23  1210 09/29/21  1553   PROTTOTAL 7.3 7.3   ALBUMIN 4.1 3.4   BILITOTAL 0.3 0.3   ALKPHOS 80 61   AST 25 16   ALT 15 19       TSH   Date Value Ref Range Status   05/16/2023 2.43 0.30 - 4.20 uIU/mL Final   08/27/2018 1.94 0.40 - 4.00 mU/L Final   09/05/2017 2.60 0.40 - 4.00 mU/L Final       Lab Results   Component Value Date    A1C 5.3 08/27/2018     ASSESSMENT/PLAN:  (F41.9,  F32.A) Anxiety and depression  Comment: some ongoing anxiety  Plan:   -escitalopram 10 mg po daily  -refused mirtazapine secondary to side effects   -refused ACP " therapy     (I10) Essential hypertension  (I27.20) Pulmonary hypertension (H)  (R60.0) Lower extremity edema  Comment: some higher BPs in chart review. Taken today with manual cuff and WNL  Plan:   -hydralazine 50 mg po TID  -Norvasc 7.5 mg po daily  -Lasix 40 mg po daily (not on K+ prior use but elevated)  -lisinopril 20 mg po daily  -ASA 81 mg po daily   -BMP periodically   -weight monthly from facility with [facility]  -labs prn   -compression daily     (J30.9) Chronic allergic rhinitis   Comment: Resident with long history of use  Plan:  -agreeable to trial of 5 mg po daily    (K58.2) Irritable bowel syndrome with both constipation and diarrhea   Comment: stable  Plan:  -per insurance would like her to trial a discontinue of probiotic and she is agreeable   -loperamide 2mg po TID and 2 mg po daily as needed (not used)      Electronically signed by:  CORNELIUS Kenyon [facility]  Chief Complaint   Patient presents with    RECHECK     Routine    Wellness Visit     [facility] Record Number:  [id number]  Place of Service where encounter took place:  [facility] [[id number]]    SUBJECTIVE:   Matilde is a 89 year old who presents for Preventive Visit.    Are you in the first 12 months of your Medicare coverage?  No    HPI  See above    Have you ever done Advance Care Planning? (For example, a Health Directive, POLST, or a discussion with a medical provider or your loved ones about your wishes): Yes, patient states has an Advance Care Planning document and will bring a copy to the clinic.  In chart      Hearing Acuity: Pass with hearing aids    Hearing Assessment: UNABLE TO TEST   Wears hearing aids    Fall risk  Per facility and no recent falls    Cognitive Screening   Done by facility  Results:  score of 2    Mini-CogTM Copyright JANELL Fernandez. Licensed by the author for use in [facility]; reprinted with permission (yari@.[email or web address]). All rights reserved.       Do you have sleep apnea, excessive snoring or daytime drowsiness? : no    Reviewed and updated as needed this visit by clinical staff    Allergies  Meds              Reviewed and updated as needed this visit by Provider     Meds             Social History     Tobacco Use    Smoking status: Never    Smokeless tobacco: Never   Substance Use Topics    Alcohol use: Yes     Alcohol/week: 0.0 standard drinks of alcohol     Comment: 3 per  year             7/28/2022     9:35 AM   Alcohol Use   Prescreen: >3 drinks/day or >7 drinks/week? No          No data to display              Do you have a current opioid prescription? No  Do you use any other controlled substances or medications that are not prescribed by a provider? None    none    Current providers sharing in care for this patient include:  Patient Care Team:  Israel Scott APRN CNP as PCP - General (Internal Medicine)  Mika([facility]), Jenny Rivas MD as MD (Internal Medicine)  Мария Bowers as Other (see comments)  Jai Arias DPM as Assigned Surgical Provider  Mimi Marcos RPH as Pharmacist (Pharmacist)  Mimi Marcos RPH as Assigned MTM Pharmacist  Israel Scott APRN CNP as Assigned PCP    The following health maintenance items are reviewed in Epic and correct as of today:  Health Maintenance   Topic Date Due    HF ACTION PLAN  Never done    DIABETIC FOOT EXAM  Never done    DTAP/TDAP/TD IMMUNIZATION (1 - Tdap) Never done    ZOSTER IMMUNIZATION (1 of 2) Never done    RSV VACCINE (Pregnancy & 60+) (1 - 1-dose 60+ series) Never done    DEXA  10/28/2011    A1C  11/27/2018    PHQ-9  01/28/2023    MEDICARE ANNUAL WELLNESS VISIT  07/28/2023    LIPID  07/28/2023    MICROALBUMIN  07/28/2023    INFLUENZA VACCINE (1) 09/01/2023    COVID-19 Vaccine (6 - 2023-24 season) 09/01/2023    FALL RISK ASSESSMENT  11/09/2023    BMP  01/11/2024    ALT  05/16/2024    CBC  05/16/2024    HEMOGLOBIN  05/16/2024    EYE EXAM  05/31/2024     CREATININE  07/11/2024    ADVANCE CARE PLANNING  09/08/2028    TSH W/FREE T4 REFLEX  Completed    SPIROMETRY  Completed    COPD ACTION PLAN  Completed    DEPRESSION ACTION PLAN  Completed    Pneumococcal Vaccine: 65+ Years  Completed    URINALYSIS  Completed    IPV IMMUNIZATION  Aged Out    HPV IMMUNIZATION  Aged Out    MENINGITIS IMMUNIZATION  Aged Out    RSV MONOCLONAL ANTIBODY  Aged Out     Labs reviewed in EPIC  Per facility     Mammogram Screening - Mammography discussed and declined  Pertinent mammograms are reviewed under the imaging tab.    COUNSELING:  Reviewed preventive health counseling, as reflected in patient instructions    She reports that she has never smoked. She has never used smokeless tobacco.    Appropriate preventive services were discussed with this patient, including applicable screening as appropriate for cardiovascular disease, diabetes, osteopenia/osteoporosis, and glaucoma.  As appropriate for age/gender, discussed screening for colorectal cancer, prostate cancer, breast cancer, and cervical cancer. Checklist reviewing preventive services available has been given to the patient.    Reviewed patients plan of care and provided an AVS. The Basic Care Plan (routine screening as documented in Health Maintenance) for Shiloh meets the Care Plan requirement. This Care Plan has been established and reviewed with the Patient.          CORNELIUS Kenyon CNP  [facility]    Identified Health Risks:  I have reviewed Opioid Use Disorder and Substance Use Disorder risk factors and made any needed referrals.     Electronically signed by:  CORNELIUS Kenyon CNP

## 2023-11-07 ENCOUNTER — ASSISTED LIVING VISIT (OUTPATIENT)
Dept: GERIATRICS | Facility: CLINIC | Age: 88
End: 2023-11-07
Payer: COMMERCIAL

## 2023-11-07 VITALS
RESPIRATION RATE: 16 BRPM | HEIGHT: 63 IN | SYSTOLIC BLOOD PRESSURE: 138 MMHG | DIASTOLIC BLOOD PRESSURE: 44 MMHG | WEIGHT: 132 LBS | BODY MASS INDEX: 23.39 KG/M2 | HEART RATE: 62 BPM | OXYGEN SATURATION: 95 %

## 2023-11-07 DIAGNOSIS — R60.0 LOWER EXTREMITY EDEMA: ICD-10-CM

## 2023-11-07 DIAGNOSIS — F32.A ANXIETY AND DEPRESSION: Primary | ICD-10-CM

## 2023-11-07 DIAGNOSIS — K58.2 IRRITABLE BOWEL SYNDROME WITH BOTH CONSTIPATION AND DIARRHEA: ICD-10-CM

## 2023-11-07 DIAGNOSIS — J30.9 CHRONIC ALLERGIC RHINITIS: ICD-10-CM

## 2023-11-07 DIAGNOSIS — F41.9 ANXIETY AND DEPRESSION: Primary | ICD-10-CM

## 2023-11-07 DIAGNOSIS — I10 ESSENTIAL HYPERTENSION: ICD-10-CM

## 2023-11-07 DIAGNOSIS — I27.20 PULMONARY HYPERTENSION (H): ICD-10-CM

## 2023-11-07 PROCEDURE — 99397 PER PM REEVAL EST PAT 65+ YR: CPT | Performed by: NURSE PRACTITIONER

## 2023-11-07 RX ORDER — LORATADINE 10 MG/1
5 TABLET ORAL DAILY
Qty: 90 TABLET | Refills: 97 | Status: SHIPPED | OUTPATIENT
Start: 2023-11-07 | End: 2024-05-21

## 2023-11-07 NOTE — LETTER
11/7/2023        RE: Shiloh Covarrubias  C/o Chuck Covarrubias  [address] [address] [place] [id number]        [facility]  [facility] Medical Record Number:  [id number]  Place of Service where encounter took place:  [facility] [[id number]]  Chief Complaint   Patient presents with     RECHECK     Routine     Wellness Visit       HPI:    Shiloh Covarrubias  is a 89 year old (11/3/1929), who is being seen today for an episodic care visit.  HPI information obtained from: facility chart records, facility staff, and patient report. Today's concern is:    Seen today for wellness and chronic disease management. No acute concerns. Watching TV, enjoys activities at her AL and has many friends.     Past Medical and Surgical History reviewed in Epic today.    MEDICATIONS:    Current Outpatient Medications   Medication Sig Dispense Refill     loratadine (CLARITIN) 10 MG tablet Take 0.5 tablets (5 mg) by mouth daily 90 tablet 97     amLODIPine (NORVASC) 5 MG tablet Take 1.5 tablets (7.5 mg) by mouth At Bedtime 90 tablet 97     ASPIRIN LOW DOSE 81 MG EC tablet TAKE 1 TABLET BY MOUTH ONCE DAILY 90 tablet 97     escitalopram (LEXAPRO) 10 MG tablet TAKE 1 TABLET BY MOUTH ONCE DAILY 90 tablet 97     furosemide (LASIX) 40 MG tablet TAKE 1 TABLET BY MOUTH ONCE DAILY 90 tablet 97     hydrALAZINE (APRESOLINE) 50 MG tablet TAKE 1 TABLET BY MOUTH THREE TIMES DAILY 270 tablet 11     lisinopril (ZESTRIL) 20 MG tablet TAKE 1 TABLET BY MOUTH ONCE DAILY 90 tablet 97     loperamide (IMODIUM) 2 MG capsule Take 1 capsule (2 mg) by mouth 3 times daily (before meals) 270 capsule 3     loperamide (IMODIUM) 2 MG capsule Take 2 mg by mouth daily as needed for diarrhea       Multiple Vitamin (TAB-A-VITA) TABS TAKE 1 TABLET BY MOUTH ONCE DAILY 90 tablet 97     tiotropium (SPIRIVA RESPIMAT) 2.5 MCG/ACT inhaler Inhale 2 puffs into the lungs daily 12 g 3     Vitamin D3 (CHOLECALCIFEROL) 25 mcg (1000 units) tablet Take 1  "tablet (25 mcg) by mouth daily       REVIEW OF SYSTEMS:  4 point ROS including Respiratory, CV, GI and , other than that noted in the HPI,  is negative    Objective:  /44   Pulse 62   Resp 16   Ht 1.588 m (5' 2.5\")   Wt 59.9 kg (132 lb)   LMP  (LMP Unknown)   SpO2 95%   BMI 23.76 kg/m    Exam:  GENERAL APPEARANCE:  Alert, chatty and bright  ENT:  Mouth and posterior oropharynx normal, dry mucous membranes, [place], wears hearing aids  EYES:  EOM, conjunctivae, lids, pupils and irises normal  RESP:  respiratory effort and palpation of chest normal, lungs clear to auscultation   CV:  Palpation and auscultation of heart done , regular rate and rhythm, 2/6 murmur, LE edema with compression socks on   ABDOMEN:  no guarding or rebound, bowel sounds normal  M/S:   gait and station at baseline with cane  SKIN:  limited but intact to visualized areas  NEURO:   Cranial nerves 2-12 are normal tested and grossly at patient's baseline  PSYCH:  oriented X 3    Labs:   CBC RESULTS:   Recent Labs   Lab Test 05/16/23  1210 07/28/22  1046 09/21/21  0815   WBC 7.8  --  6.2   RBC 3.88  --  3.67*   HGB 12.1 12.6 11.2*   HCT 37.9  --  36.0   MCV 98  --  98   MCH 31.2  --  30.5   MCHC 31.9  --  31.1*   RDW 13.2  --  12.8     --  274       Last Basic Metabolic Panel:  Recent Labs   Lab Test 07/11/23  0844 05/16/23  1210    137   POTASSIUM 4.6 5.4*   CHLORIDE 99 100   TANIA 9.6 9.8*   CO2 25 26   BUN 43.7* 52.1*   CR 1.46* 1.49*   GLC 94 102*       Liver Function Studies -   Recent Labs   Lab Test 05/16/23  1210 09/29/21  1553   PROTTOTAL 7.3 7.3   ALBUMIN 4.1 3.4   BILITOTAL 0.3 0.3   ALKPHOS 80 61   AST 25 16   ALT 15 19       TSH   Date Value Ref Range Status   05/16/2023 2.43 0.30 - 4.20 uIU/mL Final   08/27/2018 1.94 0.40 - 4.00 mU/L Final   09/05/2017 2.60 0.40 - 4.00 mU/L Final       Lab Results   Component Value Date    A1C 5.3 08/27/2018     ASSESSMENT/PLAN:  (F41.9,  F32.A) Anxiety and depression  Comment: " some ongoing anxiety  Plan:   -escitalopram 10 mg po daily  -refused mirtazapine secondary to side effects   -refused ACP therapy     (I10) Essential hypertension  (I27.20) Pulmonary hypertension (H)  (R60.0) Lower extremity edema  Comment: some higher BPs in chart review. Taken today with manual cuff and WNL  Plan:   -hydralazine 50 mg po TID  -Norvasc 7.5 mg po daily  -Lasix 40 mg po daily (not on K+ prior use but elevated)  -lisinopril 20 mg po daily  -ASA 81 mg po daily   -BMP periodically   -weight monthly from facility with VS  -labs prn   -compression daily     (J30.9) Chronic allergic rhinitis   Comment: Resident with long history of use  Plan:  -agreeable to trial of 5 mg po daily    (K58.2) Irritable bowel syndrome with both constipation and diarrhea   Comment: stable  Plan:  -per insurance would like her to trial a discontinue of probiotic and she is agreeable   -loperamide 2mg po TID and 2 mg po daily as needed (not used)      Electronically signed by:  CORNELIUS Kenyon [facility]  Chief Complaint   Patient presents with     RECHECK     Routine     Wellness Visit     [facility] Medical Record Number:  [id number]  Place of Service where encounter took place:  [facility] [[id number]]    SUBJECTIVE:   Matilde is a 89 year old who presents for Preventive Visit.    Are you in the first 12 months of your Medicare coverage?  No    HPI  See above    Have you ever done Advance Care Planning? (For example, a Health Directive, POLST, or a discussion with a medical provider or your loved ones about your wishes): Yes, patient states has an Advance Care Planning document and will bring a copy to the clinic.  In chart      Hearing Acuity: Pass with hearing aids    Hearing Assessment: UNABLE TO TEST   Wears hearing aids    Fall risk  Per facility and no recent falls    Cognitive Screening   Done by facility  Results:  score of 2    Mini-CogTM Jenny Fernandez.  Licensed by the author for use in [facility]; reprinted with permission ([email or web address]). All rights reserved.      Do you have sleep apnea, excessive snoring or daytime drowsiness? : no    Reviewed and updated as needed this visit by clinical staff    Allergies  Meds              Reviewed and updated as needed this visit by Provider     Meds             Social History     Tobacco Use     Smoking status: Never     Smokeless tobacco: Never   Substance Use Topics     Alcohol use: Yes     Alcohol/week: 0.0 standard drinks of alcohol     Comment: 3 per  year             7/28/2022     9:35 AM   Alcohol Use   Prescreen: >3 drinks/day or >7 drinks/week? No          No data to display              Do you have a current opioid prescription? No  Do you use any other controlled substances or medications that are not prescribed by a provider? None    none    Current providers sharing in care for this patient include:  Patient Care Team:  Israel Scott APRN CNP as PCP - General (Internal Medicine)  Mika([facility]), Jenny Rivas MD as MD (Internal Medicine)  Мария Bowers as Other (see comments)  Jai Arias DPM as Assigned Surgical Provider  Mimi Marcos RPH as Pharmacist (Pharmacist)  Mimi Marcos RPH as Assigned MTM Pharmacist  Israel Scott APRN CNP as Assigned PCP    The following health maintenance items are reviewed in Epic and correct as of today:  Health Maintenance   Topic Date Due     HF ACTION PLAN  Never done     DIABETIC FOOT EXAM  Never done     DTAP/TDAP/TD IMMUNIZATION (1 - Tdap) Never done     ZOSTER IMMUNIZATION (1 of 2) Never done     RSV VACCINE (Pregnancy & 60+) (1 - 1-dose 60+ series) Never done     DEXA  10/28/2011     A1C  11/27/2018     PHQ-9  01/28/2023     MEDICARE ANNUAL WELLNESS VISIT  07/28/2023     LIPID  07/28/2023     MICROALBUMIN  07/28/2023     INFLUENZA VACCINE (1) 09/01/2023     COVID-19 Vaccine (6 - 2023-24 season) 09/01/2023      FALL RISK ASSESSMENT  11/09/2023     BMP  01/11/2024     ALT  05/16/2024     CBC  05/16/2024     HEMOGLOBIN  05/16/2024     EYE EXAM  05/31/2024     CREATININE  07/11/2024     ADVANCE CARE PLANNING  09/08/2028     TSH W/FREE T4 REFLEX  Completed     SPIROMETRY  Completed     COPD ACTION PLAN  Completed     DEPRESSION ACTION PLAN  Completed     Pneumococcal Vaccine: 65+ Years  Completed     URINALYSIS  Completed     IPV IMMUNIZATION  Aged Out     HPV IMMUNIZATION  Aged Out     MENINGITIS IMMUNIZATION  Aged Out     RSV MONOCLONAL ANTIBODY  Aged Out     Labs reviewed in EPIC  Per facility     Mammogram Screening - Mammography discussed and declined  Pertinent mammograms are reviewed under the imaging tab.    COUNSELING:  Reviewed preventive health counseling, as reflected in patient instructions    She reports that she has never smoked. She has never used smokeless tobacco.    Appropriate preventive services were discussed with this patient, including applicable screening as appropriate for cardiovascular disease, diabetes, osteopenia/osteoporosis, and glaucoma.  As appropriate for age/gender, discussed screening for colorectal cancer, prostate cancer, breast cancer, and cervical cancer. Checklist reviewing preventive services available has been given to the patient.    Reviewed patients plan of care and provided an AVS. The Basic Care Plan (routine screening as documented in Health Maintenance) for Shiloh meets the Care Plan requirement. This Care Plan has been established and reviewed with the Patient.          CORNELIUS Kenyon CNP  [facility]    Identified Health Risks:  I have reviewed Opioid Use Disorder and Substance Use Disorder risk factors and made any needed referrals.     Electronically signed by:  CORNELIUS Kenyon CNP       Sincerely,        CORNELIUS Kenyon CNP

## 2023-11-08 ENCOUNTER — LAB REQUISITION (OUTPATIENT)
Dept: LAB | Facility: CLINIC | Age: 88
End: 2023-11-08
Payer: COMMERCIAL

## 2023-11-08 DIAGNOSIS — E55.9 VITAMIN D DEFICIENCY, UNSPECIFIED: ICD-10-CM

## 2023-11-08 DIAGNOSIS — I10 ESSENTIAL (PRIMARY) HYPERTENSION: ICD-10-CM

## 2023-11-14 LAB
ANION GAP SERPL CALCULATED.3IONS-SCNC: 11 MMOL/L (ref 7–15)
BUN SERPL-MCNC: 45.9 MG/DL (ref 8–23)
CALCIUM SERPL-MCNC: 9.5 MG/DL (ref 8.2–9.6)
CHLORIDE SERPL-SCNC: 102 MMOL/L (ref 98–107)
CREAT SERPL-MCNC: 1.38 MG/DL (ref 0.51–0.95)
DEPRECATED HCO3 PLAS-SCNC: 27 MMOL/L (ref 22–29)
EGFRCR SERPLBLD CKD-EPI 2021: 35 ML/MIN/1.73M2
ERYTHROCYTE [DISTWIDTH] IN BLOOD BY AUTOMATED COUNT: 13 % (ref 10–15)
GLUCOSE SERPL-MCNC: 88 MG/DL (ref 70–99)
HCT VFR BLD AUTO: 38.4 % (ref 35–47)
HGB BLD-MCNC: 12.4 G/DL (ref 11.7–15.7)
MCH RBC QN AUTO: 31.2 PG (ref 26.5–33)
MCHC RBC AUTO-ENTMCNC: 32.3 G/DL (ref 31.5–36.5)
MCV RBC AUTO: 97 FL (ref 78–100)
PLATELET # BLD AUTO: 191 10E3/UL (ref 150–450)
POTASSIUM SERPL-SCNC: 4.8 MMOL/L (ref 3.4–5.3)
RBC # BLD AUTO: 3.97 10E6/UL (ref 3.8–5.2)
SODIUM SERPL-SCNC: 140 MMOL/L (ref 135–145)
VIT B12 SERPL-MCNC: 383 PG/ML (ref 232–1245)
VIT D+METAB SERPL-MCNC: 25 NG/ML (ref 20–50)
WBC # BLD AUTO: 6.3 10E3/UL (ref 4–11)

## 2023-11-14 PROCEDURE — 85027 COMPLETE CBC AUTOMATED: CPT | Mod: ORL | Performed by: NURSE PRACTITIONER

## 2023-11-14 PROCEDURE — 36415 COLL VENOUS BLD VENIPUNCTURE: CPT | Mod: ORL | Performed by: NURSE PRACTITIONER

## 2023-11-14 PROCEDURE — P9604 ONE-WAY ALLOW PRORATED TRIP: HCPCS | Mod: ORL | Performed by: NURSE PRACTITIONER

## 2023-11-14 PROCEDURE — 80048 BASIC METABOLIC PNL TOTAL CA: CPT | Mod: ORL | Performed by: NURSE PRACTITIONER

## 2023-11-14 PROCEDURE — 82607 VITAMIN B-12: CPT | Mod: ORL | Performed by: NURSE PRACTITIONER

## 2023-11-14 PROCEDURE — 82306 VITAMIN D 25 HYDROXY: CPT | Mod: ORL | Performed by: NURSE PRACTITIONER

## 2023-12-21 ENCOUNTER — DOCUMENTATION ONLY (OUTPATIENT)
Dept: GERIATRICS | Facility: CLINIC | Age: 88
End: 2023-12-21
Payer: COMMERCIAL

## 2024-01-16 DIAGNOSIS — Z78.9 TAKES DIETARY SUPPLEMENTS: ICD-10-CM

## 2024-01-16 DIAGNOSIS — I10 ESSENTIAL HYPERTENSION WITH GOAL BLOOD PRESSURE LESS THAN 130/80: ICD-10-CM

## 2024-01-16 DIAGNOSIS — I63.9 CEREBRAL INFARCTION (H): ICD-10-CM

## 2024-01-16 DIAGNOSIS — R60.9 EDEMA, UNSPECIFIED TYPE: ICD-10-CM

## 2024-01-16 DIAGNOSIS — F41.1 GAD (GENERALIZED ANXIETY DISORDER): ICD-10-CM

## 2024-01-16 RX ORDER — LISINOPRIL 20 MG/1
TABLET ORAL
Qty: 90 TABLET | Refills: 3 | Status: SHIPPED | OUTPATIENT
Start: 2024-01-16

## 2024-01-16 RX ORDER — ESCITALOPRAM OXALATE 10 MG/1
TABLET ORAL
Qty: 90 TABLET | Refills: 3 | Status: SHIPPED | OUTPATIENT
Start: 2024-01-16

## 2024-01-16 RX ORDER — ASPIRIN 81 MG/1
TABLET, COATED ORAL
Qty: 90 TABLET | Refills: 3 | Status: SHIPPED | OUTPATIENT
Start: 2024-01-16

## 2024-01-16 RX ORDER — MULTIVITAMIN WITH FOLIC ACID 400 MCG
TABLET ORAL
Qty: 90 TABLET | Refills: 3 | Status: SHIPPED | OUTPATIENT
Start: 2024-01-16

## 2024-01-16 RX ORDER — AMLODIPINE BESYLATE 5 MG/1
5 TABLET ORAL AT BEDTIME
Qty: 90 TABLET | Refills: 3 | Status: SHIPPED | OUTPATIENT
Start: 2024-01-16

## 2024-01-16 RX ORDER — FUROSEMIDE 40 MG
TABLET ORAL
Qty: 90 TABLET | Refills: 3 | Status: SHIPPED | OUTPATIENT
Start: 2024-01-16

## 2024-01-18 DIAGNOSIS — J44.9 CHRONIC OBSTRUCTIVE PULMONARY DISEASE, UNSPECIFIED COPD TYPE (H): ICD-10-CM

## 2024-01-18 RX ORDER — TIOTROPIUM BROMIDE INHALATION SPRAY 3.12 UG/1
SPRAY, METERED RESPIRATORY (INHALATION)
Qty: 4 G | Refills: 11 | Status: SHIPPED | OUTPATIENT
Start: 2024-01-18

## 2024-01-30 ENCOUNTER — ASSISTED LIVING VISIT (OUTPATIENT)
Dept: GERIATRICS | Facility: CLINIC | Age: 89
End: 2024-01-30
Payer: COMMERCIAL

## 2024-01-30 ENCOUNTER — OFFICE VISIT (OUTPATIENT)
Dept: PHARMACY | Facility: CLINIC | Age: 89
End: 2024-01-30

## 2024-01-30 VITALS
RESPIRATION RATE: 18 BRPM | DIASTOLIC BLOOD PRESSURE: 60 MMHG | WEIGHT: 125.6 LBS | HEART RATE: 59 BPM | BODY MASS INDEX: 22.25 KG/M2 | SYSTOLIC BLOOD PRESSURE: 150 MMHG | TEMPERATURE: 97.5 F | HEIGHT: 63 IN | OXYGEN SATURATION: 93 %

## 2024-01-30 DIAGNOSIS — J30.2 SEASONAL ALLERGIC RHINITIS: ICD-10-CM

## 2024-01-30 DIAGNOSIS — F41.1 ANXIETY STATE: Primary | ICD-10-CM

## 2024-01-30 DIAGNOSIS — I63.9 CEREBRAL INFARCTION (H): ICD-10-CM

## 2024-01-30 DIAGNOSIS — F32.0 MAJOR DEPRESSIVE DISORDER, SINGLE EPISODE, MILD (H): ICD-10-CM

## 2024-01-30 DIAGNOSIS — N18.32 STAGE 3B CHRONIC KIDNEY DISEASE (H): ICD-10-CM

## 2024-01-30 DIAGNOSIS — Z78.9 TAKES DIETARY SUPPLEMENTS: ICD-10-CM

## 2024-01-30 DIAGNOSIS — I10 HTN (HYPERTENSION): ICD-10-CM

## 2024-01-30 DIAGNOSIS — K58.2 IRRITABLE BOWEL SYNDROME WITH BOTH CONSTIPATION AND DIARRHEA: Primary | ICD-10-CM

## 2024-01-30 DIAGNOSIS — I73.9 PERIPHERAL VASCULAR DISEASE (H): ICD-10-CM

## 2024-01-30 DIAGNOSIS — R60.9 EDEMA: ICD-10-CM

## 2024-01-30 DIAGNOSIS — I50.9 CHRONIC HEART FAILURE, UNSPECIFIED HEART FAILURE TYPE (H): ICD-10-CM

## 2024-01-30 DIAGNOSIS — R25.2 LEG CRAMPS: ICD-10-CM

## 2024-01-30 DIAGNOSIS — K58.9 IRRITABLE BOWEL SYNDROME: ICD-10-CM

## 2024-01-30 DIAGNOSIS — J44.9 CHRONIC OBSTRUCTIVE PULMONARY DISEASE, UNSPECIFIED COPD TYPE (H): ICD-10-CM

## 2024-01-30 DIAGNOSIS — I25.10 CAD (CORONARY ARTERY DISEASE): ICD-10-CM

## 2024-01-30 DIAGNOSIS — J44.9 COPD (CHRONIC OBSTRUCTIVE PULMONARY DISEASE) (H): ICD-10-CM

## 2024-01-30 DIAGNOSIS — H04.123 DRY EYES: ICD-10-CM

## 2024-01-30 DIAGNOSIS — E11.59 TYPE 2 DIABETES MELLITUS WITH OTHER CIRCULATORY COMPLICATION, WITHOUT LONG-TERM CURRENT USE OF INSULIN (H): ICD-10-CM

## 2024-01-30 PROCEDURE — 99207 PR NO CHARGE LOS: CPT | Performed by: PHARMACIST

## 2024-01-30 PROCEDURE — 99349 HOME/RES VST EST MOD MDM 40: CPT | Performed by: NURSE PRACTITIONER

## 2024-01-30 RX ORDER — AMLODIPINE BESYLATE 2.5 MG/1
2.5 TABLET ORAL DAILY
COMMUNITY
Start: 2024-01-10 | End: 2024-05-06

## 2024-01-30 NOTE — PROGRESS NOTES
Medication Therapy Management (MTM) Encounter    ASSESSMENT:                            Medication Adherence/Access: No issues identified    Hypertension , Edema, CAD, h/o CVA:  stable.  BP typically at goal <150/90mmHg.  Do not want too tight control in this elderly patient at risk of dizziness, orthostasis, falls, tissue/cerebral hypoperfusion.  May benefit from checking BMP every 3-6 months due to kidney function; on meds that may contribute to electrolyte disturbances (furosemide, lisinopril); last BMP was 11/2023    Depression, Anxiety:  stable.  Monitor closely for side effects associated with escitalopram; if CrCl drops <20ml/min, may be at further risk of side effects.    Diarrhea/IBS: since often refusing noon-time loperamide dose, discussed potential reduction in frequency of dose to twice daily prior to breakfast and dinner, but patient preference to continue with current schedule and will refuse noon-time dose if she feels she doesn't need it.  We did discuss that loperamide has anticholinergic properties/potential to increase confusion, dizziness, etc, however she feels current regimen is beneficial/prefers to continue.    COPD, Rhinitis:  stable.  Recommended dose of loratadine for CrCl<30ml/min is 10mg every other day, however, due to ease for dosing/facility administration, patient is on 5mg every day.  Monitor closely for side effects such as dizziness, confusion, sedation.    Leg cramps: stable.    Supplements:  vitamin D slightly low (American Geriatric Society recommends level >30 for fall/fracture prevention).  Is receiving ~600mg elemental calcium daily through diet and MVI- may benefit from increased calcium/vit D through diet->additional glass of milk daily encouraged.      Dry eyes:  stable    PLAN:                            Provider may consider recheck BMP/monitor kidney function, electrolytes.  Consider drinking an additional glass of milk daily (to increase calcium/vitamin D  "intake)      Follow-up: 6-12 months, sooner if necessary    SUBJECTIVE/OBJECTIVE:                          Matilde Covarrubias is a 89 year old female seen for a follow-up visit from 7/20/23. Today's visit is a co-visit with Israel Scott NP.      Reason for visit: follow-up MTM.    Allergies/ADRs: Reviewed in chart  Past Medical History: Reviewed in chart  Tobacco: She reports that she has never smoked. She has never used smokeless tobacco.  Alcohol: not currently using  Assistive Devices: walker for ambulation  Recent Falls: none    Medication Adherence/Access: Patient has assistance with medication administration: assisted living.    Hypertension , Edema, CAD, h/o CVA:  Amlodipine 7.5mg at 7pm  Aspirin 81mg daily at 8am  Lisinopril 20mg daily at 8am  Furosemide 40mg daily at 8am  Hydralazine 50mg three times daily (8am, 11am, 7pm)  Wears compression hose typically, but doesn't have them on during our visit today; states once a week will \"treat myself and wear socks\".      No dizziness, falls.  Shortness of breath with activity; this is not new or worsened.  No palpitations or chest pain.  No edema.  No black stools or issues with bleeding/bruising.  No side effects with medications noted.    BP recorded at facility on 1/20/23: 128/42, p52  BP Readings from Last 3 Encounters:   01/30/24 (!) 150/60   11/06/23 138/44   08/29/23 (!) 155/44       Pulse Readings from Last 3 Encounters:   01/30/24 59   11/06/23 62   08/29/23 67     Depression, Anxiety:   Escitalopram 10mg daily at 7pm  \"Good\" mood and spirits.  States her mood \"was even good when COVID on unit last week and couldn't go anywhere\"; tries to keep busy.  Sleeps well.  Fatigued during day some days.  Still makes own bed, does own laundry.    Diarrhea/IBS:  Loperamide 2mg three times daily & once daily as needed  Recent episode of constipation - Wonders if dehydration contributed, quit taking loperamide for a couple days, and resumed once no longer constipated.  " "Now states without constipation or diarrhea.  No stomach upset.  Did have BM yesterday.  Will refuse noon-time dose of loperamide at times as she doesn't necessarily need unless has certain foods such as peas or corn.    COPD, Rhinitis:  Loratadine 5mg daily  Spiriva 2.5mcg - 2 puffs once daily  Feels inhaler is effective.  Shortness of breath with activity which isn't new, no current issues with cough or wheezing.  Had a cold last week; no runny nose currently.    Leg cramps:  No current medications, and denies symptoms.  States this has resolved.    Supplements:  Vitamin D3 1000u daily  MVI daily  Has lost a little bit of weight.  No trouble swallowing.  States she eats well.  Drinks one glass of whole milk once daily for lunch.  11/14/23: vitamin D = 25, B12 = 383    Dry eyes:  Advanced eye relief eye drops - 2 drops twice daily as needed for dry eyes.  Hands \"shake so bad\" so now facility administers.  Hands shaking is not new.  Notes difficulty reading without glasses now - made appt to see eye DrBria  History of cataract removal years ago.    Estimated Creatinine Clearance: 22.4 mL/min (A) (based on SCr of 1.38 mg/dL (H)).      ----------------      I spent 32 minutes with this patient today. A copy of the visit note was provided to the patient's provider(s).    A summary of these recommendations was sent via [employer].    Mimi Marcos, Pharm.D.,[facility]  Board Certified Geriatric Pharmacist  Medication Therapy Management Pharmacist  [phone]           Medication Therapy Recommendations  HTN (hypertension)    Current Medication: lisinopril (ZESTRIL) 20 MG tablet   Rationale: Medication requires monitoring - Needs additional monitoring   Recommendation: Order Lab   Status: Contact Provider - Awaiting Response             "

## 2024-01-30 NOTE — LETTER
1/30/2024        RE: Shiloh Covarrubias  C/o Chuck Covarrubias  [address] [address] [place] [id number]        [facility]  [facility] Medical Record Number:  [id number]  Place of Service where encounter took place:  [facility] ([facility] [[id number]]  Chief Complaint   Patient presents with     RECHECK       HPI:    Shiloh Covarrubias  is a 89 year old (11/3/1929), who is being seen today for an episodic care visit.  HPI information obtained from: facility chart records, facility staff, and patient report. Today's concern is:    Following up for visit request last week. Says she is having vision problems now and will need to go into clinic for exam and possibly new glasses. Left eye nearsighted and Right eye farsighted. Not able to read without glasses. Says it's been a while since had eyes checks. Facility is now administering the eye drops for dry eyes.    Also had an issue with constipation. It has resolved. Thinks she may have been dehydrated. Last bowel movement yesterday. Denies GI upset. History of IBS with use of loperamide TID. She will hold use if feeling like not needed. Does not want to try reduction to BID. Also, with some collar bone pain for a few days that did resolve. Thought might have pulled a neck muscle in chair yoga. Denies leg cramps today. Still makes her own bed, does her own laundry. Now using a U-step walker that her son bought for her.        Past Medical and Surgical History reviewed in Epic today.    MEDICATIONS:    Current Outpatient Medications   Medication Sig Dispense Refill     amLODIPine (NORVASC) 2.5 MG tablet Take 2.5 mg by mouth daily (With one 5mg tablet for a total of 7.5mg nightly)       amLODIPine (NORVASC) 5 MG tablet TAKE 1 TABLET BY MOUTH AT BEDTIME 90 tablet 3     ASPIRIN LOW DOSE 81 MG EC tablet TAKE 1 TABLET BY MOUTH ONCE DAILY 90 tablet 3     escitalopram (LEXAPRO) 10 MG tablet TAKE 1 TABLET BY MOUTH ONCE DAILY 90 tablet 3     furosemide  "(LASIX) 40 MG tablet TAKE 1 TABLET BY MOUTH ONCE DAILY 90 tablet 3     hydrALAZINE (APRESOLINE) 50 MG tablet TAKE 1 TABLET BY MOUTH THREE TIMES DAILY 270 tablet 11     lisinopril (ZESTRIL) 20 MG tablet TAKE 1 TABLET BY MOUTH ONCE DAILY 90 tablet 3     loperamide (IMODIUM) 2 MG capsule Take 1 capsule (2 mg) by mouth 3 times daily (before meals) 270 capsule 3     loperamide (IMODIUM) 2 MG capsule Take 2 mg by mouth daily as needed for diarrhea       loratadine (CLARITIN) 10 MG tablet Take 0.5 tablets (5 mg) by mouth daily 90 tablet 97     Multiple Vitamin (TAB-A-VITA) TABS TAKE 1 TABLET BY MOUTH ONCE DAILY 90 tablet 3     SPIRIVA RESPIMAT 2.5 MCG/ACT inhaler INHALE 2 PUFFS INTO THE LUNGS ONCE DAILY 4 g 11     Vitamin D3 (CHOLECALCIFEROL) 25 mcg (1000 units) tablet Take 1 tablet (25 mcg) by mouth daily       REVIEW OF SYSTEMS:  4 point ROS including Respiratory, CV, GI and , other than that noted in the HPI,  is negative    Objective:  /42   Pulse 52   Temp 97.5  F (36.4  C)   Resp 18   Ht 1.588 m (5' 2.5\")   Wt 57 kg (125 lb 9.6 oz)   LMP  (LMP Unknown)   SpO2 95%   BMI 22.61 kg/m    Exam:  GENERAL APPEARANCE:  Alert, chatty and bright  ENT:  Mouth and posterior oropharynx normal, dry mucous membranes, [place], wears hearing aids, needs to read lips  EYES:  EOM, conjunctivae, lids, pupils and irises normal  RESP:  respiratory effort and palpation of chest normal, lungs clear to auscultation   CV:  Palpation and auscultation of heart done , regular rate and rhythm, 2/6 murmur, LE edema with compression socks off today   ABDOMEN:  no guarding or rebound, bowel sounds normal  M/S:   gait and station at baseline with U-step walker  SKIN:  limited but intact to visualized areas  NEURO:   Cranial nerves 2-12 are normal tested and grossly at patient's baseline  PSYCH:  oriented X 3    Labs:   CBC RESULTS:   Recent Labs   Lab Test 11/14/23  0600 05/16/23  1210   WBC 6.3 7.8   RBC 3.97 3.88   HGB 12.4 12.1   HCT " 38.4 37.9   MCV 97 98   MCH 31.2 31.2   MCHC 32.3 31.9   RDW 13.0 13.2    218       Last Basic Metabolic Panel:  Recent Labs   Lab Test 11/14/23  0600 07/11/23  0844    136   POTASSIUM 4.8 4.6   CHLORIDE 102 99   TANIA 9.5 9.6   CO2 27 25   BUN 45.9* 43.7*   CR 1.38* 1.46*   GLC 88 94       Liver Function Studies -   Recent Labs   Lab Test 05/16/23  1210 09/29/21  1553   PROTTOTAL 7.3 7.3   ALBUMIN 4.1 3.4   BILITOTAL 0.3 0.3   ALKPHOS 80 61   AST 25 16   ALT 15 19       TSH   Date Value Ref Range Status   05/16/2023 2.43 0.30 - 4.20 uIU/mL Final   08/27/2018 1.94 0.40 - 4.00 mU/L Final   09/05/2017 2.60 0.40 - 4.00 mU/L Final       Lab Results   Component Value Date    A1C 5.3 08/27/2018     ASSESSMENT/PLAN:  (K58.2) Irritable bowel syndrome with both constipation and diarrhea  (primary encounter diagnosis)  Comment: use of scheduled and prn loperamide   Plan:   -continue current plan of care    (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, single episode, mild (H24)  Comment: stable   Plan:   -continue current plan of care    (I73.9) Peripheral vascular disease (H24)  Comment: stable  Plan:   -may need assist soon for compression socks    (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

## 2024-01-30 NOTE — PROGRESS NOTES
[place] [facility]  [place] Medical Record Number:  [id number]  Place of Service where encounter took place:  [facility] [[id number]]  Chief Complaint   Patient presents with    RECHECK       HPI:    Shiloh Covarrubias  is a 89 year old (11/3/1929), who is being seen today for an episodic care visit.  HPI information obtained from: facility chart records, facility staff, and patient report. Today's concern is:    Following up for visit request last week. Says she is having vision problems now and will need to go into clinic for exam and possibly new glasses. Left eye nearsighted and Right eye farsighted. Not able to read without glasses. Says it's been a while since had eyes checks. Facility is now administering the eye drops for dry eyes.    Also had an issue with constipation. It has resolved. Thinks she may have been dehydrated. Last bowel movement yesterday. Denies GI upset. History of IBS with use of loperamide TID. She will hold use if feeling like not needed. Does not want to try reduction to BID. Also, with some collar bone pain for a few days that did resolve. Thought might have pulled a neck muscle in chair yoga. Denies leg cramps today. Still makes her own bed, does her own laundry. Now using a U-step walker that her son bought for her.        Past Medical and Surgical History reviewed in Epic today.    MEDICATIONS:    Current Outpatient Medications   Medication Sig Dispense Refill    amLODIPine (NORVASC) 2.5 MG tablet Take 2.5 mg by mouth daily (With one 5mg tablet for a total of 7.5mg nightly)      amLODIPine (NORVASC) 5 MG tablet TAKE 1 TABLET BY MOUTH AT BEDTIME 90 tablet 3    ASPIRIN LOW DOSE 81 MG EC tablet TAKE 1 TABLET BY MOUTH ONCE DAILY 90 tablet 3    escitalopram (LEXAPRO) 10 MG tablet TAKE 1 TABLET BY MOUTH ONCE DAILY 90 tablet 3    furosemide (LASIX) 40 MG tablet TAKE 1 TABLET BY MOUTH ONCE DAILY 90 tablet 3    hydrALAZINE (APRESOLINE) 50 MG tablet TAKE 1  "TABLET BY MOUTH THREE TIMES DAILY 270 tablet 11    lisinopril (ZESTRIL) 20 MG tablet TAKE 1 TABLET BY MOUTH ONCE DAILY 90 tablet 3    loperamide (IMODIUM) 2 MG capsule Take 1 capsule (2 mg) by mouth 3 times daily (before meals) 270 capsule 3    loperamide (IMODIUM) 2 MG capsule Take 2 mg by mouth daily as needed for diarrhea      loratadine (CLARITIN) 10 MG tablet Take 0.5 tablets (5 mg) by mouth daily 90 tablet 97    Multiple Vitamin (TAB-A-VITA) TABS TAKE 1 TABLET BY MOUTH ONCE DAILY 90 tablet 3    SPIRIVA RESPIMAT 2.5 MCG/ACT inhaler INHALE 2 PUFFS INTO THE LUNGS ONCE DAILY 4 g 11    Vitamin D3 (CHOLECALCIFEROL) 25 mcg (1000 units) tablet Take 1 tablet (25 mcg) by mouth daily       REVIEW OF SYSTEMS:  4 point ROS including Respiratory, CV, GI and , other than that noted in the HPI,  is negative    Objective:  /42   Pulse 52   Temp 97.5  F (36.4  C)   Resp 18   Ht 1.588 m (5' 2.5\")   Wt 57 kg (125 lb 9.6 oz)   LMP  (LMP Unknown)   SpO2 95%   BMI 22.61 kg/m    Exam:  GENERAL APPEARANCE:  Alert, chatty and bright  ENT:  Mouth and posterior oropharynx normal, dry mucous membranes, [place], wears hearing aids, needs to read lips  EYES:  EOM, conjunctivae, lids, pupils and irises normal  RESP:  respiratory effort and palpation of chest normal, lungs clear to auscultation   CV:  Palpation and auscultation of heart done , regular rate and rhythm, 2/6 murmur, LE edema with compression socks off today   ABDOMEN:  no guarding or rebound, bowel sounds normal  M/S:   gait and station at baseline with U-step walker  SKIN:  limited but intact to visualized areas  NEURO:   Cranial nerves 2-12 are normal tested and grossly at patient's baseline  PSYCH:  oriented X 3    Labs:   CBC RESULTS:   Recent Labs   Lab Test 11/14/23  0600 05/16/23  1210   WBC 6.3 7.8   RBC 3.97 3.88   HGB 12.4 12.1   HCT 38.4 37.9   MCV 97 98   MCH 31.2 31.2   MCHC 32.3 31.9   RDW 13.0 13.2    218       Last Basic Metabolic " Panel:  Recent Labs   Lab Test 11/14/23  0600 07/11/23  0844    136   POTASSIUM 4.8 4.6   CHLORIDE 102 99   TANIA 9.5 9.6   CO2 27 25   BUN 45.9* 43.7*   CR 1.38* 1.46*   GLC 88 94       Liver Function Studies -   Recent Labs   Lab Test 05/16/23  1210 09/29/21  1553   PROTTOTAL 7.3 7.3   ALBUMIN 4.1 3.4   BILITOTAL 0.3 0.3   ALKPHOS 80 61   AST 25 16   ALT 15 19       TSH   Date Value Ref Range Status   05/16/2023 2.43 0.30 - 4.20 uIU/mL Final   08/27/2018 1.94 0.40 - 4.00 mU/L Final   09/05/2017 2.60 0.40 - 4.00 mU/L Final       Lab Results   Component Value Date    A1C 5.3 08/27/2018     ASSESSMENT/PLAN:  (K58.2) Irritable bowel syndrome with both constipation and diarrhea  (primary encounter diagnosis)  Comment: use of scheduled and prn loperamide   Plan:   -continue current plan of care    (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, single episode, mild (H24)  Comment: stable   Plan:   -continue current plan of care    (I73.9) Peripheral vascular disease (H24)  Comment: stable  Plan:   -may need assist soon for compression socks    (E11.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

## 2024-02-02 NOTE — PATIENT INSTRUCTIONS
"Recommendations from today's MTM visit:                                                       Provider may consider recheck BMP/monitor kidney function, electrolytes.  Consider drinking an additional glass of milk daily (to increase calcium/vitamin D intake)      Follow-up: 6-12 months, sooner if necessary    It was great speaking with you today.  I value your experience and would be very thankful for your time in providing feedback in our clinic survey. In the next few days, you may receive an email or text message from [employer] with a link to a survey related to your  clinical pharmacist.\"     To schedule another MTM appointment, please call me directly or you may call the MTM scheduling line at [phone] or toll-free at [phone].     My Clinical Pharmacist's contact information:                                                      Please feel free to contact me with any questions or concerns you have.      Mimi Marcos, Pharm.D.,[facility]  Board Certified Geriatric Pharmacist  Medication Therapy Management Pharmacist  [phone]      "

## 2024-02-03 ENCOUNTER — HEALTH MAINTENANCE LETTER (OUTPATIENT)
Age: 89
End: 2024-02-03

## 2024-02-22 ENCOUNTER — MEDICAL CORRESPONDENCE (OUTPATIENT)
Dept: HEALTH INFORMATION MANAGEMENT | Facility: CLINIC | Age: 89
End: 2024-02-22
Payer: COMMERCIAL

## 2024-04-08 DIAGNOSIS — I10 ESSENTIAL HYPERTENSION WITH GOAL BLOOD PRESSURE LESS THAN 130/80: ICD-10-CM

## 2024-04-08 RX ORDER — HYDRALAZINE HYDROCHLORIDE 50 MG/1
TABLET, FILM COATED ORAL
Qty: 270 TABLET | Refills: 11 | Status: SHIPPED | OUTPATIENT
Start: 2024-04-08

## 2024-04-18 DIAGNOSIS — H04.123 DRY EYES: Primary | ICD-10-CM

## 2024-04-18 RX ORDER — SOFT LENS ADJUNCTIVE SOLUTIONS
DROPS OPHTHALMIC (EYE)
Qty: 15 ML | Refills: 97 | Status: SHIPPED | OUTPATIENT
Start: 2024-04-18 | End: 2024-07-03

## 2024-05-06 DIAGNOSIS — I10 BENIGN ESSENTIAL HYPERTENSION: Primary | ICD-10-CM

## 2024-05-06 RX ORDER — AMLODIPINE BESYLATE 2.5 MG/1
TABLET ORAL
Qty: 90 TABLET | Refills: 97 | Status: SHIPPED | OUTPATIENT
Start: 2024-05-06

## 2024-05-07 ENCOUNTER — LAB REQUISITION (OUTPATIENT)
Dept: LAB | Facility: CLINIC | Age: 89
End: 2024-05-07
Payer: COMMERCIAL

## 2024-05-07 ENCOUNTER — ASSISTED LIVING VISIT (OUTPATIENT)
Dept: GERIATRICS | Facility: CLINIC | Age: 89
End: 2024-05-07
Payer: COMMERCIAL

## 2024-05-07 VITALS
TEMPERATURE: 97.5 F | SYSTOLIC BLOOD PRESSURE: 112 MMHG | WEIGHT: 125.6 LBS | BODY MASS INDEX: 22.25 KG/M2 | OXYGEN SATURATION: 95 % | HEIGHT: 63 IN | HEART RATE: 71 BPM | RESPIRATION RATE: 18 BRPM | DIASTOLIC BLOOD PRESSURE: 62 MMHG

## 2024-05-07 DIAGNOSIS — N18.32 STAGE 3B CHRONIC KIDNEY DISEASE (H): ICD-10-CM

## 2024-05-07 DIAGNOSIS — M79.675 GREAT TOE PAIN, LEFT: ICD-10-CM

## 2024-05-07 DIAGNOSIS — J30.9 CHRONIC ALLERGIC RHINITIS: ICD-10-CM

## 2024-05-07 DIAGNOSIS — N18.9 CHRONIC KIDNEY DISEASE, UNSPECIFIED: ICD-10-CM

## 2024-05-07 DIAGNOSIS — T78.40XA ALLERGIC REACTION, INITIAL ENCOUNTER: Primary | ICD-10-CM

## 2024-05-07 PROCEDURE — 99349 HOME/RES VST EST MOD MDM 40: CPT | Performed by: NURSE PRACTITIONER

## 2024-05-07 RX ORDER — DIPHENHYDRAMINE HCL 25 MG
25 CAPSULE ORAL EVERY 6 HOURS PRN
COMMUNITY
Start: 2024-05-07 | End: 2024-06-11

## 2024-05-07 NOTE — PROGRESS NOTES
[place] [facility]  [place] Medical Record Number:  [id number]  Place of Service where encounter took place:  [facility] [[id number]]  Chief Complaint   Patient presents with    Nursing Home Acute       HPI:    Shiloh Covarrubias  is a 89 year old (11/3/1929), who is being seen today for an episodic care visit.  HPI information obtained from: facility chart records, facility staff, patient report, and [facility] chart review. Today's concern is:    Seen today for follow up allergic reaction. Staff noticed a swollen face around her mouth that started in the evening on 4/30. She denies trouble with breathing or swallowing. Benadryl was ordered for prn use but does not appear any was used. She is requesting for an increase dose of loratadine which she takes daily for chronic rhinitis. Per pharmacy notes recommended dose of loratadine for CrCl<30ml/min is 10mg every other day, however, due to ease for dosing/facility administration, patient is on 5mg every day. Zyrtec with similar dosing with CKD/age. Voice is hoarse at baseline. Says this has happened before with the same presentation and over her life around 6 times. Can't remember if it's always in the Spring. Says it's resolved.     Also has a left red great toe. Looks like the great toe could be pushing up against her shoe when walking and could be developing a callus at the tip causing discomfort. Her toenail is also very short so the skin is pushing a bit over the top of the nail. Discomfort with palpation. Follows with podiatry and she has been cutting/filing it down on her own.     Past Medical and Surgical History reviewed in Epic today.    MEDICATIONS:  Current Outpatient Medications   Medication Sig Dispense Refill    amLODIPine (NORVASC) 2.5 MG tablet TAKE 1 TABLET BY MOUTH AT BEDTIME (TAKE WITH 5MG FOR A TOTAL OF 7.5MG)  90 tablet 97    amLODIPine (NORVASC) 5 MG tablet TAKE 1 TABLET BY MOUTH AT BEDTIME 90 tablet 3     "ASPIRIN LOW DOSE 81 MG EC tablet TAKE 1 TABLET BY MOUTH ONCE DAILY 90 tablet 3    escitalopram (LEXAPRO) 10 MG tablet TAKE 1 TABLET BY MOUTH ONCE DAILY 90 tablet 3    furosemide (LASIX) 40 MG tablet TAKE 1 TABLET BY MOUTH ONCE DAILY 90 tablet 3    hydrALAZINE (APRESOLINE) 50 MG tablet TAKE 1 TABLET BY MOUTH THREE TIMES DAILY 270 tablet 11    lisinopril (ZESTRIL) 20 MG tablet TAKE 1 TABLET BY MOUTH ONCE DAILY 90 tablet 3    loperamide (IMODIUM) 2 MG capsule Take 1 capsule (2 mg) by mouth 3 times daily (before meals) 270 capsule 3    loperamide (IMODIUM) 2 MG capsule Take 2 mg by mouth daily as needed for diarrhea      loratadine (CLARITIN) 10 MG tablet Take 0.5 tablets (5 mg) by mouth daily 90 tablet 97    MOISTURE EYES 1-0.3 % SOLN INSTILL ONE DROP IN EACH EYE TWICE DAILY;& MAY INSTILL 2 DROPS IN EACH EYE ONCE DAILY AS NEEDED 15 mL 97    Multiple Vitamin (TAB-A-VITA) TABS TAKE 1 TABLET BY MOUTH ONCE DAILY 90 tablet 3    SPIRIVA RESPIMAT 2.5 MCG/ACT inhaler INHALE 2 PUFFS INTO THE LUNGS ONCE DAILY 4 g 11    Vitamin D3 (CHOLECALCIFEROL) 25 mcg (1000 units) tablet Take 1 tablet (25 mcg) by mouth daily       REVIEW OF SYSTEMS:  4 point ROS including Respiratory, CV, GI and , other than that noted in the HPI,  is negative    Objective:  /62   Pulse 71   Temp 97.5  F (36.4  C)   Resp 18   Ht 1.588 m (5' 2.5\")   Wt 57 kg (125 lb 9.6 oz)   LMP  (LMP Unknown)   SpO2 95%   BMI 22.61 kg/m    Exam:  GENERAL APPEARANCE:  Alert, chatty and bright  ENT:  Mouth and posterior oropharynx normal, dry mucous membranes, hoarse voice at baseline, [place], wears hearing aids, needs to read lips  EYES:  EOM, conjunctivae, lids, pupils and irises normal  RESP:  respiratory effort and palpation of chest normal, lungs clear to auscultation   CV:  Palpation and auscultation of heart done , regular rate and rhythm, 2/6 murmur, LE edema trace  with compression socks   ABDOMEN:  no guarding or rebound, bowel sounds normal  M/S:   gait " and station at baseline with U-step walker  SKIN:  limited but intact to visualized areas, great left toe is red at the top with callus forming   NEURO:   Cranial nerves 2-12 are normal tested and grossly at patient's baseline  PSYCH:  oriented X 3    CBC RESULTS:   Recent Labs   Lab Test 11/14/23  0600 05/16/23  1210   WBC 6.3 7.8   RBC 3.97 3.88   HGB 12.4 12.1   HCT 38.4 37.9   MCV 97 98   MCH 31.2 31.2   MCHC 32.3 31.9   RDW 13.0 13.2    218       Last Basic Metabolic Panel:  Recent Labs   Lab Test 11/14/23  0600 07/11/23  0844    136   POTASSIUM 4.8 4.6   CHLORIDE 102 99   TANIA 9.5 9.6   CO2 27 25   BUN 45.9* 43.7*   CR 1.38* 1.46*   GLC 88 94       Liver Function Studies -   Recent Labs   Lab Test 05/16/23  1210 09/29/21  1553   PROTTOTAL 7.3 7.3   ALBUMIN 4.1 3.4   BILITOTAL 0.3 0.3   ALKPHOS 80 61   AST 25 16   ALT 15 19       TSH   Date Value Ref Range Status   05/16/2023 2.43 0.30 - 4.20 uIU/mL Final   08/27/2018 1.94 0.40 - 4.00 mU/L Final   09/05/2017 2.60 0.40 - 4.00 mU/L Final       Lab Results   Component Value Date    A1C 5.3 08/27/2018     Estimated Creatinine Clearance: 22.4 mL/min (A) (based on SCr of 1.38 mg/dL (H)).      ASSESSMENT/PLAN:  (T78.40XA) Allergic reaction, initial encounter  (primary encounter diagnosis)  (J30.9) Chronic allergic rhinitis  Comment: resolved   Plan:   -continue with scheduled loratadine and prn benadryl     (N18.32) Stage 3b chronic kidney disease (H)  Comment: chronic   Plan:   -updated BMP    (M79.675) Great toe pain, left  Comment: from narrow toe box, close file of nail and slide of toe at top of shoe  Plan:   -she will soak every other day, open to air overnight and while in apartment, lotion, allow nail to grow to tip of toe Will update provider if not resolving       Electronically signed by:  CORNELIUS Kenyon CNP

## 2024-05-07 NOTE — LETTER
Matilde Covarrubias orders:     -bmp for CKD       Israel Scott, APRN CNP on 5/7/2024 at 11:04 AM

## 2024-05-07 NOTE — LETTER
5/7/2024        RE: Shiloh Covarrubias  C/o Chuck Covarrubias  [address] [address] [place] [id number]        [facility]  [place] Medical Record Number:  [id number]  Place of Service where encounter took place:  [facility] [[id number]]  Chief Complaint   Patient presents with     Nursing Home Acute       HPI:    Shiloh Covarrubias  is a 89 year old (11/3/1929), who is being seen today for an episodic care visit.  HPI information obtained from: facility chart records, facility staff, patient report, and [facility] chart review. Today's concern is:    Seen today for follow up allergic reaction. Staff noticed a swollen face around her mouth that started in the evening on 4/30. She denies trouble with breathing or swallowing. Benadryl was ordered for prn use but does not appear any was used. She is requesting for an increase dose of loratadine which she takes daily for chronic rhinitis. Per pharmacy notes recommended dose of loratadine for CrCl<30ml/min is 10mg every other day, however, due to ease for dosing/facility administration, patient is on 5mg every day. Zyrtec with similar dosing with CKD/age. Voice is hoarse at baseline. Says this has happened before with the same presentation and over her life around 6 times. Can't remember if it's always in the Spring. Says it's resolved.     Also has a left red great toe. Looks like the great toe could be pushing up against her shoe when walking and could be developing a callus at the tip causing discomfort. Her toenail is also very short so the skin is pushing a bit over the top of the nail. Discomfort with palpation. Follows with podiatry and she has been cutting/filing it down on her own.     Past Medical and Surgical History reviewed in Epic today.    MEDICATIONS:  Current Outpatient Medications   Medication Sig Dispense Refill     amLODIPine (NORVASC) 2.5 MG tablet TAKE 1 TABLET BY MOUTH AT BEDTIME (TAKE WITH 5MG FOR A TOTAL  "OF 7.5MG)  90 tablet 97     amLODIPine (NORVASC) 5 MG tablet TAKE 1 TABLET BY MOUTH AT BEDTIME 90 tablet 3     ASPIRIN LOW DOSE 81 MG EC tablet TAKE 1 TABLET BY MOUTH ONCE DAILY 90 tablet 3     escitalopram (LEXAPRO) 10 MG tablet TAKE 1 TABLET BY MOUTH ONCE DAILY 90 tablet 3     furosemide (LASIX) 40 MG tablet TAKE 1 TABLET BY MOUTH ONCE DAILY 90 tablet 3     hydrALAZINE (APRESOLINE) 50 MG tablet TAKE 1 TABLET BY MOUTH THREE TIMES DAILY 270 tablet 11     lisinopril (ZESTRIL) 20 MG tablet TAKE 1 TABLET BY MOUTH ONCE DAILY 90 tablet 3     loperamide (IMODIUM) 2 MG capsule Take 1 capsule (2 mg) by mouth 3 times daily (before meals) 270 capsule 3     loperamide (IMODIUM) 2 MG capsule Take 2 mg by mouth daily as needed for diarrhea       loratadine (CLARITIN) 10 MG tablet Take 0.5 tablets (5 mg) by mouth daily 90 tablet 97     MOISTURE EYES 1-0.3 % SOLN INSTILL ONE DROP IN EACH EYE TWICE DAILY;& MAY INSTILL 2 DROPS IN EACH EYE ONCE DAILY AS NEEDED 15 mL 97     Multiple Vitamin (TAB-A-VITA) TABS TAKE 1 TABLET BY MOUTH ONCE DAILY 90 tablet 3     SPIRIVA RESPIMAT 2.5 MCG/ACT inhaler INHALE 2 PUFFS INTO THE LUNGS ONCE DAILY 4 g 11     Vitamin D3 (CHOLECALCIFEROL) 25 mcg (1000 units) tablet Take 1 tablet (25 mcg) by mouth daily       REVIEW OF SYSTEMS:  4 point ROS including Respiratory, CV, GI and , other than that noted in the HPI,  is negative    Objective:  /62   Pulse 71   Temp 97.5  F (36.4  C)   Resp 18   Ht 1.588 m (5' 2.5\")   Wt 57 kg (125 lb 9.6 oz)   LMP  (LMP Unknown)   SpO2 95%   BMI 22.61 kg/m    Exam:  GENERAL APPEARANCE:  Alert, chatty and bright  ENT:  Mouth and posterior oropharynx normal, dry mucous membranes, hoarse voice at baseline, [place], wears hearing aids, needs to read lips  EYES:  EOM, conjunctivae, lids, pupils and irises normal  RESP:  respiratory effort and palpation of chest normal, lungs clear to auscultation   CV:  Palpation and auscultation of heart done , regular rate and " rhythm, 2/6 murmur, LE edema trace  with compression socks   ABDOMEN:  no guarding or rebound, bowel sounds normal  M/S:   gait and station at baseline with U-step walker  SKIN:  limited but intact to visualized areas, great left toe is red at the top with callus forming   NEURO:   Cranial nerves 2-12 are normal tested and grossly at patient's baseline  PSYCH:  oriented X 3    CBC RESULTS:   Recent Labs   Lab Test 11/14/23  0600 05/16/23  1210   WBC 6.3 7.8   RBC 3.97 3.88   HGB 12.4 12.1   HCT 38.4 37.9   MCV 97 98   MCH 31.2 31.2   MCHC 32.3 31.9   RDW 13.0 13.2    218       Last Basic Metabolic Panel:  Recent Labs   Lab Test 11/14/23  0600 07/11/23  0844    136   POTASSIUM 4.8 4.6   CHLORIDE 102 99   TANIA 9.5 9.6   CO2 27 25   BUN 45.9* 43.7*   CR 1.38* 1.46*   GLC 88 94       Liver Function Studies -   Recent Labs   Lab Test 05/16/23  1210 09/29/21  1553   PROTTOTAL 7.3 7.3   ALBUMIN 4.1 3.4   BILITOTAL 0.3 0.3   ALKPHOS 80 61   AST 25 16   ALT 15 19       TSH   Date Value Ref Range Status   05/16/2023 2.43 0.30 - 4.20 uIU/mL Final   08/27/2018 1.94 0.40 - 4.00 mU/L Final   09/05/2017 2.60 0.40 - 4.00 mU/L Final       Lab Results   Component Value Date    A1C 5.3 08/27/2018     Estimated Creatinine Clearance: 22.4 mL/min (A) (based on SCr of 1.38 mg/dL (H)).      ASSESSMENT/PLAN:  (T78.40XA) Allergic reaction, initial encounter  (primary encounter diagnosis)  (J30.9) Chronic allergic rhinitis  Comment: resolved   Plan:   -continue with scheduled loratadine and prn benadryl     (N18.32) Stage 3b chronic kidney disease (H)  Comment: chronic   Plan:   -updated BMP    (M79.675) Great toe pain, left  Comment: from narrow toe box, close file of nail and slide of toe at top of shoe  Plan:   -she will soak every other day, open to air overnight and while in apartment, lotion, allow nail to grow to tip of toe Will update provider if not resolving       Electronically signed by:  CORNELIUS Kenyon CNP                Sincerely,        CORNELIUS Kenyon CNP

## 2024-05-14 ENCOUNTER — ASSISTED LIVING VISIT (OUTPATIENT)
Dept: GERIATRICS | Facility: CLINIC | Age: 89
End: 2024-05-14
Payer: COMMERCIAL

## 2024-05-14 VITALS
RESPIRATION RATE: 18 BRPM | HEIGHT: 63 IN | SYSTOLIC BLOOD PRESSURE: 128 MMHG | TEMPERATURE: 97.5 F | HEART RATE: 52 BPM | WEIGHT: 125.6 LBS | DIASTOLIC BLOOD PRESSURE: 42 MMHG | OXYGEN SATURATION: 95 % | BODY MASS INDEX: 22.25 KG/M2

## 2024-05-14 DIAGNOSIS — R52 PAIN: Primary | ICD-10-CM

## 2024-05-14 DIAGNOSIS — M79.605 PAIN OF LEFT LOWER EXTREMITY: ICD-10-CM

## 2024-05-14 LAB
ANION GAP SERPL CALCULATED.3IONS-SCNC: 12 MMOL/L (ref 7–15)
BUN SERPL-MCNC: 42.5 MG/DL (ref 8–23)
CALCIUM SERPL-MCNC: 9.7 MG/DL (ref 8.2–9.6)
CHLORIDE SERPL-SCNC: 101 MMOL/L (ref 98–107)
CREAT SERPL-MCNC: 1.33 MG/DL (ref 0.51–0.95)
DEPRECATED HCO3 PLAS-SCNC: 25 MMOL/L (ref 22–29)
EGFRCR SERPLBLD CKD-EPI 2021: 37 ML/MIN/1.73M2
GLUCOSE SERPL-MCNC: 86 MG/DL (ref 70–99)
POTASSIUM SERPL-SCNC: 4.7 MMOL/L (ref 3.4–5.3)
SODIUM SERPL-SCNC: 138 MMOL/L (ref 135–145)

## 2024-05-14 PROCEDURE — 36415 COLL VENOUS BLD VENIPUNCTURE: CPT | Mod: ORL | Performed by: NURSE PRACTITIONER

## 2024-05-14 PROCEDURE — P9604 ONE-WAY ALLOW PRORATED TRIP: HCPCS | Mod: ORL | Performed by: NURSE PRACTITIONER

## 2024-05-14 PROCEDURE — 99349 HOME/RES VST EST MOD MDM 40: CPT | Performed by: INTERNAL MEDICINE

## 2024-05-14 PROCEDURE — 80048 BASIC METABOLIC PNL TOTAL CA: CPT | Mod: ORL | Performed by: NURSE PRACTITIONER

## 2024-05-14 RX ORDER — ACETAMINOPHEN 500 MG
500 TABLET ORAL EVERY 4 HOURS PRN
Qty: 30 TABLET | Refills: 11 | Status: SHIPPED | OUTPATIENT
Start: 2024-05-14 | End: 2024-08-06

## 2024-05-14 NOTE — LETTER
5/14/2024        RE: Shiloh Covarrubias  C/o Chuck Covarrubias  [address] [address] [place] [id number]        No notes on file      Sincerely,        Jenny Padilla MD

## 2024-05-14 NOTE — LETTER
Matilde Covarrubias orders:     Begin     acetaminophen (TYLENOL) 500 MG tablet Take 1 tablet (500 mg) by mouth every 4 hours as needed for mild pain     -ok to use bedside Voltaren if family supplies to right hip        CORNELIUS Kenyon CNP on 5/14/2024 at 12:36 PM

## 2024-05-21 ENCOUNTER — TELEPHONE (OUTPATIENT)
Dept: PHARMACY | Facility: CLINIC | Age: 89
End: 2024-05-21

## 2024-05-21 ENCOUNTER — ASSISTED LIVING VISIT (OUTPATIENT)
Dept: GERIATRICS | Facility: CLINIC | Age: 89
End: 2024-05-21
Payer: COMMERCIAL

## 2024-05-21 VITALS
RESPIRATION RATE: 16 BRPM | SYSTOLIC BLOOD PRESSURE: 125 MMHG | DIASTOLIC BLOOD PRESSURE: 70 MMHG | BODY MASS INDEX: 21.76 KG/M2 | WEIGHT: 122.8 LBS | HEIGHT: 63 IN | OXYGEN SATURATION: 97 % | HEART RATE: 67 BPM | TEMPERATURE: 98 F

## 2024-05-21 DIAGNOSIS — H61.23 BILATERAL IMPACTED CERUMEN: Primary | ICD-10-CM

## 2024-05-21 DIAGNOSIS — J31.0 CHRONIC RHINITIS: ICD-10-CM

## 2024-05-21 DIAGNOSIS — R68.2 MOUTH DRYNESS: ICD-10-CM

## 2024-05-21 PROCEDURE — 99349 HOME/RES VST EST MOD MDM 40: CPT | Performed by: NURSE PRACTITIONER

## 2024-05-21 RX ORDER — LORATADINE 10 MG/1
5 TABLET ORAL EVERY OTHER DAY
Qty: 90 TABLET | Refills: 97 | Status: SHIPPED | OUTPATIENT
Start: 2024-05-21

## 2024-05-21 NOTE — TELEPHONE ENCOUNTER
"Previously called patient last week regarding her concern that meds may be contributing to dry mouth.  Dentist had mentioned to her that meds were \"ruining her teeth.\"  No answer last week, but left message and she returned my call today.    Discussed that Spiriva may contribute - hasn't been rinsing mouth with water following use and she will begin doing this.    Other potential contributors: escitalopram, furosemide, loratadine, loperamide.  She hasn't been using the prn diphenhydramine and NP stopping this.    She will monitor for improvement in dry mouth once she begins rinsing mouth with water after Spiriva use as noted above.  She reports preference to continue current escitalopram due to anxiety.  Discussed this is certainly ok and we will continue to monitor.  Could potentially adjust other named meds if appropriate in the future.  Encouraged her to call me with any questions in the future.    Mimi Marcos, Pharm.D.,[facility] [place]  Board Certified Geriatric Pharmacist  Medication Therapy Management Pharmacist  [phone]    "

## 2024-05-21 NOTE — PROGRESS NOTES
[place] [facility]  [place] Medical Record Number:  [id number]  Place of Service where encounter took place:  [facility] [[id number]]  Chief Complaint   Patient presents with    Assisted Living Acute     Ear cleaning       HPI:    Shiloh Covarrubias  is a 89 year old (11/3/1929), who is being seen today for an episodic care visit.  HPI information obtained from: facility chart records, facility staff, patient report, and [facility] chart review. Today's concern is:    Seen today for follow up to cerumen impaction and dry mouth contributing to dental carries. Reviewed her medications with PharmD and resident that could be contributor. Some of these she has been taking for a long time and is worried about reducing or stopping. Also, seems she has not been rinsing mouth after inhaler use. She will have 3 teeth extracted in the following weeks. Denies any mouth pain today. Says her hip pain has resolved     Past Medical and Surgical History reviewed in Epic today.    MEDICATIONS:    Current Outpatient Medications   Medication Sig Dispense Refill    acetaminophen (TYLENOL) 500 MG tablet Take 1 tablet (500 mg) by mouth every 4 hours as needed for mild pain 30 tablet 11    amLODIPine (NORVASC) 2.5 MG tablet TAKE 1 TABLET BY MOUTH AT BEDTIME (TAKE WITH 5MG FOR A TOTAL OF 7.5MG)  90 tablet 97    amLODIPine (NORVASC) 5 MG tablet TAKE 1 TABLET BY MOUTH AT BEDTIME 90 tablet 3    ASPIRIN LOW DOSE 81 MG EC tablet TAKE 1 TABLET BY MOUTH ONCE DAILY 90 tablet 3    diphenhydrAMINE (BENADRYL) 25 MG capsule Take 1 capsule (25 mg) by mouth every 6 hours as needed for itching or allergies      escitalopram (LEXAPRO) 10 MG tablet TAKE 1 TABLET BY MOUTH ONCE DAILY 90 tablet 3    furosemide (LASIX) 40 MG tablet TAKE 1 TABLET BY MOUTH ONCE DAILY 90 tablet 3    hydrALAZINE (APRESOLINE) 50 MG tablet TAKE 1 TABLET BY MOUTH THREE TIMES DAILY 270 tablet 11    lisinopril (ZESTRIL) 20 MG tablet TAKE 1 TABLET BY  "MOUTH ONCE DAILY 90 tablet 3    loperamide (IMODIUM) 2 MG capsule Take 1 capsule (2 mg) by mouth 3 times daily (before meals) 270 capsule 3    loperamide (IMODIUM) 2 MG capsule Take 2 mg by mouth daily as needed for diarrhea      loratadine (CLARITIN) 10 MG tablet Take 0.5 tablets (5 mg) by mouth daily 90 tablet 97    MOISTURE EYES 1-0.3 % SOLN INSTILL ONE DROP IN EACH EYE TWICE DAILY;& MAY INSTILL 2 DROPS IN EACH EYE ONCE DAILY AS NEEDED 15 mL 97    Multiple Vitamin (TAB-A-VITA) TABS TAKE 1 TABLET BY MOUTH ONCE DAILY 90 tablet 3    SPIRIVA RESPIMAT 2.5 MCG/ACT inhaler INHALE 2 PUFFS INTO THE LUNGS ONCE DAILY 4 g 11    Vitamin D3 (CHOLECALCIFEROL) 25 mcg (1000 units) tablet Take 1 tablet (25 mcg) by mouth daily       REVIEW OF SYSTEMS:  4 point ROS including Respiratory, CV, GI and , other than that noted in the HPI,  is negative    Objective:  /70   Pulse 67   Temp 98  F (36.7  C)   Resp 16   Ht 1.588 m (5' 2.5\")   Wt 55.7 kg (122 lb 12.8 oz)   LMP  (LMP Unknown)   SpO2 97%   BMI 22.10 kg/m    Exam:  GENERAL APPEARANCE:  Alert, chatty and bright  ENT:  Mouth and posterior oropharynx normal, dry mucous membranes, hoarse voice at baseline, [place], not wearing her hearing aids, needs to read lips-wax in both ear canals   EYES:  EOM, conjunctivae, lids, pupils and irises normal  RESP:  respiratory effort and palpation of chest normal, lungs clear to auscultation   CV:  Palpation and auscultation of heart done , regular rate and rhythm, 2/6 murmur, LE edema trace  with compression socks   ABDOMEN:  no guarding or rebound, bowel sounds normal  M/S:   gait and station at baseline with U-step walker  SKIN:  limited but intact to visualized areas  NEURO:   Cranial nerves 2-12 are normal tested and grossly at patient's baseline  PSYCH:  oriented X 3    Labs:   CBC RESULTS:   Recent Labs   Lab Test 11/14/23  0600 05/16/23  1210   WBC 6.3 7.8   RBC 3.97 3.88   HGB 12.4 12.1   HCT 38.4 37.9   MCV 97 98   MCH 31.2 " 31.2   MCHC 32.3 31.9   RDW 13.0 13.2    218       Last Basic Metabolic Panel:  Recent Labs   Lab Test 05/14/24  0939 11/14/23  0600    140   POTASSIUM 4.7 4.8   CHLORIDE 101 102   TANIA 9.7* 9.5   CO2 25 27   BUN 42.5* 45.9*   CR 1.33* 1.38*   GLC 86 88       Liver Function Studies -   Recent Labs   Lab Test 05/16/23  1210 09/29/21  1553   PROTTOTAL 7.3 7.3   ALBUMIN 4.1 3.4   BILITOTAL 0.3 0.3   ALKPHOS 80 61   AST 25 16   ALT 15 19       TSH   Date Value Ref Range Status   05/16/2023 2.43 0.30 - 4.20 uIU/mL Final   08/27/2018 1.94 0.40 - 4.00 mU/L Final   09/05/2017 2.60 0.40 - 4.00 mU/L Final       Lab Results   Component Value Date    A1C 5.3 08/27/2018     ASSESSMENT/PLAN:  (H61.23) Bilateral impacted cerumen  (primary encounter diagnosis)  Comment:   Plan:   -cerumen removal with lighted curette to both ear canals   -debrox course     (R68.2) Mouth dryness  (J31.0) Chronic rhinitis   Comment: ongoing contributing to dental carries    Plan:   -will rinse after spiriva  -reducing loratadine to every other day as contributor to dryness      Electronically signed by:  Israel Scott, CORNELIUS CNP

## 2024-05-21 NOTE — LETTER
Shiloh Covarrubias orders:     Begin   carbamide peroxide (DEBROX) 6.5 % otic solution Place 5 drops into both ears at bedtime for 4 days Place in left ear canal with right ear on pillow. Hold position for 5 minutes. Repeat on opposite side.    Reduce to  loratadine (CLARITIN) 10 MG tablet Take 0.5 tablets (5 mg) by mouth every other day             CORNELIUS Kenyon CNP on 5/21/2024 at 6:23 PM

## 2024-05-21 NOTE — LETTER
5/21/2024        RE: Shiloh Covarrubias  C/o Chuck Covarrubias  [address] [address] [place] [address]        [place] [facility]  [place] Medical Record Number:  [id number]  Place of Service where encounter took place:  [facility] [[id number]]  Chief Complaint   Patient presents with     Assisted Living Acute     Ear cleaning       HPI:    Shiloh Covarrubias  is a 89 year old (11/3/1929), who is being seen today for an episodic care visit.  HPI information obtained from: facility chart records, facility staff, patient report, and [facility] chart review. Today's concern is:    Seen today for follow up to cerumen impaction and dry mouth contributing to dental carries. Reviewed her medications with PharmD and resident that could be contributor. Some of these she has been taking for a long time and is worried about reducing or stopping. Also, seems she has not been rinsing mouth after inhaler use. She will have 3 teeth extracted in the following weeks. Denies any mouth pain today. Says her hip pain has resolved     Past Medical and Surgical History reviewed in Epic today.    MEDICATIONS:    Current Outpatient Medications   Medication Sig Dispense Refill     acetaminophen (TYLENOL) 500 MG tablet Take 1 tablet (500 mg) by mouth every 4 hours as needed for mild pain 30 tablet 11     amLODIPine (NORVASC) 2.5 MG tablet TAKE 1 TABLET BY MOUTH AT BEDTIME (TAKE WITH 5MG FOR A TOTAL OF 7.5MG)  90 tablet 97     amLODIPine (NORVASC) 5 MG tablet TAKE 1 TABLET BY MOUTH AT BEDTIME 90 tablet 3     ASPIRIN LOW DOSE 81 MG EC tablet TAKE 1 TABLET BY MOUTH ONCE DAILY 90 tablet 3     diphenhydrAMINE (BENADRYL) 25 MG capsule Take 1 capsule (25 mg) by mouth every 6 hours as needed for itching or allergies       escitalopram (LEXAPRO) 10 MG tablet TAKE 1 TABLET BY MOUTH ONCE DAILY 90 tablet 3     furosemide (LASIX) 40 MG tablet TAKE 1 TABLET BY MOUTH ONCE DAILY 90 tablet 3     hydrALAZINE (APRESOLINE) 50  "MG tablet TAKE 1 TABLET BY MOUTH THREE TIMES DAILY 270 tablet 11     lisinopril (ZESTRIL) 20 MG tablet TAKE 1 TABLET BY MOUTH ONCE DAILY 90 tablet 3     loperamide (IMODIUM) 2 MG capsule Take 1 capsule (2 mg) by mouth 3 times daily (before meals) 270 capsule 3     loperamide (IMODIUM) 2 MG capsule Take 2 mg by mouth daily as needed for diarrhea       loratadine (CLARITIN) 10 MG tablet Take 0.5 tablets (5 mg) by mouth daily 90 tablet 97     MOISTURE EYES 1-0.3 % SOLN INSTILL ONE DROP IN EACH EYE TWICE DAILY;& MAY INSTILL 2 DROPS IN EACH EYE ONCE DAILY AS NEEDED 15 mL 97     Multiple Vitamin (TAB-A-VITA) TABS TAKE 1 TABLET BY MOUTH ONCE DAILY 90 tablet 3     SPIRIVA RESPIMAT 2.5 MCG/ACT inhaler INHALE 2 PUFFS INTO THE LUNGS ONCE DAILY 4 g 11     Vitamin D3 (CHOLECALCIFEROL) 25 mcg (1000 units) tablet Take 1 tablet (25 mcg) by mouth daily       REVIEW OF SYSTEMS:  4 point ROS including Respiratory, CV, GI and , other than that noted in the HPI,  is negative    Objective:  /70   Pulse 67   Temp 98  F (36.7  C)   Resp 16   Ht 1.588 m (5' 2.5\")   Wt 55.7 kg (122 lb 12.8 oz)   LMP  (LMP Unknown)   SpO2 97%   BMI 22.10 kg/m    Exam:  GENERAL APPEARANCE:  Alert, chatty and bright  ENT:  Mouth and posterior oropharynx normal, dry mucous membranes, hoarse voice at baseline, [place], not wearing her hearing aids, needs to read lips-wax in both ear canals   EYES:  EOM, conjunctivae, lids, pupils and irises normal  RESP:  respiratory effort and palpation of chest normal, lungs clear to auscultation   CV:  Palpation and auscultation of heart done , regular rate and rhythm, 2/6 murmur, LE edema trace  with compression socks   ABDOMEN:  no guarding or rebound, bowel sounds normal  M/S:   gait and station at baseline with U-step walker  SKIN:  limited but intact to visualized areas  NEURO:   Cranial nerves 2-12 are normal tested and grossly at patient's baseline  PSYCH:  oriented X 3    Labs:   CBC RESULTS:   Recent Labs "   Lab Test 11/14/23  0600 05/16/23  1210   WBC 6.3 7.8   RBC 3.97 3.88   HGB 12.4 12.1   HCT 38.4 37.9   MCV 97 98   MCH 31.2 31.2   MCHC 32.3 31.9   RDW 13.0 13.2    218       Last Basic Metabolic Panel:  Recent Labs   Lab Test 05/14/24  0939 11/14/23  0600    140   POTASSIUM 4.7 4.8   CHLORIDE 101 102   TANIA 9.7* 9.5   CO2 25 27   BUN 42.5* 45.9*   CR 1.33* 1.38*   GLC 86 88       Liver Function Studies -   Recent Labs   Lab Test 05/16/23  1210 09/29/21  1553   PROTTOTAL 7.3 7.3   ALBUMIN 4.1 3.4   BILITOTAL 0.3 0.3   ALKPHOS 80 61   AST 25 16   ALT 15 19       TSH   Date Value Ref Range Status   05/16/2023 2.43 0.30 - 4.20 uIU/mL Final   08/27/2018 1.94 0.40 - 4.00 mU/L Final   09/05/2017 2.60 0.40 - 4.00 mU/L Final       Lab Results   Component Value Date    A1C 5.3 08/27/2018     ASSESSMENT/PLAN:  (H61.23) Bilateral impacted cerumen  (primary encounter diagnosis)  Comment:   Plan:   -cerumen removal with lighted curette to both ear canals   -debrox course     (R68.2) Mouth dryness  (J31.0) Chronic rhinitis   Comment: ongoing contributing to dental carries    Plan:   -will rinse after spiriva  -reducing loratadine to every other day as contributor to dryness      Electronically signed by:  CORNELIUS Kenyon CNP            Sincerely,        CORNELIUS Kenyon CNP

## 2024-06-06 NOTE — PROGRESS NOTES
Shiloh Covarrubias is a 89 year old female seen May 14, 2024 at [facility] where she has resided for 2 and a half years (admit 2021) seen to follow up HTN, CKD3, COPD, DM2 and depression/anxiety. Today she is seen in her apartment up to chair.  Her son and daughter-in-law are present as well.  Pt reports one week of left lateral thigh pain, very focal.   Would like PRN acetaminophen available.     Does note she walks in the hallways with 4WW, in her apartment with a 4-prong cane   States her feet are numb.     Pt had an episode of facial swelling, noted to be an allergic reaction this spring.  Now resolved   Also had a skin cancer on his nose removed.     By chart review, pt had a 2021 hospitalization following a fall at home.  No injuries found and she returned home after initial ED visit.  A few days later she presented with dyspnea and generalized weakness.    She was found to have a fever and hypoxia, treated with abx for CAP.   CXR also showed pulmonary vascular congestion and BNP 6000 with elevated Cr.   All improved with IV furosemide and adjustment of her medications   She was discharged to [facility] where she regained her previous mobility.  Furosemide dose adjusted.   She discharged home before transition to AL later that year.    On Claritin which she reports has decreased number of colds she gets   Takes loperamide 45 minutes before she eats tid:   Has a once daily BM, no constipation   Panic attacks when   13 years ago.   Pt knows all her meds and why she takes them, but they are administered by AL staff.        Past Medical History:   Diagnosis Date    Asthma 2023    CAD (coronary artery disease) 2023    Cerebral infarction (H) 2023    Chronic kidney disease, stage 3 (H) 2020    COPD (chronic obstructive pulmonary disease) (H) 2023    Disorder of thyroid 2023    Edema     Peripheral edema    Essential hypertension with goal blood pressure  "less than 130/80 5/24/2016    Generalized muscle weakness 1/4/2023    GERD (gastroesophageal reflux disease) 1/19/2011    Heart failure (H) 1/4/2023    Hyperlipidemia LDL goal <100 3/30/2011    Hypertension     Hypertriglyceridemia 5/18/2010    Major depressive disorder, single episode, mild (H24) 2/15/2016    Osteoarthrosis, unspecified whether generalized or localized, involving lower leg 1/8/2007    Problem list name updated by automated process. Provider to review Replacing diagnoses that were inactivated after the 10/1/2021 regulatory import.    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    Type 2 diabetes mellitus (H) 1/4/2023    Unspecified asthma(493.90)     Unspecified intestinal obstruction 02/17/10    D/C 02/20/10       Past Surgical History:   Procedure Laterality Date    ESOPHAGOSCOPY, GASTROSCOPY, DUODENOSCOPY (EGD), COMBINED  2/8/2011    COMBINED ESOPHAGOSCOPY, GASTROSCOPY, DUODENOSCOPY (EGD), BIOPSY SINGLE OR MULTIPLE performed by ANGY LIMA at  GI    ESOPHAGOSCOPY, GASTROSCOPY, DUODENOSCOPY (EGD), DILATATION, COMBINED  2/8/2011    COMBINED ESOPHAGOSCOPY, GASTROSCOPY, DUODENOSCOPY (EGD), DILATATION performed by ANGY LIMA at  GI    [facility] UGI ENDOSCOPY, SIMPLE EXAM  02/23/10     SH:     3 sons and 2 daughters all in [place]   Son Chuck is first contact    Moved here from [place], had been living with her youngest son   Non smoker     ROS: Ambulatory without device     BIMS 15/15  Weight in 2021 was as low as 108 lbs   05/14/24 57 kg (125 lb 9.6 oz)   05/07/24 57 kg (125 lb 9.6 oz)   01/30/24 57 kg (125 lb 9.6 oz)   11/06/23 59.9 kg (132 lb)      EXAM: NAD  /42   Pulse 52   Temp 97.5  F (36.4  C)   Resp 18   Ht 1.588 m (5' 2.5\")   Wt 57 kg (125 lb 9.6 oz)   LMP  (LMP Unknown)   SpO2 95%   BMI 22.61 kg/m     Neck supple without adenopathy  Lungs with decreased BS, no " rales or wheeze  Heart RRR s1s2 with II/VI ALICIA  Abd soft, NT, no distention or guarding, +BS  Ext with trace edema, wearing compression stockings   Neuro: accurate historian, no focal findings  Psych: affect okay, very pleasant       Lab Results   Component Value Date     05/14/2024    POTASSIUM 4.7 05/14/2024    CHLORIDE 101 05/14/2024    CO2 25 05/14/2024    ANIONGAP 12 05/14/2024    GLC 86 05/14/2024    BUN 42.5 (H) 05/14/2024    CR 1.33 (H) 05/14/2024    GFRESTIMATED 37 (L) 05/14/2024    TANIA 9.7 (H) 05/14/2024     Lab Results   Component Value Date    WBC 6.3 11/14/2023      HGB 12.4 11/14/2023      MCV 97 11/14/2023       11/14/2023     ECHO 9/2021:    Left ventricular systolic function is normal.The visual ejection fraction is 65-70%.  The right ventricular systolic function is normal.  The left atrium is moderate to severely dilated.  Mild valvular aortic stenosis.There is mild (1+) aortic regurgitation.There is moderate mitral annular calcification.There is mild (1+) mitral regurgitation.  Pulmonary hypertension- RVSP 48 mm hg +RA.      IMP/PLAN:   (M79.605) Pain of left lower extremity  Comment: focal   Present just a few days   Plan: diclofenac gel to left thigh   Acetaminophen 500 mg q4 hours PRN   Further workup if pain persists        (I10) Essential hypertension   (N18.32) Stage 3b chronic kidney disease (H)  Comment: SBPs 120s    GFR 32-37    Plan: amlodipine 5 mg/day, hydralazine 50 mg tid, lisinopril 20 mg/day   Follow bps and BMP   Renal dosing of medications    (I27.20) Pulmonary hypertension (H)  (I50.32) Chronic heart failure with preserved ejection fraction (HFpEF) (H)  Comment: stable volume status    She is on an ACEI, but not a beta blocker secondary to bradycardia     Plan: furosemide 40 mg/day   Compression, elevation as tolerated     (E11.22,  N18.32) Type 2 diabetes mellitus with stage 3b chronic kidney disease, without long-term current use of insulin (H)  Comment:   Lab  Results   Component Value Date    A1C 5.3 08/27/2018      Plan: diet controlled, no recent checks  >>>Would add A1C to next labs   She remains on a daily aspirin and an ACEI  Ophthalmology and Podiatry follow up    (F41.9,  F32.A) Anxiety and depression  Comment: by history   Plan: escitalopram 10 mg/day     (J44.9) Chronic obstructive pulmonary disease, unspecified COPD type (H)  Comment: stable   Plan: Spiriva 2 puffs daily   Takes loratadine for upper respiratory symptoms and seasonal allergies     (K52.839) Microscopic colitis, unspecified microscopic colitis type  Comment: with diarrhea   Plan: symptomatic treatment with loperamide and [place].      Jenny Padilla MD

## 2024-06-11 ENCOUNTER — ASSISTED LIVING VISIT (OUTPATIENT)
Dept: GERIATRICS | Facility: CLINIC | Age: 89
End: 2024-06-11
Payer: COMMERCIAL

## 2024-06-11 VITALS
BODY MASS INDEX: 21.76 KG/M2 | DIASTOLIC BLOOD PRESSURE: 50 MMHG | RESPIRATION RATE: 16 BRPM | HEIGHT: 63 IN | WEIGHT: 122.8 LBS | SYSTOLIC BLOOD PRESSURE: 130 MMHG | HEART RATE: 57 BPM | OXYGEN SATURATION: 96 %

## 2024-06-11 DIAGNOSIS — R63.4 WEIGHT LOSS: ICD-10-CM

## 2024-06-11 DIAGNOSIS — M79.605 PAIN OF LEFT LOWER EXTREMITY: ICD-10-CM

## 2024-06-11 DIAGNOSIS — M25.551 PAIN OF RIGHT HIP: ICD-10-CM

## 2024-06-11 DIAGNOSIS — R19.7 DIARRHEA, UNSPECIFIED TYPE: Primary | ICD-10-CM

## 2024-06-11 PROCEDURE — 99349 HOME/RES VST EST MOD MDM 40: CPT | Performed by: NURSE PRACTITIONER

## 2024-06-11 RX ORDER — LOPERAMIDE HCL 2 MG
2 CAPSULE ORAL 2 TIMES DAILY
Qty: 270 CAPSULE | Refills: 3 | Status: SHIPPED | OUTPATIENT
Start: 2024-06-11

## 2024-06-11 NOTE — LETTER
6/11/2024      Shiloh Covarrubias  C/o Chuck Covarrubias  [address] [address] [place] [address]        [place] [facility]  [place] Medical Record Number:  [id number]  Place of Service where encounter took place:  [facility] [[id number]]  Chief Complaint   Patient presents with     Assisted Living Acute       HPI:    Shiloh Covarrubias  is a 89 year old (11/3/1929), who is being seen today for an episodic care visit.  HPI information obtained from: facility chart records, facility staff, patient report, and [facility] chart review. Today's concern is:    Seen today for left leg pain and right hip pain. She denies pain today. Does not want daily dose of Tylenol but likes the ability to ask if needed. No recent falls. Says some difficulty chewing with 3 dental extractions yesterday    Also chronic use of imodium but is cutting back with recent episodes of constipation. Denies N/V, no abdominal pain.     Past Medical and Surgical History reviewed in Epic today.    MEDICATIONS:    Current Outpatient Medications   Medication Sig Dispense Refill     loperamide (IMODIUM) 2 MG capsule Take 1 capsule (2 mg) by mouth 2 times daily And 2 mg po daily prn 270 capsule 3     acetaminophen (TYLENOL) 500 MG tablet Take 1 tablet (500 mg) by mouth every 4 hours as needed for mild pain 30 tablet 11     amLODIPine (NORVASC) 2.5 MG tablet TAKE 1 TABLET BY MOUTH AT BEDTIME (TAKE WITH 5MG FOR A TOTAL OF 7.5MG)  90 tablet 97     amLODIPine (NORVASC) 5 MG tablet TAKE 1 TABLET BY MOUTH AT BEDTIME 90 tablet 3     ASPIRIN LOW DOSE 81 MG EC tablet TAKE 1 TABLET BY MOUTH ONCE DAILY 90 tablet 3     escitalopram (LEXAPRO) 10 MG tablet TAKE 1 TABLET BY MOUTH ONCE DAILY 90 tablet 3     furosemide (LASIX) 40 MG tablet TAKE 1 TABLET BY MOUTH ONCE DAILY 90 tablet 3     hydrALAZINE (APRESOLINE) 50 MG tablet TAKE 1 TABLET BY MOUTH THREE TIMES DAILY 270 tablet 11     lisinopril (ZESTRIL) 20 MG tablet TAKE 1 TABLET BY  "MOUTH ONCE DAILY 90 tablet 3     loratadine (CLARITIN) 10 MG tablet Take 0.5 tablets (5 mg) by mouth every other day 90 tablet 97     MOISTURE EYES 1-0.3 % SOLN INSTILL ONE DROP IN EACH EYE TWICE DAILY;& MAY INSTILL 2 DROPS IN EACH EYE ONCE DAILY AS NEEDED 15 mL 97     Multiple Vitamin (TAB-A-VITA) TABS TAKE 1 TABLET BY MOUTH ONCE DAILY 90 tablet 3     SPIRIVA RESPIMAT 2.5 MCG/ACT inhaler INHALE 2 PUFFS INTO THE LUNGS ONCE DAILY 4 g 11     Vitamin D3 (CHOLECALCIFEROL) 25 mcg (1000 units) tablet Take 1 tablet (25 mcg) by mouth daily       REVIEW OF SYSTEMS:  4 point ROS including Respiratory, CV, GI and , other than that noted in the HPI,  is negative    Objective:  /50   Pulse 57   Resp 16   Ht 1.588 m (5' 2.5\")   Wt 55.7 kg (122 lb 12.8 oz)   LMP  (LMP Unknown)   SpO2 96%   BMI 22.10 kg/m    Exam:  GENERAL APPEARANCE:  Alert, chatty and bright  ENT:  Mouth and posterior oropharynx normal, dry mucous membranes, hoarse voice at baseline, [place], wears hearing aids (not today) needs to read lips-wax on side of ear canal right- narrow ear canals bilateral. No redness, minimal swelling in mouth  EYES:  EOM, conjunctivae, lids, pupils and irises normal  RESP:  respiratory effort and palpation of chest normal, lungs clear to auscultation but diminished   CV:  Palpation and auscultation of heart done , regular rate and rhythm, 2/6 murmur, LE edema trace  with compression socks   ABDOMEN:  no guarding or rebound, bowel sounds normal  M/S:   gait and station at baseline with U-step walker  SKIN:  limited but intact to visualized areas, great left toe is red at the top with callus forming   NEURO:   Cranial nerves 2-12 are normal tested and grossly at patient's baseline  PSYCH:  oriented X 3    Labs:   CBC RESULTS:   Recent Labs   Lab Test 11/14/23  0600 05/16/23  1210   WBC 6.3 7.8   RBC 3.97 3.88   HGB 12.4 12.1   HCT 38.4 37.9   MCV 97 98   MCH 31.2 31.2   MCHC 32.3 31.9   RDW 13.0 13.2    218       Last " Basic Metabolic Panel:  Recent Labs   Lab Test 05/14/24  0939 11/14/23  0600    140   POTASSIUM 4.7 4.8   CHLORIDE 101 102   TANIA 9.7* 9.5   CO2 25 27   BUN 42.5* 45.9*   CR 1.33* 1.38*   GLC 86 88       Liver Function Studies -   Recent Labs   Lab Test 05/16/23  1210 09/29/21  1553   PROTTOTAL 7.3 7.3   ALBUMIN 4.1 3.4   BILITOTAL 0.3 0.3   ALKPHOS 80 61   AST 25 16   ALT 15 19       TSH   Date Value Ref Range Status   05/16/2023 2.43 0.30 - 4.20 uIU/mL Final   08/27/2018 1.94 0.40 - 4.00 mU/L Final   09/05/2017 2.60 0.40 - 4.00 mU/L Final       Lab Results   Component Value Date    A1C 5.3 08/27/2018     ASSESSMENT/PLAN:  (R19.7) Diarrhea, unspecified type  (primary encounter diagnosis)  Comment:   Plan: l  -reduced to loperamide (IMODIUM) 2 MG capsule po BID and prn           (M79.605) Pain of left lower extremity  (M25.551) Pain of right hip  Comment: refusing scheduled tylenol   Plan:   -tylenol prn   -voltaren gel prn     (R63.4) Weight loss  Comment: slow loss over the past year  Plan:  -continue to monitor    Electronically signed by:  CONRELIUS Kenyon CNP             Sincerely,        CORNELIUS Kenyon CNP

## 2024-06-11 NOTE — PROGRESS NOTES
[facility]  [place] [facility] Record Number:  [id number]  Place of Service where encounter took place:  [facility] [[id number]]  Chief Complaint   Patient presents with    Assisted Living Acute       HPI:    Shiloh Covarrubias  is a 89 year old (11/3/1929), who is being seen today for an episodic care visit.  HPI information obtained from: facility chart records, facility staff, patient report, and [facility] chart review. Today's concern is:    Seen today for left leg pain and right hip pain. She denies pain today. Does not want daily dose of Tylenol but likes the ability to ask if needed. No recent falls. Says some difficulty chewing with 3 dental extractions yesterday    Also chronic use of imodium but is cutting back with recent episodes of constipation. Denies N/V, no abdominal pain.     Past Medical and Surgical History reviewed in Epic today.    MEDICATIONS:    Current Outpatient Medications   Medication Sig Dispense Refill    loperamide (IMODIUM) 2 MG capsule Take 1 capsule (2 mg) by mouth 2 times daily And 2 mg po daily prn 270 capsule 3    acetaminophen (TYLENOL) 500 MG tablet Take 1 tablet (500 mg) by mouth every 4 hours as needed for mild pain 30 tablet 11    amLODIPine (NORVASC) 2.5 MG tablet TAKE 1 TABLET BY MOUTH AT BEDTIME (TAKE WITH 5MG FOR A TOTAL OF 7.5MG)  90 tablet 97    amLODIPine (NORVASC) 5 MG tablet TAKE 1 TABLET BY MOUTH AT BEDTIME 90 tablet 3    ASPIRIN LOW DOSE 81 MG EC tablet TAKE 1 TABLET BY MOUTH ONCE DAILY 90 tablet 3    escitalopram (LEXAPRO) 10 MG tablet TAKE 1 TABLET BY MOUTH ONCE DAILY 90 tablet 3    furosemide (LASIX) 40 MG tablet TAKE 1 TABLET BY MOUTH ONCE DAILY 90 tablet 3    hydrALAZINE (APRESOLINE) 50 MG tablet TAKE 1 TABLET BY MOUTH THREE TIMES DAILY 270 tablet 11    lisinopril (ZESTRIL) 20 MG tablet TAKE 1 TABLET BY MOUTH ONCE DAILY 90 tablet 3    loratadine (CLARITIN) 10 MG tablet Take 0.5 tablets (5 mg) by mouth every other day  "90 tablet 97    MOISTURE EYES 1-0.3 % SOLN INSTILL ONE DROP IN EACH EYE TWICE DAILY;& MAY INSTILL 2 DROPS IN EACH EYE ONCE DAILY AS NEEDED 15 mL 97    Multiple Vitamin (TAB-A-VITA) TABS TAKE 1 TABLET BY MOUTH ONCE DAILY 90 tablet 3    SPIRIVA RESPIMAT 2.5 MCG/ACT inhaler INHALE 2 PUFFS INTO THE LUNGS ONCE DAILY 4 g 11    Vitamin D3 (CHOLECALCIFEROL) 25 mcg (1000 units) tablet Take 1 tablet (25 mcg) by mouth daily       REVIEW OF SYSTEMS:  4 point ROS including Respiratory, CV, GI and , other than that noted in the HPI,  is negative    Objective:  /50   Pulse 57   Resp 16   Ht 1.588 m (5' 2.5\")   Wt 55.7 kg (122 lb 12.8 oz)   LMP  (LMP Unknown)   SpO2 96%   BMI 22.10 kg/m    Exam:  GENERAL APPEARANCE:  Alert, chatty and bright  ENT:  Mouth and posterior oropharynx normal, dry mucous membranes, hoarse voice at baseline, [place], wears hearing aids (not today) needs to read lips-wax on side of ear canal right- narrow ear canals bilateral. No redness, minimal swelling in mouth  EYES:  EOM, conjunctivae, lids, pupils and irises normal  RESP:  respiratory effort and palpation of chest normal, lungs clear to auscultation but diminished   CV:  Palpation and auscultation of heart done , regular rate and rhythm, 2/6 murmur, LE edema trace  with compression socks   ABDOMEN:  no guarding or rebound, bowel sounds normal  M/S:   gait and station at baseline with U-step walker  SKIN:  limited but intact to visualized areas, great left toe is red at the top with callus forming   NEURO:   Cranial nerves 2-12 are normal tested and grossly at patient's baseline  PSYCH:  oriented X 3    Labs:   CBC RESULTS:   Recent Labs   Lab Test 11/14/23  0600 05/16/23  1210   WBC 6.3 7.8   RBC 3.97 3.88   HGB 12.4 12.1   HCT 38.4 37.9   MCV 97 98   MCH 31.2 31.2   MCHC 32.3 31.9   RDW 13.0 13.2    218       Last Basic Metabolic Panel:  Recent Labs   Lab Test 05/14/24  0939 11/14/23  0600    140   POTASSIUM 4.7 4.8   CHLORIDE " 101 102   TANIA 9.7* 9.5   CO2 25 27   BUN 42.5* 45.9*   CR 1.33* 1.38*   GLC 86 88       Liver Function Studies -   Recent Labs   Lab Test 05/16/23  1210 09/29/21  1553   PROTTOTAL 7.3 7.3   ALBUMIN 4.1 3.4   BILITOTAL 0.3 0.3   ALKPHOS 80 61   AST 25 16   ALT 15 19       TSH   Date Value Ref Range Status   05/16/2023 2.43 0.30 - 4.20 uIU/mL Final   08/27/2018 1.94 0.40 - 4.00 mU/L Final   09/05/2017 2.60 0.40 - 4.00 mU/L Final       Lab Results   Component Value Date    A1C 5.3 08/27/2018     ASSESSMENT/PLAN:  (R19.7) Diarrhea, unspecified type  (primary encounter diagnosis)  Comment:   Plan: l  -reduced to loperamide (IMODIUM) 2 MG capsule po BID and prn           (M79.605) Pain of left lower extremity  (M25.551) Pain of right hip  Comment: refusing scheduled tylenol   Plan:   -tylenol prn   -voltaren gel prn     (R63.4) Weight loss  Comment: slow loss over the past year  Plan:  -continue to monitor    Electronically signed by:  CORNELIUS Kenyon CNP

## 2024-06-11 NOTE — LETTER
Shiloh Covarrubias orders:     -reduce to   loperamide (IMODIUM) 2 MG capsule Take 1 capsule (2 mg) by mouth 2 times daily And 2 mg po daily prn       -discontinue benadryl

## 2024-06-22 ENCOUNTER — HEALTH MAINTENANCE LETTER (OUTPATIENT)
Age: 89
End: 2024-06-22

## 2024-07-02 DIAGNOSIS — H04.123 DRY EYES: ICD-10-CM

## 2024-07-03 RX ORDER — SOFT LENS ADJUNCTIVE SOLUTIONS
DROPS OPHTHALMIC (EYE)
Qty: 15 ML | Refills: 97 | Status: SHIPPED | OUTPATIENT
Start: 2024-07-03

## 2024-08-06 ENCOUNTER — ASSISTED LIVING VISIT (OUTPATIENT)
Dept: GERIATRICS | Facility: CLINIC | Age: 89
End: 2024-08-06
Payer: COMMERCIAL

## 2024-08-06 VITALS
WEIGHT: 122.8 LBS | OXYGEN SATURATION: 97 % | BODY MASS INDEX: 21.76 KG/M2 | TEMPERATURE: 96.6 F | SYSTOLIC BLOOD PRESSURE: 144 MMHG | HEART RATE: 57 BPM | DIASTOLIC BLOOD PRESSURE: 42 MMHG | RESPIRATION RATE: 15 BRPM | HEIGHT: 63 IN

## 2024-08-06 DIAGNOSIS — J44.9 CHRONIC OBSTRUCTIVE PULMONARY DISEASE, UNSPECIFIED COPD TYPE (H): ICD-10-CM

## 2024-08-06 DIAGNOSIS — R19.7 DIARRHEA, UNSPECIFIED TYPE: ICD-10-CM

## 2024-08-06 DIAGNOSIS — R52 PAIN: Primary | ICD-10-CM

## 2024-08-06 DIAGNOSIS — I10 ESSENTIAL HYPERTENSION: ICD-10-CM

## 2024-08-06 DIAGNOSIS — I50.9 CHRONIC HEART FAILURE, UNSPECIFIED HEART FAILURE TYPE (H): ICD-10-CM

## 2024-08-06 DIAGNOSIS — F32.0 MAJOR DEPRESSIVE DISORDER, SINGLE EPISODE, MILD (H): ICD-10-CM

## 2024-08-06 PROCEDURE — 99349 HOME/RES VST EST MOD MDM 40: CPT | Performed by: NURSE PRACTITIONER

## 2024-08-06 RX ORDER — ACETAMINOPHEN 500 MG
500 TABLET ORAL 2 TIMES DAILY
Qty: 30 TABLET | Refills: 11 | Status: SHIPPED | OUTPATIENT
Start: 2024-08-06

## 2024-08-06 NOTE — PROGRESS NOTES
[facility]  Chief Complaint   Patient presents with    Annual Comprehensive Exam Assisted Living     [place] Medical Record Number:  [id number]  Place of Service where encounter took place:  [facility] [[id number]]    HPI:    Shiloh Covarrubias  is a 89 year old  (11/3/1929), who is being seen today for an annual comprehensive visit. HPI information obtained from: facility chart records, facility staff, patient report, and [facility] Epic chart review.  Today's concerns are:    Seen today for follow up to chronic disease management. No acute concerns from resident or staff. [place] and had a [place] time. Says her knees aren't the best and would like a few dose of daily tylenol. Ambulates with 4WW. No recent falls. VSS, weight stable. Looking forward to get a new upper and lower partial on 8/14/24.     ALLERGIES: Clonidine derivatives, Entocort [corticosteroids], Macrobid [nitrofurantoin], and Sulfa antibiotics  PAST MEDICAL HISTORY:  has a past medical history of Asthma (1/4/2023), CAD (coronary artery disease) (1/4/2023), Cerebral infarction (H) (1/4/2023), Chronic kidney disease, stage 3 (H) (11/16/2020), COPD (chronic obstructive pulmonary disease) (H) (1/4/2023), Disorder of thyroid (1/4/2023), Edema, Essential hypertension with goal blood pressure less than 130/80 (5/24/2016), Generalized muscle weakness (1/4/2023), GERD (gastroesophageal reflux disease) (1/19/2011), Heart failure (H) (1/4/2023), Hyperlipidemia LDL goal <100 (3/30/2011), Hypertension, Hypertriglyceridemia (5/18/2010), Major depressive disorder, single episode, mild (H24) (2/15/2016), Osteoarthrosis, unspecified whether generalized or localized, involving lower leg (1/8/2007), Peripheral vascular disease (H24) (3/30/2021), Squamous cell carcinoma (2015), 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    She has no past medical history of Unspecified cerebral artery occlusion with cerebral infarction.  PAST SURGICAL HISTORY:  has a past surgical history that includes UPPER GI ENDOSCOPY,EXAM (02/23/10); Esophagoscopy, gastroscopy, duodenoscopy (EGD), combined (2/8/2011); and Esophagoscopy, gastroscopy, duodenoscopy (EGD), dilatation, combined (2/8/2011).  IMMUNIZATIONS:  Immunization History   Administered Date(s) Administered    COVID-19 12+ (2023-24) (Pfizer) 10/19/2023    COVID-19 Bivalent 12+ (Pfizer) 02/02/2023    COVID-19 MONOVALENT 12+ (Pfizer) 02/25/2021, 03/18/2021, 09/30/2021, 06/21/2022    Influenza (High Dose) 3 valent vaccine 10/14/2010, 10/07/2011    Influenza (IIV3) PF 11/20/2000, 10/22/2001, 10/16/2002, 10/28/2003, 10/20/2004, 10/13/2005, 11/06/2006, 10/29/2007, 10/14/2008, 09/18/2009, 10/02/2012, 10/01/2014    Influenza Vaccine 18-64 (Flublok) 09/29/2021, 10/06/2022    Influenza Vaccine 65+ (Fluzone HD) 09/17/2020    Influenza Vaccine >6 months,quad, PF 09/24/2018, 10/11/2019    Influenza Vaccine, 6+MO IM (QUADRIVALENT W/PRESERVATIVES) 09/23/2015, 10/19/2023    Pneumo Conj 13-V (2010&after) 12/28/2016    Pneumococcal 23 valent 09/24/2018    RSV Vaccine (Abrysvo) 02/01/2024     Above immunizations pulled from [place]. [facility] and facility records also reconciled. Outstanding information sent to  to update [facility].  Future immunizations are not needed at this point as all recommended immunizations are up to date.     Current Outpatient Medications   Medication Sig Dispense Refill    acetaminophen (TYLENOL) 500 MG tablet Take 1 tablet (500 mg) by mouth 2 times daily And twice daily as needed 30 tablet 11    amLODIPine (NORVASC) 2.5 MG tablet TAKE 1 TABLET BY MOUTH AT BEDTIME (TAKE WITH 5MG FOR A TOTAL OF 7.5MG)  90 tablet 97    amLODIPine (NORVASC) 5 MG tablet TAKE 1 TABLET BY MOUTH AT BEDTIME 90  tablet 3    ASPIRIN LOW DOSE 81 MG EC tablet TAKE 1 TABLET BY MOUTH ONCE DAILY 90 tablet 3    escitalopram (LEXAPRO) 10 MG tablet TAKE 1 TABLET BY MOUTH ONCE DAILY 90 tablet 3    furosemide (LASIX) 40 MG tablet TAKE 1 TABLET BY MOUTH ONCE DAILY 90 tablet 3    hydrALAZINE (APRESOLINE) 50 MG tablet TAKE 1 TABLET BY MOUTH THREE TIMES DAILY 270 tablet 11    lisinopril (ZESTRIL) 20 MG tablet TAKE 1 TABLET BY MOUTH ONCE DAILY 90 tablet 3    loperamide (IMODIUM) 2 MG capsule Take 1 capsule (2 mg) by mouth 2 times daily And 2 mg po daily prn 270 capsule 3    loratadine (CLARITIN) 10 MG tablet Take 0.5 tablets (5 mg) by mouth every other day 90 tablet 97    MOISTURE EYES 1-0.3 % SOLN INSTILL ONE DROP IN EACH EYE THREE TIMES DAILY;& MAY INSTILL 2 DROPS IN EACH EYE ONCE DAILY AS NEEDED 15 mL 97    Multiple Vitamin (TAB-A-VITA) TABS TAKE 1 TABLET BY MOUTH ONCE DAILY 90 tablet 3    SPIRIVA RESPIMAT 2.5 MCG/ACT inhaler INHALE 2 PUFFS INTO THE LUNGS ONCE DAILY 4 g 11    Vitamin D3 (CHOLECALCIFEROL) 25 mcg (1000 units) tablet Take 1 tablet (25 mcg) by mouth daily       Case Management:  I have reviewed the Assisted Living care plan, current immunizations and preventive care/cancer screening. .Future cancer screening is not clinically indicated secondary to age/goals of care Patient's desire to return to the community is present, but is not able due to care needs . Current Level of Care is appropriate.    Advance Directive Discussion:    I reviewed the current advanced directives as reflected in EPIC, the POLST and the facility chart, and verified the congruency of orders. I contacted the first party son and discussed the plan of Care.  I did review the advance directives with the resident.     Team Discussion:  I communicated with the appropriate disciplines involved with the Plan of Care:   Nursing    Patient's goal is pain control and comfort.  Information reviewed:  Medications, vital signs, orders, and nursing notes.    ROS:  4  "point ROS including Respiratory, CV, GI and , other than that noted in the HPI,  is negative    Vitals:  BP (!) 144/42   Pulse 57   Temp (!) 96.6  F (35.9  C)   Resp 15   Ht 1.588 m (5' 2.5\")   Wt 55.7 kg (122 lb 12.8 oz)   LMP  (LMP Unknown)   SpO2 97%   BMI 22.10 kg/m   Body mass index is 22.1 kg/m .  Exam:  GENERAL APPEARANCE:  Alert, chatty and bright  ENT:  Mouth and posterior oropharynx normal, dry mucous membranes, hoarse voice at baseline, [place], wears hearing aids   EYES:  EOM, conjunctivae, lids, pupils and irises normal  RESP:  respiratory effort and palpation of chest normal, lungs clear to auscultation but diminished   CV:  Palpation and auscultation of heart done , regular rate and rhythm, 2/6 murmur, LE edema trace  with compression socks   ABDOMEN:  no guarding or rebound, bowel sounds normal  M/S:   gait and station at baseline with U-step walker  SKIN:  limited but intact to visualized areas  NEURO:   Cranial nerves 2-12 are normal tested and grossly at patient's baseline  PSYCH:  oriented X 3    Lab/Diagnostic data:   CBC RESULTS:   Recent Labs   Lab Test 11/14/23  0600 05/16/23  1210   WBC 6.3 7.8   RBC 3.97 3.88   HGB 12.4 12.1   HCT 38.4 37.9   MCV 97 98   MCH 31.2 31.2   MCHC 32.3 31.9   RDW 13.0 13.2    218       Last Basic Metabolic Panel:  Recent Labs   Lab Test 05/14/24  0939 11/14/23  0600    140   POTASSIUM 4.7 4.8   CHLORIDE 101 102   TANIA 9.7* 9.5   CO2 25 27   BUN 42.5* 45.9*   CR 1.33* 1.38*   GLC 86 88       Liver Function Studies -   Recent Labs   Lab Test 05/16/23  1210 09/29/21  1553   PROTTOTAL 7.3 7.3   ALBUMIN 4.1 3.4   BILITOTAL 0.3 0.3   ALKPHOS 80 61   AST 25 16   ALT 15 19       TSH   Date Value Ref Range Status   05/16/2023 2.43 0.30 - 4.20 uIU/mL Final   08/27/2018 1.94 0.40 - 4.00 mU/L Final   09/05/2017 2.60 0.40 - 4.00 mU/L Final       Lab Results   Component Value Date    A1C 5.3 08/27/2018       ASSESSMENT/PLAN  (R52) Pain  (primary encounter " diagnosis)  Comment:   Plan:   -acetaminophen (TYLENOL) 500 MG tablet          (I50.9) Chronic heart failure, unspecified heart failure type (H)  (I10) Essential hypertension  Comment: stable   Plan:   -continue current plan of care    (R19.7) Diarrhea, unspecified type  Comment: improved  Plan:   -continue current plan of care    (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, single episode, mild (H24)  Comment: stable   Plan:   -continue current plan of care    (J44.9) Chronic obstructive pulmonary disease, unspecified COPD type (H)  Comment: stable   Plan:   -spiriva         Electronically signed by:  CORNELIUS Kenyon CNP

## 2024-08-06 NOTE — LETTER
Matilde Covarrubias (Ludmilla) orders:     -increase to   acetaminophen (TYLENOL) 500 MG tablet Take 1 tablet (500 mg) by mouth 2 times daily And twice daily as needed       -BMP, CBC, Liver function tests for HTN    -thyroid for hypothyroidism screen     -A1c for diabetic screen       Electronically signed by:  CORNELIUS Kenyon CNP

## 2024-08-06 NOTE — LETTER
8/6/2024      Shiloh Covarrubias  C/o Chuck Covarrubias  [address] [address] [place] [id number]        [facility]  Chief Complaint   Patient presents with     Annual Comprehensive Exam Assisted Living     [facility] [place] Medical Record Number:  [id number]  Place of Service where encounter took place:  [facility] [[id number]]    HPI:    Shiloh Covarrubias  is a 89 year old  (11/3/1929), who is being seen today for an annual comprehensive visit. HPI information obtained from: facility chart records, facility staff, patient report, and [facility] chart review.  Today's concerns are:    Seen today for follow up to chronic disease management. No acute concerns from resident or staff. [place] and had a wonderful time. Says her knees aren't the best and would like a few dose of daily tylenol. Ambulates with 4WW. No recent falls. VSS, weight stable. Looking forward to get a new upper and lower partial on 8/14/24.     ALLERGIES: Clonidine derivatives, Entocort [corticosteroids], Macrobid [nitrofurantoin], and Sulfa antibiotics  PAST MEDICAL HISTORY:  has a past medical history of Asthma (1/4/2023), CAD (coronary artery disease) (1/4/2023), Cerebral infarction (H) (1/4/2023), Chronic kidney disease, stage 3 (H) (11/16/2020), COPD (chronic obstructive pulmonary disease) (H) (1/4/2023), Disorder of thyroid (1/4/2023), Edema, Essential hypertension with goal blood pressure less than 130/80 (5/24/2016), Generalized muscle weakness (1/4/2023), GERD (gastroesophageal reflux disease) (1/19/2011), Heart failure (H) (1/4/2023), Hyperlipidemia LDL goal <100 (3/30/2011), Hypertension, Hypertriglyceridemia (5/18/2010), Major depressive disorder, single episode, mild (H24) (2/15/2016), Osteoarthrosis, unspecified whether generalized or localized, involving lower leg (1/8/2007), Peripheral vascular disease (H24) (3/30/2021), Squamous cell carcinoma (2015), Squamous  cell carcinoma of skin of left temple (1/4/2023), Type 2 diabetes mellitus (H) (1/4/2023), Unspecified asthma(493.90), and Unspecified intestinal obstruction (02/17/10).    She has no past medical history of Unspecified cerebral artery occlusion with cerebral infarction.  PAST SURGICAL HISTORY:  has a past surgical history that includes UPPER GI ENDOSCOPY,EXAM (02/23/10); Esophagoscopy, gastroscopy, duodenoscopy (EGD), combined (2/8/2011); and Esophagoscopy, gastroscopy, duodenoscopy (EGD), dilatation, combined (2/8/2011).  IMMUNIZATIONS:  Immunization History   Administered Date(s) Administered     COVID-19 12+ (2023-24) (Pfizer) 10/19/2023     COVID-19 Bivalent 12+ (Pfizer) 02/02/2023     COVID-19 MONOVALENT 12+ (Pfizer) 02/25/2021, 03/18/2021, 09/30/2021, 06/21/2022     Influenza (High Dose) 3 valent vaccine 10/14/2010, 10/07/2011     Influenza (IIV3) PF 11/20/2000, 10/22/2001, 10/16/2002, 10/28/2003, 10/20/2004, 10/13/2005, 11/06/2006, 10/29/2007, 10/14/2008, 09/18/2009, 10/02/2012, 10/01/2014     Influenza Vaccine 18-64 (Flublok) 09/29/2021, 10/06/2022     Influenza Vaccine 65+ (Fluzone HD) 09/17/2020     Influenza Vaccine >6 months,quad, PF 09/24/2018, 10/11/2019     Influenza Vaccine, 6+MO IM (QUADRIVALENT W/PRESERVATIVES) 09/23/2015, 10/19/2023     Pneumo Conj 13-V (2010&after) 12/28/2016     Pneumococcal 23 valent 09/24/2018     RSV Vaccine (Abrysvo) 02/01/2024     Above immunizations pulled from [facility]. [facility] and facility records also reconciled. Outstanding information sent to  to update [facility].  Future immunizations are not needed at this point as all recommended immunizations are up to date.     Current Outpatient Medications   Medication Sig Dispense Refill     acetaminophen (TYLENOL) 500 MG tablet Take 1 tablet (500 mg) by mouth 2 times daily And twice daily as needed 30 tablet 11     amLODIPine (NORVASC) 2.5 MG tablet TAKE 1 TABLET BY MOUTH AT BEDTIME (TAKE WITH 5MG FOR A  TOTAL OF 7.5MG)  90 tablet 97     amLODIPine (NORVASC) 5 MG tablet TAKE 1 TABLET BY MOUTH AT BEDTIME 90 tablet 3     ASPIRIN LOW DOSE 81 MG EC tablet TAKE 1 TABLET BY MOUTH ONCE DAILY 90 tablet 3     escitalopram (LEXAPRO) 10 MG tablet TAKE 1 TABLET BY MOUTH ONCE DAILY 90 tablet 3     furosemide (LASIX) 40 MG tablet TAKE 1 TABLET BY MOUTH ONCE DAILY 90 tablet 3     hydrALAZINE (APRESOLINE) 50 MG tablet TAKE 1 TABLET BY MOUTH THREE TIMES DAILY 270 tablet 11     lisinopril (ZESTRIL) 20 MG tablet TAKE 1 TABLET BY MOUTH ONCE DAILY 90 tablet 3     loperamide (IMODIUM) 2 MG capsule Take 1 capsule (2 mg) by mouth 2 times daily And 2 mg po daily prn 270 capsule 3     loratadine (CLARITIN) 10 MG tablet Take 0.5 tablets (5 mg) by mouth every other day 90 tablet 97     MOISTURE EYES 1-0.3 % SOLN INSTILL ONE DROP IN EACH EYE THREE TIMES DAILY;& MAY INSTILL 2 DROPS IN EACH EYE ONCE DAILY AS NEEDED 15 mL 97     Multiple Vitamin (TAB-A-VITA) TABS TAKE 1 TABLET BY MOUTH ONCE DAILY 90 tablet 3     SPIRIVA RESPIMAT 2.5 MCG/ACT inhaler INHALE 2 PUFFS INTO THE LUNGS ONCE DAILY 4 g 11     Vitamin D3 (CHOLECALCIFEROL) 25 mcg (1000 units) tablet Take 1 tablet (25 mcg) by mouth daily       Case Management:  I have reviewed the Assisted Living care plan, current immunizations and preventive care/cancer screening. .Future cancer screening is not clinically indicated secondary to age/goals of care Patient's desire to return to the community is present, but is not able due to care needs . Current Level of Care is appropriate.    Advance Directive Discussion:    I reviewed the current advanced directives as reflected in EPIC, the POLST and the facility chart, and verified the congruency of orders. I contacted the first party son and discussed the plan of Care.  I did review the advance directives with the resident.     Team Discussion:  I communicated with the appropriate disciplines involved with the Plan of Care:   Nursing    Patient's goal  "is pain control and comfort.  Information reviewed:  Medications, vital signs, orders, and nursing notes.    ROS:  4 point ROS including Respiratory, CV, GI and , other than that noted in the HPI,  is negative    Vitals:  BP (!) 144/42   Pulse 57   Temp (!) 96.6  F (35.9  C)   Resp 15   Ht 1.588 m (5' 2.5\")   Wt 55.7 kg (122 lb 12.8 oz)   LMP  (LMP Unknown)   SpO2 97%   BMI 22.10 kg/m   Body mass index is 22.1 kg/m .  Exam:  GENERAL APPEARANCE:  Alert, chatty and bright  ENT:  Mouth and posterior oropharynx normal, dry mucous membranes, hoarse voice at baseline, [place], wears hearing aids   EYES:  EOM, conjunctivae, lids, pupils and irises normal  RESP:  respiratory effort and palpation of chest normal, lungs clear to auscultation but diminished   CV:  Palpation and auscultation of heart done , regular rate and rhythm, 2/6 murmur, LE edema trace  with compression socks   ABDOMEN:  no guarding or rebound, bowel sounds normal  M/S:   gait and station at baseline with U-step walker  SKIN:  limited but intact to visualized areas  NEURO:   Cranial nerves 2-12 are normal tested and grossly at patient's baseline  PSYCH:  oriented X 3    Lab/Diagnostic data:   CBC RESULTS:   Recent Labs   Lab Test 11/14/23  0600 05/16/23  1210   WBC 6.3 7.8   RBC 3.97 3.88   HGB 12.4 12.1   HCT 38.4 37.9   MCV 97 98   MCH 31.2 31.2   MCHC 32.3 31.9   RDW 13.0 13.2    218       Last Basic Metabolic Panel:  Recent Labs   Lab Test 05/14/24  0939 11/14/23  0600    140   POTASSIUM 4.7 4.8   CHLORIDE 101 102   TANIA 9.7* 9.5   CO2 25 27   BUN 42.5* 45.9*   CR 1.33* 1.38*   GLC 86 88       Liver Function Studies -   Recent Labs   Lab Test 05/16/23  1210 09/29/21  1553   PROTTOTAL 7.3 7.3   ALBUMIN 4.1 3.4   BILITOTAL 0.3 0.3   ALKPHOS 80 61   AST 25 16   ALT 15 19       TSH   Date Value Ref Range Status   05/16/2023 2.43 0.30 - 4.20 uIU/mL Final   08/27/2018 1.94 0.40 - 4.00 mU/L Final   09/05/2017 2.60 0.40 - 4.00 mU/L Final "       Lab Results   Component Value Date    A1C 5.3 08/27/2018       ASSESSMENT/PLAN  (R52) Pain  (primary encounter diagnosis)  Comment:   Plan:   -acetaminophen (TYLENOL) 500 MG tablet          (I50.9) Chronic heart failure, unspecified heart failure type (H)  (I10) Essential hypertension  Comment: stable   Plan:   -continue current plan of care    (R19.7) Diarrhea, unspecified type  Comment: improved  Plan:   -continue current plan of care    (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, single episode, mild (H24)  Comment: stable   Plan:   -continue current plan of care    (J44.9) Chronic obstructive pulmonary disease, unspecified COPD type (H)  Comment: stable   Plan:   -spiriva         Electronically signed by:  CORNELIUS Kenyon CNP            Sincerely,        CORNELIUS Kenyon CNP

## 2024-08-07 ENCOUNTER — LAB REQUISITION (OUTPATIENT)
Dept: LAB | Facility: CLINIC | Age: 89
End: 2024-08-07
Payer: COMMERCIAL

## 2024-08-07 DIAGNOSIS — E11.65 TYPE 2 DIABETES MELLITUS WITH HYPERGLYCEMIA (H): ICD-10-CM

## 2024-08-07 DIAGNOSIS — I10 ESSENTIAL (PRIMARY) HYPERTENSION: ICD-10-CM

## 2024-08-07 DIAGNOSIS — E03.9 HYPOTHYROIDISM, UNSPECIFIED: ICD-10-CM

## 2024-08-13 LAB
ALBUMIN SERPL BCG-MCNC: 4.4 G/DL (ref 3.5–5.2)
ALP SERPL-CCNC: 71 U/L (ref 40–150)
ALT SERPL W P-5'-P-CCNC: 14 U/L (ref 0–50)
ANION GAP SERPL CALCULATED.3IONS-SCNC: 14 MMOL/L (ref 7–15)
AST SERPL W P-5'-P-CCNC: 26 U/L (ref 0–45)
BILIRUB DIRECT SERPL-MCNC: <0.2 MG/DL (ref 0–0.3)
BILIRUB SERPL-MCNC: 0.4 MG/DL
BUN SERPL-MCNC: 31.3 MG/DL (ref 8–23)
CALCIUM SERPL-MCNC: 9.5 MG/DL (ref 8.8–10.4)
CHLORIDE SERPL-SCNC: 98 MMOL/L (ref 98–107)
CREAT SERPL-MCNC: 1.29 MG/DL (ref 0.51–0.95)
EGFRCR SERPLBLD CKD-EPI 2021: 38 ML/MIN/1.73M2
ERYTHROCYTE [DISTWIDTH] IN BLOOD BY AUTOMATED COUNT: 13.2 % (ref 10–15)
GLUCOSE SERPL-MCNC: 124 MG/DL (ref 70–99)
HBA1C MFR BLD: 5.2 %
HCO3 SERPL-SCNC: 26 MMOL/L (ref 22–29)
HCT VFR BLD AUTO: 38.2 % (ref 35–47)
HGB BLD-MCNC: 12.2 G/DL (ref 11.7–15.7)
MCH RBC QN AUTO: 30 PG (ref 26.5–33)
MCHC RBC AUTO-ENTMCNC: 31.9 G/DL (ref 31.5–36.5)
MCV RBC AUTO: 94 FL (ref 78–100)
PLATELET # BLD AUTO: 196 10E3/UL (ref 150–450)
POTASSIUM SERPL-SCNC: 4.3 MMOL/L (ref 3.4–5.3)
PROT SERPL-MCNC: 7.4 G/DL (ref 6.4–8.3)
RBC # BLD AUTO: 4.06 10E6/UL (ref 3.8–5.2)
SODIUM SERPL-SCNC: 138 MMOL/L (ref 135–145)
TSH SERPL DL<=0.005 MIU/L-ACNC: 3.03 UIU/ML (ref 0.3–4.2)
WBC # BLD AUTO: 7.9 10E3/UL (ref 4–11)

## 2024-08-13 PROCEDURE — 36415 COLL VENOUS BLD VENIPUNCTURE: CPT | Mod: ORL | Performed by: NURSE PRACTITIONER

## 2024-08-13 PROCEDURE — 82248 BILIRUBIN DIRECT: CPT | Mod: ORL | Performed by: NURSE PRACTITIONER

## 2024-08-13 PROCEDURE — 85027 COMPLETE CBC AUTOMATED: CPT | Mod: ORL | Performed by: NURSE PRACTITIONER

## 2024-08-13 PROCEDURE — 83036 HEMOGLOBIN GLYCOSYLATED A1C: CPT | Mod: ORL | Performed by: NURSE PRACTITIONER

## 2024-08-13 PROCEDURE — P9604 ONE-WAY ALLOW PRORATED TRIP: HCPCS | Mod: ORL | Performed by: NURSE PRACTITIONER

## 2024-08-13 PROCEDURE — 80053 COMPREHEN METABOLIC PANEL: CPT | Mod: ORL | Performed by: NURSE PRACTITIONER

## 2024-08-13 PROCEDURE — 84443 ASSAY THYROID STIM HORMONE: CPT | Mod: ORL | Performed by: NURSE PRACTITIONER

## 2024-10-08 ENCOUNTER — PATIENT OUTREACH (OUTPATIENT)
Dept: CARE COORDINATION | Facility: CLINIC | Age: 89
End: 2024-10-08
Payer: COMMERCIAL

## 2024-10-22 ENCOUNTER — PATIENT OUTREACH (OUTPATIENT)
Dept: CARE COORDINATION | Facility: CLINIC | Age: 89
End: 2024-10-22
Payer: COMMERCIAL

## 2024-11-04 NOTE — PROGRESS NOTES
"[facility]  [place] Medical Record Number:  [id number]  Place of Service where encounter took place:  [facility] [[id number]]  Chief Complaint   Patient presents with    RECHECK       HPI:    Shiloh Covarrubias  is a 89 year old (11/3/1929), who is being seen today for an episodic care visit.  HPI information obtained from: facility chart records, facility staff, patient report, and [facility] chart review. Today's concern is:    Seen today for follow up to chronic disease management. Just celebrated her 89th birthday. Had a wonderful time. Says feels no different. Sleeping ok, eating ok. Has intermittent right thigh pain that \"is gone\". [place] and reads lips. Has two new partials and no trouble with chewing food. Slight weight loss so trying to eat more. Doesn't want to start Ensure. Use of high-step 4WW.     Past Medical and Surgical History reviewed in Epic today.    MEDICATIONS:  Current Outpatient Medications   Medication Sig Dispense Refill    acetaminophen (TYLENOL) 500 MG tablet Take 1 tablet (500 mg) by mouth 2 times daily And twice daily as needed 30 tablet 11    amLODIPine (NORVASC) 2.5 MG tablet TAKE 1 TABLET BY MOUTH AT BEDTIME (TAKE WITH 5MG FOR A TOTAL OF 7.5MG)  90 tablet 97    amLODIPine (NORVASC) 5 MG tablet TAKE 1 TABLET BY MOUTH AT BEDTIME 90 tablet 3    ASPIRIN LOW DOSE 81 MG EC tablet TAKE 1 TABLET BY MOUTH ONCE DAILY 90 tablet 3    escitalopram (LEXAPRO) 10 MG tablet TAKE 1 TABLET BY MOUTH ONCE DAILY 90 tablet 3    furosemide (LASIX) 40 MG tablet TAKE 1 TABLET BY MOUTH ONCE DAILY 90 tablet 3    hydrALAZINE (APRESOLINE) 50 MG tablet TAKE 1 TABLET BY MOUTH THREE TIMES DAILY 270 tablet 11    lisinopril (ZESTRIL) 20 MG tablet TAKE 1 TABLET BY MOUTH ONCE DAILY 90 tablet 3    loperamide (IMODIUM) 2 MG capsule Take 1 capsule (2 mg) by mouth 2 times daily And 2 mg po daily prn 270 capsule 3    loratadine (CLARITIN) 10 MG tablet Take 0.5 tablets (5 mg) by " "mouth every other day 90 tablet 97    MOISTURE EYES 1-0.3 % SOLN INSTILL ONE DROP IN EACH EYE THREE TIMES DAILY;& MAY INSTILL 2 DROPS IN EACH EYE ONCE DAILY AS NEEDED 15 mL 97    Multiple Vitamin (TAB-A-VITA) TABS TAKE 1 TABLET BY MOUTH ONCE DAILY 90 tablet 3    SPIRIVA RESPIMAT 2.5 MCG/ACT inhaler INHALE 2 PUFFS INTO THE LUNGS ONCE DAILY 4 g 11    Vitamin D3 (CHOLECALCIFEROL) 25 mcg (1000 units) tablet Take 1 tablet (25 mcg) by mouth daily       REVIEW OF SYSTEMS:  4 point ROS including Respiratory, CV, GI and , other than that noted in the HPI,  is negative    Objective:  /60   Pulse 63   Temp 97.3  F (36.3  C)   Resp 18   Ht 1.588 m (5' 2.5\")   Wt 56.3 kg (124 lb 3.2 oz)   LMP  (LMP Unknown)   SpO2 94%   BMI 22.35 kg/m    Exam:  GENERAL APPEARANCE:  Alert, chatty and bright  ENT:  Mouth and posterior oropharynx normal, dry mucous membranes, hoarse voice at baseline, [place], wears hearing aids   EYES:  EOM, conjunctivae, lids, pupils and irises normal, glasses  RESP:  respiratory effort and palpation of chest normal, lungs clear to auscultation but diminished   CV:  Palpation and auscultation of heart done , regular rate and rhythm, 2/6 murmur, LE edema 1+  with compression socks   ABDOMEN:  no guarding or rebound, bowel sounds normal  M/S:   gait and station at baseline with U-step walker  SKIN:  limited but intact to visualized areas  NEURO:   Cranial nerves 2-12 are normal tested and grossly at patient's baseline  PSYCH:  oriented X 3    Labs:   CBC RESULTS:   Recent Labs   Lab Test 08/13/24  0847 11/14/23  0600   WBC 7.9 6.3   RBC 4.06 3.97   HGB 12.2 12.4   HCT 38.2 38.4   MCV 94 97   MCH 30.0 31.2   MCHC 31.9 32.3   RDW 13.2 13.0    191       Last Basic Metabolic Panel:  Recent Labs   Lab Test 08/13/24  0847 05/14/24  0939    138   POTASSIUM 4.3 4.7   CHLORIDE 98 101   TANIA 9.5 9.7*   CO2 26 25   BUN 31.3* 42.5*   CR 1.29* 1.33*   * 86       Liver Function Studies -   Recent " Labs   Lab Test 08/13/24  0847 05/16/23  1210   PROTTOTAL 7.4 7.3   ALBUMIN 4.4 4.1   BILITOTAL 0.4 0.3   ALKPHOS 71 80   AST 26 25   ALT 14 15       TSH   Date Value Ref Range Status   08/13/2024 3.03 0.30 - 4.20 uIU/mL Final   05/16/2023 2.43 0.30 - 4.20 uIU/mL Final   08/27/2018 1.94 0.40 - 4.00 mU/L Final   09/05/2017 2.60 0.40 - 4.00 mU/L Final       Lab Results   Component Value Date    A1C 5.2 08/13/2024    A1C 5.3 08/27/2018     Estimated Creatinine Clearance: 23.2 mL/min (A) (based on SCr of 1.29 mg/dL (H)).      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

## 2024-11-05 ENCOUNTER — ASSISTED LIVING VISIT (OUTPATIENT)
Dept: GERIATRICS | Facility: CLINIC | Age: 89
End: 2024-11-05
Payer: COMMERCIAL

## 2024-11-05 VITALS
TEMPERATURE: 97.3 F | OXYGEN SATURATION: 94 % | HEART RATE: 56 BPM | BODY MASS INDEX: 22.01 KG/M2 | WEIGHT: 124.2 LBS | DIASTOLIC BLOOD PRESSURE: 60 MMHG | SYSTOLIC BLOOD PRESSURE: 131 MMHG | HEIGHT: 63 IN | RESPIRATION RATE: 18 BRPM

## 2024-11-05 DIAGNOSIS — N18.32 STAGE 3B CHRONIC KIDNEY DISEASE (H): ICD-10-CM

## 2024-11-05 DIAGNOSIS — J44.9 CHRONIC OBSTRUCTIVE PULMONARY DISEASE, UNSPECIFIED COPD TYPE (H): ICD-10-CM

## 2024-11-05 DIAGNOSIS — R52 PAIN: Primary | ICD-10-CM

## 2024-11-05 DIAGNOSIS — I50.9 CHRONIC HEART FAILURE, UNSPECIFIED HEART FAILURE TYPE (H): ICD-10-CM

## 2024-11-05 DIAGNOSIS — F32.0 MAJOR DEPRESSIVE DISORDER, SINGLE EPISODE, MILD (H): ICD-10-CM

## 2024-11-05 DIAGNOSIS — R19.7 DIARRHEA, UNSPECIFIED TYPE: ICD-10-CM

## 2024-11-05 DIAGNOSIS — I10 ESSENTIAL HYPERTENSION: ICD-10-CM

## 2024-11-05 DIAGNOSIS — R63.4 WEIGHT LOSS: ICD-10-CM

## 2024-11-05 PROCEDURE — 99349 HOME/RES VST EST MOD MDM 40: CPT | Performed by: NURSE PRACTITIONER

## 2024-11-05 NOTE — LETTER
" 11/5/2024      Shiloh Covarrubias  C/o Chuck Covarrubias  [address] [address] [place] [id number]        [facility]  [place] Medical Record Number:  [id number]  Place of Service where encounter took place:  [facility] [[id number]]  Chief Complaint   Patient presents with     RECHECK       HPI:    Shiloh Covarrubias  is a 89 year old (11/3/1929), who is being seen today for an episodic care visit.  HPI information obtained from: facility chart records, facility staff, patient report, and [facility] chart review. Today's concern is:    Seen today for follow up to chronic disease management. Just celebrated her 89th birthday. Had a wonderful time. Says feels no different. Sleeping ok, eating ok. Has intermittent right thigh pain that \"is gone\". [place] and reads lips. Has two new partials and no trouble with chewing food. Slight weight loss so trying to eat more. Doesn't want to start Ensure. Use of high-step 4WW.     Past Medical and Surgical History reviewed in Epic today.    MEDICATIONS:  Current Outpatient Medications   Medication Sig Dispense Refill     acetaminophen (TYLENOL) 500 MG tablet Take 1 tablet (500 mg) by mouth 2 times daily And twice daily as needed 30 tablet 11     amLODIPine (NORVASC) 2.5 MG tablet TAKE 1 TABLET BY MOUTH AT BEDTIME (TAKE WITH 5MG FOR A TOTAL OF 7.5MG)  90 tablet 97     amLODIPine (NORVASC) 5 MG tablet TAKE 1 TABLET BY MOUTH AT BEDTIME 90 tablet 3     ASPIRIN LOW DOSE 81 MG EC tablet TAKE 1 TABLET BY MOUTH ONCE DAILY 90 tablet 3     escitalopram (LEXAPRO) 10 MG tablet TAKE 1 TABLET BY MOUTH ONCE DAILY 90 tablet 3     furosemide (LASIX) 40 MG tablet TAKE 1 TABLET BY MOUTH ONCE DAILY 90 tablet 3     hydrALAZINE (APRESOLINE) 50 MG tablet TAKE 1 TABLET BY MOUTH THREE TIMES DAILY 270 tablet 11     lisinopril (ZESTRIL) 20 MG tablet TAKE 1 TABLET BY MOUTH ONCE DAILY 90 tablet 3     loperamide (IMODIUM) 2 MG capsule Take 1 capsule (2 mg) by mouth 2 times " "daily And 2 mg po daily prn 270 capsule 3     loratadine (CLARITIN) 10 MG tablet Take 0.5 tablets (5 mg) by mouth every other day 90 tablet 97     MOISTURE EYES 1-0.3 % SOLN INSTILL ONE DROP IN EACH EYE THREE TIMES DAILY;& MAY INSTILL 2 DROPS IN EACH EYE ONCE DAILY AS NEEDED 15 mL 97     Multiple Vitamin (TAB-A-VITA) TABS TAKE 1 TABLET BY MOUTH ONCE DAILY 90 tablet 3     SPIRIVA RESPIMAT 2.5 MCG/ACT inhaler INHALE 2 PUFFS INTO THE LUNGS ONCE DAILY 4 g 11     Vitamin D3 (CHOLECALCIFEROL) 25 mcg (1000 units) tablet Take 1 tablet (25 mcg) by mouth daily       REVIEW OF SYSTEMS:  4 point ROS including Respiratory, CV, GI and , other than that noted in the HPI,  is negative    Objective:  /60   Pulse 63   Temp 97.3  F (36.3  C)   Resp 18   Ht 1.588 m (5' 2.5\")   Wt 56.3 kg (124 lb 3.2 oz)   LMP  (LMP Unknown)   SpO2 94%   BMI 22.35 kg/m    Exam:  GENERAL APPEARANCE:  Alert, chatty and bright  ENT:  Mouth and posterior oropharynx normal, dry mucous membranes, hoarse voice at baseline, [place], wears hearing aids   EYES:  EOM, conjunctivae, lids, pupils and irises normal, glasses  RESP:  respiratory effort and palpation of chest normal, lungs clear to auscultation but diminished   CV:  Palpation and auscultation of heart done , regular rate and rhythm, 2/6 murmur, LE edema 1+  with compression socks   ABDOMEN:  no guarding or rebound, bowel sounds normal  M/S:   gait and station at baseline with U-step walker  SKIN:  limited but intact to visualized areas  NEURO:   Cranial nerves 2-12 are normal tested and grossly at patient's baseline  PSYCH:  oriented X 3    Labs:   CBC RESULTS:   Recent Labs   Lab Test 08/13/24  0847 11/14/23  0600   WBC 7.9 6.3   RBC 4.06 3.97   HGB 12.2 12.4   HCT 38.2 38.4   MCV 94 97   MCH 30.0 31.2   MCHC 31.9 32.3   RDW 13.2 13.0    191       Last Basic Metabolic Panel:  Recent Labs   Lab Test 08/13/24  0847 05/14/24  0939    138   POTASSIUM 4.3 4.7   CHLORIDE 98 101   TANIA " 9.5 9.7*   CO2 26 25   BUN 31.3* 42.5*   CR 1.29* 1.33*   * 86       Liver Function Studies -   Recent Labs   Lab Test 08/13/24  0847 05/16/23  1210   PROTTOTAL 7.4 7.3   ALBUMIN 4.4 4.1   BILITOTAL 0.4 0.3   ALKPHOS 71 80   AST 26 25   ALT 14 15       TSH   Date Value Ref Range Status   08/13/2024 3.03 0.30 - 4.20 uIU/mL Final   05/16/2023 2.43 0.30 - 4.20 uIU/mL Final   08/27/2018 1.94 0.40 - 4.00 mU/L Final   09/05/2017 2.60 0.40 - 4.00 mU/L Final       Lab Results   Component Value Date    A1C 5.2 08/13/2024    A1C 5.3 08/27/2018     Estimated Creatinine Clearance: 23.2 mL/min (A) (based on SCr of 1.29 mg/dL (H)).      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

## 2024-12-17 ENCOUNTER — ASSISTED LIVING VISIT (OUTPATIENT)
Dept: GERIATRICS | Facility: CLINIC | Age: 89
End: 2024-12-17
Payer: COMMERCIAL

## 2024-12-17 VITALS
SYSTOLIC BLOOD PRESSURE: 158 MMHG | WEIGHT: 129.6 LBS | HEIGHT: 63 IN | RESPIRATION RATE: 16 BRPM | BODY MASS INDEX: 22.96 KG/M2 | DIASTOLIC BLOOD PRESSURE: 50 MMHG | OXYGEN SATURATION: 95 % | HEART RATE: 62 BPM

## 2024-12-17 DIAGNOSIS — I73.9 PERIPHERAL VASCULAR DISEASE (H): ICD-10-CM

## 2024-12-17 DIAGNOSIS — R52 PAIN: ICD-10-CM

## 2024-12-17 DIAGNOSIS — R60.0 LOWER EXTREMITY EDEMA: ICD-10-CM

## 2024-12-17 DIAGNOSIS — R60.0 BILATERAL LEG EDEMA: Primary | ICD-10-CM

## 2024-12-17 DIAGNOSIS — T14.8XXA OPEN WOUND: ICD-10-CM

## 2024-12-17 PROCEDURE — 99349 HOME/RES VST EST MOD MDM 40: CPT | Performed by: NURSE PRACTITIONER

## 2024-12-17 RX ORDER — ACETAMINOPHEN 500 MG
1000 TABLET ORAL EVERY MORNING
Qty: 30 TABLET | Refills: 11 | Status: SHIPPED | OUTPATIENT
Start: 2024-12-17

## 2024-12-17 RX ORDER — OXYCODONE HYDROCHLORIDE 5 MG/1
2.5 TABLET ORAL EVERY 6 HOURS PRN
Qty: 12 TABLET | Refills: 0 | Status: SHIPPED | OUTPATIENT
Start: 2024-12-17

## 2024-12-17 NOTE — LETTER
Matilde Covarrubias orders:         New orders:   -homecare RN for wound care support  -continue with compression daily to help reduce swelling  -wound care daily until homecare eval and treat: wash/pat dry, apply adaptic over wound beds, cover with non-stick dressing and secure with Kerlix or like   -tylenol scheduled only in morning and prn daily  -as needed oxycodone 2.5 mg po q6H prn for leg pain      Sent via Epic    acetaminophen (TYLENOL) 500 MG tablet Take 2 tablets (1,000 mg) by mouth every morning. And once daily as needed     oxyCODONE (ROXICODONE) 5 MG tablet Take 0.5 tablets (2.5 mg) by mouth every 6 hours as needed for pain.         Electronically signed by:  CORNELIUS Kenyon CNP

## 2024-12-17 NOTE — PROGRESS NOTES
[facility]  [place] Medical Record Number:  [id number]  Place of Service where encounter took place:  [facility] [[id number]]  Chief Complaint   Patient presents with    RECHECK     Increased pain, swelling in legs       HPI:    Shiloh Covarrubias  is a 89 year old (11/3/1929), who is being seen today for an episodic care visit.  HPI information obtained from: facility chart records, facility staff, patient report, and [facility] chart review. Today's concern is:    Seen today for LE open areas. Says this has been going on for several weeks and just getting worse. Her left leg hurts more than the right although not as bad. She does not want to take Tylenol at night because it makes her jittery. She does wear compression at baseline. Denies SOB, chest pain, abdominal pain.     Past Medical and Surgical History reviewed in Epic today.    MEDICATIONS:    Current Outpatient Medications   Medication Sig Dispense Refill    acetaminophen (TYLENOL) 500 MG tablet Take 1 tablet (500 mg) by mouth 2 times daily And twice daily as needed 30 tablet 11    amLODIPine (NORVASC) 2.5 MG tablet TAKE 1 TABLET BY MOUTH AT BEDTIME (TAKE WITH 5MG FOR A TOTAL OF 7.5MG)  90 tablet 97    amLODIPine (NORVASC) 5 MG tablet TAKE 1 TABLET BY MOUTH AT BEDTIME 90 tablet 3    ASPIRIN LOW DOSE 81 MG EC tablet TAKE 1 TABLET BY MOUTH ONCE DAILY 90 tablet 3    escitalopram (LEXAPRO) 10 MG tablet TAKE 1 TABLET BY MOUTH ONCE DAILY 90 tablet 3    furosemide (LASIX) 40 MG tablet TAKE 1 TABLET BY MOUTH ONCE DAILY 90 tablet 3    hydrALAZINE (APRESOLINE) 50 MG tablet TAKE 1 TABLET BY MOUTH THREE TIMES DAILY 270 tablet 11    lisinopril (ZESTRIL) 20 MG tablet TAKE 1 TABLET BY MOUTH ONCE DAILY 90 tablet 3    loperamide (IMODIUM) 2 MG capsule Take 1 capsule (2 mg) by mouth 2 times daily And 2 mg po daily prn 270 capsule 3    loratadine (CLARITIN) 10 MG tablet Take 0.5 tablets (5 mg) by mouth every other day 90 tablet 97  "   MOISTURE EYES 1-0.3 % SOLN INSTILL ONE DROP IN EACH EYE THREE TIMES DAILY;& MAY INSTILL 2 DROPS IN EACH EYE ONCE DAILY AS NEEDED 15 mL 97    Multiple Vitamin (TAB-A-VITA) TABS TAKE 1 TABLET BY MOUTH ONCE DAILY 90 tablet 3    SPIRIVA RESPIMAT 2.5 MCG/ACT inhaler INHALE 2 PUFFS INTO THE LUNGS ONCE DAILY 4 g 11    Vitamin D3 (CHOLECALCIFEROL) 25 mcg (1000 units) tablet Take 1 tablet (25 mcg) by mouth daily       Estimated Creatinine Clearance: 24.2 mL/min (A) (based on SCr of 1.29 mg/dL (H)).      REVIEW OF SYSTEMS:  4 point ROS including Respiratory, CV, GI and , other than that noted in the HPI,  is negative    Objective:  BP (!) 158/50   Pulse 62   Resp 16   Ht 1.588 m (5' 2.5\")   Wt 58.8 kg (129 lb 9.6 oz)   LMP  (LMP Unknown)   SpO2 95%   BMI 23.33 kg/m    Exam:  GENERAL APPEARANCE:  Alert, chatty and bright  ENT:  Mouth and posterior oropharynx normal, dry mucous membranes, hoarse voice at baseline, [place], wears hearing aids   EYES:  EOM, conjunctivae, lids, pupils and irises normal, glasses  RESP:  respiratory effort and palpation of chest normal, lungs clear to auscultation but diminished   CV:  Palpation and auscultation of heart done , regular rate and rhythm, 2/6 murmur, LE edema 1+  without compression socks   ABDOMEN:  no guarding or rebound, bowel sounds normal  M/S:   gait and station at baseline with U-step walker  SKIN:  slight redness, slough in some open areas, moist and weeping, no warmth   NEURO:   Cranial nerves 2-12 are normal tested and grossly at patient's baseline  PSYCH:  oriented X 3    Labs:   CBC RESULTS:   Recent Labs   Lab Test 08/13/24  0847 11/14/23  0600   WBC 7.9 6.3   RBC 4.06 3.97   HGB 12.2 12.4   HCT 38.2 38.4   MCV 94 97   MCH 30.0 31.2   MCHC 31.9 32.3   RDW 13.2 13.0    191       Last Basic Metabolic Panel:  Recent Labs   Lab Test 08/13/24  0847 05/14/24  0939    138   POTASSIUM 4.3 4.7   CHLORIDE 98 101   TANIA 9.5 9.7*   CO2 26 25   BUN 31.3* 42.5*   CR " 1.29* 1.33*   * 86       Liver Function Studies -   Recent Labs   Lab Test 08/13/24  0847 05/16/23  1210   PROTTOTAL 7.4 7.3   ALBUMIN 4.4 4.1   BILITOTAL 0.4 0.3   ALKPHOS 71 80   AST 26 25   ALT 14 15       TSH   Date Value Ref Range Status   08/13/2024 3.03 0.30 - 4.20 uIU/mL Final   05/16/2023 2.43 0.30 - 4.20 uIU/mL Final   08/27/2018 1.94 0.40 - 4.00 mU/L Final   09/05/2017 2.60 0.40 - 4.00 mU/L Final       Lab Results   Component Value Date    A1C 5.2 08/13/2024    A1C 5.3 08/27/2018     ASSESSMENT/PLAN:  (R60.0) Bilateral leg edema  (primary encounter diagnosis)  (R60.0) Lower extremity edema  (I73.9) Peripheral vascular disease (H)  (R52) Pain  (T14.8XXA) Open wound    New orders:   -homecare RN for wound care support  -continue with compression daily to help reduce swelling  -wound care daily until homecare eval and treat: wash/pat dry, apply adaptic over wound beds, cover with non-stick dressing and secure with Kerlix or like   -tylenol scheduled only in morning and prn daily  -as needed oxycodone 2.5 mg po q6H prn for leg pain      Electronically signed by:  Israel Scott, CORNELIUS PAGAN

## 2024-12-17 NOTE — LETTER
12/17/2024      Shiloh Covarrubias  C/o Chuck Covarrubias  [address] [address] [place] [address]        [facility]  [place] Medical Record Number:  [id number]  Place of Service where encounter took place:  [facility] [[id number]]  Chief Complaint   Patient presents with     RECHECK     Increased pain, swelling in legs       HPI:    Shiloh Covarrubias  is a 89 year old (11/3/1929), who is being seen today for an episodic care visit.  HPI information obtained from: facility chart records, facility staff, patient report, and [facility] chart review. Today's concern is:    Seen today for LE open areas. Says this has been going on for several weeks and just getting worse. Her left leg hurts more than the right although not as bad. She does not want to take Tylenol at night because it makes her jittery. She does wear compression at baseline. Denies SOB, chest pain, abdominal pain.     Past Medical and Surgical History reviewed in Epic today.    MEDICATIONS:    Current Outpatient Medications   Medication Sig Dispense Refill     acetaminophen (TYLENOL) 500 MG tablet Take 1 tablet (500 mg) by mouth 2 times daily And twice daily as needed 30 tablet 11     amLODIPine (NORVASC) 2.5 MG tablet TAKE 1 TABLET BY MOUTH AT BEDTIME (TAKE WITH 5MG FOR A TOTAL OF 7.5MG)  90 tablet 97     amLODIPine (NORVASC) 5 MG tablet TAKE 1 TABLET BY MOUTH AT BEDTIME 90 tablet 3     ASPIRIN LOW DOSE 81 MG EC tablet TAKE 1 TABLET BY MOUTH ONCE DAILY 90 tablet 3     escitalopram (LEXAPRO) 10 MG tablet TAKE 1 TABLET BY MOUTH ONCE DAILY 90 tablet 3     furosemide (LASIX) 40 MG tablet TAKE 1 TABLET BY MOUTH ONCE DAILY 90 tablet 3     hydrALAZINE (APRESOLINE) 50 MG tablet TAKE 1 TABLET BY MOUTH THREE TIMES DAILY 270 tablet 11     lisinopril (ZESTRIL) 20 MG tablet TAKE 1 TABLET BY MOUTH ONCE DAILY 90 tablet 3     loperamide (IMODIUM) 2 MG capsule Take 1 capsule (2 mg) by mouth 2 times daily And 2 mg po daily prn  "270 capsule 3     loratadine (CLARITIN) 10 MG tablet Take 0.5 tablets (5 mg) by mouth every other day 90 tablet 97     MOISTURE EYES 1-0.3 % SOLN INSTILL ONE DROP IN EACH EYE THREE TIMES DAILY;& MAY INSTILL 2 DROPS IN EACH EYE ONCE DAILY AS NEEDED 15 mL 97     Multiple Vitamin (TAB-A-VITA) TABS TAKE 1 TABLET BY MOUTH ONCE DAILY 90 tablet 3     SPIRIVA RESPIMAT 2.5 MCG/ACT inhaler INHALE 2 PUFFS INTO THE LUNGS ONCE DAILY 4 g 11     Vitamin D3 (CHOLECALCIFEROL) 25 mcg (1000 units) tablet Take 1 tablet (25 mcg) by mouth daily       Estimated Creatinine Clearance: 24.2 mL/min (A) (based on SCr of 1.29 mg/dL (H)).      REVIEW OF SYSTEMS:  4 point ROS including Respiratory, CV, GI and , other than that noted in the HPI,  is negative    Objective:  BP (!) 158/50   Pulse 62   Resp 16   Ht 1.588 m (5' 2.5\")   Wt 58.8 kg (129 lb 9.6 oz)   LMP  (LMP Unknown)   SpO2 95%   BMI 23.33 kg/m    Exam:  GENERAL APPEARANCE:  Alert, chatty and bright  ENT:  Mouth and posterior oropharynx normal, dry mucous membranes, hoarse voice at baseline, [place], wears hearing aids   EYES:  EOM, conjunctivae, lids, pupils and irises normal, glasses  RESP:  respiratory effort and palpation of chest normal, lungs clear to auscultation but diminished   CV:  Palpation and auscultation of heart done , regular rate and rhythm, 2/6 murmur, LE edema 1+  without compression socks   ABDOMEN:  no guarding or rebound, bowel sounds normal  M/S:   gait and station at baseline with U-step walker  SKIN:  slight redness, slough in some open areas, moist and weeping, no warmth   NEURO:   Cranial nerves 2-12 are normal tested and grossly at patient's baseline  PSYCH:  oriented X 3    Labs:   CBC RESULTS:   Recent Labs   Lab Test 08/13/24  0847 11/14/23  0600   WBC 7.9 6.3   RBC 4.06 3.97   HGB 12.2 12.4   HCT 38.2 38.4   MCV 94 97   MCH 30.0 31.2   MCHC 31.9 32.3   RDW 13.2 13.0    191       Last Basic Metabolic Panel:  Recent Labs   Lab Test " 08/13/24  0847 05/14/24  0939    138   POTASSIUM 4.3 4.7   CHLORIDE 98 101   TANIA 9.5 9.7*   CO2 26 25   BUN 31.3* 42.5*   CR 1.29* 1.33*   * 86       Liver Function Studies -   Recent Labs   Lab Test 08/13/24  0847 05/16/23  1210   PROTTOTAL 7.4 7.3   ALBUMIN 4.4 4.1   BILITOTAL 0.4 0.3   ALKPHOS 71 80   AST 26 25   ALT 14 15       TSH   Date Value Ref Range Status   08/13/2024 3.03 0.30 - 4.20 uIU/mL Final   05/16/2023 2.43 0.30 - 4.20 uIU/mL Final   08/27/2018 1.94 0.40 - 4.00 mU/L Final   09/05/2017 2.60 0.40 - 4.00 mU/L Final       Lab Results   Component Value Date    A1C 5.2 08/13/2024    A1C 5.3 08/27/2018     ASSESSMENT/PLAN:  (R60.0) Bilateral leg edema  (primary encounter diagnosis)  (R60.0) Lower extremity edema  (I73.9) Peripheral vascular disease (H)  (R52) Pain  (T14.8XXA) Open wound    New orders:   -homecare RN for wound care support  -continue with compression daily to help reduce swelling  -wound care daily until homecare eval and treat: wash/pat dry, apply adaptic over wound beds, cover with non-stick dressing and secure with Kerlix or like   -tylenol scheduled only in morning and prn daily  -as needed oxycodone 2.5 mg po q6H prn for leg pain      Electronically signed by:  CORNELIUS Kenyon CNP             Sincerely,        CORNELIUS Kenyon CNP

## 2024-12-26 ENCOUNTER — TELEPHONE (OUTPATIENT)
Dept: GERIATRICS | Facility: CLINIC | Age: 89
End: 2024-12-26
Payer: COMMERCIAL

## 2024-12-26 DIAGNOSIS — L03.119 CELLULITIS OF LOWER EXTREMITY, UNSPECIFIED LATERALITY: Primary | ICD-10-CM

## 2024-12-26 RX ORDER — DOXYCYCLINE 100 MG/1
100 CAPSULE ORAL 2 TIMES DAILY
Qty: 10 CAPSULE | Refills: 0 | Status: SHIPPED | OUTPATIENT
Start: 2024-12-26 | End: 2024-12-31

## 2024-12-26 NOTE — TELEPHONE ENCOUNTER
Verbal order/direction given to: Faxed to nursing office per facility nurse's request  Ariella Diaz RN

## 2024-12-26 NOTE — TELEPHONE ENCOUNTER
"  Adding antibiotics for cellulitis    Estimated Creatinine Clearance: 24.2 mL/min (A) (based on SCr of 1.29 mg/dL (H)).      Plan:     doxycycline hyclate (VIBRAMYCIN) 100 MG capsule Take 1 capsule (100 mg) by mouth 2 times daily for 5 days.     Electronically signed by:  CORNELIUS Kenyon CNP               [facility] Geriatrics Triage Call    Provider: CORNELIUS Matos CNP  Facility: [facility] Facility Type:  AL    Caller: Dexrex Gear   Call Back Number: [phone]    Allergies:    Allergies   Allergen Reactions    Clonidine Derivatives      Severe side effects      Entocort [Corticosteroids]     Macrobid [Nitrofurantoin]      Diarrhea      Sulfa Antibiotics Itching     itch        SBAR:     S-(situation): Wound check and update on condition.    B-(background): Pt has open wounds on BLE which home care is monitoring and performing wound care.     A-(assessment): Today LLE open wound appears to have s/sx of infection. Yellow purulent drainage and malodorous. Surrounding skin is red and blanchable with warmth to touch. Increased pain and tenderness noted. Afebrile.     R-(recommendations): Abx treatment for wound infection?       Telephone encounter sent to:  CORNELIUS Matos CNP    Please send response/orders to \"Geriatrics Nurse Pool\"    Ariella Diaz RN      "

## 2025-01-04 ENCOUNTER — HEALTH MAINTENANCE LETTER (OUTPATIENT)
Age: OVER 89
End: 2025-01-04

## 2025-01-05 DIAGNOSIS — J44.9 CHRONIC OBSTRUCTIVE PULMONARY DISEASE, UNSPECIFIED COPD TYPE (H): ICD-10-CM

## 2025-01-06 RX ORDER — TIOTROPIUM BROMIDE INHALATION SPRAY 3.12 UG/1
SPRAY, METERED RESPIRATORY (INHALATION)
Qty: 4 G | Refills: 97 | Status: SHIPPED | OUTPATIENT
Start: 2025-01-06

## 2025-01-20 NOTE — PROGRESS NOTES
[facility]  [place] [facility] Record Number:  [id number]  Place of Service where encounter took place:  [facility] [[id number]]  Chief Complaint   Patient presents with    RECHECK       HPI:    Shiloh Covarrubias  is a 89 year old (11/3/1929), who is being seen today for an episodic care visit.  HPI information obtained from: facility chart records, facility staff, patient report, and [facility] chart review. Today's concern is:    Follow up to cellulitis and ongoing LE edema and wound of left leg. Resident is being followed by wound care but has stopped wearing compression. Discussed today that components to reducing edema include compression, elevation and diuretics. She will need a lift chair to elevate her legs.  Denies pain in legs and limited use of oxycodone this month, used 7x in December.     Past Medical and Surgical History reviewed in Epic today.    MEDICATIONS:    Current Outpatient Medications   Medication Sig Dispense Refill    acetaminophen (TYLENOL) 500 MG tablet Take 2 tablets (1,000 mg) by mouth every morning. And once daily as needed 30 tablet 11    amLODIPine (NORVASC) 2.5 MG tablet TAKE 1 TABLET BY MOUTH AT BEDTIME (TAKE WITH 5MG FOR A TOTAL OF 7.5MG)  90 tablet 97    amLODIPine (NORVASC) 5 MG tablet TAKE 1 TABLET BY MOUTH AT BEDTIME 90 tablet 3    ASPIRIN LOW DOSE 81 MG EC tablet TAKE 1 TABLET BY MOUTH ONCE DAILY 90 tablet 3    escitalopram (LEXAPRO) 10 MG tablet TAKE 1 TABLET BY MOUTH ONCE DAILY 90 tablet 3    furosemide (LASIX) 40 MG tablet TAKE 1 TABLET BY MOUTH ONCE DAILY 90 tablet 3    hydrALAZINE (APRESOLINE) 50 MG tablet TAKE 1 TABLET BY MOUTH THREE TIMES DAILY 270 tablet 11    lisinopril (ZESTRIL) 20 MG tablet TAKE 1 TABLET BY MOUTH ONCE DAILY 90 tablet 3    loperamide (IMODIUM) 2 MG capsule Take 1 capsule (2 mg) by mouth 2 times daily And 2 mg po daily prn 270 capsule 3    loratadine (CLARITIN) 10 MG tablet Take 0.5 tablets (5 mg) by mouth  "every other day 90 tablet 97    MOISTURE EYES 1-0.3 % SOLN INSTILL ONE DROP IN EACH EYE THREE TIMES DAILY;& MAY INSTILL 2 DROPS IN EACH EYE ONCE DAILY AS NEEDED 15 mL 97    Multiple Vitamin (TAB-A-VITA) TABS TAKE 1 TABLET BY MOUTH ONCE DAILY 90 tablet 3    oxyCODONE (ROXICODONE) 5 MG tablet Take 0.5 tablets (2.5 mg) by mouth every 6 hours as needed for pain. 12 tablet 0    SPIRIVA RESPIMAT 2.5 MCG/ACT inhaler INHALE 2 PUFFS INTO THE LUNGS ONCE DAILY 4 g 97    Vitamin D3 (CHOLECALCIFEROL) 25 mcg (1000 units) tablet Take 1 tablet (25 mcg) by mouth daily       REVIEW OF SYSTEMS:  4 point ROS including Respiratory, CV, GI and , other than that noted in the HPI,  is negative    Objective:  /55   Pulse 70   Resp 16   Ht 1.588 m (5' 2.5\")   Wt 55.9 kg (123 lb 3.2 oz)   LMP  (LMP Unknown)   SpO2 95%   BMI 22.17 kg/m    Exam:  GENERAL APPEARANCE:  Alert, chatty and bright  ENT:  Mouth and posterior oropharynx normal, dry mucous membranes, hoarse voice at baseline, [place], wears hearing aids   EYES:  EOM, conjunctivae, lids, pupils and irises normal, glasses  RESP:  respiratory effort and palpation of chest normal, lungs clear to auscultation but diminished   CV:  Palpation and auscultation of heart done , regular rate and rhythm, 2/6 murmur, LE edema 2+  without compression socks   ABDOMEN:  no guarding or rebound, bowel sounds normal  M/S:   gait and station at baseline with U-step walker  SKIN: slough in some open areas, moist and weeping, no warmth   NEURO:   Cranial nerves 2-12 are normal tested and grossly at patient's baseline  PSYCH:  oriented X 3     Labs:   CBC RESULTS:   Recent Labs   Lab Test 08/13/24  0847 11/14/23  0600   WBC 7.9 6.3   RBC 4.06 3.97   HGB 12.2 12.4   HCT 38.2 38.4   MCV 94 97   MCH 30.0 31.2   MCHC 31.9 32.3   RDW 13.2 13.0    191       Last Basic Metabolic Panel:  Recent Labs   Lab Test 08/13/24  0847 05/14/24  0939    138   POTASSIUM 4.3 4.7   CHLORIDE 98 101   TANIA 9.5 " 9.7*   CO2 26 25   BUN 31.3* 42.5*   CR 1.29* 1.33*   * 86       Liver Function Studies -   Recent Labs   Lab Test 08/13/24  0847 05/16/23  1210   PROTTOTAL 7.4 7.3   ALBUMIN 4.4 4.1   BILITOTAL 0.4 0.3   ALKPHOS 71 80   AST 26 25   ALT 14 15       TSH   Date Value Ref Range Status   08/13/2024 3.03 0.30 - 4.20 uIU/mL Final   05/16/2023 2.43 0.30 - 4.20 uIU/mL Final   08/27/2018 1.94 0.40 - 4.00 mU/L Final   09/05/2017 2.60 0.40 - 4.00 mU/L Final       Lab Results   Component Value Date    A1C 5.2 08/13/2024    A1C 5.3 08/27/2018     ASSESSMENT/PLAN:  (R60.0) Bilateral leg edema  (primary encounter diagnosis)  (I50.9) Chronic heart failure, unspecified heart failure type (H)  (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 On am and off at HS. If not tolerating Jobst please measure and order German Hose    -lift chair     Electronically signed by:  CORNELIUS Kenyon CNP

## 2025-01-21 ENCOUNTER — ASSISTED LIVING VISIT (OUTPATIENT)
Dept: GERIATRICS | Facility: CLINIC | Age: OVER 89
End: 2025-01-21
Payer: COMMERCIAL

## 2025-01-21 VITALS
WEIGHT: 123.2 LBS | HEIGHT: 63 IN | SYSTOLIC BLOOD PRESSURE: 122 MMHG | HEART RATE: 70 BPM | OXYGEN SATURATION: 95 % | BODY MASS INDEX: 21.83 KG/M2 | DIASTOLIC BLOOD PRESSURE: 55 MMHG | RESPIRATION RATE: 16 BRPM

## 2025-01-21 DIAGNOSIS — T14.8XXA OPEN WOUND: ICD-10-CM

## 2025-01-21 DIAGNOSIS — I73.9 PERIPHERAL VASCULAR DISEASE: ICD-10-CM

## 2025-01-21 DIAGNOSIS — E11.59 TYPE 2 DIABETES MELLITUS WITH OTHER CIRCULATORY COMPLICATION, WITHOUT LONG-TERM CURRENT USE OF INSULIN (H): ICD-10-CM

## 2025-01-21 DIAGNOSIS — R60.0 BILATERAL LEG EDEMA: Primary | ICD-10-CM

## 2025-01-21 DIAGNOSIS — I50.9 CHRONIC HEART FAILURE, UNSPECIFIED HEART FAILURE TYPE (H): ICD-10-CM

## 2025-01-21 DIAGNOSIS — N18.32 STAGE 3B CHRONIC KIDNEY DISEASE (H): ICD-10-CM

## 2025-01-21 PROCEDURE — 99349 HOME/RES VST EST MOD MDM 40: CPT | Performed by: NURSE PRACTITIONER

## 2025-01-21 NOTE — LETTER
1/21/2025      Shiloh Covarrubias  C/o Chuck Covarrubias  [address] [address] [place] [id number]        [facility]  [place] [facility] Record Number:  [id number]  Place of Service where encounter took place:  [facility] [[id number]]  Chief Complaint   Patient presents with     RECHECK       HPI:    Shiloh Covarrubias  is a 89 year old (11/3/1929), who is being seen today for an episodic care visit.  HPI information obtained from: facility chart records, facility staff, patient report, and [facility] chart review. Today's concern is:    Follow up to cellulitis and ongoing LE edema and wound of left leg. Resident is being followed by wound care but has stopped wearing compression. Discussed today that components to reducing edema include compression, elevation and diuretics. She will need a lift chair to elevate her legs.  Denies pain in legs and limited use of oxycodone this month, used 7x in December.     Past Medical and Surgical History reviewed in Epic today.    MEDICATIONS:    Current Outpatient Medications   Medication Sig Dispense Refill     acetaminophen (TYLENOL) 500 MG tablet Take 2 tablets (1,000 mg) by mouth every morning. And once daily as needed 30 tablet 11     amLODIPine (NORVASC) 2.5 MG tablet TAKE 1 TABLET BY MOUTH AT BEDTIME (TAKE WITH 5MG FOR A TOTAL OF 7.5MG)  90 tablet 97     amLODIPine (NORVASC) 5 MG tablet TAKE 1 TABLET BY MOUTH AT BEDTIME 90 tablet 3     ASPIRIN LOW DOSE 81 MG EC tablet TAKE 1 TABLET BY MOUTH ONCE DAILY 90 tablet 3     escitalopram (LEXAPRO) 10 MG tablet TAKE 1 TABLET BY MOUTH ONCE DAILY 90 tablet 3     furosemide (LASIX) 40 MG tablet TAKE 1 TABLET BY MOUTH ONCE DAILY 90 tablet 3     hydrALAZINE (APRESOLINE) 50 MG tablet TAKE 1 TABLET BY MOUTH THREE TIMES DAILY 270 tablet 11     lisinopril (ZESTRIL) 20 MG tablet TAKE 1 TABLET BY MOUTH ONCE DAILY 90 tablet 3     loperamide (IMODIUM) 2 MG capsule Take 1 capsule (2 mg) by mouth 2 times  "daily And 2 mg po daily prn 270 capsule 3     loratadine (CLARITIN) 10 MG tablet Take 0.5 tablets (5 mg) by mouth every other day 90 tablet 97     MOISTURE EYES 1-0.3 % SOLN INSTILL ONE DROP IN EACH EYE THREE TIMES DAILY;& MAY INSTILL 2 DROPS IN EACH EYE ONCE DAILY AS NEEDED 15 mL 97     Multiple Vitamin (TAB-A-VITA) TABS TAKE 1 TABLET BY MOUTH ONCE DAILY 90 tablet 3     oxyCODONE (ROXICODONE) 5 MG tablet Take 0.5 tablets (2.5 mg) by mouth every 6 hours as needed for pain. 12 tablet 0     SPIRIVA RESPIMAT 2.5 MCG/ACT inhaler INHALE 2 PUFFS INTO THE LUNGS ONCE DAILY 4 g 97     Vitamin D3 (CHOLECALCIFEROL) 25 mcg (1000 units) tablet Take 1 tablet (25 mcg) by mouth daily       REVIEW OF SYSTEMS:  4 point ROS including Respiratory, CV, GI and , other than that noted in the HPI,  is negative    Objective:  /55   Pulse 70   Resp 16   Ht 1.588 m (5' 2.5\")   Wt 55.9 kg (123 lb 3.2 oz)   LMP  (LMP Unknown)   SpO2 95%   BMI 22.17 kg/m    Exam:  GENERAL APPEARANCE:  Alert, chatty and bright  ENT:  Mouth and posterior oropharynx normal, dry mucous membranes, hoarse voice at baseline, [place], wears hearing aids   EYES:  EOM, conjunctivae, lids, pupils and irises normal, glasses  RESP:  respiratory effort and palpation of chest normal, lungs clear to auscultation but diminished   CV:  Palpation and auscultation of heart done , regular rate and rhythm, 2/6 murmur, LE edema 2+  without compression socks   ABDOMEN:  no guarding or rebound, bowel sounds normal  M/S:   gait and station at baseline with U-step walker  SKIN: slough in some open areas, moist and weeping, no warmth   NEURO:   Cranial nerves 2-12 are normal tested and grossly at patient's baseline  PSYCH:  oriented X 3     Labs:   CBC RESULTS:   Recent Labs   Lab Test 08/13/24  0847 11/14/23  0600   WBC 7.9 6.3   RBC 4.06 3.97   HGB 12.2 12.4   HCT 38.2 38.4   MCV 94 97   MCH 30.0 31.2   MCHC 31.9 32.3   RDW 13.2 13.0    191       Last Basic Metabolic " Panel:  Recent Labs   Lab Test 08/13/24  0847 05/14/24  0939    138   POTASSIUM 4.3 4.7   CHLORIDE 98 101   TANIA 9.5 9.7*   CO2 26 25   BUN 31.3* 42.5*   CR 1.29* 1.33*   * 86       Liver Function Studies -   Recent Labs   Lab Test 08/13/24  0847 05/16/23  1210   PROTTOTAL 7.4 7.3   ALBUMIN 4.4 4.1   BILITOTAL 0.4 0.3   ALKPHOS 71 80   AST 26 25   ALT 14 15       TSH   Date Value Ref Range Status   08/13/2024 3.03 0.30 - 4.20 uIU/mL Final   05/16/2023 2.43 0.30 - 4.20 uIU/mL Final   08/27/2018 1.94 0.40 - 4.00 mU/L Final   09/05/2017 2.60 0.40 - 4.00 mU/L Final       Lab Results   Component Value Date    A1C 5.2 08/13/2024    A1C 5.3 08/27/2018     ASSESSMENT/PLAN:  (R60.0) Bilateral leg edema  (primary encounter diagnosis)  (I50.9) Chronic heart failure, unspecified heart failure type (H)  (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 On am and off at HS. If not tolerating Jobst please measure and order German Hose    -lift chair     Electronically signed by:  CORNELIUS Kenyon CNP           Sincerely,        CORNELIUS Kenyon CNP    Electronically signed

## 2025-01-21 NOTE — LETTER
Shiloh Covarrubias orders:       New Orders:     -daily compression on legs. On am and off at HS. If not tolerating Jobst please measure and order German Hose    -lift chair (please fax with notes to her     -BMP and CBC next week on lab day for HTN      Electronically signed by:  CORNELIUS Kenyon CNP

## 2025-01-21 NOTE — LETTER
Shiloh Covarrubias orders:     Shiloh Covarrubias orders:       New Orders:     -daily compression on legs. On am and off at HS. If not tolerating Jobst please measure and order German Hose    -lift chair (please fax with notes to her )    -BMP and CBC next week on lab day for HTN      Electronically signed by:  CORNELIUS Kenyon CNP   NPI# [id number]

## 2025-01-22 ENCOUNTER — LAB REQUISITION (OUTPATIENT)
Dept: LAB | Facility: CLINIC | Age: OVER 89
End: 2025-01-22
Payer: COMMERCIAL

## 2025-01-22 DIAGNOSIS — I10 ESSENTIAL (PRIMARY) HYPERTENSION: ICD-10-CM

## 2025-01-25 DIAGNOSIS — Z78.9 TAKES DIETARY SUPPLEMENTS: ICD-10-CM

## 2025-01-25 DIAGNOSIS — F41.1 GAD (GENERALIZED ANXIETY DISORDER): ICD-10-CM

## 2025-01-25 DIAGNOSIS — R60.9 EDEMA, UNSPECIFIED TYPE: ICD-10-CM

## 2025-01-25 DIAGNOSIS — H25.9: Primary | ICD-10-CM

## 2025-01-25 DIAGNOSIS — I63.9 CEREBRAL INFARCTION (H): ICD-10-CM

## 2025-01-25 DIAGNOSIS — I10 ESSENTIAL HYPERTENSION WITH GOAL BLOOD PRESSURE LESS THAN 130/80: ICD-10-CM

## 2025-01-27 RX ORDER — ASPIRIN 81 MG/1
81 TABLET, COATED ORAL DAILY
Qty: 90 TABLET | Refills: 97 | Status: SHIPPED | OUTPATIENT
Start: 2025-01-27

## 2025-01-27 RX ORDER — ESCITALOPRAM OXALATE 10 MG/1
TABLET ORAL
Qty: 90 TABLET | Refills: 97 | Status: SHIPPED | OUTPATIENT
Start: 2025-01-27

## 2025-01-27 RX ORDER — AMLODIPINE BESYLATE 5 MG/1
5 TABLET ORAL AT BEDTIME
Qty: 90 TABLET | Refills: 97 | Status: SHIPPED | OUTPATIENT
Start: 2025-01-27

## 2025-01-27 RX ORDER — LISINOPRIL 20 MG/1
TABLET ORAL
Qty: 90 TABLET | Refills: 97 | Status: SHIPPED | OUTPATIENT
Start: 2025-01-27

## 2025-01-27 RX ORDER — MULTIVITAMIN WITH FOLIC ACID 400 MCG
TABLET ORAL
Qty: 90 TABLET | Refills: 97 | Status: SHIPPED | OUTPATIENT
Start: 2025-01-27

## 2025-01-27 RX ORDER — FUROSEMIDE 40 MG/1
TABLET ORAL
Qty: 90 TABLET | Refills: 97 | Status: SHIPPED | OUTPATIENT
Start: 2025-01-27

## 2025-01-27 RX ORDER — VIT A/VIT C/VIT E/ZINC/COPPER 4296-226
1 CAPSULE ORAL 2 TIMES DAILY WITH MEALS
Qty: 180 CAPSULE | Refills: 97 | Status: SHIPPED | OUTPATIENT
Start: 2025-01-27

## 2025-01-28 LAB
ANION GAP SERPL CALCULATED.3IONS-SCNC: 11 MMOL/L (ref 7–15)
BUN SERPL-MCNC: 29.9 MG/DL (ref 8–23)
CALCIUM SERPL-MCNC: 9 MG/DL (ref 8.8–10.4)
CHLORIDE SERPL-SCNC: 100 MMOL/L (ref 98–107)
CREAT SERPL-MCNC: 1.25 MG/DL (ref 0.51–0.95)
EGFRCR SERPLBLD CKD-EPI 2021: 39 ML/MIN/1.73M2
ERYTHROCYTE [DISTWIDTH] IN BLOOD BY AUTOMATED COUNT: 13.3 % (ref 10–15)
GLUCOSE SERPL-MCNC: 93 MG/DL (ref 70–99)
HCO3 SERPL-SCNC: 27 MMOL/L (ref 22–29)
HCT VFR BLD AUTO: 35.4 % (ref 35–47)
HGB BLD-MCNC: 11.5 G/DL (ref 11.7–15.7)
MCH RBC QN AUTO: 30.1 PG (ref 26.5–33)
MCHC RBC AUTO-ENTMCNC: 32.5 G/DL (ref 31.5–36.5)
MCV RBC AUTO: 93 FL (ref 78–100)
PLATELET # BLD AUTO: 229 10E3/UL (ref 150–450)
POTASSIUM SERPL-SCNC: 4.9 MMOL/L (ref 3.4–5.3)
RBC # BLD AUTO: 3.82 10E6/UL (ref 3.8–5.2)
SODIUM SERPL-SCNC: 138 MMOL/L (ref 135–145)
WBC # BLD AUTO: 8.5 10E3/UL (ref 4–11)

## 2025-02-04 DIAGNOSIS — R52 PAIN: ICD-10-CM

## 2025-02-04 RX ORDER — OXYCODONE HYDROCHLORIDE 5 MG/1
2.5 TABLET ORAL EVERY 6 HOURS PRN
Qty: 30 TABLET | Refills: 0 | Status: SHIPPED | OUTPATIENT
Start: 2025-02-04

## 2025-02-10 NOTE — PROGRESS NOTES
Medication Therapy Management (MTM) Encounter    ASSESSMENT:                            Medication Adherence/Access: No issues identified.      Hypertension , Edema/Heart failure, CAD, PVD, h/o CVA:  Blood pressure typically at goal <150/90mmHg, with frequent diastolic blood pressures <60.  Preference to keep diastolic >60-65 to reduce risk of cardiac events, hypoperfusion, and may benefit from reduction in hydralazine to twice daily dosing and monitor blood pressure; hydralazine may also contribute to edema, CNS adverse effects, etc.  Of note, patient is not on statin - per chart review, appears she was previously on simvastatin but discontinued in 2011 due to leg cramps that improved with discontinuation.  Has likely remained off alternative statin due to age/goals of care despite secondary prevention, and would need to weigh risk vs benefit of re-trial of alternative statin (given recent findings from lower extremity ultrasound/sig peripheral vascular disease).      Diarrhea/IBS:  Stable.    Dry eyes, Dry mouth:  Stable.     COPD, Rhinitis   Stable.     Mental Health   Depression, Anxiety:   Stable.     Supplements: Stable.      Pain: may benefit from stopping as needed oxycodone due to non-use, associated risks, and may also benefit from stopping scheduled Tylenol as patient has been refusing & has as needed Tylenol available.    PLAN:                            Provider may consider reduction in hydralazine to 50mg twice daily and monitor blood pressure.  Provider may consider discontinuation of scheduled Tylenol and as needed oxycodone.      Follow-up: 1-3months, sooner if necessary    SUBJECTIVE/OBJECTIVE:                          Matilde Covarrubias is a 89 year old female seen for a follow-up visit.  This is her first MTM visit of the year.  She was referred to me from her health plan.       Reason for visit: comprehensive med review.    Allergies/ADRs: Reviewed in chart  Past Medical History: Reviewed in  "chart  Tobacco: She reports that she has never smoked. She has never used smokeless tobacco.  Alcohol: not currently using  Caffeine: 1 cup daily  Assistive Devices: Ustep walker; cane inside apartment  Recent Falls: none  Appetite: \"pretty good.\"  Meals at the facility. Slice of banana \"to help pills go down\".    Medication Adherence/Access: Patient has assistance with medication administration: assisted living.        Hypertension , Edema/Heart failure, CAD, PVD, h/o CVA:  Amlodipine 7.5mg daily in the evening  Aspirin 81mg daily in the morning  Lisinopril 20mg daily in the morning  Furosemide 40mg daily in the morning  Hydralazine 50mg three times daily     On chart review, history of vascular wounds, cellulitis, lower ext edema.  Patient states today she will receive lounge chair to help elevate her legs.  Uses Jobst.  Ultrasound yesterday of left lower extremity.  Patient denies dizziness, falls.    Bruises easily.  Notes edema in both lower extremities.  Shortness of breath with more significant activity; this is not new or worsened.  No palpitations or chest pain.  No black stools or issues with bleeding/bruising.  No side effects with medications noted.  Denies urinary frequency.  Notes Bps have been \"very good\" lately per her report.  BP Readings from Last 3 Encounters:   02/11/25 (!) 142/50   01/20/25 122/55   12/17/24 (!) 158/50             Diarrhea/IBS:  Loperamide 2mg two times daily & once daily as needed    Notes diarrhea \"sometimes; depends what I eat.\"  Denies constipation.  Feels she requires loperamide scheduled.  Denies heartburn, stomach upset \"if I worry about something.\"      Dry eyes, Dry mouth:  Advanced eye relief eye drops - 1 drop each eye three times daily & 2 drops daily as needed    Notes eye drops effective.  Sometimes dry mouth still an issue.  Will have a drink of water overnight when up to use restroom which helps.         COPD ,Rhinitis  Loratadine 5mg every other day  Spiriva " "2.5mcg - 2 puffs once daily    Rinsing mouth out with water after using Spiriva to see if this helps with dry mouth as we previously discussed following last visit.  No wheezing, coughing, runny nose.  Would like to continue with loratadine.      Patient reports no current medication side effects.         Mental Health   Depression, Anxiety:   Escitalopram 10mg daily in the evening    \"Sometimes anxiety if concerned about something.\"  Mood good most of the time.  Sleeps well at night.  She states escitalopram is a \"must.\"  States gets \"pretty anxious about stuff.\" Preference to continue with current medication - \"my stomach doesn't handle new meds very well.\"  Patient reports no current medication side effects.     Supplements   Vitamin D3 1000u daily  MVI daily  Preservision AREDS - 1 tablet twice daily    States well-rounded diet.  Eats at facility as noted above - not full portions - \"they give us so much.\"  Drinks one glass of whole milk once daily for lunch.  Sometimes will have another cup in the evening.  No reported issues at this time.        Pain:  Acetaminophen 1000mg every morning and daily as needed  Oxycodone 2.5mg every 6hr as needed    Left lower leg pain has improved - no longer bothering her at night.  Reports bandage is bothering her during the day, especially when gets up in the morning.  Notes helps when re-wrapped (twice weekly).  States hasn't been using Tylenol - but will ask if needed - effective. Facility notes indicate she has been refusing scheduled Tylenol.  Also note no prn use of Tylenol recently and no prn use of oxycodone since 12/28/24.    Estimated Creatinine Clearance: 23.8 mL/min (A) (based on SCr of 1.25 mg/dL (H)).    ----------------      I spent 25 minutes with this patient today.     A summary of these recommendations was sent via [employer].    Mimi Marcos, Pharm.D.,[facility]  Board Certified Geriatric Pharmacist  Medication Therapy Management Pharmacist  [phone]           " Medication Therapy Recommendations  Hypertension, unspecified type   1 Current Medication: hydrALAZINE (APRESOLINE) 50 MG tablet   Current Medication Sig: TAKE 1 TABLET BY MOUTH THREE TIMES DAILY   Rationale: Undesirable effect - Adverse medication event - Safety   Recommendation: Decrease Frequency   Status: Contact Provider - Awaiting Response   Identified Date: 2/11/2025         Pain   1 Current Medication: acetaminophen (TYLENOL) 500 MG tablet   Current Medication Sig: Take 2 tablets (1,000 mg) by mouth every morning. And once daily as needed   Rationale: Frequency inappropriate - Dosage too high - Safety   Recommendation: Decrease Frequency   Status: Contact Provider - Awaiting Response   Identified Date: 2/11/2025         2 Current Medication: oxyCODONE (ROXICODONE) 5 MG tablet   Current Medication Sig: Take 0.5 tablets (2.5 mg) by mouth every 6 hours as needed for severe pain.   Rationale: Order duration inappropriate - Dosage too high - Safety   Recommendation: Discontinue Medication   Status: Contact Provider - Awaiting Response   Identified Date: 2/11/2025

## 2025-02-11 ENCOUNTER — OFFICE VISIT (OUTPATIENT)
Dept: PHARMACY | Facility: CLINIC | Age: OVER 89
End: 2025-02-11
Payer: COMMERCIAL

## 2025-02-11 VITALS — SYSTOLIC BLOOD PRESSURE: 142 MMHG | DIASTOLIC BLOOD PRESSURE: 50 MMHG

## 2025-02-11 DIAGNOSIS — J30.2 SEASONAL ALLERGIC RHINITIS, UNSPECIFIED TRIGGER: ICD-10-CM

## 2025-02-11 DIAGNOSIS — F41.1 ANXIETY STATE: ICD-10-CM

## 2025-02-11 DIAGNOSIS — I10 HYPERTENSION, UNSPECIFIED TYPE: Primary | ICD-10-CM

## 2025-02-11 DIAGNOSIS — R52 PAIN: ICD-10-CM

## 2025-02-11 DIAGNOSIS — K58.0 IRRITABLE BOWEL SYNDROME WITH DIARRHEA: ICD-10-CM

## 2025-02-11 DIAGNOSIS — I25.10 CORONARY ARTERY DISEASE INVOLVING NATIVE HEART WITHOUT ANGINA PECTORIS, UNSPECIFIED VESSEL OR LESION TYPE: ICD-10-CM

## 2025-02-11 DIAGNOSIS — F32.0 MAJOR DEPRESSIVE DISORDER, SINGLE EPISODE, MILD: ICD-10-CM

## 2025-02-11 DIAGNOSIS — Z78.9 TAKES DIETARY SUPPLEMENTS: ICD-10-CM

## 2025-02-11 DIAGNOSIS — R60.0 LOCALIZED EDEMA: ICD-10-CM

## 2025-02-11 DIAGNOSIS — H04.123 DRY EYES: ICD-10-CM

## 2025-02-11 DIAGNOSIS — R68.2 DRY MOUTH: ICD-10-CM

## 2025-02-11 DIAGNOSIS — I73.9 PERIPHERAL VASCULAR DISEASE: ICD-10-CM

## 2025-02-11 DIAGNOSIS — I63.9 CEREBRAL INFARCTION (H): ICD-10-CM

## 2025-02-11 DIAGNOSIS — J44.9 COPD (CHRONIC OBSTRUCTIVE PULMONARY DISEASE) (H): ICD-10-CM

## 2025-02-11 PROCEDURE — 99607 MTMS BY PHARM ADDL 15 MIN: CPT | Performed by: PHARMACIST

## 2025-02-11 PROCEDURE — 99605 MTMS BY PHARM NP 15 MIN: CPT | Performed by: PHARMACIST

## 2025-02-11 NOTE — LETTER
_  Medication List        Prepared on: Feb 11, 2025     Bring your Medication List when you go to the doctor, hospital, or   emergency room. And, share it with your family or caregivers.     Note any changes to how you take your medications.  Cross out medications when you no longer use them.    Medication How I take it Why I use it Prescriber   acetaminophen (TYLENOL) 500 MG tablet Take 2 tablets (1,000 mg) by mouth every morning. And once daily as needed Pain CORNELIUS Meléndez CNP   amLODIPine (NORVASC) 2.5 MG tablet TAKE 1 TABLET BY MOUTH AT BEDTIME (TAKE WITH 5MG FOR A TOTAL OF 7.5MG)  Benign Essential Hypertension CORNELIUS Meléndez CNP   amLODIPine (NORVASC) 5 MG tablet TAKE 1 TABLET BY MOUTH AT BEDTIME Essential hypertension with goal blood pressure less than 130/80 CORNELIUS Meléndez CNP   ASPIRIN ADULT LOW DOSE 81 MG EC tablet TAKE 1 TABLET BY MOUTH ONCE DAILY Cerebral Infarction (H) CORNELIUS Meléndez CNP   escitalopram (LEXAPRO) 10 MG tablet TAKE 1 TABLET BY MOUTH ONCE DAILY [place] (Generalized Anxiety Disorder) CORNELIUS Meléndez CNP   furosemide (LASIX) 40 MG tablet TAKE 1 TABLET BY MOUTH ONCE DAILY Edema, unspecified type CORNELIUS Meléndez CNP   hydrALAZINE (APRESOLINE) 50 MG tablet TAKE 1 TABLET BY MOUTH THREE TIMES DAILY Essential hypertension  CORNELIUS Meléndez CNP   lisinopril (ZESTRIL) 20 MG tablet TAKE 1 TABLET BY MOUTH ONCE DAILY Essential hypertension  CORNELIUS Meléndez CNP   loperamide (IMODIUM) 2 MG capsule Take 1 capsule (2 mg) by mouth 2 times daily And 2 mg by mouth daily as needed Diarrhea, unspecified type CORNELIUS Meléndez CNP   loratadine (CLARITIN) 10 MG tablet Take 0.5 tablets (5 mg) by mouth every other day Chronic Rhinitis CORNELIUS Meléndez CNP   MOISTURE EYES 1-0.3 % SOLN INSTILL ONE DROP IN EACH EYE THREE TIMES DAILY;& MAY INSTILL 2 DROPS IN EACH EYE ONCE DAILY AS NEEDED Dry Eyes CORNELIUS Meléndez CNP    Multiple Vitamin (TAB-A-VITA) TABS TAKE 1 TABLET BY MOUTH ONCE DAILY Takes dietary supplements CORNELIUS Meléndez CNP   Multiple Vitamins-Minerals (PRESERVISION AREDS) CAPS TAKE 1 CAPSULE BY MOUTH TWICE DAILY WITH MEALS Senile Cataract CORNELIUS Meléndez CNP   oxyCODONE (ROXICODONE) 5 MG tablet Take 0.5 tablets (2.5 mg) by mouth every 6 hours as needed for severe pain. Pain CORNELIUS Meléndez CNP   SPIRIVA RESPIMAT 2.5 MCG/ACT inhaler INHALE 2 PUFFS INTO THE LUNGS ONCE DAILY Chronic obstructive pulmonary disease, unspecified COPD type (H) CORNELIUS Meléndez CNP   Vitamin D3 (CHOLECALCIFEROL) 25 mcg (1000 units) tablet Take 1 tablet (25 mcg) by mouth daily  supplement CORNELIUS Meléndez CNP         Add new medications, over-the-counter drugs, herbals, vitamins, or  minerals in the blank rows below.    Medication How I take it Why I use it Prescriber                                      Allergies:      - Clonidine Derivatives  - Entocort [corticosteroids]  - Macrobid [nitrofurantoin]  - Sulfa Antibiotics - Itching        Side effects I have had:      Not on File        Other Information:              My notes and questions:

## 2025-02-11 NOTE — LETTER
February 11, 2025  Shiloh JOHN Satish  C/O DUSTIN COVARRUBIAS  [address] [address] [place] [id number]    Dear Ms. Covarrubias, [facility]     Thank you for talking with me on Feb 11, 2025 about your health and medications. As a follow-up to our conversation, I have included two documents:      Your Recommended To-Do List has steps you should take to get the best results from your medications.  Your Medication List will help you keep track of your medications and how to take them.    If you want to talk about these documents, please call Mimi Marcos RPH at phone: [phone], Monday-Friday 8-4:30pm.    I look forward to working with you and your doctors to make sure your medications work well for you.    Sincerely,  Mimi Marcos RPH  [facility] Pharmacist, [facility]

## 2025-02-11 NOTE — Clinical Note
Thank you, [place]!  Let me know what you think about the hydralazine on her.  Wondered if it could be reduced to twice daily given her low diastolic blood pressure, but not sure what your thoughts are!  Thanks!!

## 2025-02-11 NOTE — LETTER
"Recommended To-Do List      Prepared on: Feb 11, 2025       You can get the best results from your medications by completing the items on this \"To-Do List.\"      Bring your To-Do List when you go to your doctor. And, share it with your family or caregivers.    My To-Do List:  What we talked about: What I should do:   An issue with your medication    Decrease how often you take hydrALAZINE (APRESOLINE) - IF provider agrees          What we talked about: What I should do:   Your medication dosage being too high    Stop scheduled acetaminophen (TYLENOL) (due to already refusing) - IF provider agrees          What we talked about: What I should do:   Your medication dosage being too high    Stop taking as needed oxyCODONE (ROXICODONE) - IF provider agrees          What we talked about: What I should do:                 What we talked about: What I should do:                     "

## 2025-02-11 NOTE — PATIENT INSTRUCTIONS
"Recommendations from today's MTM visit:                                                       Provider may consider reduction in hydralazine to 50mg twice daily and monitor blood pressure.  Provider may consider discontinuation of scheduled Tylenol and as needed oxycodone.        Follow-up: 1-3 months, sooner if necessary        It was great speaking with you today.  I value your experience and would be very thankful for your time in providing feedback in our clinic survey. In the next few days, you may receive an email or text message from [employer] with a link to a survey related to your  clinical pharmacist.\"     To schedule another MTM appointment, please call me directly or you may call the MTM scheduling line at [phone] or toll-free at [phone].     My Clinical Pharmacist's contact information:                                                      Please feel free to contact me with any questions or concerns you have.      Mimi Marcos, Pharm.D.,[facility] [place]  Board Certified Geriatric Pharmacist  Medication Therapy Management Pharmacist  [phone]      "

## 2025-02-13 DIAGNOSIS — I10 ESSENTIAL HYPERTENSION WITH GOAL BLOOD PRESSURE LESS THAN 130/80: ICD-10-CM

## 2025-02-13 RX ORDER — HYDRALAZINE HYDROCHLORIDE 50 MG/1
TABLET, FILM COATED ORAL
Qty: 270 TABLET | Refills: 11 | Status: SHIPPED | OUTPATIENT
Start: 2025-02-13

## 2025-02-13 NOTE — PROGRESS NOTES
Resident with slow to non-healing wound of LLE. Recent duplex of LE shows mild PAD with MAJOR and severe stenosis of left proximal SFA with post-stenotic monophasic waveforms, moderate stenosis of left proximal DFA, significant arthrosclerotic plaque throughout both LE, retrograde flow in left distal REMI at ankle. Prior use of statin off since 2011 with side effect of muscle cramps that resolved with medication discontinuing. On ASA    Plan:   -reviewed results with family  -may decide for vascular follow up  -wound care from homecare  -reducing hydralazine with lower diastolic to 50 mg po BID  -facility to obtain BP/HR daily x 14 days        Electronically signed by:  CORNELIUS Kenyon CNP

## 2025-02-24 NOTE — PROGRESS NOTES
[facility]  [facility] Medical Record Number:  [id number]  Place of Service where encounter took place:  [facility] [[id number]]  Chief Complaint   Patient presents with    RECHECK       HPI:    Shiloh Covarrubias  is a 89 year old (11/3/1929), who is being seen today for an episodic care visit.  HPI information obtained from: facility chart records, facility staff, patient report, and [facility] chart review. Today's concern is:    Follow up to left lower extremity wound, weight loss, BP. History of PAD and PVD with slow healing wound on LLE. Homecare is following, now using hydrofera to left lower leg and wound is improving. Denies pain today but intermittent episodes of LLE pain. No use of prn tylenol. No use of prn oxycodone since 12/2024.    With wider pulse pressure reduced hydralazine to 50 mg po BID with improvement to diastolic. Some weight loss and says wasn't real hungry when eating 123-->113 over 2 months. She has tried Mirtazapine in the past (2023) for a week or so and is willing to try again. She also is reporting more anxiety with this non-healing wound.     Past Medical and Surgical History reviewed in Epic today.    MEDICATIONS:    Current Outpatient Medications   Medication Sig Dispense Refill    mirtazapine (REMERON) 15 MG tablet Take 1 tablet (15 mg) by mouth at bedtime. 30 tablet 11    acetaminophen (TYLENOL) 500 MG tablet Take 2 tablets (1,000 mg) by mouth every morning. And once daily as needed (Patient not taking: Reported on 2/11/2025) 30 tablet 11    amLODIPine (NORVASC) 2.5 MG tablet TAKE 1 TABLET BY MOUTH AT BEDTIME (TAKE WITH 5MG FOR A TOTAL OF 7.5MG)  90 tablet 97    amLODIPine (NORVASC) 5 MG tablet TAKE 1 TABLET BY MOUTH AT BEDTIME 90 tablet 97    ASPIRIN ADULT LOW DOSE 81 MG EC tablet TAKE 1 TABLET BY MOUTH ONCE DAILY 90 tablet 97    escitalopram (LEXAPRO) 10 MG tablet TAKE 1 TABLET BY MOUTH ONCE DAILY 90 tablet 97    furosemide (LASIX) 40  "MG tablet TAKE 1 TABLET BY MOUTH ONCE DAILY 90 tablet 97    hydrALAZINE (APRESOLINE) 50 MG tablet TAKE 1 TABLET BY MOUTH TWICE  DAILY 270 tablet 11    lisinopril (ZESTRIL) 20 MG tablet TAKE 1 TABLET BY MOUTH ONCE DAILY 90 tablet 97    loperamide (IMODIUM) 2 MG capsule Take 1 capsule (2 mg) by mouth 2 times daily And 2 mg po daily prn 270 capsule 3    loratadine (CLARITIN) 10 MG tablet Take 0.5 tablets (5 mg) by mouth every other day 90 tablet 97    MOISTURE EYES 1-0.3 % SOLN INSTILL ONE DROP IN EACH EYE THREE TIMES DAILY;& MAY INSTILL 2 DROPS IN EACH EYE ONCE DAILY AS NEEDED 15 mL 97    Multiple Vitamin (TAB-A-VITA) TABS TAKE 1 TABLET BY MOUTH ONCE DAILY 90 tablet 97    Multiple Vitamins-Minerals (PRESERVISION AREDS) CAPS TAKE 1 CAPSULE BY MOUTH TWICE DAILY WITH MEALS 180 capsule 97    SPIRIVA RESPIMAT 2.5 MCG/ACT inhaler INHALE 2 PUFFS INTO THE LUNGS ONCE DAILY 4 g 97    Vitamin D3 (CHOLECALCIFEROL) 25 mcg (1000 units) tablet Take 1 tablet (25 mcg) by mouth daily       REVIEW OF SYSTEMS:  4 point ROS including Respiratory, CV, GI and , other than that noted in the HPI,  is negative    Objective:  /62   Pulse 68   Temp 97.7  F (36.5  C)   Resp 16   Ht 1.588 m (5' 2.5\")   Wt 51.7 kg (113 lb 14.4 oz)   LMP  (LMP Unknown)   SpO2 95%   BMI 20.50 kg/m    Exam:  GENERAL APPEARANCE:  Alert, chatty and bright  ENT:  Mouth and posterior oropharynx normal, dry mucous membranes, hoarse voice at baseline, [place], wears hearing aids   EYES:  EOM, conjunctivae, lids, pupils and irises normal, glasses  RESP:  respiratory effort and palpation of chest normal, lungs clear to auscultation but diminished   CV:  Palpation and auscultation of heart done , regular rate and rhythm, 2/6 murmur, LE edema 1+  compression  ABDOMEN: bowel sounds normal  M/S:   gait and station at baseline with U-step walker or cane (around apartment)   SKIN: left lateral/anterior slow healing wound. Minimal slough in wound scattered wound beds, no " warmth, slightly red  NEURO:   Cranial nerves 2-12 are normal tested and grossly at patient's baseline  PSYCH:  oriented X 3    Labs:   CBC RESULTS:   Recent Labs   Lab Test 01/28/25  1012 08/13/24  0847   WBC 8.5 7.9   RBC 3.82 4.06   HGB 11.5* 12.2   HCT 35.4 38.2   MCV 93 94   MCH 30.1 30.0   MCHC 32.5 31.9   RDW 13.3 13.2    196       Last Basic Metabolic Panel:  Recent Labs   Lab Test 01/28/25  1012 08/13/24  0847    138   POTASSIUM 4.9 4.3   CHLORIDE 100 98   TANIA 9.0 9.5   CO2 27 26   BUN 29.9* 31.3*   CR 1.25* 1.29*   GLC 93 124*       Liver Function Studies -   Recent Labs   Lab Test 08/13/24  0847 05/16/23  1210   PROTTOTAL 7.4 7.3   ALBUMIN 4.4 4.1   BILITOTAL 0.4 0.3   ALKPHOS 71 80   AST 26 25   ALT 14 15       TSH   Date Value Ref Range Status   08/13/2024 3.03 0.30 - 4.20 uIU/mL Final   05/16/2023 2.43 0.30 - 4.20 uIU/mL Final   08/27/2018 1.94 0.40 - 4.00 mU/L Final   09/05/2017 2.60 0.40 - 4.00 mU/L Final       Lab Results   Component Value Date    A1C 5.2 08/13/2024    A1C 5.3 08/27/2018     ASSESSMENT/PLAN:  (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, single episode, mild  (primary encounter diagnosis)  (R60.0) Bilateral leg edema  (E78.5) Hyperlipidemia LDL goal <100  (L97.929) Ulcer of left lower extremity, unspecified ulcer stage (H)  (R63.4) Weight loss    New orders:   -only doing 1 medication change at a time to assist with compliance   -discontinuing oxycodone  -starting mirtazapine 15 mg po daily at HS  -discuss crestor 2.5 mg po 3x weekly at next visit or ezetimibe at next visit once stable on mirtazapine   -updated lipid with next set of labs  -continue with homecare for woundcare      -see in conjunction with NP student     Electronically signed by:  CORNELIUS Kenyon CNP

## 2025-02-25 ENCOUNTER — ASSISTED LIVING VISIT (OUTPATIENT)
Dept: GERIATRICS | Facility: CLINIC | Age: OVER 89
End: 2025-02-25
Payer: COMMERCIAL

## 2025-02-25 VITALS
RESPIRATION RATE: 16 BRPM | WEIGHT: 113.9 LBS | TEMPERATURE: 97.7 F | OXYGEN SATURATION: 95 % | BODY MASS INDEX: 20.18 KG/M2 | DIASTOLIC BLOOD PRESSURE: 62 MMHG | SYSTOLIC BLOOD PRESSURE: 132 MMHG | HEIGHT: 63 IN | HEART RATE: 68 BPM

## 2025-02-25 DIAGNOSIS — F32.0 MAJOR DEPRESSIVE DISORDER, SINGLE EPISODE, MILD: Primary | ICD-10-CM

## 2025-02-25 DIAGNOSIS — R63.4 WEIGHT LOSS: ICD-10-CM

## 2025-02-25 DIAGNOSIS — R60.0 BILATERAL LEG EDEMA: ICD-10-CM

## 2025-02-25 DIAGNOSIS — L97.929 ULCER OF LEFT LOWER EXTREMITY, UNSPECIFIED ULCER STAGE (H): ICD-10-CM

## 2025-02-25 DIAGNOSIS — E78.5 HYPERLIPIDEMIA LDL GOAL <100: ICD-10-CM

## 2025-02-25 PROCEDURE — 99349 HOME/RES VST EST MOD MDM 40: CPT | Performed by: NURSE PRACTITIONER

## 2025-02-25 RX ORDER — MIRTAZAPINE 15 MG/1
15 TABLET, FILM COATED ORAL AT BEDTIME
Qty: 30 TABLET | Refills: 11 | Status: SHIPPED | OUTPATIENT
Start: 2025-02-25

## 2025-02-25 NOTE — LETTER
2/25/2025      Shiloh Covarrubias  C/o Chuck Covarrubias  [address] [address] [place] [id number]        [facility]  [facility] Medical Record Number:  [id number]  Place of Service where encounter took place:  [facility] [[id number]]  Chief Complaint   Patient presents with     RECHECK       HPI:    Shiloh Covarrubias  is a 89 year old (11/3/1929), who is being seen today for an episodic care visit.  HPI information obtained from: facility chart records, facility staff, patient report, and [facility] chart review. Today's concern is:    Follow up to left lower extremity wound, weight loss, BP. History of PAD and PVD with slow healing wound on LLE. Homecare is following, now using hydrofera to left lower leg and wound is improving. Denies pain today but intermittent episodes of LLE pain. No use of prn tylenol. No use of prn oxycodone since 12/2024.    With wider pulse pressure reduced hydralazine to 50 mg po BID with improvement to diastolic. Some weight loss and says wasn't real hungry when eating 123-->113 over 2 months. She has tried Mirtazapine in the past (2023) for a week or so and is willing to try again. She also is reporting more anxiety with this non-healing wound.     Past Medical and Surgical History reviewed in Epic today.    MEDICATIONS:    Current Outpatient Medications   Medication Sig Dispense Refill     mirtazapine (REMERON) 15 MG tablet Take 1 tablet (15 mg) by mouth at bedtime. 30 tablet 11     acetaminophen (TYLENOL) 500 MG tablet Take 2 tablets (1,000 mg) by mouth every morning. And once daily as needed (Patient not taking: Reported on 2/11/2025) 30 tablet 11     amLODIPine (NORVASC) 2.5 MG tablet TAKE 1 TABLET BY MOUTH AT BEDTIME (TAKE WITH 5MG FOR A TOTAL OF 7.5MG)  90 tablet 97     amLODIPine (NORVASC) 5 MG tablet TAKE 1 TABLET BY MOUTH AT BEDTIME 90 tablet 97     ASPIRIN ADULT LOW DOSE 81 MG EC tablet TAKE 1 TABLET BY MOUTH ONCE DAILY 90 tablet 97      "escitalopram (LEXAPRO) 10 MG tablet TAKE 1 TABLET BY MOUTH ONCE DAILY 90 tablet 97     furosemide (LASIX) 40 MG tablet TAKE 1 TABLET BY MOUTH ONCE DAILY 90 tablet 97     hydrALAZINE (APRESOLINE) 50 MG tablet TAKE 1 TABLET BY MOUTH TWICE  DAILY 270 tablet 11     lisinopril (ZESTRIL) 20 MG tablet TAKE 1 TABLET BY MOUTH ONCE DAILY 90 tablet 97     loperamide (IMODIUM) 2 MG capsule Take 1 capsule (2 mg) by mouth 2 times daily And 2 mg po daily prn 270 capsule 3     loratadine (CLARITIN) 10 MG tablet Take 0.5 tablets (5 mg) by mouth every other day 90 tablet 97     MOISTURE EYES 1-0.3 % SOLN INSTILL ONE DROP IN EACH EYE THREE TIMES DAILY;& MAY INSTILL 2 DROPS IN EACH EYE ONCE DAILY AS NEEDED 15 mL 97     Multiple Vitamin (TAB-A-VITA) TABS TAKE 1 TABLET BY MOUTH ONCE DAILY 90 tablet 97     Multiple Vitamins-Minerals (PRESERVISION AREDS) CAPS TAKE 1 CAPSULE BY MOUTH TWICE DAILY WITH MEALS 180 capsule 97     SPIRIVA RESPIMAT 2.5 MCG/ACT inhaler INHALE 2 PUFFS INTO THE LUNGS ONCE DAILY 4 g 97     Vitamin D3 (CHOLECALCIFEROL) 25 mcg (1000 units) tablet Take 1 tablet (25 mcg) by mouth daily       REVIEW OF SYSTEMS:  4 point ROS including Respiratory, CV, GI and , other than that noted in the HPI,  is negative    Objective:  /62   Pulse 68   Temp 97.7  F (36.5  C)   Resp 16   Ht 1.588 m (5' 2.5\")   Wt 51.7 kg (113 lb 14.4 oz)   LMP  (LMP Unknown)   SpO2 95%   BMI 20.50 kg/m    Exam:  GENERAL APPEARANCE:  Alert, chatty and bright  ENT:  Mouth and posterior oropharynx normal, dry mucous membranes, hoarse voice at baseline, [place], wears hearing aids   EYES:  EOM, conjunctivae, lids, pupils and irises normal, glasses  RESP:  respiratory effort and palpation of chest normal, lungs clear to auscultation but diminished   CV:  Palpation and auscultation of heart done , regular rate and rhythm, 2/6 murmur, LE edema 1+  compression  ABDOMEN: bowel sounds normal  M/S:   gait and station at baseline with U-step walker or cane " (around apartment)   SKIN: left lateral/anterior slow healing wound. Minimal slough in wound scattered wound beds, no warmth, slightly red  NEURO:   Cranial nerves 2-12 are normal tested and grossly at patient's baseline  PSYCH:  oriented X 3    Labs:   CBC RESULTS:   Recent Labs   Lab Test 01/28/25  1012 08/13/24  0847   WBC 8.5 7.9   RBC 3.82 4.06   HGB 11.5* 12.2   HCT 35.4 38.2   MCV 93 94   MCH 30.1 30.0   MCHC 32.5 31.9   RDW 13.3 13.2    196       Last Basic Metabolic Panel:  Recent Labs   Lab Test 01/28/25  1012 08/13/24  0847    138   POTASSIUM 4.9 4.3   CHLORIDE 100 98   TANIA 9.0 9.5   CO2 27 26   BUN 29.9* 31.3*   CR 1.25* 1.29*   GLC 93 124*       Liver Function Studies -   Recent Labs   Lab Test 08/13/24  0847 05/16/23  1210   PROTTOTAL 7.4 7.3   ALBUMIN 4.4 4.1   BILITOTAL 0.4 0.3   ALKPHOS 71 80   AST 26 25   ALT 14 15       TSH   Date Value Ref Range Status   08/13/2024 3.03 0.30 - 4.20 uIU/mL Final   05/16/2023 2.43 0.30 - 4.20 uIU/mL Final   08/27/2018 1.94 0.40 - 4.00 mU/L Final   09/05/2017 2.60 0.40 - 4.00 mU/L Final       Lab Results   Component Value Date    A1C 5.2 08/13/2024    A1C 5.3 08/27/2018     ASSESSMENT/PLAN:  (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, single episode, mild  (primary encounter diagnosis)  (R60.0) Bilateral leg edema  (E78.5) Hyperlipidemia LDL goal <100  (L97.929) Ulcer of left lower extremity, unspecified ulcer stage (H)  (R63.4) Weight loss    New orders:   -only doing 1 medication change at a time to assist with compliance   -discontinuing oxycodone  -starting mirtazapine 15 mg po daily at HS  -discuss crestor 2.5 mg po 3x weekly at next visit or ezetimibe at next visit once stable on mirtazapine   -updated lipid with next set of labs  -continue with homecare for woundcare      -see in conjunction with NP student     Electronically signed by:  CORNELIUS Kenyon CNP           Sincerely,        CORNELIUS Kenyon CNP    Electronically signed

## 2025-02-25 NOTE — LETTER
Shiloh Covarrubias orders:     -discontinue oxycodone    -begin Mirtazapine 15 mg po daily at HS for anxiety/depression and appetite stimulant          Electronically signed by:  CORNELIUS Kenyon CNP

## 2025-02-27 DIAGNOSIS — Z53.9 DIAGNOSIS NOT YET DEFINED: Primary | ICD-10-CM

## 2025-02-27 PROCEDURE — G0179 MD RECERTIFICATION HHA PT: HCPCS | Performed by: NURSE PRACTITIONER

## 2025-03-05 DIAGNOSIS — I73.9 PAD (PERIPHERAL ARTERY DISEASE): Primary | ICD-10-CM

## 2025-03-05 DIAGNOSIS — R52 PAIN: ICD-10-CM

## 2025-03-05 RX ORDER — ACETAMINOPHEN 500 MG
1000 TABLET ORAL 2 TIMES DAILY
Qty: 30 TABLET | Refills: 11 | Status: SHIPPED | OUTPATIENT
Start: 2025-03-05

## 2025-03-05 NOTE — PROGRESS NOTES
Nursing called to report resident with intermittent fever, stomach pain. Many at facility with GI virus. Additionally having increased left leg pain and requesting more Tylenol. Resident with slow to non-healing wound of LLE and hx of PVD, PAD, HTN. She has resisted any medication changes, start of statin. HTN is controlled. Recent duplex of LE shows mild PAD with MAJOR and severe stenosis of left proximal SFA with post-stenotic monophasic waveforms, moderate stenosis of left proximal DFA, significant arthrosclerotic plaque throughout both LE, retrograde flow in left distal REMI at ankle. Prior use of statin off since 2011 with side effect of muscle cramps that resolved with medication discontinuing. On ASA     Plan:  -nursing onsite deferring to eval LLE for s/s of infection as homecare is coming out this evening. If not, onsite nurse to eval LLE for s/s of infection and report back to provider group    -BMP and CBC w/diff on next lab day for infection     -vascular consult to eval (resident is ambulatory and attend clinic appointments)    -tylenol to 1,000 mg po TID x 7 days and then return to previous order     Electronically signed by:  CORNELIUS Kenyon CNP

## 2025-03-06 ENCOUNTER — TELEPHONE (OUTPATIENT)
Dept: GERIATRICS | Facility: CLINIC | Age: OVER 89
End: 2025-03-06
Payer: COMMERCIAL

## 2025-03-06 ENCOUNTER — TELEPHONE (OUTPATIENT)
Dept: OTHER | Facility: CLINIC | Age: OVER 89
End: 2025-03-06
Payer: COMMERCIAL

## 2025-03-06 DIAGNOSIS — L03.116 CELLULITIS OF LEFT LOWER EXTREMITY: Primary | ICD-10-CM

## 2025-03-06 DIAGNOSIS — I73.9 PERIPHERAL VASCULAR DISEASE: Primary | ICD-10-CM

## 2025-03-06 RX ORDER — CEPHALEXIN 500 MG/1
500 CAPSULE ORAL 2 TIMES DAILY
Qty: 14 CAPSULE | Refills: 0 | Status: SHIPPED | OUTPATIENT
Start: 2025-03-06 | End: 2025-03-13

## 2025-03-06 NOTE — TELEPHONE ENCOUNTER
ORDERS GIVEN:   - cephALEXin (KEFLEX) 500 MG capsule Take 1 capsule (500 mg) by mouth 2 times daily for 7 days.     Provider giving order: CORNELIUS Matos CNP    Order given to: Emailed to MONY Childs at [facility]    Ariella Diaz RN

## 2025-03-06 NOTE — TELEPHONE ENCOUNTER
"  Yes. Will treat. Estimated Creatinine Clearance: 22 mL/min (A) (based on SCr of 1.25 mg/dL (H)).    Plan:     cephALEXin (KEFLEX) 500 MG capsule Take 1 capsule (500 mg) by mouth 2 times daily for 7 days.     CORNELIUS Kenyon CNP on 3/6/2025 at 4:36 PM          [facility]iatric[facility] Triage Call    Provider: CORNELIUS Matos CNP  Facility: [facility] Facility Type:  AL    Caller: [employer]    Call Back Number: [phone]    Allergies:    Allergies   Allergen Reactions    Clonidine Derivatives      Severe side effects      Entocort [Corticosteroids]     Macrobid [Nitrofurantoin]      Diarrhea      Sulfa Antibiotics Itching     itch        SBAR:     S-(situation): Concern for cellulitis    B-(background): Wound on LLE, treated for infection previously in December with Doxycycline.     A-(assessment): Today nursing reports wound has no sign of infection. But the LLE and foot is bright red from knee down and warm to touch. Foot is 2+ edema. Pt reports no pain today, but had some yesterday. Temp 100.2 and endorses feeling weak.     R-(recommendations): Tx for cellulitis?        Telephone encounter sent to:  CORNELIUS Matos CNP    Please send response/orders to \"Geriatrics Nurse Pool\"    Ariella Diaz RN      "

## 2025-03-06 NOTE — TELEPHONE ENCOUNTER
"Referral received via [employer] on 03/05/2025.    Referred by CORNELIUS Sebastian CNP for \"PAD with pain, possible chronic limb ischemia as seen on portable imaging.\"    Previous imaging completed (pertinent to referral):  02/10/2025 [facility]    Routing to scheduling to coordinate the following:  [facility] w/ TBI  Bilateral Arterial Duplex  CTA abd/pelvis with runoffs  NEW VASCULAR PATIENT consult with Vascular Surgery; they are requesting [place] location if possible  Please schedule this within 1-2 weeks        Appt note:    Referred by CORNELIUS Sebastian CNP for \"PAD with pain, possible chronic limb ischemia as seen on portable imaging.\"  "

## 2025-03-06 NOTE — TELEPHONE ENCOUNTER
Called and spoke to patient's son. He still needs to talk to his mother about this referral to see if she would like to get scheduled. Patient is also currently sick with a cold and would not be able to come in for a few days. Requested a call back on Monday.      Laya Newton MA

## 2025-03-07 ENCOUNTER — LAB REQUISITION (OUTPATIENT)
Dept: LAB | Facility: CLINIC | Age: OVER 89
End: 2025-03-07
Payer: COMMERCIAL

## 2025-03-07 DIAGNOSIS — R53.1 WEAKNESS: ICD-10-CM

## 2025-03-07 DIAGNOSIS — R50.9 FEVER, UNSPECIFIED: ICD-10-CM

## 2025-03-10 NOTE — TELEPHONE ENCOUNTER
Unable to schedule imaging and consult in [place] within the requested time line.      Laya Newton MA

## 2025-03-10 NOTE — TELEPHONE ENCOUNTER
Patient is scheduled with Dr. Everett on 04/09/25.  Patient was advised to be seen within 1 to 2 weeks, referral received on 03/05/25.    Routing to Dr. Everett to advise if patient should be seen sooner than scheduled consult date, as referral was sent priority.

## 2025-03-10 NOTE — TELEPHONE ENCOUNTER
Reviewed referral with Dr. Everett.      Per Dr. Everett, patient does not need to be seen urgently.  Also, please cancel bilateral arterial duplex and CTA.      Routing to scheduling to coordinate this with the patient.

## 2025-03-10 NOTE — TELEPHONE ENCOUNTER
Spoke with patient's son and updated him on the new recommendation. He was extremely appreciative that Dr. Everett and our nurses took the time to review the referral and only need the necessary testing given his mother's age.      Laya Newton MA

## 2025-03-11 ENCOUNTER — ASSISTED LIVING VISIT (OUTPATIENT)
Dept: GERIATRICS | Facility: CLINIC | Age: OVER 89
End: 2025-03-11
Payer: COMMERCIAL

## 2025-03-11 ENCOUNTER — TRANSFERRED RECORDS (OUTPATIENT)
Dept: HEALTH INFORMATION MANAGEMENT | Facility: CLINIC | Age: OVER 89
End: 2025-03-11

## 2025-03-11 VITALS
HEIGHT: 63 IN | TEMPERATURE: 97.8 F | DIASTOLIC BLOOD PRESSURE: 78 MMHG | SYSTOLIC BLOOD PRESSURE: 124 MMHG | BODY MASS INDEX: 20.18 KG/M2 | WEIGHT: 113.9 LBS | OXYGEN SATURATION: 95 % | RESPIRATION RATE: 12 BRPM | HEART RATE: 60 BPM

## 2025-03-11 DIAGNOSIS — M79.89 PAIN AND SWELLING OF LEFT LOWER EXTREMITY: ICD-10-CM

## 2025-03-11 DIAGNOSIS — M79.605 PAIN AND SWELLING OF LEFT LOWER EXTREMITY: ICD-10-CM

## 2025-03-11 DIAGNOSIS — L03.116 CELLULITIS OF LEFT LOWER EXTREMITY: Primary | ICD-10-CM

## 2025-03-11 LAB
ANION GAP SERPL CALCULATED.3IONS-SCNC: 14 MMOL/L (ref 7–15)
BASOPHILS # BLD AUTO: 0 10E3/UL (ref 0–0.2)
BASOPHILS NFR BLD AUTO: 0 %
BUN SERPL-MCNC: 43.6 MG/DL (ref 8–23)
CALCIUM SERPL-MCNC: 9.1 MG/DL (ref 8.8–10.4)
CHLORIDE SERPL-SCNC: 100 MMOL/L (ref 98–107)
CREAT SERPL-MCNC: 1.23 MG/DL (ref 0.51–0.95)
EGFRCR SERPLBLD CKD-EPI 2021: 40 ML/MIN/1.73M2
EOSINOPHIL # BLD AUTO: 0.1 10E3/UL (ref 0–0.7)
EOSINOPHIL NFR BLD AUTO: 1 %
ERYTHROCYTE [DISTWIDTH] IN BLOOD BY AUTOMATED COUNT: 13.8 % (ref 10–15)
GLUCOSE SERPL-MCNC: 121 MG/DL (ref 70–99)
HCO3 SERPL-SCNC: 23 MMOL/L (ref 22–29)
HCT VFR BLD AUTO: 32.9 % (ref 35–47)
HGB BLD-MCNC: 10.4 G/DL (ref 11.7–15.7)
IMM GRANULOCYTES # BLD: 0.1 10E3/UL
IMM GRANULOCYTES NFR BLD: 1 %
LYMPHOCYTES # BLD AUTO: 0.9 10E3/UL (ref 0.8–5.3)
LYMPHOCYTES NFR BLD AUTO: 10 %
MCH RBC QN AUTO: 29 PG (ref 26.5–33)
MCHC RBC AUTO-ENTMCNC: 31.6 G/DL (ref 31.5–36.5)
MCV RBC AUTO: 92 FL (ref 78–100)
MONOCYTES # BLD AUTO: 0.7 10E3/UL (ref 0–1.3)
MONOCYTES NFR BLD AUTO: 8 %
NEUTROPHILS # BLD AUTO: 7.7 10E3/UL (ref 1.6–8.3)
NEUTROPHILS NFR BLD AUTO: 81 %
NRBC # BLD AUTO: 0 10E3/UL
NRBC BLD AUTO-RTO: 0 /100
PLATELET # BLD AUTO: 251 10E3/UL (ref 150–450)
POTASSIUM SERPL-SCNC: 3.9 MMOL/L (ref 3.4–5.3)
RBC # BLD AUTO: 3.59 10E6/UL (ref 3.8–5.2)
SODIUM SERPL-SCNC: 137 MMOL/L (ref 135–145)
WBC # BLD AUTO: 9.5 10E3/UL (ref 4–11)

## 2025-03-11 PROCEDURE — 80048 BASIC METABOLIC PNL TOTAL CA: CPT | Mod: ORL | Performed by: NURSE PRACTITIONER

## 2025-03-11 PROCEDURE — P9604 ONE-WAY ALLOW PRORATED TRIP: HCPCS | Mod: ORL | Performed by: NURSE PRACTITIONER

## 2025-03-11 PROCEDURE — 99349 HOME/RES VST EST MOD MDM 40: CPT | Performed by: NURSE PRACTITIONER

## 2025-03-11 PROCEDURE — 36415 COLL VENOUS BLD VENIPUNCTURE: CPT | Mod: ORL | Performed by: NURSE PRACTITIONER

## 2025-03-11 PROCEDURE — 85025 COMPLETE CBC W/AUTO DIFF WBC: CPT | Mod: ORL | Performed by: NURSE PRACTITIONER

## 2025-03-11 RX ORDER — SULFAMETHOXAZOLE AND TRIMETHOPRIM 400; 80 MG/1; MG/1
1 TABLET ORAL 2 TIMES DAILY
Qty: 14 TABLET | Refills: 0 | Status: ON HOLD | OUTPATIENT
Start: 2025-03-11 | End: 2025-03-18

## 2025-03-11 RX ORDER — OXYCODONE HYDROCHLORIDE 5 MG/1
2.5 TABLET ORAL EVERY 6 HOURS PRN
Qty: 30 TABLET | Refills: 0 | Status: ON HOLD | OUTPATIENT
Start: 2025-03-11

## 2025-03-11 NOTE — PROGRESS NOTES
"[facility]  [place] [facility] Record Number:  [id number]  Place of Service where encounter took place:  [facility] [[id number]]  Chief Complaint   Patient presents with    RECHECK     LLE infection       HPI:    Shiloh Covarrubias  is a 89 year old (11/3/1929), who is being seen today for an episodic care visit.  HPI information obtained from: facility chart records, facility staff, patient report, and [facility] chart review. Today's concern is:    Follow up to LLE infection, cellulitis, ankle pain (did have a fall yesterday). History of PAD and PVD with slow to non- healing wound on LLE. She was started on antibiotics this past weekend. Had a course of doxycycline back in December. Having much pain in LLE. Was started on scheduled tylenol but is not taking as it \"makes me jittery\". Has used oxycodone in the past for pain noted as effective.     Past Medical and Surgical History reviewed in Epic today.    MEDICATIONS:    Current Outpatient Medications   Medication Sig Dispense Refill    oxyCODONE (ROXICODONE) 5 MG tablet Take 0.5 tablets (2.5 mg) by mouth every 6 hours as needed for pain. 30 tablet 0    sulfamethoxazole-trimethoprim (BACTRIM) 400-80 MG tablet Take 1 tablet by mouth 2 times daily for 7 days. 14 tablet 0    acetaminophen (TYLENOL) 500 MG tablet Take 2 tablets (1,000 mg) by mouth 2 times daily. And 1,000 mg po once daily as needed. Do for 7 days then return to previous order 30 tablet 11    amLODIPine (NORVASC) 2.5 MG tablet TAKE 1 TABLET BY MOUTH AT BEDTIME (TAKE WITH 5MG FOR A TOTAL OF 7.5MG)  90 tablet 97    amLODIPine (NORVASC) 5 MG tablet TAKE 1 TABLET BY MOUTH AT BEDTIME 90 tablet 97    ASPIRIN ADULT LOW DOSE 81 MG EC tablet TAKE 1 TABLET BY MOUTH ONCE DAILY 90 tablet 97    escitalopram (LEXAPRO) 10 MG tablet TAKE 1 TABLET BY MOUTH ONCE DAILY 90 tablet 97    furosemide (LASIX) 40 MG tablet TAKE 1 TABLET BY MOUTH ONCE DAILY 90 tablet 97    hydrALAZINE " "(APRESOLINE) 50 MG tablet TAKE 1 TABLET BY MOUTH TWICE  DAILY 270 tablet 11    lisinopril (ZESTRIL) 20 MG tablet TAKE 1 TABLET BY MOUTH ONCE DAILY 90 tablet 97    loperamide (IMODIUM) 2 MG capsule Take 1 capsule (2 mg) by mouth 2 times daily And 2 mg po daily prn 270 capsule 3    loratadine (CLARITIN) 10 MG tablet Take 0.5 tablets (5 mg) by mouth every other day 90 tablet 97    mirtazapine (REMERON) 15 MG tablet Take 1 tablet (15 mg) by mouth at bedtime. 30 tablet 11    MOISTURE EYES 1-0.3 % SOLN INSTILL ONE DROP IN EACH EYE THREE TIMES DAILY;& MAY INSTILL 2 DROPS IN EACH EYE ONCE DAILY AS NEEDED 15 mL 97    Multiple Vitamin (TAB-A-VITA) TABS TAKE 1 TABLET BY MOUTH ONCE DAILY 90 tablet 97    Multiple Vitamins-Minerals (PRESERVISION AREDS) CAPS TAKE 1 CAPSULE BY MOUTH TWICE DAILY WITH MEALS 180 capsule 97    SPIRIVA RESPIMAT 2.5 MCG/ACT inhaler INHALE 2 PUFFS INTO THE LUNGS ONCE DAILY 4 g 97    Vitamin D3 (CHOLECALCIFEROL) 25 mcg (1000 units) tablet Take 1 tablet (25 mcg) by mouth daily       REVIEW OF SYSTEMS:  4 point ROS including Respiratory, CV, GI and , other than that noted in the HPI,  is negative    Objective:  /78   Pulse 60   Temp 97.8  F (36.6  C)   Resp 12   Ht 1.588 m (5' 2.5\")   Wt 51.7 kg (113 lb 14.4 oz)   LMP  (LMP Unknown)   SpO2 95%   BMI 20.50 kg/m    Exam:  GENERAL APPEARANCE:  Alert  ENT:  Mouth and posterior oropharynx normal, dry mucous membranes, hoarse voice at baseline, [place], wears hearing aids   EYES:  EOM, conjunctivae, lids, pupils and irises normal, glasses  RESP:  respiratory effort and palpation of chest normal, lungs clear to auscultation but diminished   CV:  Palpation and auscultation of heart done , regular rate and rhythm, 2/6 murmur, LE edema 2+  ABDOMEN: bowel sounds normal  M/S:   gait and station at baseline with U-step walker or cane (around apartment)   SKIN: left lateral/anterior with spreading open areas, slough in wound scattered wound beds, warmth, " increased redness and swelling  NEURO:   Cranial nerves 2-12 are normal tested and grossly at patient's baseline  PSYCH:  oriented X 3    Labs:   CBC RESULTS:   Recent Labs   Lab Test 03/11/25  0600 01/28/25  1012   WBC 9.5 8.5   RBC 3.59* 3.82   HGB 10.4* 11.5*   HCT 32.9* 35.4   MCV 92 93   MCH 29.0 30.1   MCHC 31.6 32.5   RDW 13.8 13.3    229       Last Basic Metabolic Panel:  Recent Labs   Lab Test 03/11/25  0600 01/28/25  1012    138   POTASSIUM 3.9 4.9   CHLORIDE 100 100   TANIA 9.1 9.0   CO2 23 27   BUN 43.6* 29.9*   CR 1.23* 1.25*   * 93       Liver Function Studies -   Recent Labs   Lab Test 08/13/24  0847 05/16/23  1210   PROTTOTAL 7.4 7.3   ALBUMIN 4.4 4.1   BILITOTAL 0.4 0.3   ALKPHOS 71 80   AST 26 25   ALT 14 15       TSH   Date Value Ref Range Status   08/13/2024 3.03 0.30 - 4.20 uIU/mL Final   05/16/2023 2.43 0.30 - 4.20 uIU/mL Final   08/27/2018 1.94 0.40 - 4.00 mU/L Final   09/05/2017 2.60 0.40 - 4.00 mU/L Final       Lab Results   Component Value Date    A1C 5.2 08/13/2024    A1C 5.3 08/27/2018     Estimated Creatinine Clearance: 22.3 mL/min (A) (based on SCr of 1.23 mg/dL (H)).      ASSESSMENT/PLAN:  (L03.116) Cellulitis of left lower extremity  (primary encounter diagnosis)  (M79.605,  M79.89) Pain and swelling of left lower extremity  Comment: worsening-no elevated WBC  Plan:   -with purulent drainage changing from Keflex to Bactrim (use of single strength with CKD)  -US and doppler for DVT concern  -oxycodone for pain  -vascular follow up 4/9      Electronically signed by:  Israel Scott, CORNELIUS PAGAN

## 2025-03-11 NOTE — LETTER
Shiloh Covarrubias orders:     -continue keflex until new antibiotic arrives    -stop keflex when Bactrim arrives and begin     sulfamethoxazole-trimethoprim (BACTRIM) 400-80 MG tablet Take 1 tablet by mouth 2 times daily for 7 days.     -US and doppler to LLE for DVT STAT      Electronically signed by:  CORNELIUS Kenyon CNP

## 2025-03-11 NOTE — LETTER
Shiloh Covarrubias orders:       -begin     oxyCODONE (ROXICODONE) 5 MG tablet Take 0.5 tablets (2.5 mg) by mouth every 6 hours as needed for pain.           Electronically signed by:  CORNELIUS Kenyon CNP

## 2025-03-11 NOTE — LETTER
" 3/11/2025      Shiloh Covarrubias  C/o Chuck Covarrubias  [address] [address] [place] [id number]        [facility]  [place] [facility] Record Number:  [id number]  Place of Service where encounter took place:  [facility] [[id number]]  Chief Complaint   Patient presents with     RECHECK     LLE infection       HPI:    Shiloh Covarrubias  is a 89 year old (11/3/1929), who is being seen today for an episodic care visit.  HPI information obtained from: facility chart records, facility staff, patient report, and [facility] chart review. Today's concern is:    Follow up to LLE infection, cellulitis, ankle pain (did have a fall yesterday). History of PAD and PVD with slow to non- healing wound on LLE. She was started on antibiotics this past weekend. Had a course of doxycycline back in December. Having much pain in LLE. Was started on scheduled tylenol but is not taking as it \"makes me jittery\". Has used oxycodone in the past for pain noted as effective.     Past Medical and Surgical History reviewed in Epic today.    MEDICATIONS:    Current Outpatient Medications   Medication Sig Dispense Refill     oxyCODONE (ROXICODONE) 5 MG tablet Take 0.5 tablets (2.5 mg) by mouth every 6 hours as needed for pain. 30 tablet 0     sulfamethoxazole-trimethoprim (BACTRIM) 400-80 MG tablet Take 1 tablet by mouth 2 times daily for 7 days. 14 tablet 0     acetaminophen (TYLENOL) 500 MG tablet Take 2 tablets (1,000 mg) by mouth 2 times daily. And 1,000 mg po once daily as needed. Do for 7 days then return to previous order 30 tablet 11     amLODIPine (NORVASC) 2.5 MG tablet TAKE 1 TABLET BY MOUTH AT BEDTIME (TAKE WITH 5MG FOR A TOTAL OF 7.5MG)  90 tablet 97     amLODIPine (NORVASC) 5 MG tablet TAKE 1 TABLET BY MOUTH AT BEDTIME 90 tablet 97     ASPIRIN ADULT LOW DOSE 81 MG EC tablet TAKE 1 TABLET BY MOUTH ONCE DAILY 90 tablet 97     escitalopram (LEXAPRO) 10 MG tablet TAKE 1 TABLET BY MOUTH ONCE DAILY 90 " "tablet 97     furosemide (LASIX) 40 MG tablet TAKE 1 TABLET BY MOUTH ONCE DAILY 90 tablet 97     hydrALAZINE (APRESOLINE) 50 MG tablet TAKE 1 TABLET BY MOUTH TWICE  DAILY 270 tablet 11     lisinopril (ZESTRIL) 20 MG tablet TAKE 1 TABLET BY MOUTH ONCE DAILY 90 tablet 97     loperamide (IMODIUM) 2 MG capsule Take 1 capsule (2 mg) by mouth 2 times daily And 2 mg po daily prn 270 capsule 3     loratadine (CLARITIN) 10 MG tablet Take 0.5 tablets (5 mg) by mouth every other day 90 tablet 97     mirtazapine (REMERON) 15 MG tablet Take 1 tablet (15 mg) by mouth at bedtime. 30 tablet 11     MOISTURE EYES 1-0.3 % SOLN INSTILL ONE DROP IN EACH EYE THREE TIMES DAILY;& MAY INSTILL 2 DROPS IN EACH EYE ONCE DAILY AS NEEDED 15 mL 97     Multiple Vitamin (TAB-A-VITA) TABS TAKE 1 TABLET BY MOUTH ONCE DAILY 90 tablet 97     Multiple Vitamins-Minerals (PRESERVISION AREDS) CAPS TAKE 1 CAPSULE BY MOUTH TWICE DAILY WITH MEALS 180 capsule 97     SPIRIVA RESPIMAT 2.5 MCG/ACT inhaler INHALE 2 PUFFS INTO THE LUNGS ONCE DAILY 4 g 97     Vitamin D3 (CHOLECALCIFEROL) 25 mcg (1000 units) tablet Take 1 tablet (25 mcg) by mouth daily       REVIEW OF SYSTEMS:  4 point ROS including Respiratory, CV, GI and , other than that noted in the HPI,  is negative    Objective:  /78   Pulse 60   Temp 97.8  F (36.6  C)   Resp 12   Ht 1.588 m (5' 2.5\")   Wt 51.7 kg (113 lb 14.4 oz)   LMP  (LMP Unknown)   SpO2 95%   BMI 20.50 kg/m    Exam:  GENERAL APPEARANCE:  Alert  ENT:  Mouth and posterior oropharynx normal, dry mucous membranes, hoarse voice at baseline, [place], wears hearing aids   EYES:  EOM, conjunctivae, lids, pupils and irises normal, glasses  RESP:  respiratory effort and palpation of chest normal, lungs clear to auscultation but diminished   CV:  Palpation and auscultation of heart done , regular rate and rhythm, 2/6 murmur, LE edema 2+  ABDOMEN: bowel sounds normal  M/S:   gait and station at baseline with U-step walker or cane (around " apartment)   SKIN: left lateral/anterior with spreading open areas, slough in wound scattered wound beds, warmth, increased redness and swelling  NEURO:   Cranial nerves 2-12 are normal tested and grossly at patient's baseline  PSYCH:  oriented X 3    Labs:   CBC RESULTS:   Recent Labs   Lab Test 03/11/25  0600 01/28/25  1012   WBC 9.5 8.5   RBC 3.59* 3.82   HGB 10.4* 11.5*   HCT 32.9* 35.4   MCV 92 93   MCH 29.0 30.1   MCHC 31.6 32.5   RDW 13.8 13.3    229       Last Basic Metabolic Panel:  Recent Labs   Lab Test 03/11/25  0600 01/28/25  1012    138   POTASSIUM 3.9 4.9   CHLORIDE 100 100   TANIA 9.1 9.0   CO2 23 27   BUN 43.6* 29.9*   CR 1.23* 1.25*   * 93       Liver Function Studies -   Recent Labs   Lab Test 08/13/24  0847 05/16/23  1210   PROTTOTAL 7.4 7.3   ALBUMIN 4.4 4.1   BILITOTAL 0.4 0.3   ALKPHOS 71 80   AST 26 25   ALT 14 15       TSH   Date Value Ref Range Status   08/13/2024 3.03 0.30 - 4.20 uIU/mL Final   05/16/2023 2.43 0.30 - 4.20 uIU/mL Final   08/27/2018 1.94 0.40 - 4.00 mU/L Final   09/05/2017 2.60 0.40 - 4.00 mU/L Final       Lab Results   Component Value Date    A1C 5.2 08/13/2024    A1C 5.3 08/27/2018     Estimated Creatinine Clearance: 22.3 mL/min (A) (based on SCr of 1.23 mg/dL (H)).      ASSESSMENT/PLAN:  (L03.116) Cellulitis of left lower extremity  (primary encounter diagnosis)  (M79.605,  M79.89) Pain and swelling of left lower extremity  Comment: worsening-no elevated WBC  Plan:   -with purulent drainage changing from Keflex to Bactrim (use of single strength with CKD)  -US and doppler for DVT concern  -oxycodone for pain  -vascular follow up 4/9      Electronically signed by:  Israel I. Dworsky, APRN CNP         Sincerely,        CORNELIUS Kenyon CNP    Electronically signed   1 y/o F with no significant history presenting with "barky cough" and congestion since last night. Patient was given 1 albuterol at home with some improvement, but when patient woke up she continued to cough and mother took her into the emergency department at that time. Denies fevers, vomiting, diarrhea. Eating and drinking well. No known sick contacts. Started  1 month ago.   No meds. No known allergies.     ED Course:  In the ED, patient given decadron x1 dose and racemic epi x2 doses. Admitted for observation and 1 episode of desaturation to 88% which self resolved. 3 y/o F with history of seasonal "allergies" for which pediatrician has prescribed albuterol (? asthma) presenting with "barky cough" and congestion since last night. Patient was given 1 albuterol at home with some improvement, but when patient woke up she continued to cough and mother took her into the emergency department at that time. Denies fevers, vomiting, diarrhea. Eating and drinking well. No known sick contacts. Started  1 month ago. No fevers.  No meds. No known allergies.     ED Course:  In the ED, patient given decadron x1 dose and racemic epi x2 doses. Admitted for observation and episode of desaturation to 88% which self resolved.

## 2025-03-13 ENCOUNTER — HOSPITAL ENCOUNTER (OUTPATIENT)
Facility: CLINIC | Age: OVER 89
Setting detail: OBSERVATION
End: 2025-03-13
Attending: EMERGENCY MEDICINE | Admitting: HOSPITALIST
Payer: COMMERCIAL

## 2025-03-13 DIAGNOSIS — L03.116 CELLULITIS OF LEFT LOWER EXTREMITY: Primary | ICD-10-CM

## 2025-03-13 DIAGNOSIS — L97.221 VENOUS STASIS ULCER OF LEFT CALF LIMITED TO BREAKDOWN OF SKIN WITHOUT VARICOSE VEINS (H): ICD-10-CM

## 2025-03-13 DIAGNOSIS — I87.2 VENOUS STASIS ULCER OF LEFT CALF LIMITED TO BREAKDOWN OF SKIN WITHOUT VARICOSE VEINS (H): ICD-10-CM

## 2025-03-13 DIAGNOSIS — M79.89 PAIN AND SWELLING OF LEFT LOWER EXTREMITY: ICD-10-CM

## 2025-03-13 DIAGNOSIS — M79.605 PAIN AND SWELLING OF LEFT LOWER EXTREMITY: ICD-10-CM

## 2025-03-13 LAB
ANION GAP SERPL CALCULATED.3IONS-SCNC: 13 MMOL/L (ref 7–15)
BASOPHILS # BLD AUTO: 0.1 10E3/UL (ref 0–0.2)
BASOPHILS NFR BLD AUTO: 1 %
BUN SERPL-MCNC: 39.9 MG/DL (ref 8–23)
CALCIUM SERPL-MCNC: 9.4 MG/DL (ref 8.8–10.4)
CHLORIDE SERPL-SCNC: 97 MMOL/L (ref 98–107)
CREAT SERPL-MCNC: 1.8 MG/DL (ref 0.51–0.95)
CRP SERPL-MCNC: 57.18 MG/L
EGFRCR SERPLBLD CKD-EPI 2021: 25 ML/MIN/1.73M2
EOSINOPHIL # BLD AUTO: 0.1 10E3/UL (ref 0–0.7)
EOSINOPHIL NFR BLD AUTO: 1 %
ERYTHROCYTE [DISTWIDTH] IN BLOOD BY AUTOMATED COUNT: 14 % (ref 10–15)
ERYTHROCYTE [SEDIMENTATION RATE] IN BLOOD BY WESTERGREN METHOD: 55 MM/HR (ref 0–30)
GLUCOSE SERPL-MCNC: 132 MG/DL (ref 70–99)
HCO3 SERPL-SCNC: 23 MMOL/L (ref 22–29)
HCT VFR BLD AUTO: 35.1 % (ref 35–47)
HGB BLD-MCNC: 11.3 G/DL (ref 11.7–15.7)
HOLD SPECIMEN: NORMAL
IMM GRANULOCYTES # BLD: 0.1 10E3/UL
IMM GRANULOCYTES NFR BLD: 1 %
LACTATE SERPL-SCNC: 0.9 MMOL/L (ref 0.7–2)
LYMPHOCYTES # BLD AUTO: 0.8 10E3/UL (ref 0.8–5.3)
LYMPHOCYTES NFR BLD AUTO: 9 %
MCH RBC QN AUTO: 29.7 PG (ref 26.5–33)
MCHC RBC AUTO-ENTMCNC: 32.2 G/DL (ref 31.5–36.5)
MCV RBC AUTO: 92 FL (ref 78–100)
MONOCYTES # BLD AUTO: 0.8 10E3/UL (ref 0–1.3)
MONOCYTES NFR BLD AUTO: 9 %
NEUTROPHILS # BLD AUTO: 7.5 10E3/UL (ref 1.6–8.3)
NEUTROPHILS NFR BLD AUTO: 80 %
NRBC # BLD AUTO: 0 10E3/UL
NRBC BLD AUTO-RTO: 0 /100
PLATELET # BLD AUTO: 360 10E3/UL (ref 150–450)
POTASSIUM SERPL-SCNC: 4.7 MMOL/L (ref 3.4–5.3)
RBC # BLD AUTO: 3.81 10E6/UL (ref 3.8–5.2)
SODIUM SERPL-SCNC: 133 MMOL/L (ref 135–145)
WBC # BLD AUTO: 9.4 10E3/UL (ref 4–11)

## 2025-03-13 PROCEDURE — 250N000011 HC RX IP 250 OP 636: Performed by: PHYSICIAN ASSISTANT

## 2025-03-13 PROCEDURE — 99285 EMERGENCY DEPT VISIT HI MDM: CPT

## 2025-03-13 PROCEDURE — 250N000011 HC RX IP 250 OP 636: Performed by: EMERGENCY MEDICINE

## 2025-03-13 PROCEDURE — 96375 TX/PRO/DX INJ NEW DRUG ADDON: CPT

## 2025-03-13 PROCEDURE — 85652 RBC SED RATE AUTOMATED: CPT | Performed by: EMERGENCY MEDICINE

## 2025-03-13 PROCEDURE — 86140 C-REACTIVE PROTEIN: CPT | Performed by: EMERGENCY MEDICINE

## 2025-03-13 PROCEDURE — G0378 HOSPITAL OBSERVATION PER HR: HCPCS

## 2025-03-13 PROCEDURE — 96374 THER/PROPH/DIAG INJ IV PUSH: CPT

## 2025-03-13 PROCEDURE — 83605 ASSAY OF LACTIC ACID: CPT | Performed by: PHYSICIAN ASSISTANT

## 2025-03-13 PROCEDURE — 99223 1ST HOSP IP/OBS HIGH 75: CPT | Performed by: PHYSICIAN ASSISTANT

## 2025-03-13 PROCEDURE — 85004 AUTOMATED DIFF WBC COUNT: CPT | Performed by: EMERGENCY MEDICINE

## 2025-03-13 PROCEDURE — 80048 BASIC METABOLIC PNL TOTAL CA: CPT | Performed by: EMERGENCY MEDICINE

## 2025-03-13 PROCEDURE — 36415 COLL VENOUS BLD VENIPUNCTURE: CPT | Performed by: EMERGENCY MEDICINE

## 2025-03-13 PROCEDURE — 36416 COLLJ CAPILLARY BLOOD SPEC: CPT | Performed by: PHYSICIAN ASSISTANT

## 2025-03-13 PROCEDURE — 87040 BLOOD CULTURE FOR BACTERIA: CPT | Performed by: EMERGENCY MEDICINE

## 2025-03-13 RX ORDER — FUROSEMIDE 10 MG/ML
40 INJECTION INTRAMUSCULAR; INTRAVENOUS ONCE
Status: COMPLETED | OUTPATIENT
Start: 2025-03-13 | End: 2025-03-13

## 2025-03-13 RX ORDER — LIDOCAINE 40 MG/G
CREAM TOPICAL
Status: DISCONTINUED | OUTPATIENT
Start: 2025-03-13 | End: 2025-03-19 | Stop reason: HOSPADM

## 2025-03-13 RX ORDER — CEFAZOLIN SODIUM 1 G/3ML
1 INJECTION, POWDER, FOR SOLUTION INTRAMUSCULAR; INTRAVENOUS EVERY 12 HOURS
Status: DISCONTINUED | OUTPATIENT
Start: 2025-03-14 | End: 2025-03-16

## 2025-03-13 RX ORDER — CEFTRIAXONE 2 G/1
2 INJECTION, POWDER, FOR SOLUTION INTRAMUSCULAR; INTRAVENOUS ONCE
Status: COMPLETED | OUTPATIENT
Start: 2025-03-13 | End: 2025-03-13

## 2025-03-13 RX ORDER — ACETAMINOPHEN 325 MG/1
650 TABLET ORAL EVERY 4 HOURS PRN
Status: DISCONTINUED | OUTPATIENT
Start: 2025-03-13 | End: 2025-03-19 | Stop reason: HOSPADM

## 2025-03-13 RX ORDER — PROCHLORPERAZINE MALEATE 5 MG/1
5 TABLET ORAL EVERY 6 HOURS PRN
Status: DISCONTINUED | OUTPATIENT
Start: 2025-03-13 | End: 2025-03-19 | Stop reason: HOSPADM

## 2025-03-13 RX ORDER — ONDANSETRON 2 MG/ML
4 INJECTION INTRAMUSCULAR; INTRAVENOUS EVERY 6 HOURS PRN
Status: DISCONTINUED | OUTPATIENT
Start: 2025-03-13 | End: 2025-03-19 | Stop reason: HOSPADM

## 2025-03-13 RX ORDER — ONDANSETRON 4 MG/1
4 TABLET, ORALLY DISINTEGRATING ORAL EVERY 6 HOURS PRN
Status: DISCONTINUED | OUTPATIENT
Start: 2025-03-13 | End: 2025-03-19 | Stop reason: HOSPADM

## 2025-03-13 RX ORDER — ACETAMINOPHEN 650 MG/1
650 SUPPOSITORY RECTAL EVERY 4 HOURS PRN
Status: DISCONTINUED | OUTPATIENT
Start: 2025-03-13 | End: 2025-03-19 | Stop reason: HOSPADM

## 2025-03-13 RX ADMIN — FUROSEMIDE 40 MG: 10 INJECTION, SOLUTION INTRAVENOUS at 21:06

## 2025-03-13 RX ADMIN — CEFTRIAXONE 2 G: 2 INJECTION, POWDER, FOR SOLUTION INTRAMUSCULAR; INTRAVENOUS at 17:47

## 2025-03-13 ASSESSMENT — COLUMBIA-SUICIDE SEVERITY RATING SCALE - C-SSRS
1. IN THE PAST MONTH, HAVE YOU WISHED YOU WERE DEAD OR WISHED YOU COULD GO TO SLEEP AND NOT WAKE UP?: NO
6. HAVE YOU EVER DONE ANYTHING, STARTED TO DO ANYTHING, OR PREPARED TO DO ANYTHING TO END YOUR LIFE?: NO
2. HAVE YOU ACTUALLY HAD ANY THOUGHTS OF KILLING YOURSELF IN THE PAST MONTH?: NO

## 2025-03-13 ASSESSMENT — ACTIVITIES OF DAILY LIVING (ADL)
ADLS_ACUITY_SCORE: 52
ADLS_ACUITY_SCORE: 39
ADLS_ACUITY_SCORE: 52
ADLS_ACUITY_SCORE: 53
ADLS_ACUITY_SCORE: 48

## 2025-03-13 NOTE — ED PROVIDER NOTES
Emergency Department Note      History of Present Illness     Chief Complaint   Wound Check      HPI   Shiloh Covarrubias is a 89 year old female with a history of CKD stage 3, hyperlipidemia, and type 2 diabetes mellitus, who presents with her daughter for evaluation of a wound check. Patient reports wound to left lower extremity. She has had the wound for the last four months, but notes that she had influenza about a week ago and wound has been worsening since, with increased pain, swelling, and redness. She denies any recent injuries or traumas to the left lower extremity. She also endorses some numbness to both lower extremities. She has been taking bactrim without improvement of wound. Patient notes a previous allergy to sulfa where she had hives after taking it, denies hives since taking this course of bactrim. She denies any chest pain or shortness of breath. Denies fevers or chills. Denies nausea, vomiting, or abdominal pain. Patient notes Home Health has been coming to her assisted living facility to redress the wound every 2 weeks. She had an ultrasound of the extremity last week and yesterday, had imaging for DVT/PE which was negative.     Independent Historian   Daughter as detailed above.    Review of External Notes   Reviewed paper notes patient's daughter brought from living facility.   3/11/25 assisted living visit note by [facility] reviewed. Patient seen for episodic care visit. Follow up to LLE infection, cellulitis, and ankle pain,    Past Medical History   Medical History and Problem List   Athscl Heart disease of native coronary artery   Chronic kidney disease Stage 3  GERD  Heart failure  Hyperlipidemia  Malignant neoplasm of skin  COPD  Type 2 diabetes mellitus  SHIVANI   Anemia  Acute on chronic diastolic heart failure  Anemia  Degeneration of lumbar intervertebral disc  Pulmonary hypertension  Acute respiratory failure  Anxiety state  Asthma  Hypertension   Peripheral  "vascular disease  Cerebrovascular disease  MDD  CAD  Disorder of thyroid  Heart failure  Osteoarthritis    Medications   Amlodipine  Aspirin 81 mg   Escitalopram  Lasix  Lisinopril  Furosemide  Hydralazine  Loperamide  Mirtazipine    Surgical History   None    Physical Exam     Patient Vitals for the past 24 hrs:   BP Temp Temp src Pulse Resp SpO2 Height Weight   03/13/25 1539 127/88 98.3  F (36.8  C) Oral 67 17 98 % 1.6 m (5' 3\") 54.7 kg (120 lb 9.5 oz)     Physical Exam  Constitutional:       Appearance: Normal appearance.      General: Not in acute distress.  HENT:      Head: Normocephalic and atraumatic.   Eyes:      Extraocular Movements: Extraocular movements intact.      Conjunctiva/sclera: Conjunctivae normal.   Cardiovascular:      Rate and Rhythm: Normal rate and regular rhythm.   Pulmonary:      Effort: Pulmonary effort is normal. No respiratory distress.   Abdominal:      General: Abdomen is flat. There is no distension.   Musculoskeletal:         General: No swelling or deformity.      Cervical back: Normal range of motion. No rigidity.      Lower extremity: Edema to bilateral lower extremity with left lower extremity erythema extending from left foot to mid left calf.  Tenderness palpation to the left lower leg.  Ulcerative lesion to the lateral left calf.  Cap refill less than 2 seconds.  Skin:     Coloration: Skin is not jaundiced or pale.   Neurological:      General: No focal deficit present.      Mental Status: Alert and oriented to person, place, and time.   Psychiatric:         Mood and Affect: Mood normal.         Behavior: Behavior normal.    Diagnostics     Lab Results   Labs Ordered and Resulted from Time of ED Arrival to Time of ED Departure   BASIC METABOLIC PANEL - Abnormal       Result Value    Sodium 133 (*)     Potassium 4.7      Chloride 97 (*)     Carbon Dioxide (CO2) 23      Anion Gap 13      Urea Nitrogen 39.9 (*)     Creatinine 1.80 (*)     GFR Estimate 25 (*)     Calcium 9.4      " Glucose 132 (*)    CBC WITH PLATELETS AND DIFFERENTIAL - Abnormal    WBC Count 9.4      RBC Count 3.81      Hemoglobin 11.3 (*)     Hematocrit 35.1      MCV 92      MCH 29.7      MCHC 32.2      RDW 14.0      Platelet Count 360      % Neutrophils 80      % Lymphocytes 9      % Monocytes 9      % Eosinophils 1      % Basophils 1      % Immature Granulocytes 1      NRBCs per 100 WBC 0      Absolute Neutrophils 7.5      Absolute Lymphocytes 0.8      Absolute Monocytes 0.8      Absolute Eosinophils 0.1      Absolute Basophils 0.1      Absolute Immature Granulocytes 0.1      Absolute NRBCs 0.0     ERYTHROCYTE SEDIMENTATION RATE AUTO - Abnormal    Erythrocyte Sedimentation Rate 55 (*)    CRP INFLAMMATION - Abnormal    CRP Inflammation 57.18 (*)    BLOOD CULTURE   BLOOD CULTURE       Imaging   No orders to display       EKG   None    Independent Interpretation   See ED course     ED Course      Medications Administered   Medications   cefTRIAXone (ROCEPHIN) 2 g vial to attach to  ml bag for ADULTS or NS 50 ml bag for PEDS (has no administration in time range)       Procedures   None    Discussion of Management   1739 I spoke to Мария Singer PA-C who accepted the patient for admission under Dr. Bhakta. Patient accepted for admission.     ED Course   ED Course as of 03/13/25 1741   Thu Mar 13, 2025   1634 Creatinine(!): 1.80  SHIVANI   1700 I initially assessed the patient and obtained the history and physical exam.        Additional Documentation  None    Medical Decision Making / Diagnosis     CMS Diagnoses: None    MIPS       None    [facility]   Shiloh Covarrubias is a 89 year old female as described above presents to the emergency department with worsening left lower extremity swelling, redness, and pain.  Patient hemodynamically stable at time of evaluation.  Afebrile.  Nonseptic and nontoxic appearing.  On examination of left lower extremity, patient certainly is tender to palpation.  There is ulcerative wound to the left  lateral calf.  Given unilateral lower extremity erythema compared to right lower extremity, certainly concerning for developing left lower extremity cellulitis secondary to chronic poor healing ulcerative wound likely from PAD.  Patient has failed outpatient oral antibiotics with Bactrim.  Especially given advanced age, admit to the hospital service for IV antibiotics and further evaluation.  Patient reports negative DVT ultrasound from yesterday.  Infectious workup ordered.  Discussed care plan with patient and family at bedside voiced understanding and agreement with plan.  Answered all questions.  Additional workup and orders as listed in chart.    Ultimately, work up shows no significant leukocytosis to suggest suggest severe systemic infection.  However, patient does have elevation in inflammatory markers.  No lactic acidemia.    Patient was mid to the hospital service.    Please refer to ED course above as part of continuation of MDM for details on the patient's treatment course and any potential changes or updates beyond my initial evaluation and MDM creation.    Disposition   The patient was admitted to the hospital.     Diagnosis     ICD-10-CM    1. Cellulitis of left lower extremity  L03.116            Scribe Disclosure:  I, Frida Haddad, am serving as a scribe at 4:29 PM on 3/13/2025 to document services personally performed by Natan Hernandez DO based on my observations and the provider's statements to me.      Natan Hernandez DO  03/13/25 0838

## 2025-03-13 NOTE — ED NOTES
"[facility]  ED Nurse Handoff Report    ED Chief complaint: Wound Check  . ED Diagnosis:   Final diagnoses:   Cellulitis of left lower extremity       Allergies:   Allergies   Allergen Reactions    Clonidine Derivatives      Severe side effects      Entocort [Corticosteroids]     Macrobid [Nitrofurantoin]      Diarrhea      Sulfa Antibiotics Itching     itch       Code Status: Full Code    Activity level - Baseline/Home:  standby.[email or web address]  Activity Level - Current:   assist of 1 and walker.   Lift room needed: No.   Bariatric: No   Needed: No   Isolation: No.   Infection: Not Applicable.     Respiratory status: Room air    Vital Signs (within 30 minutes):   Vitals:    03/13/25 1539   BP: 127/88   Pulse: 67   Resp: 17   Temp: 98.3  F (36.8  C)   TempSrc: Oral   SpO2: 98%   Weight: 54.7 kg (120 lb 9.5 oz)   Height: 1.6 m (5' 3\")       Cardiac Rhythm:  ,      Pain level:    Patient confused: No.   Patient Falls Risk: patient and family education.   Elimination Status: Has voided     Patient Report - Initial Complaint: left lower leg swelling/redness /pain.   Focused Assessment: lower leg wound work up(celullitis)     Abnormal Results:   Labs Ordered and Resulted from Time of ED Arrival to Time of ED Departure   BASIC METABOLIC PANEL - Abnormal       Result Value    Sodium 133 (*)     Potassium 4.7      Chloride 97 (*)     Carbon Dioxide (CO2) 23      Anion Gap 13      Urea Nitrogen 39.9 (*)     Creatinine 1.80 (*)     GFR Estimate 25 (*)     Calcium 9.4      Glucose 132 (*)    CBC WITH PLATELETS AND DIFFERENTIAL - Abnormal    WBC Count 9.4      RBC Count 3.81      Hemoglobin 11.3 (*)     Hematocrit 35.1      MCV 92      MCH 29.7      MCHC 32.2      RDW 14.0      Platelet Count 360      % Neutrophils 80      % Lymphocytes 9      % Monocytes 9      % Eosinophils 1      % Basophils 1      % Immature Granulocytes 1      NRBCs per 100 WBC 0      Absolute Neutrophils 7.5      Absolute " Lymphocytes 0.8      Absolute Monocytes 0.8      Absolute Eosinophils 0.1      Absolute Basophils 0.1      Absolute Immature Granulocytes 0.1      Absolute NRBCs 0.0     ERYTHROCYTE SEDIMENTATION RATE AUTO - Abnormal    Erythrocyte Sedimentation Rate 55 (*)    CRP INFLAMMATION - Abnormal    CRP Inflammation 57.18 (*)    BLOOD CULTURE   BLOOD CULTURE        No orders to display       Treatments provided: admit/mar  Family Comments: dtr at side  OBS brochure/video discussed/provided to patient:  Yes  ED Medications:   Medications   cefTRIAXone (ROCEPHIN) 2 g vial to attach to  ml bag for ADULTS or NS 50 ml bag for PEDS (2 g Intravenous $New Bag 3/13/25 4940)       Drips infusing:  No  For the majority of the shift this patient was Green.   Interventions performed were na  .    Sepsis treatment initiated: No    Cares/treatment/interventions/medications to be completed following ED care: see orders    ED Nurse Name: Jessika Dodd, RN  5:50 PM     RECEIVING UNIT ED HANDOFF REVIEW    Above ED Nurse Handoff Report was reviewed: Yes  Reviewed by: Zabrina Vidal RN on March 13, 2025 at 7:52 PM   KALEIGH Griffiths called the ED to inform them the note was read: Yes

## 2025-03-13 NOTE — H&P
[facility]    Hospitalist History and Physical    Name: Shiloh Covarrubias    MRN: [id number]  YOB: 1929    Age: 89 year old  Date of Admission:  3/13/2025  Date of Service (when I saw the patient): 03/13/25    Assessment & Plan   Shiloh Covarrubias is a 89 year old female with PMH significant for hypertension, asthma/COPD, anxiety, depression, CKD stage III, PAD, PVD with known nonhealing wound of left lower extremity who presents to the ED on 3/13/2025 for evaluation of ongoing wound with concern for surrounding cellulitis even after being on oral antibiotics.    ED workup reveals: VSS, hemoglobin of 11.3, sodium of 133, chloride of 97, BUN of 39.9, creatinine of 1.8, glucose of 132, ESR 55, CRP of 57.18, blood cultures obtained x 2 and pending, and per patient LE ultrasound at her facility was negative for DVT. Unable to see any of these imaging results in chart.     Nonhealing left LE wound   Left LE cellulitis   H/o PAD, PVD  Noted to have slow to heal bilateral LE wounds since 12/2024 and has been seeing wound care as outpatient. Right LE wound has healed and left LE was almost healed until recent onset of fever up to 103 the week PTA and with acute worsening of left LE wound. Increased erythema, swelling, warmth, and pain to dorsum of left foot with worsening wounds. Weeping of left LE and per provider at facility note the wound appeared to have purulent drainage on 3/11. Previously on course of PO Doxycycline in 12/2024 and most recently taking PO Keflex that was changed to PO Bactrim on 3/11. Ultrasound at facility negative for DVT, unable to see results in chart. Afebrile, no leukocytosis but ESR and CRP elevated.   -monitor for fevers  -recheck CRP in AM  -change Rocephin (given in ED) to IV Ancef   -elevated left LE and ACE wrap to help with swelling  -WOC consult to continue wound care  -repeat CBC in AM  -follow blood cultures from 3/13   -scheduled for follow up  with vascular surgery in 04/2025    Hyponatremia  Hypochloremia  SHIVANI on CKD stage III  Sodium of 133, chloride of 97, and creatinine of 1.8. reports adequate oral intake. Patient reports not urinating very much this afternoon which is not normal. Suspect hyponatremia and SHIVANI may be in relation to hypervolemia.   -bladder scan to assess for retention  -based on LE edema will give IV Lasix 40 mg once, reassess in AM   -closely monitor kidney function, recheck BMP in AM, if worsening with diuretics then SHIVANI may be in part related to recent Bactrim use     COPD  No current exacerbation, not wheezing or short of breath.  -continue PTA Spiriva if patient has with her    HFpEF  Baseline LE edema. No current shortness of breath.   -hold PTA PO Lasix while giving IV Lasix   -monitor I&Os and daily weights     HTN  BP stable  -continue PTA Amlodipine, Hydralazine, and Lisinopril with parameters    DM2  Diet controlled, most recent HgbA1c of 5.2 in 08/2024     Depression  -continue PTA Lexapro and Mirtazapine     Awaiting formal med rec to resume PTA medications.     Clinically Significant Risk Factors Present on Admission         # Hyponatremia: Lowest Na = 133 mmol/L in last 2 days, will monitor as appropriate  # Hypochloremia: Lowest Cl = 97 mmol/L in last 2 days, will monitor as appropriate        # Drug Induced Platelet Defect: home medication list includes an antiplatelet medication  # Acute Kidney Injury, unspecified: based on a >150% or 0.3 mg/dL increase in last creatinine compared to past 90 day average, will monitor renal function  # Hypertension: Noted on problem list               # COPD: noted on problem list      DVT Prophylaxis: Pneumatic Compression Devices  Code Status: DNR / DNI, discussed with patient  Disposition: Expected discharge in 24-48 hours, admit to observation    Primary Care Physician   Israel Scott    Chief Complaint   Left LE wound     History obtained from discussion with ED provider,   Mary, chart review, and interview with patient.  Patient's daughter is at the bedside and provides additional history.    History of Present Illness   Shiloh Covarrubias is a 89 year old female who presents with worsening left LE wound.  Patient reports she has had this left lower extremity wound since 12/2024.  This has been present in conjunction with a right lower extremity wound that has healed with wound care.  Her left lower leg wound was almost healed until last week.  Patient states last week she had a fever a couple of days up to 103. She wasn't sure if this was the flu due to this going around her facility. No specific respiratory symptoms at the time. She had a small spot open on her left lower leg and the day after her onset of fever her wound appeared to worsen.  She notes associated erythema, swelling, and warmth to the area.  Her facility provider started her on oral Keflex and recently switched this to oral Bactrim due to lack of improvement.  She notes intermittent lightheadedness and dizziness at baseline.  She notes significant left foot discomfort from the swelling and redness.  She states since her wound has been present she has had nonpurulent weeping.  Denies any recurrent fever, chills, nausea, vomiting, chest pain, or shortness of breath.  She notes good appetite and intake.  She is suppose to see vascular medicine in 04/2025 because of her peripheral vascular disease.  She did have a lower extremity ultrasound through her facility that was negative for DVT.  She reports having ABIs performed of both lower legs, she has better blood flow in her right versus her left leg.    Past Medical History    Past Medical History:   Diagnosis Date    Asthma 1/4/2023    CAD (coronary artery disease) 1/4/2023    Cerebral infarction (H) 1/4/2023    Chronic kidney disease, stage 3 (H) 11/16/2020    COPD (chronic obstructive pulmonary disease) (H) 1/4/2023    Disorder of thyroid 1/4/2023    Edema      Peripheral edema    Essential hypertension with goal blood pressure less than 130/80 5/24/2016    Generalized muscle weakness 1/4/2023    GERD (gastroesophageal reflux disease) 1/19/2011    Heart failure (H) 1/4/2023    Hyperlipidemia LDL goal <100 3/30/2011    Hypertension     Hypertriglyceridemia 5/18/2010    Major depressive disorder, single episode, mild 2/15/2016    Osteoarthrosis, unspecified whether generalized or localized, involving lower leg 1/8/2007    Problem list name updated by automated process. Provider to review Replacing diagnoses that were inactivated after the 10/1/2021 regulatory import.    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    Type 2 diabetes mellitus (H) 1/4/2023    Unspecified asthma(493.90)     Unspecified intestinal obstruction 02/17/10    D/C 02/20/10     Past Surgical History   Past Surgical History:   Procedure Laterality Date    ESOPHAGOSCOPY, GASTROSCOPY, DUODENOSCOPY (EGD), COMBINED  2/8/2011    COMBINED ESOPHAGOSCOPY, GASTROSCOPY, DUODENOSCOPY (EGD), BIOPSY SINGLE OR MULTIPLE performed by ANGY LIMA at  GI    ESOPHAGOSCOPY, GASTROSCOPY, DUODENOSCOPY (EGD), DILATATION, COMBINED  2/8/2011    COMBINED ESOPHAGOSCOPY, GASTROSCOPY, DUODENOSCOPY (EGD), DILATATION performed by ANGY LIMA at  GI    [facility]GI ENDOSCOPY, SIMPLE EXAM  02/23/10     Prior to Admission Medications   Prior to Admission Medications   Prescriptions Last Dose Informant Patient Reported? Taking?   ASPIRIN ADULT LOW DOSE 81 MG EC tablet   No No   Sig: TAKE 1 TABLET BY MOUTH ONCE DAILY   MOISTURE EYES 1-0.3 % SOLN   No No   Sig: INSTILL ONE DROP IN EACH EYE THREE TIMES DAILY;& MAY INSTILL 2 DROPS IN EACH EYE ONCE DAILY AS NEEDED   Multiple Vitamin (TAB-A-VITA) TABS   No No   Sig: TAKE 1 TABLET BY MOUTH ONCE DAILY   Multiple Vitamins-Minerals (PRESERVISION AREDS) CAPS   No No   Sig: TAKE 1 CAPSULE BY  MOUTH TWICE DAILY WITH MEALS   SPIRIVA RESPIMAT 2.5 MCG/ACT inhaler   No No   Sig: INHALE 2 PUFFS INTO THE LUNGS ONCE DAILY   Vitamin D3 (CHOLECALCIFEROL) 25 mcg (1000 units) tablet   Yes No   Sig: Take 1 tablet (25 mcg) by mouth daily   acetaminophen (TYLENOL) 500 MG tablet   No No   Sig: Take 2 tablets (1,000 mg) by mouth 2 times daily. And 1,000 mg po once daily as needed. Do for 7 days then return to previous order   amLODIPine (NORVASC) 2.5 MG tablet   No No   Sig: TAKE 1 TABLET BY MOUTH AT BEDTIME (TAKE WITH 5MG FOR A TOTAL OF 7.5MG) NOTE DOSAGE/STRENGTH   amLODIPine (NORVASC) 5 MG tablet   No No   Sig: TAKE 1 TABLET BY MOUTH AT BEDTIME   escitalopram (LEXAPRO) 10 MG tablet   No No   Sig: TAKE 1 TABLET BY MOUTH ONCE DAILY   furosemide (LASIX) 40 MG tablet   No No   Sig: TAKE 1 TABLET BY MOUTH ONCE DAILY   hydrALAZINE (APRESOLINE) 50 MG tablet   No No   Sig: TAKE 1 TABLET BY MOUTH TWICE  DAILY   lisinopril (ZESTRIL) 20 MG tablet   No No   Sig: TAKE 1 TABLET BY MOUTH ONCE DAILY   loperamide (IMODIUM) 2 MG capsule   No No   Sig: Take 1 capsule (2 mg) by mouth 2 times daily And 2 mg po daily prn   loratadine (CLARITIN) 10 MG tablet   No No   Sig: Take 0.5 tablets (5 mg) by mouth every other day   mirtazapine (REMERON) 15 MG tablet   No No   Sig: Take 1 tablet (15 mg) by mouth at bedtime.   oxyCODONE (ROXICODONE) 5 MG tablet   No No   Sig: Take 0.5 tablets (2.5 mg) by mouth every 6 hours as needed for pain.   sulfamethoxazole-trimethoprim (BACTRIM) 400-80 MG tablet   No No   Sig: Take 1 tablet by mouth 2 times daily for 7 days.      Facility-Administered Medications: None     Allergies   Allergies   Allergen Reactions    Clonidine Derivatives      Severe side effects      Entocort [Corticosteroids]     Macrobid [Nitrofurantoin]      Diarrhea      Sulfa Antibiotics Itching     itch       Social History   Social History     Tobacco Use    Smoking status: Never    Smokeless tobacco: Never   Substance Use Topics     Alcohol use: Yes     Alcohol/week: 0.0 standard drinks of alcohol     Comment: 3 per  year     Social History     Social History Narrative    Not on file     Family History   Family history reviewed with patient and is noncontributory.    Review of Systems   A Comprehensive greater than 10 system review of systems was carried out.  Pertinent positives and negatives are noted above.  Otherwise negative for contributory information.    Physical Exam   Temp: 98.3  F (36.8  C) Temp src: Oral BP: 127/88 Pulse: 67   Resp: 17 SpO2: 98 % O2 Device: None (Room air)    Vital Signs with Ranges  Temp:  [98.3  F (36.8  C)] 98.3  F (36.8  C)  Pulse:  [67] 67  Resp:  [17] 17  BP: (127)/(88) 127/88  SpO2:  [98 %] 98 %  120 lbs 9.47 oz    GEN:  Alert, oriented x 3, appears comfortable sitting up on gurney, no overt distress  HEENT:  Normocephalic/atraumatic, no scleral icterus, no nasal discharge, mouth moist.  CV:  Regular rate and rhythm, no murmur or JVD.  S1 + S2 noted, no S3 or S4.  LUNGS:  Clear to auscultation bilaterally without rales/rhonchi/wheezing/retractions.  Symmetric chest rise on inhalation noted.  ABD:  Active bowel sounds, soft, non-tender/non-distended.  No rebound/guarding/rigidity.  EXT: 2-3+ bilateral LE edema (L>R).  No cyanosis.  No acute joint synovitis noted.  SKIN:  Dry to touch, chronic venous stasis changes to bilateral LEs, left LE with more rubor appearance, serosanguinous weeping, dorsum of  with palpation  NEURO:  Symmetric muscle strength, sensation to touch grossly intact.  Coordination symmetric on general exam.  No new focal deficits appreciated.    Data   Data reviewed today:  I personally reviewed labs from this visit.     Results for orders placed or performed during the hospital encounter of 03/13/25   Extra Tube ([place] Draw) *Canceled*     Status: None ()    Narrative    The following orders were created for panel order Extra Tube ([place] Draw).  Procedure                                Abnormality         Status                     ---------                               -----------         ------                       Please view results for these tests on the individual orders.   Basic metabolic panel (BMP)     Status: Abnormal   Result Value Ref Range    Sodium 133 (L) 135 - 145 mmol/L    Potassium 4.7 3.4 - 5.3 mmol/L    Chloride 97 (L) 98 - 107 mmol/L    Carbon Dioxide (CO2) 23 22 - 29 mmol/L    Anion Gap 13 7 - 15 mmol/L    Urea Nitrogen 39.9 (H) 8.0 - 23.0 mg/dL    Creatinine 1.80 (H) 0.51 - 0.95 mg/dL    GFR Estimate 25 (L) >60 mL/min/1.73m2    Calcium 9.4 8.8 - 10.4 mg/dL    Glucose 132 (H) 70 - 99 mg/dL   Extra Tube ([place] Draw)     Status: None    Narrative    The following orders were created for panel order Extra Tube ([place] Draw).  Procedure                               Abnormality         Status                     ---------                               -----------         ------                     Extra Blood Culture Bottle[[id number]]                      Final result               Extra Blue Top Tube[[id number]]                             Final result               Extra Red Top Tube[[id number]]                              Final result                 Please view results for these tests on the individual orders.   CBC with platelets and differential     Status: Abnormal   Result Value Ref Range    WBC Count 9.4 4.0 - 11.0 10e3/uL    RBC Count 3.81 3.80 - 5.20 10e6/uL    Hemoglobin 11.3 (L) 11.7 - 15.7 g/dL    Hematocrit 35.1 35.0 - 47.0 %    MCV 92 78 - 100 fL    MCH 29.7 26.5 - 33.0 pg    MCHC 32.2 31.5 - 36.5 g/dL    RDW 14.0 10.0 - 15.0 %    Platelet Count 360 150 - 450 10e3/uL    % Neutrophils 80 %    % Lymphocytes 9 %    % Monocytes 9 %    % Eosinophils 1 %    % Basophils 1 %    % Immature Granulocytes 1 %    NRBCs per 100 WBC 0 <1 /100    Absolute Neutrophils 7.5 1.6 - 8.3 10e3/uL    Absolute Lymphocytes 0.8 0.8 - 5.3 10e3/uL    Absolute Monocytes 0.8 0.0  - 1.3 10e3/uL    Absolute Eosinophils 0.1 0.0 - 0.7 10e3/uL    Absolute Basophils 0.1 0.0 - 0.2 10e3/uL    Absolute Immature Granulocytes 0.1 <=0.4 10e3/uL    Absolute NRBCs 0.0 10e3/uL   Extra Blood Culture Bottle     Status: None   Result Value Ref Range    Hold Specimen JIC    Extra Blue Top Tube     Status: None   Result Value Ref Range    Hold Specimen JIC    Extra Red Top Tube     Status: None   Result Value Ref Range    Hold Specimen JIC    Erythrocyte sedimentation rate auto     Status: Abnormal   Result Value Ref Range    Erythrocyte Sedimentation Rate 55 (H) 0 - 30 mm/hr   CRP inflammation     Status: Abnormal   Result Value Ref Range    CRP Inflammation 57.18 (H) <5.00 mg/L   CBC with Platelets & Differential     Status: Abnormal    Narrative    The following orders were created for panel order CBC with Platelets & Differential.  Procedure                               Abnormality         Status                     ---------                               -----------         ------                     CBC with platelets and ...[[id number]]  Abnormal            Final result                 Please view results for these tests on the individual orders.       JOLLY [facility]  Securely message with the Vocera Web Console (learn more here)  Text page via [employer] Paging/Directory

## 2025-03-13 NOTE — ED TRIAGE NOTES
Pt complains of L leg ulcer above L ankle x4 months that was improving, but started to get worse about 1 wk ago due to the patient having the flu. Pt is still taking antibiotics. Not a diabetic. Pt states she had fevers at home w/ the flu. Wound appears to be weeping, red, swollen.

## 2025-03-14 VITALS
SYSTOLIC BLOOD PRESSURE: 131 MMHG | TEMPERATURE: 98.4 F | OXYGEN SATURATION: 94 % | HEART RATE: 71 BPM | DIASTOLIC BLOOD PRESSURE: 52 MMHG | RESPIRATION RATE: 19 BRPM | WEIGHT: 119.6 LBS | HEIGHT: 63 IN | BODY MASS INDEX: 21.19 KG/M2

## 2025-03-14 PROBLEM — I87.2 VENOUS STASIS ULCER OF LEFT CALF LIMITED TO BREAKDOWN OF SKIN WITHOUT VARICOSE VEINS (H): Status: ACTIVE | Noted: 2025-03-14

## 2025-03-14 PROBLEM — L97.221 VENOUS STASIS ULCER OF LEFT CALF LIMITED TO BREAKDOWN OF SKIN WITHOUT VARICOSE VEINS (H): Status: ACTIVE | Noted: 2025-03-14

## 2025-03-14 LAB
ANION GAP SERPL CALCULATED.3IONS-SCNC: 11 MMOL/L (ref 7–15)
BASOPHILS # BLD AUTO: 0.1 10E3/UL (ref 0–0.2)
BASOPHILS NFR BLD AUTO: 1 %
BUN SERPL-MCNC: 33.8 MG/DL (ref 8–23)
CALCIUM SERPL-MCNC: 9.1 MG/DL (ref 8.8–10.4)
CHLORIDE SERPL-SCNC: 101 MMOL/L (ref 98–107)
CREAT SERPL-MCNC: 1.48 MG/DL (ref 0.51–0.95)
CRP SERPL-MCNC: 50.39 MG/L
EGFRCR SERPLBLD CKD-EPI 2021: 32 ML/MIN/1.73M2
EOSINOPHIL # BLD AUTO: 0.1 10E3/UL (ref 0–0.7)
EOSINOPHIL NFR BLD AUTO: 1 %
ERYTHROCYTE [DISTWIDTH] IN BLOOD BY AUTOMATED COUNT: 13.8 % (ref 10–15)
GLUCOSE SERPL-MCNC: 103 MG/DL (ref 70–99)
HCO3 SERPL-SCNC: 24 MMOL/L (ref 22–29)
HCT VFR BLD AUTO: 30.4 % (ref 35–47)
HGB BLD-MCNC: 9.9 G/DL (ref 11.7–15.7)
IMM GRANULOCYTES # BLD: 0.1 10E3/UL
IMM GRANULOCYTES NFR BLD: 1 %
LYMPHOCYTES # BLD AUTO: 0.7 10E3/UL (ref 0.8–5.3)
LYMPHOCYTES NFR BLD AUTO: 10 %
MCH RBC QN AUTO: 29.3 PG (ref 26.5–33)
MCHC RBC AUTO-ENTMCNC: 32.6 G/DL (ref 31.5–36.5)
MCV RBC AUTO: 90 FL (ref 78–100)
MONOCYTES # BLD AUTO: 0.7 10E3/UL (ref 0–1.3)
MONOCYTES NFR BLD AUTO: 9 %
NEUTROPHILS # BLD AUTO: 5.9 10E3/UL (ref 1.6–8.3)
NEUTROPHILS NFR BLD AUTO: 79 %
NRBC # BLD AUTO: 0 10E3/UL
NRBC BLD AUTO-RTO: 0 /100
PLATELET # BLD AUTO: 295 10E3/UL (ref 150–450)
POTASSIUM SERPL-SCNC: 4.8 MMOL/L (ref 3.4–5.3)
RBC # BLD AUTO: 3.38 10E6/UL (ref 3.8–5.2)
SODIUM SERPL-SCNC: 136 MMOL/L (ref 135–145)
WBC # BLD AUTO: 7.5 10E3/UL (ref 4–11)

## 2025-03-14 PROCEDURE — 80048 BASIC METABOLIC PNL TOTAL CA: CPT | Performed by: PHYSICIAN ASSISTANT

## 2025-03-14 PROCEDURE — 120N000001 HC R&B MED SURG/OB

## 2025-03-14 PROCEDURE — 86140 C-REACTIVE PROTEIN: CPT | Performed by: PHYSICIAN ASSISTANT

## 2025-03-14 PROCEDURE — G0463 HOSPITAL OUTPT CLINIC VISIT: HCPCS

## 2025-03-14 PROCEDURE — 250N000013 HC RX MED GY IP 250 OP 250 PS 637: Performed by: PHYSICIAN ASSISTANT

## 2025-03-14 PROCEDURE — 36415 COLL VENOUS BLD VENIPUNCTURE: CPT | Performed by: PHYSICIAN ASSISTANT

## 2025-03-14 PROCEDURE — 96375 TX/PRO/DX INJ NEW DRUG ADDON: CPT

## 2025-03-14 PROCEDURE — 250N000013 HC RX MED GY IP 250 OP 250 PS 637: Performed by: INTERNAL MEDICINE

## 2025-03-14 PROCEDURE — G0378 HOSPITAL OBSERVATION PER HR: HCPCS

## 2025-03-14 PROCEDURE — 99232 SBSQ HOSP IP/OBS MODERATE 35: CPT | Performed by: INTERNAL MEDICINE

## 2025-03-14 PROCEDURE — 250N000011 HC RX IP 250 OP 636: Performed by: PHYSICIAN ASSISTANT

## 2025-03-14 PROCEDURE — 85048 AUTOMATED LEUKOCYTE COUNT: CPT | Performed by: PHYSICIAN ASSISTANT

## 2025-03-14 PROCEDURE — 85004 AUTOMATED DIFF WBC COUNT: CPT | Performed by: PHYSICIAN ASSISTANT

## 2025-03-14 RX ORDER — LOPERAMIDE HYDROCHLORIDE 2 MG/1
2 CAPSULE ORAL DAILY PRN
Status: DISCONTINUED | OUTPATIENT
Start: 2025-03-14 | End: 2025-03-19 | Stop reason: HOSPADM

## 2025-03-14 RX ORDER — FUROSEMIDE 40 MG/1
40 TABLET ORAL DAILY
Status: DISCONTINUED | OUTPATIENT
Start: 2025-03-14 | End: 2025-03-19 | Stop reason: HOSPADM

## 2025-03-14 RX ORDER — ACETAMINOPHEN 500 MG
1000 TABLET ORAL EVERY MORNING
COMMUNITY
End: 2025-03-26

## 2025-03-14 RX ORDER — CARBOXYMETHYLCELLULOSE SODIUM 5 MG/ML
1 SOLUTION/ DROPS OPHTHALMIC 3 TIMES DAILY
Status: DISCONTINUED | OUTPATIENT
Start: 2025-03-14 | End: 2025-03-19 | Stop reason: HOSPADM

## 2025-03-14 RX ORDER — LISINOPRIL 20 MG/1
20 TABLET ORAL DAILY
Status: DISCONTINUED | OUTPATIENT
Start: 2025-03-14 | End: 2025-03-19 | Stop reason: HOSPADM

## 2025-03-14 RX ORDER — HYDRALAZINE HYDROCHLORIDE 50 MG/1
50 TABLET, FILM COATED ORAL 2 TIMES DAILY
Status: DISCONTINUED | OUTPATIENT
Start: 2025-03-14 | End: 2025-03-19 | Stop reason: HOSPADM

## 2025-03-14 RX ORDER — MIRTAZAPINE 15 MG/1
15 TABLET, FILM COATED ORAL AT BEDTIME
Status: DISCONTINUED | OUTPATIENT
Start: 2025-03-14 | End: 2025-03-14

## 2025-03-14 RX ORDER — CARBOXYMETHYLCELLULOSE SODIUM 5 MG/ML
1 SOLUTION/ DROPS OPHTHALMIC 3 TIMES DAILY PRN
Status: DISCONTINUED | OUTPATIENT
Start: 2025-03-14 | End: 2025-03-19 | Stop reason: HOSPADM

## 2025-03-14 RX ORDER — VITAMIN B COMPLEX
25 TABLET ORAL DAILY
Status: DISCONTINUED | OUTPATIENT
Start: 2025-03-14 | End: 2025-03-19 | Stop reason: HOSPADM

## 2025-03-14 RX ORDER — ACETAMINOPHEN 500 MG
1000 TABLET ORAL 3 TIMES DAILY
Status: ON HOLD | COMMUNITY
End: 2025-03-19

## 2025-03-14 RX ORDER — NALOXONE HYDROCHLORIDE 0.4 MG/ML
0.4 INJECTION, SOLUTION INTRAMUSCULAR; INTRAVENOUS; SUBCUTANEOUS
Status: DISCONTINUED | OUTPATIENT
Start: 2025-03-14 | End: 2025-03-19 | Stop reason: HOSPADM

## 2025-03-14 RX ORDER — ASPIRIN 81 MG/1
81 TABLET ORAL DAILY
Status: DISCONTINUED | OUTPATIENT
Start: 2025-03-14 | End: 2025-03-19 | Stop reason: HOSPADM

## 2025-03-14 RX ORDER — NALOXONE HYDROCHLORIDE 0.4 MG/ML
0.2 INJECTION, SOLUTION INTRAMUSCULAR; INTRAVENOUS; SUBCUTANEOUS
Status: DISCONTINUED | OUTPATIENT
Start: 2025-03-14 | End: 2025-03-19 | Stop reason: HOSPADM

## 2025-03-14 RX ORDER — LOPERAMIDE HYDROCHLORIDE 2 MG/1
2 CAPSULE ORAL 2 TIMES DAILY
COMMUNITY

## 2025-03-14 RX ORDER — LOPERAMIDE HYDROCHLORIDE 2 MG/1
2 CAPSULE ORAL DAILY PRN
Status: ON HOLD | COMMUNITY
End: 2025-03-19

## 2025-03-14 RX ORDER — THERA TABS 400 MCG
1 TAB ORAL DAILY
Status: DISCONTINUED | OUTPATIENT
Start: 2025-03-14 | End: 2025-03-19 | Stop reason: HOSPADM

## 2025-03-14 RX ORDER — VIT A/VIT C/VIT E/ZINC/COPPER 4296-226
1 CAPSULE ORAL 2 TIMES DAILY WITH MEALS
Status: DISCONTINUED | OUTPATIENT
Start: 2025-03-14 | End: 2025-03-14

## 2025-03-14 RX ORDER — LOPERAMIDE HYDROCHLORIDE 2 MG/1
2 CAPSULE ORAL 2 TIMES DAILY
Status: DISCONTINUED | OUTPATIENT
Start: 2025-03-14 | End: 2025-03-19 | Stop reason: HOSPADM

## 2025-03-14 RX ORDER — ESCITALOPRAM OXALATE 10 MG/1
10 TABLET ORAL EVERY EVENING
Status: DISCONTINUED | OUTPATIENT
Start: 2025-03-14 | End: 2025-03-19 | Stop reason: HOSPADM

## 2025-03-14 RX ORDER — ACETAMINOPHEN 500 MG
1000 TABLET ORAL DAILY PRN
COMMUNITY
End: 2025-03-26

## 2025-03-14 RX ADMIN — CEFAZOLIN 1 G: 1 INJECTION, POWDER, FOR SOLUTION INTRAMUSCULAR; INTRAVENOUS at 18:57

## 2025-03-14 RX ADMIN — Medication 25 MCG: at 11:04

## 2025-03-14 RX ADMIN — HYDRALAZINE HYDROCHLORIDE 50 MG: 50 TABLET ORAL at 21:11

## 2025-03-14 RX ADMIN — ASPIRIN 81 MG: 81 TABLET, COATED ORAL at 11:05

## 2025-03-14 RX ADMIN — OXYCODONE HYDROCHLORIDE 2.5 MG: 5 TABLET ORAL at 16:33

## 2025-03-14 RX ADMIN — LISINOPRIL 20 MG: 20 TABLET ORAL at 11:05

## 2025-03-14 RX ADMIN — CEFAZOLIN 1 G: 1 INJECTION, POWDER, FOR SOLUTION INTRAMUSCULAR; INTRAVENOUS at 06:15

## 2025-03-14 RX ADMIN — ACETAMINOPHEN 650 MG: 325 TABLET, FILM COATED ORAL at 07:55

## 2025-03-14 RX ADMIN — CARBOXYMETHYLCELLULOSE SODIUM 1 DROP: 5 SOLUTION/ DROPS OPHTHALMIC at 11:05

## 2025-03-14 RX ADMIN — ACETAMINOPHEN 650 MG: 325 TABLET, FILM COATED ORAL at 02:43

## 2025-03-14 RX ADMIN — ACETAMINOPHEN 650 MG: 325 TABLET, FILM COATED ORAL at 15:04

## 2025-03-14 RX ADMIN — ESCITALOPRAM OXALATE 10 MG: 10 TABLET ORAL at 21:11

## 2025-03-14 RX ADMIN — HYDRALAZINE HYDROCHLORIDE 50 MG: 50 TABLET ORAL at 11:04

## 2025-03-14 RX ADMIN — CARBOXYMETHYLCELLULOSE SODIUM 1 DROP: 5 SOLUTION/ DROPS OPHTHALMIC at 15:49

## 2025-03-14 RX ADMIN — LOPERAMIDE HYDROCHLORIDE 2 MG: 2 CAPSULE ORAL at 11:04

## 2025-03-14 RX ADMIN — FUROSEMIDE 40 MG: 40 TABLET ORAL at 11:04

## 2025-03-14 RX ADMIN — ACETAMINOPHEN 650 MG: 325 TABLET, FILM COATED ORAL at 21:11

## 2025-03-14 RX ADMIN — AMLODIPINE BESYLATE 7.5 MG: 5 TABLET ORAL at 21:11

## 2025-03-14 RX ADMIN — CARBOXYMETHYLCELLULOSE SODIUM 1 DROP: 5 SOLUTION/ DROPS OPHTHALMIC at 21:11

## 2025-03-14 RX ADMIN — THERA TABS 1 TABLET: TAB at 11:05

## 2025-03-14 RX ADMIN — UMECLIDINIUM 1 PUFF: 62.5 AEROSOL, POWDER ORAL at 11:06

## 2025-03-14 ASSESSMENT — ACTIVITIES OF DAILY LIVING (ADL)
ADLS_ACUITY_SCORE: 49
ADLS_ACUITY_SCORE: 49
ADLS_ACUITY_SCORE: 47
ADLS_ACUITY_SCORE: 49
ADLS_ACUITY_SCORE: 51
ADLS_ACUITY_SCORE: 47
ADLS_ACUITY_SCORE: 51
ADLS_ACUITY_SCORE: 47
ADLS_ACUITY_SCORE: 51
ADLS_ACUITY_SCORE: 49
ADLS_ACUITY_SCORE: 51
ADLS_ACUITY_SCORE: 47
ADLS_ACUITY_SCORE: 51

## 2025-03-14 NOTE — PLAN OF CARE
"Temp: 97.7  F (36.5  C) Temp src: Oral BP: 123/40 Pulse: 70   Resp: 18 SpO2: 93 % O2 Device: None (Room air)       A&Ox4. VSS. Up 1A GB walker. LLE red/hot. Open areas present. Moistened kerlix placed with ABD pad wrapped around. Patient reported wound pain as it was drying/sticking to chux. PRN tylenol, elevation, and ice provided. On IV Ancef Q 12 hr. BLE edema, L > R. IV lasix x1, good output recorded even with some incontinence. From [facility]. Continue plan of care.     Goal Outcome Evaluation:      Plan of Care Reviewed With: patient    Overall Patient Progress: no changeOverall Patient Progress: no change    Outcome Evaluation: IV abx. LLE wound pain - tylenol/ice/elevation/dressing. IV lasix.        Problem: Adult Inpatient Plan of Care  Goal: Plan of Care Review  Description: The Plan of Care Review/Shift note should be completed every shift.  The Outcome Evaluation is a brief statement about your assessment that the patient is improving, declining, or no change.  This information will be displayed automatically on your shift  note.  Outcome: Progressing  Flowsheets (Taken 3/14/2025 0524)  Outcome Evaluation: IV abx. LLE wound pain - tylenol/ice/elevation/dressing. IV lasix.  Plan of Care Reviewed With: patient  Overall Patient Progress: no change  Goal: Patient-Specific Goal (Individualized)  Description: You can add care plan individualizations to a care plan. Examples of Individualization might be:  \"Parent requests to be called daily at 9am for status\", \"I have a hard time hearing out of my right ear\", or \"Do not touch me to wake me up as it startles  me\".  Outcome: Progressing  Goal: Absence of Hospital-Acquired Illness or Injury  Outcome: Progressing  Intervention: Identify and Manage Fall Risk  Recent Flowsheet Documentation  Taken 3/14/2025 0400 by Zabrina Vidal, RN  Safety Promotion/Fall Prevention: safety round/check completed  Taken 3/14/2025 0255 by Zabrina Vidal, RN  Safety Promotion/Fall " Prevention:   activity supervised   assistive device/personal items within reach   safety round/check completed  Taken 3/14/2025 0200 by Zabrina Vidal RN  Safety Promotion/Fall Prevention: safety round/check completed  Taken 3/14/2025 0005 by Zabrina Vidal RN  Safety Promotion/Fall Prevention:   activity supervised   nonskid shoes/slippers when out of bed   room door open   room near nurse's station   supervised activity  Taken 3/13/2025 2100 by Zabrina Vidal RN  Safety Promotion/Fall Prevention:   activity supervised   clutter free environment maintained   mobility aid in reach   nonskid shoes/slippers when out of bed   room near nurse's station   room door open   room organization consistent   safety round/check completed   supervised activity  Intervention: Prevent Skin Injury  Recent Flowsheet Documentation  Taken 3/14/2025 0255 by Zabrina Vidal RN  Body Position:   position changed independently   lower extremity elevated  Taken 3/14/2025 0005 by Zabrina Vidal RN  Body Position:   position changed independently   lower extremity elevated  Taken 3/13/2025 2100 by Zabrina Vidal RN  Body Position:   position changed independently   heels elevated   legs elevated  Intervention: Prevent and Manage VTE (Venous Thromboembolism) Risk  Recent Flowsheet Documentation  Taken 3/13/2025 2100 by Zabrina Vidal RN  VTE Prevention/Management: (ACE wraps during day)   compression stockings off   SCDs off (sequential compression devices)  Intervention: Prevent Infection  Recent Flowsheet Documentation  Taken 3/13/2025 2100 by Zabrina Vidal RN  Infection Prevention:   rest/sleep promoted   single patient room provided   hand hygiene promoted  Goal: Optimal Comfort and Wellbeing  Outcome: Progressing  Intervention: Monitor Pain and Promote Comfort  Recent Flowsheet Documentation  Taken 3/14/2025 0340 by Zabrina Vidal RN  Pain Management Interventions:   rest   repositioned   cold applied  Taken 3/14/2025 0243 by  Zabrina Vidal, RN  Pain Management Interventions:   medication (see MAR)   repositioned   rest  Intervention: Provide Person-Centered Care  Recent Flowsheet Documentation  Taken 3/13/2025 2100 by Zabrina Vidal RN  Trust Relationship/Rapport:   care explained   choices provided   thoughts/feelings acknowledged  Goal: Readiness for Transition of Care  Outcome: Progressing  Intervention: Mutually Develop Transition Plan  Recent Flowsheet Documentation  Taken 3/13/2025 2115 by Zabrina Vidal, RN  Equipment Currently Used at Home:   grab bar, toilet   grab bar, tub/shower   shower chair   walker, rolling     Problem: Delirium  Goal: Optimal Coping  Outcome: Progressing  Goal: Improved Behavioral Control  Outcome: Progressing  Intervention: Minimize Safety Risk  Recent Flowsheet Documentation  Taken 3/13/2025 2100 by Zabrina Vidal RN  Enhanced Safety Measures:   review medications for side effects with activity   room near unit station  Trust Relationship/Rapport:   care explained   choices provided   thoughts/feelings acknowledged  Goal: Improved Attention and Thought Clarity  Outcome: Progressing  Goal: Improved Sleep  Outcome: Progressing     Problem: Infection  Goal: Absence of Infection Signs and Symptoms  Outcome: Progressing

## 2025-03-14 NOTE — PLAN OF CARE
"Goal Outcome Evaluation:      Plan of Care Reviewed With: patient    Overall Patient Progress: improvingOverall Patient Progress: improving    Outcome Evaluation: alert and oriented. bilateral jennifer lower legs, left leg serous drainage. started on iv antibiotics, vss afebrile, denies pain. ate 1/2 box lunch and cup of tea for supper.    Problem: Adult Inpatient Plan of Care  Goal: Plan of Care Review  Description: The Plan of Care Review/Shift note should be completed every shift.  The Outcome Evaluation is a brief statement about your assessment that the patient is improving, declining, or no change.  This information will be displayed automatically on your shift  note.  Outcome: Progressing  Flowsheets (Taken 3/13/2025 2043)  Outcome Evaluation: alert and oriented. bilateral jennifer lower legs, left leg serous drainage. started on iv antibiotics, vss afebrile, denies pain. ate 1/2 box lunch and cup of tea for supper.  Plan of Care Reviewed With: patient  Overall Patient Progress: improving  Goal: Patient-Specific Goal (Individualized)  Description: You can add care plan individualizations to a care plan. Examples of Individualization might be:  \"Parent requests to be called daily at 9am for status\", \"I have a hard time hearing out of my right ear\", or \"Do not touch me to wake me up as it startles  me\".  Outcome: Progressing  Goal: Absence of Hospital-Acquired Illness or Injury  Outcome: Progressing  Intervention: Identify and Manage Fall Risk  Recent Flowsheet Documentation  Taken 3/13/2025 1907 by Connie Smith, RN  Safety Promotion/Fall Prevention:   activity supervised   clutter free environment maintained   lighting adjusted   mobility aid in reach   nonskid shoes/slippers when out of bed   patient and family education   room door open   room near nurse's station   room organization consistent   safety round/check completed   supervised activity  Intervention: Prevent Skin Injury  Recent Flowsheet " Documentation  Taken 3/13/2025 1907 by oCnnie Smith, RN  Body Position:   position changed independently   lower extremity elevated  Intervention: Prevent Infection  Recent Flowsheet Documentation  Taken 3/13/2025 1907 by Connie Smith, RN  Infection Prevention:   hand hygiene promoted   rest/sleep promoted   single patient room provided  Goal: Optimal Comfort and Wellbeing  Outcome: Progressing  Goal: Readiness for Transition of Care  Outcome: Progressing     Problem: Delirium  Goal: Optimal Coping  Outcome: Progressing  Goal: Improved Behavioral Control  Outcome: Progressing  Intervention: Minimize Safety Risk  Recent Flowsheet Documentation  Taken 3/13/2025 1907 by Connie Smith, RN  Enhanced Safety Measures: room near unit station  Goal: Improved Attention and Thought Clarity  Outcome: Progressing  Goal: Improved Sleep  Outcome: Progressing

## 2025-03-14 NOTE — PROGRESS NOTES
PRIMARY DIAGNOSIS: SOFT TISSUE INFECTIONS  OUTPATIENT/OBSERVATION GOALS TO BE MET BEFORE DISCHARGE:  Vitals sign stable or return to baseline: Yes    Tolerating oral antibiotics or has home infusion set up if applicable: No    Pain status: Improved-controlled with oral pain medications. Ice, elevation, and wound dressing.    Return to near baseline physical activity: No - requiring 1A GB walker    Discharge Planner Nurse   Safe discharge environment identified: Yes  Barriers to discharge: Yes       Entered by: Zabrina Vidal RN 03/14/2025 5:20 AM       Nurse to notify provider when observation goals have been met and patient is ready for discharge.

## 2025-03-14 NOTE — PROGRESS NOTES
Cared for [id number]     Pt A/O x 4, VSS; Pt denies headache, dizziness, N/V & SOB. Pt reports 5/10 L leg pain (PRN Tylenol). Pt up Asst: 1 w/gait belt & walker. Lung sounds clear/diminished, RA. Wounds/dressings changed per plan of care; blanchable redness on sacrum. IV Ancef, IV Lasix. WOC & Social Work following. Plan to discharge 1-2 days. Will continue with plan of care.     +++++++++++++++++++  PRIMARY DIAGNOSIS: ACUTE PAIN  OUTPATIENT/OBSERVATION GOALS TO BE MET BEFORE DISCHARGE:  1. Pain Status: Improved-controlled with oral pain medications.     2. Return to near baseline physical activity: No     3. Cleared for discharge by consultants (if involved): No        Discharge Planner Nurse  Safe discharge environment identified: Yes  Barriers to discharge: No       Entered by: Mary Beth Ledbetter RN 03/14/2025 4:00 PM

## 2025-03-14 NOTE — CONSULTS
[facility]  WO Nurse Inpatient Assessment     Consulted for: Left leg    Summary: Patient with significant arterial insufficiency based on testing last month.  Is supposed to have appointment with vascular next month.  If cellulitis resolves here can likely wait for that appointment but if not improving would recommend virtual vascular consult here.  Message sent to MD with above information.    [facility] nurse follow-up plan: weekly    Patient History (according to provider note(s):      Shiloh Covarrubias is a 89 year old female with PMH significant for hypertension, asthma/COPD, anxiety, depression, CKD stage III, PAD, PVD with known nonhealing wound of left lower extremity who presents to the ED on 3/13/2025 for evaluation of ongoing wound with concern for surrounding cellulitis even after being on oral antibiotics.     Assessment:      Areas visualized during today's visit: Focused:    Wound location: Left medial leg  Last photo: 3/14/25    Left medial leg      Bilateral legs      Wound due to: Venous Ulcer  Wound history/plan of care: Venous ulcer but patient with known arterial insufficiency.  Is supposed to have appointment with vascular next month.     Wound base: 100 % Epidermis     Palpation of the wound bed: normal      Drainage: none     Description of drainage: none     Measurements (length x width x depth, in cm): 5  x 3.5 cm      Tunneling: N/A     Undermining: N/A  Periwound skin: Dry/scaly and Erythema- blanchable      Color: pink      Temperature: warm  Odor: none  Pain: moderate-more painful if area not covered  Pain interventions prior to dressing change: slow and gentle cares   Treatment goal: Heal  and Protection  STATUS: initial assessment  Supplies ordered: gathered       Treatment Plan:     Left leg wound(s): Every other day  Cleanse with wound cleanser and dry  Apply nonadhesive Optifoam ([facility]#[id number]) to wound  Wrap with kerlix  No compression to left leg     Orders:  Written    RECOMMEND PRIMARY TEAM ORDER:  see above  Education provided: plan of care  Discussed plan of care with: Patient and Family  Notify WOC if wound(s) deteriorate.  Nursing to notify the Provider(s) and re-consult the WO Nurse if new skin concern.    DATA:     Current support surface: Standard  Standard gel mattress (Isoflex)  BMI: Body mass index is 19.84 kg/m .   Active diet order: Orders Placed This Encounter      Regular Diet Adult     Output: I/O last 3 completed shifts:  In: 150 [P.O.:150]  Out: 1925 [Urine:1925]     Labs:   Recent Labs   Lab 03/14/25  0616   HGB 9.9*   WBC 7.5     Pressure injury risk assessment:   Sensory Perception: 4-->no impairment  Moisture: 3-->occasionally moist  Activity: 3-->walks occasionally  Mobility: 3-->slightly limited  Nutrition: 3-->adequate  Friction and Shear: 3-->no apparent problem  Neeraj Score: 19    Chuck Clarke RN CWOCN  Contact Via [facility] Nurse (Malcolm)  Dept. Office Number: [phone]

## 2025-03-14 NOTE — PROGRESS NOTES
PRIMARY DIAGNOSIS: SOFT TISSUE INFECTIONS  OUTPATIENT/OBSERVATION GOALS TO BE MET BEFORE DISCHARGE:  Vitals sign stable or return to baseline: Yes    Tolerating oral antibiotics or has home infusion set up if applicable: No - IV antibiotics    Pain status: Improved-controlled with oral pain medications. Ice, elevation, and wound dressing.    Return to near baseline physical activity: No- 1A GB walker, increased pain/difficulty walking after laying for a while.    Discharge Planner Nurse   Safe discharge environment identified: Yes- DANYELL  Barriers to discharge: Yes - Cellulitis       Entered by: Zabrina Vidal RN 03/14/2025 5:22 AM         Nurse to notify provider when observation goals have been met and patient is ready for discharge.

## 2025-03-14 NOTE — PROGRESS NOTES
[facility]    Medicine Progress Note - Hospitalist Service    Date of Admission:  3/13/2025    Assessment & Plan   Shiloh Covarrubias is a 89 year old female with PMH significant for hypertension, asthma/COPD, anxiety, depression, CKD stage III, PAD, PVD with known nonhealing wound of left lower extremity who presented to the ED on 3/13/2025 for evaluation of ongoing wound with concern for surrounding cellulitis even after being on oral antibiotics.     ED workup reveals: VSS, hemoglobin of 11.3, sodium of 133, chloride of 97, BUN of 39.9, creatinine of 1.8, glucose of 132, ESR 55, CRP of 57.18, blood cultures obtained x 2 and pending, and per patient LE ultrasound at her facility was negative for DVT.  She was admitted with cellulitis that failed outpatient treatment.  She was treated with Ancef.    Problem list:     Nonhealing left LE wound   Left LE cellulitis   H/o PAD, PVD  -Still red warm and tender  -Wound care nurse consult appreciated.  If not improving may need vascular surgery consult.  -Continue Ancef     Hyponatremia  Hypochloremia  SHIVANI on CKD stage III  -Sodium and chloride have normalized  -Kidney function has improved     COPD  -continue PTA Spiriva if patient has with her     HFpEF  Baseline LE edema. No current shortness of breath.   -hold PTA PO Lasix while giving IV Lasix   -monitor I&Os and daily weights      HTN  BP stable  -continue PTA Amlodipine, Hydralazine, and Lisinopril with parameters     DM2  Diet controlled, most recent HgbA1c of 5.2 in 08/2024      Depression  -continue PTA Lexapro and Mirtazapine        Observation Goals: List all goals to be met before discharge home:, - Tolerating oral antibiotics or has plans for home infusion set up.\,, - Vital signs normal or at patient baseline,, - Adequate pain control on oral analgesia,, - Infection is improving,, - Color, warmth, movement, sensation of affected area or limb is intact or at baseline,, - Return to  baseline functional status,, - Safe disposition plan has been identified,, - Nurse to notify provider when observation goals have been met and patient is ready for discharge.  Diet: Regular Diet Adult    DVT Prophylaxis: Pneumatic Compression Devices  Ochoa Catheter: Not present  Lines: None     Cardiac Monitoring: None  Code Status: No CPR- Do NOT Intubate      Clinically Significant Risk Factors Present on Admission         # Hyponatremia: Lowest Na = 133 mmol/L in last 2 days, will monitor as appropriate  # Hypochloremia: Lowest Cl = 97 mmol/L in last 2 days, will monitor as appropriate        # Drug Induced Platelet Defect: home medication list includes an antiplatelet medication   # Hypertension: Noted on problem list      # Anemia: based on hgb <11           # COPD: noted on problem list        Social Drivers of Health            Disposition Plan     Medically Ready for Discharge: Anticipated in 2-4 Days             Beka Russell MD  Hospitalist Service  [facility]  Securely message with [employer] (more info)  Text page via [employer] Paging/Directory   ______________________________________________________________________    Interval History   Left lower leg is .  Swelling is down per patient    Physical Exam   Vital Signs: Temp: 98.2  F (36.8  C) Temp src: Oral BP: (!) 118/34 Pulse: 63   Resp: 18 SpO2: 95 % O2 Device: None (Room air)    Weight: 112 lbs 0 oz    GENERAL:  Comfortable. Cooperative.  PSYCH: pleasant, oriented, No acute distress.  EYES: PERRLA, Normal conjunctiva.  HEART:  Regular rate and rhythm. No JVD. Pulses normal. No edema.  LUNGS:  Clear to auscultation, normal Respiratory effort.  ABDOMEN:  Soft, no hepatosplenomegaly, normal bowel sounds.  EXTREMETIES: No clubbing, cyanosis or ischemia.  Distal left lower leg with nonhealing wound and erythema.  SKIN:  Dry to touch, No rash.      Medical Decision Making       40 MINUTES SPENT BY ME on the date of service  doing chart review, history, exam, documentation & further activities per the note.      Data     I have personally reviewed the following data over the past 24 hrs:    7.5  \   9.9 (L)   / 295     136 101 33.8 (H) /  103 (H)   4.8 24 1.48 (H) \     Procal: N/A CRP: 50.39 (H) Lactic Acid: 0.9         Imaging results reviewed over the past 24 hrs:   No results found for this or any previous visit (from the past 24 hours).  Recent Labs   Lab 03/14/25  0616 03/13/25  1604 03/11/25  0600   WBC 7.5 9.4 9.5   HGB 9.9* 11.3* 10.4*   MCV 90 92 92    360 251    133* 137   POTASSIUM 4.8 4.7 3.9   CHLORIDE 101 97* 100   CO2 24 23 23   BUN 33.8* 39.9* 43.6*   CR 1.48* 1.80* 1.23*   ANIONGAP 11 13 14   TANIA 9.1 9.4 9.1   * 132* 121*

## 2025-03-14 NOTE — PLAN OF CARE
"Cared for [id number]    Pt A/O x 4, VSS; Pt denies headache, dizziness, N/V & SOB. Pt reports 5/10 L leg pain (PRN Tylenol). Pt up Asst: 1 w/gait belt & walker. Lung sounds clear/diminished, RA. Wounds/dressings changed per plan of care; blanchable redness on sacrum. IV Ancef, IV Lasix. WOC & Social Work following. Plan to discharge 1-2 days. Will continue with plan of care.    +++++++++++++++++++  PRIMARY DIAGNOSIS: ACUTE PAIN  OUTPATIENT/OBSERVATION GOALS TO BE MET BEFORE DISCHARGE:  1. Pain Status: Improved-controlled with oral pain medications.    2. Return to near baseline physical activity: No    3. Cleared for discharge by consultants (if involved): No    Discharge Planner Nurse   Safe discharge environment identified: Yes  Barriers to discharge: No       Entered by: Mary Beth Ledbetter RN 03/14/2025 4:00 PM     Please review provider order for any additional goals.   Nurse to notify provider when observation goals have been met and patient is ready for discharge.    Goal Outcome Evaluation:      Plan of Care Reviewed With: patient    Overall Patient Progress: no changeOverall Patient Progress: no change    Outcome Evaluation: IV Ancef, WOC consult    Problem: Adult Inpatient Plan of Care  Goal: Plan of Care Review  Description: The Plan of Care Review/Shift note should be completed every shift.  The Outcome Evaluation is a brief statement about your assessment that the patient is improving, declining, or no change.  This information will be displayed automatically on your shift  note.  Outcome: Progressing  Flowsheets (Taken 3/14/2025 0800)  Outcome Evaluation: IV Ancef, WOC consult  Plan of Care Reviewed With: patient  Overall Patient Progress: no change  Goal: Patient-Specific Goal (Individualized)  Description: You can add care plan individualizations to a care plan. Examples of Individualization might be:  \"Parent requests to be called daily at 9am for status\", \"I have a hard time hearing out of my right " "ear\", or \"Do not touch me to wake me up as it startles  me\".  Outcome: Progressing  Goal: Absence of Hospital-Acquired Illness or Injury  Outcome: Progressing  Intervention: Identify and Manage Fall Risk  Recent Flowsheet Documentation  Taken 3/14/2025 0755 by Mary Beth Ledbetter RN  Safety Promotion/Fall Prevention:   activity supervised   assistive device/personal items within reach   clutter free environment maintained   mobility aid in reach   nonskid shoes/slippers when out of bed   safety round/check completed   supervised activity  Intervention: Prevent Skin Injury  Recent Flowsheet Documentation  Taken 3/14/2025 0755 by Mary Beth Ledbetter RN  Body Position: position changed independently  Intervention: Prevent and Manage VTE (Venous Thromboembolism) Risk  Recent Flowsheet Documentation  Taken 3/14/2025 0755 by Mary Beth Ledbetter RN  VTE Prevention/Management: patient refused intervention  Goal: Optimal Comfort and Wellbeing  Outcome: Progressing  Goal: Readiness for Transition of Care  Outcome: Progressing     Problem: Delirium  Goal: Optimal Coping  Outcome: Progressing  Goal: Improved Behavioral Control  Outcome: Progressing  Intervention: Minimize Safety Risk  Recent Flowsheet Documentation  Taken 3/14/2025 0755 by Mary Beth Ledbetter RN  Enhanced Safety Measures:   pain management   patient/family teach back on injury risk   review medications for side effects with activity  Goal: Improved Attention and Thought Clarity  Outcome: Progressing  Goal: Improved Sleep  Outcome: Progressing     Problem: Infection  Goal: Absence of Infection Signs and Symptoms  Outcome: Progressing     Problem: Skin Injury Risk Increased  Goal: Skin Health and Integrity  Outcome: Progressing  Intervention: Plan: Nurse Driven Intervention: Moisture Management  Recent Flowsheet Documentation  Taken 3/14/2025 0755 by Mary Beth Ledbetter RN  Moisture Interventions: Encourage regular toileting  Intervention: Optimize Skin " Protection  Recent Flowsheet Documentation  Taken 3/14/2025 0757 by Mary Beth Ledbetter, RN  Activity Management:   ambulated to bathroom   up in chair  Head of Bed (HOB) Positioning: HOB at 20-30 degrees

## 2025-03-14 NOTE — PHARMACY-ADMISSION MEDICATION HISTORY
Pharmacist Admission Medication History    Admission medication history is complete. The information provided in this note is only as accurate as the sources available at the time of the update.    Information Source(s): Caregiver and Facility (TCU/NH/) medication list/MAR via  MAR    Pertinent Information:  Patient resides at [place] [phone]    Changes made to PTA medication list:  Added: none  Deleted: Mirtazapine  Changed: Tylenol , loperamide, amlodipine, eye drop ,       Medication History Completed By: Marcia Lehman [facility] 3/14/2025 9:29 AM    PTA Med List   Medication Sig Note Last Dose/Taking    acetaminophen (TYLENOL) 500 MG tablet Take 1,000 mg by mouth 3 times daily. 3/14/2025: Was for 7 days last day 3/14 then back to once daily 3/13/2025    acetaminophen (TYLENOL) 500 MG tablet Take 1,000 mg by mouth every morning. 3/14/2025: Start 3/15 Taking    acetaminophen (TYLENOL) 500 MG tablet Take 1,000 mg by mouth daily as needed for mild pain.  Past Month    AMLODIPINE BESYLATE PO Take 7.5 mg by mouth daily.  3/12/2025 Evening    ASPIRIN ADULT LOW DOSE 81 MG EC tablet TAKE 1 TABLET BY MOUTH ONCE DAILY  3/13/2025 Morning    escitalopram (LEXAPRO) 10 MG tablet TAKE 1 TABLET BY MOUTH ONCE DAILY  3/12/2025 Evening    furosemide (LASIX) 40 MG tablet TAKE 1 TABLET BY MOUTH ONCE DAILY  3/13/2025 Morning    hydrALAZINE (APRESOLINE) 50 MG tablet TAKE 1 TABLET BY MOUTH TWICE  DAILY  3/13/2025 Morning    lisinopril (ZESTRIL) 20 MG tablet TAKE 1 TABLET BY MOUTH ONCE DAILY  3/13/2025 Morning    loperamide (IMODIUM) 2 MG capsule Take 2 mg by mouth 2 times daily.  3/13/2025 Morning    loperamide (IMODIUM) 2 MG capsule Take 2 mg by mouth daily as needed for diarrhea.  Past Week    loratadine (CLARITIN) 10 MG tablet Take 0.5 tablets (5 mg) by mouth every other day  3/12/2025 Evening    Multiple Vitamin (TAB-A-VITA) TABS TAKE 1 TABLET BY MOUTH ONCE DAILY  3/13/2025 Noon    Multiple Vitamins-Minerals (PRESERVISION  AREDS) CAPS TAKE 1 CAPSULE BY MOUTH TWICE DAILY WITH MEALS  3/13/2025 Morning    oxyCODONE (ROXICODONE) 5 MG tablet Take 0.5 tablets (2.5 mg) by mouth every 6 hours as needed for pain.  3/13/2025 Noon    PROPYLENE GLYCOL-GLYCERIN OP Place 1 drop into both eyes 3 times daily.  3/13/2025 Noon    PROPYLENE GLYCOL-GLYCERIN OP Place 2 drops into both eyes daily as needed.  Taking As Needed    SPIRIVA RESPIMAT 2.5 MCG/ACT inhaler INHALE 2 PUFFS INTO THE LUNGS ONCE DAILY  3/13/2025 Morning    sulfamethoxazole-trimethoprim (BACTRIM) 400-80 MG tablet Take 1 tablet by mouth 2 times daily for 7 days.  3/13/2025 Morning    Vitamin D3 (CHOLECALCIFEROL) 25 mcg (1000 units) tablet Take 1 tablet (25 mcg) by mouth daily  3/13/2025 Morning

## 2025-03-15 ENCOUNTER — APPOINTMENT (OUTPATIENT)
Dept: GENERAL RADIOLOGY | Facility: CLINIC | Age: OVER 89
End: 2025-03-15
Attending: INTERNAL MEDICINE
Payer: COMMERCIAL

## 2025-03-15 PROCEDURE — 99232 SBSQ HOSP IP/OBS MODERATE 35: CPT | Performed by: INTERNAL MEDICINE

## 2025-03-15 PROCEDURE — 250N000013 HC RX MED GY IP 250 OP 250 PS 637: Performed by: INTERNAL MEDICINE

## 2025-03-15 PROCEDURE — 999N000157 HC STATISTIC RCP TIME EA 10 MIN

## 2025-03-15 PROCEDURE — 250N000013 HC RX MED GY IP 250 OP 250 PS 637: Performed by: PHYSICIAN ASSISTANT

## 2025-03-15 PROCEDURE — 120N000001 HC R&B MED SURG/OB

## 2025-03-15 PROCEDURE — 73610 X-RAY EXAM OF ANKLE: CPT | Mod: LT

## 2025-03-15 PROCEDURE — 94640 AIRWAY INHALATION TREATMENT: CPT

## 2025-03-15 PROCEDURE — 250N000011 HC RX IP 250 OP 636: Performed by: PHYSICIAN ASSISTANT

## 2025-03-15 RX ADMIN — Medication 25 MCG: at 09:20

## 2025-03-15 RX ADMIN — LOPERAMIDE HYDROCHLORIDE 2 MG: 2 CAPSULE ORAL at 12:30

## 2025-03-15 RX ADMIN — ACETAMINOPHEN 650 MG: 325 TABLET, FILM COATED ORAL at 09:19

## 2025-03-15 RX ADMIN — FUROSEMIDE 40 MG: 40 TABLET ORAL at 09:20

## 2025-03-15 RX ADMIN — CEFAZOLIN 1 G: 1 INJECTION, POWDER, FOR SOLUTION INTRAMUSCULAR; INTRAVENOUS at 18:09

## 2025-03-15 RX ADMIN — CARBOXYMETHYLCELLULOSE SODIUM 1 DROP: 5 SOLUTION/ DROPS OPHTHALMIC at 09:20

## 2025-03-15 RX ADMIN — LISINOPRIL 20 MG: 20 TABLET ORAL at 09:19

## 2025-03-15 RX ADMIN — HYDRALAZINE HYDROCHLORIDE 50 MG: 50 TABLET ORAL at 20:31

## 2025-03-15 RX ADMIN — THERA TABS 1 TABLET: TAB at 09:19

## 2025-03-15 RX ADMIN — UMECLIDINIUM 1 PUFF: 62.5 AEROSOL, POWDER ORAL at 08:37

## 2025-03-15 RX ADMIN — ACETAMINOPHEN 650 MG: 325 TABLET, FILM COATED ORAL at 16:16

## 2025-03-15 RX ADMIN — ACETAMINOPHEN 650 MG: 325 TABLET, FILM COATED ORAL at 03:23

## 2025-03-15 RX ADMIN — CARBOXYMETHYLCELLULOSE SODIUM 1 DROP: 5 SOLUTION/ DROPS OPHTHALMIC at 16:15

## 2025-03-15 RX ADMIN — CARBOXYMETHYLCELLULOSE SODIUM 1 DROP: 5 SOLUTION/ DROPS OPHTHALMIC at 21:43

## 2025-03-15 RX ADMIN — HYDRALAZINE HYDROCHLORIDE 50 MG: 50 TABLET ORAL at 09:19

## 2025-03-15 RX ADMIN — LOPERAMIDE HYDROCHLORIDE 2 MG: 2 CAPSULE ORAL at 09:19

## 2025-03-15 RX ADMIN — ASPIRIN 81 MG: 81 TABLET, COATED ORAL at 09:19

## 2025-03-15 RX ADMIN — LOPERAMIDE HYDROCHLORIDE 2 MG: 2 CAPSULE ORAL at 16:16

## 2025-03-15 RX ADMIN — CEFAZOLIN 1 G: 1 INJECTION, POWDER, FOR SOLUTION INTRAMUSCULAR; INTRAVENOUS at 06:14

## 2025-03-15 RX ADMIN — ESCITALOPRAM OXALATE 10 MG: 10 TABLET ORAL at 20:30

## 2025-03-15 RX ADMIN — AMLODIPINE BESYLATE 7.5 MG: 5 TABLET ORAL at 21:43

## 2025-03-15 ASSESSMENT — ACTIVITIES OF DAILY LIVING (ADL)
ADLS_ACUITY_SCORE: 56
ADLS_ACUITY_SCORE: 51
ADLS_ACUITY_SCORE: 55
ADLS_ACUITY_SCORE: 56
ADLS_ACUITY_SCORE: 55
ADLS_ACUITY_SCORE: 49
ADLS_ACUITY_SCORE: 51
ADLS_ACUITY_SCORE: 56
ADLS_ACUITY_SCORE: 49
ADLS_ACUITY_SCORE: 56
DEPENDENT_IADLS:: LAUNDRY;MEDICATION MANAGEMENT;TRANSPORTATION
ADLS_ACUITY_SCORE: 51
ADLS_ACUITY_SCORE: 56
ADLS_ACUITY_SCORE: 49
ADLS_ACUITY_SCORE: 49
ADLS_ACUITY_SCORE: 51
ADLS_ACUITY_SCORE: 49
ADLS_ACUITY_SCORE: 56

## 2025-03-15 NOTE — CONSULTS
Care Management Initial Consult    General Information  Assessment completed with: Patient,    Type of CM/SW Visit: Initial Assessment    Primary Care Provider verified and updated as needed: Yes   Readmission within the last 30 days: no previous admission in last 30 days      Reason for Consult: discharge planning  Advance Care Planning: Advance Care Planning Reviewed: present on chart        Communication Assessment  Patient's communication style: spoken language (English or Bilingual)    Hearing Difficulty or Deaf: yes   Wear Glasses or Blind: yes    Cognitive  Cognitive/Neuro/Behavioral: WDL                      Living Environment:   People in home: alone, facility resident     Current living Arrangements: assisted living  Name of Facility: [facility]   Able to return to prior arrangements: yes     Family/Social Support:  Care provided by: self, homecare agency  Provides care for: no one  Marital Status:   Support system: Children          Description of Support System: Supportive, Involved       Current Resources:   Patient receiving home care services: Yes  Skilled Home Care Services: Skilled Nursing     Community Resources: None  Equipment currently used at home: grab bar, toilet, grab bar, tub/shower, walker, rolling  Supplies currently used at home: Wound Care Supplies    Employment/Financial:  Employment Status: retired        Financial Concerns: none      Does the patient's insurance plan have a 3 day qualifying hospital stay waiver?  Yes     Which insurance plan 3 day waiver is available? Alternative insurance waiver    Will the waiver be used for post-acute placement? No    Lifestyle & Psychosocial Needs:  Social Drivers of Health     Food Insecurity: Low Risk  (3/13/2025)    Food Insecurity     Within the past 12 months, did you worry that your food would run out before you got money to buy more?: No     Within the past 12 months, did the food you bought just not last and you didn t have money to  get more?: No   Depression: Not at risk (11/9/2022)    PHQ-2     PHQ-2 Score: 0   Housing Stability: Low Risk  (3/13/2025)    Housing Stability     Do you have housing? : Yes     Are you worried about losing your housing?: No   Tobacco Use: Low Risk  (8/6/2024)    Patient History     Smoking Tobacco Use: Never     Smokeless Tobacco Use: Never     Passive Exposure: Not on file   Financial Resource Strain: Low Risk  (3/13/2025)    Financial Resource Strain     Within the past 12 months, have you or your family members you live with been unable to get utilities (heat, electricity) when it was really needed?: No   Alcohol Use: Not on file   Transportation Needs: Low Risk  (3/13/2025)    Transportation Needs     Within the past 12 months, has lack of transportation kept you from medical appointments, getting your medicines, non-medical meetings or appointments, work, or from getting things that you need?: No   Physical Activity: Not on file   Interpersonal Safety: Low Risk  (3/13/2025)    Interpersonal Safety     Do you feel physically and emotionally safe where you currently live?: Yes     Within the past 12 months, have you been hit, slapped, kicked or otherwise physically hurt by someone?: No     Within the past 12 months, have you been humiliated or emotionally abused in other ways by your partner or ex-partner?: No   Stress: Not on file   Social Connections: Not on file   Health Literacy: Not on file       Functional Status:  Prior to admission patient needed assistance:   Dependent ADLs:: Ambulation-walker  Dependent IADLs:: Laundry, Medication Management, Transportation     Mental Health Status:  Mental Health Status: No Current Concerns       Discussed  Partnership in Safe Discharge Planning  document with patient/family: No    Additional Information:  Care management consult for discharge planning/disposition. Patient admitted for left lower leg cellulitis, non-healing left lower extremity wound. Patient comes  from [facility] assisted living. Permission given for CM to coordinate with [facility].  Spoke with Christina keith at [facility].    Patient receives services as follows: med management, meals  [facility] contact (name/number): nurse phone [phone]  Fax #: [phone]  Barriers to pt returning: none  Baseline mobility: indep with walker  Time of preferred return: Mon-Fri  New meds/prescriptions/Pharmacy: A&E in [place]  Transportation: family    Patient states she is open to homecare for nursing 2 x weekly for wound care. Per chart review it appears she is open with [facility]. Did not get a hold of them to confirm.    RN CC/SW will continue to follow for discharge planning and return to facility with resumption of home care.     Next Steps: coordinate discharge to [facility] with JAMIL Whiting RN  Care Coordinator  [facility]

## 2025-03-15 NOTE — PROGRESS NOTES
[facility]    Medicine Progress Note - Hospitalist Service    Date of Admission:  3/13/2025    Assessment & Plan   Shiloh Covarrubias is a 89 year old female with PMH significant for hypertension, asthma/COPD, anxiety, depression, CKD stage III, PAD, PVD with known nonhealing wound of left lower extremity who presented to the ED on 3/13/2025 for evaluation of ongoing wound with concern for surrounding cellulitis even after being on oral antibiotics.     ED workup reveals: VSS, hemoglobin of 11.3, sodium of 133, chloride of 97, BUN of 39.9, creatinine of 1.8, glucose of 132, ESR 55, CRP of 57.18, blood cultures obtained x 2 and pending, and per patient LE ultrasound at her facility was negative for DVT.  She was admitted with cellulitis that failed outpatient treatment.  She was treated with Ancef.    Problem list:     Nonhealing left LE wound   Left LE cellulitis   H/o PAD, PVD  - but seems less erythematous.  Swelling has resolved.  There is no drainage.  -Wound care nurse consult appreciated.    -Continue Ancef  -Tenderness is with weightbearing or direct palpation.  I obtained an x-ray to ensure no fracture.  An age-indeterminate talus avulsion fragment was identified but I doubt that this is causing her symptoms.  -AM CBC     Hyponatremia  Hypochloremia  SHIVANI on CKD stage III  -Sodium and chloride have normalized  -Kidney function has improved  -AM BMP     COPD  -continue PTA Spiriva if patient has with her     HFpEF  Baseline LE edema. No current shortness of breath.   -hold PTA PO Lasix while giving IV Lasix   -monitor I&Os and daily weights      HTN  BP stable  -continue PTA Amlodipine, Hydralazine, and Lisinopril with parameters     DM2  Diet controlled, most recent HgbA1c of 5.2 in 08/2024      Depression  -continue PTA Lexapro and Mirtazapine           Diet: Regular Diet Adult    DVT Prophylaxis: Pneumatic Compression Devices  Ochoa Catheter: Not present  Lines: None      Cardiac Monitoring: None  Code Status: No CPR- Do NOT Intubate      Clinically Significant Risk Factors Present on Admission                 # Drug Induced Platelet Defect: home medication list includes an antiplatelet medication   # Hypertension: Noted on problem list      # Anemia: based on hgb <11           # Financial/Environmental Concerns: none  # COPD: noted on problem list        Social Drivers of Health            Disposition Plan     Medically Ready for Discharge: Anticipated in 2-4 Days             Beka Russell MD  Hospitalist Service  [facility] [place]  Securely message with [employer] (more info)  Text page via [employer] Paging/Directory   ______________________________________________________________________    Interval History   No new problems.  Still with quite a bit of tenderness but improved erythema, swelling, and resolution of drainage    Physical Exam   Vital Signs: Temp: 98.4  F (36.9  C) Temp src: Oral BP: 122/42 Pulse: 68   Resp: 18 SpO2: 95 % O2 Device: None (Room air)    Weight: 109 lbs 1.6 oz    GENERAL:  Comfortable. Cooperative.  PSYCH: pleasant, oriented, No acute distress.  EYES: PERRLA, Normal conjunctiva.  HEART:  Regular rate and rhythm. No JVD. Pulses normal. No edema.  LUNGS:  Clear to auscultation, normal Respiratory effort.  ABDOMEN:  Soft, no hepatosplenomegaly, normal bowel sounds.  EXTREMETIES: No clubbing, cyanosis or ischemia.  Left leg with improved erythema.  No drainage.  There is some disrupted skin above the ankle medially.  Below the ankle there is a little bit deeper erythema and tenderness to palpation.  SKIN:  Dry to touch, No rash.      Medical Decision Making       45 MINUTES SPENT BY ME on the date of service doing chart review, history, exam, documentation & further activities per the note.      Data         Imaging results reviewed over the past 24 hrs:   Recent Results (from the past 24 hours)   XR Ankle Left G/E 3 Views    Narrative    EXAM:  XR ANKLE LEFT G/E 3 VIEWS  LOCATION: [facility]  DATE: 3/15/2025    INDICATION: cellulitis, pain with weight bearing. Evaluate for fracture. No obvious traumatic injury  COMPARISON: None.      Impression    IMPRESSION: Mild soft tissue swelling around the ankle. Small linear radiodensity at the dorsal talar head is consistent with an age-indeterminate avulsion fragment. There is normal joint alignment. Mild tibiotalar joint degenerative changes. The ankle   mortise is congruent. Osteopenia. Vascular calcification.     Recent Labs   Lab 03/14/25  0616 03/13/25  1604 03/11/25  0600   WBC 7.5 9.4 9.5   HGB 9.9* 11.3* 10.4*   MCV 90 92 92    360 251    133* 137   POTASSIUM 4.8 4.7 3.9   CHLORIDE 101 97* 100   CO2 24 23 23   BUN 33.8* 39.9* 43.6*   CR 1.48* 1.80* 1.23*   ANIONGAP 11 13 14   TANIA 9.1 9.4 9.1   * 132* 121*

## 2025-03-15 NOTE — PLAN OF CARE
"Vitals are Temp: 98.2  F (36.8  C) Temp src: Oral BP: (!) 144/50 Pulse: 63   Resp: 18 SpO2: 95 %.  Patient is Alert and Oriented x4. They are 1 Assist with Gait Belt and Walker.Pt is a Regular diet.  They are complaining of some pa in to LLE- settled with tylenol and one dose of oxy.  Patient is Saline locked with intermittent abx. Warm and reddened to LLE -drsg wrapped. WOC consulted. R/A.     Goal Outcome Evaluation:      Plan of Care Reviewed With: patient    Overall Patient Progress: improvingOverall Patient Progress: improving         Problem: Adult Inpatient Plan of Care  Goal: Plan of Care Review  Description: The Plan of Care Review/Shift note should be completed every shift.  The Outcome Evaluation is a brief statement about your assessment that the patient is improving, declining, or no change.  This information will be displayed automatically on your shift  note.  Outcome: Progressing  Flowsheets (Taken 3/14/2025 2204)  Plan of Care Reviewed With: patient  Overall Patient Progress: improving  Goal: Patient-Specific Goal (Individualized)  Description: You can add care plan individualizations to a care plan. Examples of Individualization might be:  \"Parent requests to be called daily at 9am for status\", \"I have a hard time hearing out of my right ear\", or \"Do not touch me to wake me up as it startles  me\".  Outcome: Progressing  Goal: Absence of Hospital-Acquired Illness or Injury  Outcome: Progressing  Intervention: Identify and Manage Fall Risk  Recent Flowsheet Documentation  Taken 3/14/2025 1553 by Xi Mauro RN  Safety Promotion/Fall Prevention:   activity supervised   assistive device/personal items within reach   clutter free environment maintained   mobility aid in reach   nonskid shoes/slippers when out of bed   safety round/check completed   supervised activity  Intervention: Prevent Skin Injury  Recent Flowsheet Documentation  Taken 3/14/2025 1553 by Xi Mauro, " RN  Body Position: position changed independently  Intervention: Prevent and Manage VTE (Venous Thromboembolism) Risk  Recent Flowsheet Documentation  Taken 3/14/2025 1553 by Xi Mauro RN  VTE Prevention/Management: patient refused intervention  Intervention: Prevent Infection  Recent Flowsheet Documentation  Taken 3/14/2025 1553 by Xi Mauro RN  Infection Prevention:   cohorting utilized   hand hygiene promoted  Goal: Optimal Comfort and Wellbeing  Outcome: Progressing  Goal: Readiness for Transition of Care  Outcome: Progressing     Problem: Delirium  Goal: Optimal Coping  Outcome: Progressing  Goal: Improved Behavioral Control  Outcome: Progressing  Intervention: Prevent and Manage Agitation  Recent Flowsheet Documentation  Taken 3/14/2025 1553 by Xi Mauro RN  Environment Familiarity/Consistency: familiar objects from home provided  Intervention: Minimize Safety Risk  Recent Flowsheet Documentation  Taken 3/14/2025 1553 by Xi Mauro RN  Enhanced Safety Measures:   pain management   patient/family teach back on injury risk   review medications for side effects with activity  Goal: Improved Attention and Thought Clarity  Outcome: Progressing  Goal: Improved Sleep  Outcome: Progressing     Problem: Infection  Goal: Absence of Infection Signs and Symptoms  Outcome: Progressing     Problem: Skin Injury Risk Increased  Goal: Skin Health and Integrity  Outcome: Progressing  Intervention: Plan: Nurse Driven Intervention: Moisture Management  Recent Flowsheet Documentation  Taken 3/14/2025 1944 by Xi Mauro RN  Moisture Interventions: Encourage regular toileting  Bathing/Skin Care:   incontinence care   moisturizer applied  Taken 3/14/2025 1553 by Xi Mauro RN  Moisture Interventions: Encourage regular toileting  Intervention: Optimize Skin Protection  Recent Flowsheet Documentation  Taken 3/14/2025 1553 by Xi Mauro  RN  Activity Management:   ambulated to bathroom   up in chair  Head of Bed (HOB) Positioning: HOB at 60 degrees

## 2025-03-15 NOTE — PLAN OF CARE
"Cared for [id number]     Pt A/O x 4, VSS; Pt denies headache, dizziness, N/V & SOB. Pt reports 7/10 L leg pain (PRN Tylenol). Pt up Asst: 1 w/gait belt & walker. Lung sounds clear/diminished, RA. Wounds/dressings changed per plan of care; blanchable redness on sacrum. IV Ancef, PO Lasix. WOC & Social Work following. XR of L Ankle/leg. Plan to discharge 1-2 days. Will continue with plan of care.    +++++++++++++++++++++++++    Goal Outcome Evaluation:      Plan of Care Reviewed With: patient    Overall Patient Progress: no changeOverall Patient Progress: no change    Outcome Evaluation: IV Ancef    Problem: Adult Inpatient Plan of Care  Goal: Plan of Care Review  Description: The Plan of Care Review/Shift note should be completed every shift.  The Outcome Evaluation is a brief statement about your assessment that the patient is improving, declining, or no change.  This information will be displayed automatically on your shift  note.  Outcome: Progressing  Flowsheets (Taken 3/15/2025 1213)  Outcome Evaluation: IV Ancef  Plan of Care Reviewed With: patient  Overall Patient Progress: no change  Goal: Patient-Specific Goal (Individualized)  Description: You can add care plan individualizations to a care plan. Examples of Individualization might be:  \"Parent requests to be called daily at 9am for status\", \"I have a hard time hearing out of my right ear\", or \"Do not touch me to wake me up as it startles  me\".  Outcome: Progressing  Goal: Absence of Hospital-Acquired Illness or Injury  Outcome: Progressing  Intervention: Identify and Manage Fall Risk  Recent Flowsheet Documentation  Taken 3/15/2025 0925 by Mary Beth Ledbetter, RN  Safety Promotion/Fall Prevention:   activity supervised   assistive device/personal items within reach   clutter free environment maintained   lighting adjusted   mobility aid in reach   nonskid shoes/slippers when out of bed   safety round/check completed   supervised activity  Intervention: " Prevent Infection  Recent Flowsheet Documentation  Taken 3/15/2025 0919 by Mary Beth Ledbetter RN  Infection Prevention:   rest/sleep promoted   hand hygiene promoted   single patient room provided  Goal: Optimal Comfort and Wellbeing  Outcome: Progressing  Intervention: Monitor Pain and Promote Comfort  Recent Flowsheet Documentation  Taken 3/15/2025 0919 by Mary Beth Ledbetter RN  Pain Management Interventions: medication (see MAR)  Goal: Readiness for Transition of Care  Outcome: Progressing  Intervention: Mutually Develop Transition Plan  Recent Flowsheet Documentation  Taken 3/15/2025 1200 by Mary Beth Ledbetter RN  Equipment Currently Used at Home:   grab bar, toilet   grab bar, tub/shower   walker, rolling     Problem: Delirium  Goal: Optimal Coping  Outcome: Progressing  Goal: Improved Behavioral Control  Outcome: Progressing  Intervention: Minimize Safety Risk  Recent Flowsheet Documentation  Taken 3/15/2025 0919 by Mary Beth Ledbetter RN  Enhanced Safety Measures:   pain management   review medications for side effects with activity  Goal: Improved Attention and Thought Clarity  Outcome: Progressing  Goal: Improved Sleep  Outcome: Progressing     Problem: Infection  Goal: Absence of Infection Signs and Symptoms  Outcome: Progressing     Problem: Skin Injury Risk Increased  Goal: Skin Health and Integrity  Outcome: Progressing  Intervention: Plan: Nurse Driven Intervention: Moisture Management  Recent Flowsheet Documentation  Taken 3/15/2025 0919 by Mary Beth Ledbetter RN  Moisture Interventions:   Encourage regular toileting   Incontinence pad  Bathing/Skin Care: incontinence care  Intervention: Plan: Nurse Driven Intervention: Friction and Shear  Recent Flowsheet Documentation  Taken 3/15/2025 0919 by Mary Beth Ledbetter RN  Friction/Shear Interventions: HOB 30 degrees or less  Intervention: Optimize Skin Protection  Recent Flowsheet Documentation  Taken 3/15/2025 0919 by Mary Beth Ledbetter  RN  Activity Management:   up in chair   ambulated to bathroom

## 2025-03-16 ENCOUNTER — APPOINTMENT (OUTPATIENT)
Dept: ULTRASOUND IMAGING | Facility: CLINIC | Age: OVER 89
End: 2025-03-16
Attending: INTERNAL MEDICINE
Payer: COMMERCIAL

## 2025-03-16 LAB
ANION GAP SERPL CALCULATED.3IONS-SCNC: 11 MMOL/L (ref 7–15)
BUN SERPL-MCNC: 17.9 MG/DL (ref 8–23)
CALCIUM SERPL-MCNC: 8.9 MG/DL (ref 8.8–10.4)
CHLORIDE SERPL-SCNC: 101 MMOL/L (ref 98–107)
CREAT SERPL-MCNC: 0.99 MG/DL (ref 0.51–0.95)
EGFRCR SERPLBLD CKD-EPI 2021: 52 ML/MIN/1.73M2
ERYTHROCYTE [DISTWIDTH] IN BLOOD BY AUTOMATED COUNT: 13.7 % (ref 10–15)
GLUCOSE SERPL-MCNC: 98 MG/DL (ref 70–99)
HCO3 SERPL-SCNC: 27 MMOL/L (ref 22–29)
HCT VFR BLD AUTO: 32.2 % (ref 35–47)
HGB BLD-MCNC: 10.4 G/DL (ref 11.7–15.7)
MCH RBC QN AUTO: 29.3 PG (ref 26.5–33)
MCHC RBC AUTO-ENTMCNC: 32.3 G/DL (ref 31.5–36.5)
MCV RBC AUTO: 91 FL (ref 78–100)
MRSA DNA SPEC QL NAA+PROBE: NEGATIVE
PLATELET # BLD AUTO: 318 10E3/UL (ref 150–450)
POTASSIUM SERPL-SCNC: 3.9 MMOL/L (ref 3.4–5.3)
RBC # BLD AUTO: 3.55 10E6/UL (ref 3.8–5.2)
SA TARGET DNA: NEGATIVE
SODIUM SERPL-SCNC: 139 MMOL/L (ref 135–145)
WBC # BLD AUTO: 9.8 10E3/UL (ref 4–11)

## 2025-03-16 PROCEDURE — 85018 HEMOGLOBIN: CPT | Performed by: INTERNAL MEDICINE

## 2025-03-16 PROCEDURE — 120N000001 HC R&B MED SURG/OB

## 2025-03-16 PROCEDURE — 999N000128 HC STATISTIC PERIPHERAL IV START W/O US GUIDANCE

## 2025-03-16 PROCEDURE — 250N000013 HC RX MED GY IP 250 OP 250 PS 637: Performed by: PHYSICIAN ASSISTANT

## 2025-03-16 PROCEDURE — 250N000011 HC RX IP 250 OP 636: Performed by: INTERNAL MEDICINE

## 2025-03-16 PROCEDURE — 36415 COLL VENOUS BLD VENIPUNCTURE: CPT | Performed by: INTERNAL MEDICINE

## 2025-03-16 PROCEDURE — 250N000013 HC RX MED GY IP 250 OP 250 PS 637: Performed by: INTERNAL MEDICINE

## 2025-03-16 PROCEDURE — 250N000011 HC RX IP 250 OP 636: Performed by: PHYSICIAN ASSISTANT

## 2025-03-16 PROCEDURE — 85041 AUTOMATED RBC COUNT: CPT | Performed by: INTERNAL MEDICINE

## 2025-03-16 PROCEDURE — 80048 BASIC METABOLIC PNL TOTAL CA: CPT | Performed by: INTERNAL MEDICINE

## 2025-03-16 PROCEDURE — 99232 SBSQ HOSP IP/OBS MODERATE 35: CPT | Performed by: INTERNAL MEDICINE

## 2025-03-16 PROCEDURE — 87641 MR-STAPH DNA AMP PROBE: CPT | Performed by: INTERNAL MEDICINE

## 2025-03-16 PROCEDURE — 93971 EXTREMITY STUDY: CPT | Mod: LT

## 2025-03-16 RX ORDER — VANCOMYCIN HYDROCHLORIDE 1 G/200ML
1000 INJECTION, SOLUTION INTRAVENOUS ONCE
Status: COMPLETED | OUTPATIENT
Start: 2025-03-16 | End: 2025-03-16

## 2025-03-16 RX ORDER — PIPERACILLIN SODIUM, TAZOBACTAM SODIUM 2; .25 G/10ML; G/10ML
2.25 INJECTION, POWDER, LYOPHILIZED, FOR SOLUTION INTRAVENOUS EVERY 6 HOURS
Status: DISCONTINUED | OUTPATIENT
Start: 2025-03-16 | End: 2025-03-19 | Stop reason: HOSPADM

## 2025-03-16 RX ADMIN — CEFAZOLIN 1 G: 1 INJECTION, POWDER, FOR SOLUTION INTRAMUSCULAR; INTRAVENOUS at 05:52

## 2025-03-16 RX ADMIN — LOPERAMIDE HYDROCHLORIDE 2 MG: 2 CAPSULE ORAL at 16:19

## 2025-03-16 RX ADMIN — LOPERAMIDE HYDROCHLORIDE 2 MG: 2 CAPSULE ORAL at 07:40

## 2025-03-16 RX ADMIN — PIPERACILLIN AND TAZOBACTAM 2.25 G: 2; .25 INJECTION, POWDER, FOR SOLUTION INTRAVENOUS at 11:25

## 2025-03-16 RX ADMIN — OXYCODONE HYDROCHLORIDE 2.5 MG: 5 TABLET ORAL at 05:53

## 2025-03-16 RX ADMIN — CARBOXYMETHYLCELLULOSE SODIUM 1 DROP: 5 SOLUTION/ DROPS OPHTHALMIC at 09:17

## 2025-03-16 RX ADMIN — VANCOMYCIN HYDROCHLORIDE 1000 MG: 1 INJECTION, SOLUTION INTRAVENOUS at 12:14

## 2025-03-16 RX ADMIN — Medication 25 MCG: at 09:17

## 2025-03-16 RX ADMIN — ACETAMINOPHEN 650 MG: 325 TABLET, FILM COATED ORAL at 09:16

## 2025-03-16 RX ADMIN — HYDRALAZINE HYDROCHLORIDE 50 MG: 50 TABLET ORAL at 21:22

## 2025-03-16 RX ADMIN — FUROSEMIDE 40 MG: 40 TABLET ORAL at 09:17

## 2025-03-16 RX ADMIN — THERA TABS 1 TABLET: TAB at 09:17

## 2025-03-16 RX ADMIN — OXYCODONE HYDROCHLORIDE 2.5 MG: 5 TABLET ORAL at 12:13

## 2025-03-16 RX ADMIN — CARBOXYMETHYLCELLULOSE SODIUM 1 DROP: 5 SOLUTION/ DROPS OPHTHALMIC at 16:19

## 2025-03-16 RX ADMIN — PIPERACILLIN AND TAZOBACTAM 2.25 G: 2; .25 INJECTION, POWDER, FOR SOLUTION INTRAVENOUS at 21:23

## 2025-03-16 RX ADMIN — ESCITALOPRAM OXALATE 10 MG: 10 TABLET ORAL at 21:22

## 2025-03-16 RX ADMIN — ACETAMINOPHEN 650 MG: 325 TABLET, FILM COATED ORAL at 01:25

## 2025-03-16 RX ADMIN — HYDRALAZINE HYDROCHLORIDE 50 MG: 50 TABLET ORAL at 09:17

## 2025-03-16 RX ADMIN — UMECLIDINIUM 1 PUFF: 62.5 AEROSOL, POWDER ORAL at 11:26

## 2025-03-16 RX ADMIN — ACETAMINOPHEN 650 MG: 325 TABLET, FILM COATED ORAL at 21:26

## 2025-03-16 RX ADMIN — LOPERAMIDE HYDROCHLORIDE 2 MG: 2 CAPSULE ORAL at 11:25

## 2025-03-16 RX ADMIN — LISINOPRIL 20 MG: 20 TABLET ORAL at 09:17

## 2025-03-16 RX ADMIN — PIPERACILLIN AND TAZOBACTAM 2.25 G: 2; .25 INJECTION, POWDER, FOR SOLUTION INTRAVENOUS at 16:19

## 2025-03-16 RX ADMIN — CARBOXYMETHYLCELLULOSE SODIUM 1 DROP: 5 SOLUTION/ DROPS OPHTHALMIC at 21:22

## 2025-03-16 RX ADMIN — ASPIRIN 81 MG: 81 TABLET, COATED ORAL at 09:17

## 2025-03-16 RX ADMIN — AMLODIPINE BESYLATE 7.5 MG: 5 TABLET ORAL at 21:21

## 2025-03-16 ASSESSMENT — ACTIVITIES OF DAILY LIVING (ADL)
ADLS_ACUITY_SCORE: 56
ADLS_ACUITY_SCORE: 55
ADLS_ACUITY_SCORE: 56
ADLS_ACUITY_SCORE: 55
ADLS_ACUITY_SCORE: 56
ADLS_ACUITY_SCORE: 56
ADLS_ACUITY_SCORE: 55

## 2025-03-16 NOTE — PROGRESS NOTES
[facility]    Medicine Progress Note - Hospitalist Service    Date of Admission:  3/13/2025    Assessment & Plan   Shiloh Covarrubias is a 89 year old female with PMH significant for hypertension, asthma/COPD, anxiety, depression, CKD stage III, PAD, PVD with known nonhealing wound of left lower extremity who presented to the ED on 3/13/2025 for evaluation of ongoing wound with concern for surrounding cellulitis even after being on oral antibiotics.     ED workup reveals: VSS, hemoglobin of 11.3, sodium of 133, chloride of 97, BUN of 39.9, creatinine of 1.8, glucose of 132, ESR 55, CRP of 57.18, blood cultures obtained x 2 and pending, and per patient LE ultrasound at her facility was negative for DVT.  She was admitted with cellulitis that failed outpatient treatment.  She was treated with Ancef.  Initially she showed some improvement with reduction of erythema and resolution of wound drainage.  She continued to have quite a bit of pain, however.  The pain was worse around and below the ankle.  This is the area where the erythema persisted though worse.  She developed increase in posterior calf pain.  Ancef was changed to vancomycin and Zosyn on 3/16.  MRSA nares swab was done and was negative.     Problem list:     Nonhealing left LE wound   Left LE cellulitis   H/o PAD, PVD  -Has improved in some ways with no more drainage from medial wound above ankle and decrease in initial swelling and erythema.  She has continued to have persistent pain, however.  The pain seems the worst around and below the medial ankle which is where the erythema is still a bit more persistent.  She also has some pain behind her lower calf today where there is also some persistent erythema.  -X-ray of left ankle showed no fracture  -Obtain ultrasound today to ensure no DVT  -Wound care nurse consult appreciated.    -Change Ancef to Zosyn and vancomycin  -Check nare MRSA swab     Hyponatremia  Hypochloremia  SHIVANI on  CKD stage III  -Sodium and chloride have normalized  -Kidney function has improved  -AM BMP     COPD  -continue PTA Spiriva if patient has with her     HFpEF  Baseline LE edema. No current shortness of breath.   -hold PTA PO Lasix while giving IV Lasix   -monitor I&Os and daily weights      HTN  BP stable  -continue PTA Amlodipine, Hydralazine, and Lisinopril with parameters     DM2  Diet controlled, most recent HgbA1c of 5.2 in 08/2024      Depression  -continue PTA Lexapro and Mirtazapine           Diet: Regular Diet Adult    DVT Prophylaxis: Pneumatic Compression Devices  Ochoa Catheter: Not present  Lines: None     Cardiac Monitoring: None  Code Status: No CPR- Do NOT Intubate      Clinically Significant Risk Factors                   # Hypertension: Noted on problem list                # Financial/Environmental Concerns: none  # COPD: noted on problem list        Social Drivers of Health            Disposition Plan     Medically Ready for Discharge: Anticipated in 2-4 Days             Beka Russell MD  Hospitalist Service  [facility]  Securely message with [employer] (more info)  Text page via [email or web address] Paging/Directory   ______________________________________________________________________    Interval History   Still with pain around medial ankle.  Now some pain in the posterior distal calf.    Physical Exam   Vital Signs: Temp: 98  F (36.7  C) Temp src: Oral BP: (!) 175/61 Pulse: 64   Resp: 16 SpO2: 95 % O2 Device: None (Room air)    Weight: 115 lbs 8.34 oz    GENERAL:  Comfortable. Cooperative.  PSYCH: pleasant, oriented, No acute distress.  EYES: PERRLA, Normal conjunctiva.  HEART:  Regular rate and rhythm. No JVD. Pulses normal. No edema.  LUNGS:  Clear to auscultation, normal Respiratory effort.  ABDOMEN:  Soft, no hepatosplenomegaly, normal bowel sounds.  EXTREMETIES: No clubbing, cyanosis or ischemia.  Medial wound above ankle seems to be improving and is without drainage.  There is  still some tenderness below the medial ankle on the left which seems little bit better than yesterday.  There is still some persistent erythema there but less warmth.  She now has pain in the posterior distal calf with maybe a little bit of erythema.  SKIN:  Dry to touch, No rash.      Medical Decision Making       45 MINUTES SPENT BY ME on the date of service doing chart review, history, exam, documentation & further activities per the note.      Data     I have personally reviewed the following data over the past 24 hrs:    9.8  \   10.4 (L)   / 318     139 101 17.9 /  98   3.9 27 0.99 (H) \       Imaging results reviewed over the past 24 hrs:   No results found for this or any previous visit (from the past 24 hours).  Recent Labs   Lab 03/16/25  0601 03/14/25  0616 03/13/25  1604   WBC 9.8 7.5 9.4   HGB 10.4* 9.9* 11.3*   MCV 91 90 92    295 360    136 133*   POTASSIUM 3.9 4.8 4.7   CHLORIDE 101 101 97*   CO2 27 24 23   BUN 17.9 33.8* 39.9*   CR 0.99* 1.48* 1.80*   ANIONGAP 11 11 13   TANIA 8.9 9.1 9.4   GLC 98 103* 132*

## 2025-03-16 NOTE — PLAN OF CARE
"Goal Outcome Evaluation:      Plan of Care Reviewed With: patient      Outcome Evaluation: IV Ancef given.    A & O x 4, VSS except elevated BP see flowsheet, scheduled meds given. LS clear, RA. Up A x1/gb/w, left foot dressing changed per plan of care by day shift, C/o lower extremity, prn Tylenol given. Meds whole with applesause. Regular diet. Plan: TBD.  Will continue POC and monitoring.   Problem: Adult Inpatient Plan of Care  Goal: Plan of Care Review  Description: The Plan of Care Review/Shift note should be completed every shift.  The Outcome Evaluation is a brief statement about your assessment that the patient is improving, declining, or no change.  This information will be displayed automatically on your shift  note.  Outcome: Progressing  Flowsheets (Taken 3/15/2025 2236)  Outcome Evaluation: IV Ancef given.  Plan of Care Reviewed With: patient  Goal: Patient-Specific Goal (Individualized)  Description: You can add care plan individualizations to a care plan. Examples of Individualization might be:  \"Parent requests to be called daily at 9am for status\", \"I have a hard time hearing out of my right ear\", or \"Do not touch me to wake me up as it startles  me\".  Outcome: Progressing  Goal: Absence of Hospital-Acquired Illness or Injury  Outcome: Progressing  Intervention: Identify and Manage Fall Risk  Recent Flowsheet Documentation  Taken 3/15/2025 1552 by Jamie Fairbanks RN  Safety Promotion/Fall Prevention:   activity supervised   treat underlying cause   treat reversible contributory factors  Intervention: Prevent Skin Injury  Recent Flowsheet Documentation  Taken 3/15/2025 1552 by Jamie Fairbanks RN  Body Position: position changed independently  Intervention: Prevent and Manage VTE (Venous Thromboembolism) Risk  Recent Flowsheet Documentation  Taken 3/15/2025 1552 by Jamie Fairbanks, RN  VTE Prevention/Management: patient refused intervention  Intervention: Prevent Infection  Recent Flowsheet " Documentation  Taken 3/15/2025 1552 by Jamie Fairbanks RN  Infection Prevention: hand hygiene promoted  Goal: Optimal Comfort and Wellbeing  Outcome: Progressing  Intervention: Monitor Pain and Promote Comfort  Recent Flowsheet Documentation  Taken 3/15/2025 1616 by Jamie Fairbanks RN  Pain Management Interventions: medication (see MAR)  Taken 3/15/2025 1552 by Jamie Fairbanks RN  Pain Management Interventions: medication (see MAR)  Intervention: Provide Person-Centered Care  Recent Flowsheet Documentation  Taken 3/15/2025 1552 by Jamie Fairbanks RN  Trust Relationship/Rapport:   care explained   choices provided  Goal: Readiness for Transition of Care  Outcome: Progressing     Problem: Delirium  Goal: Optimal Coping  Outcome: Progressing  Goal: Improved Behavioral Control  Outcome: Progressing  Intervention: Prevent and Manage Agitation  Recent Flowsheet Documentation  Taken 3/15/2025 1552 by Jamie Fairbanks RN  Environment Familiarity/Consistency: daily routine followed  Intervention: Minimize Safety Risk  Recent Flowsheet Documentation  Taken 3/15/2025 1552 by Jamie Fairbanks RN  Communication Support Strategies: active listening utilized  Enhanced Safety Measures: pain management  Trust Relationship/Rapport:   care explained   choices provided  Goal: Improved Attention and Thought Clarity  Outcome: Progressing  Goal: Improved Sleep  Outcome: Progressing     Problem: Infection  Goal: Absence of Infection Signs and Symptoms  Outcome: Progressing     Problem: Skin Injury Risk Increased  Goal: Skin Health and Integrity  Outcome: Progressing  Intervention: Plan: Nurse Driven Intervention: Moisture Management  Recent Flowsheet Documentation  Taken 3/15/2025 1552 by Jamie Fairbanks RN  Moisture Interventions: Encourage regular toileting  Bathing/Skin Care: incontinence care  Intervention: Plan: Nurse Driven Intervention: Friction and Shear  Recent Flowsheet Documentation  Taken 3/15/2025 1552 by Tiki  Jamie, RN  Friction/Shear Interventions: HOB 30 degrees or less  Intervention: Optimize Skin Protection  Recent Flowsheet Documentation  Taken 3/15/2025 4582 by Jamie Fairbanks, RN  Activity Management: activity adjusted per tolerance  Head of Bed (HOB) Positioning: HOB at 20-30 degrees

## 2025-03-16 NOTE — PHARMACY-VANCOMYCIN DOSING SERVICE
"Pharmacy Vancomycin Initial Note  Date of Service 2025  Patient's  11/3/1929  89 year old, female    Indication: Skin and Soft Tissue Infection    Current estimated CrCl = Estimated Creatinine Clearance: 28.1 mL/min (A) (based on SCr of 0.99 mg/dL (H)).    Creatinine for last 3 days  3/13/2025:  4:04 PM Creatinine 1.80 mg/dL  3/14/2025:  6:16 AM Creatinine 1.48 mg/dL  3/16/2025:  6:01 AM Creatinine 0.99 mg/dL    Recent Vancomycin Level(s) for last 3 days  No results found for requested labs within last 3 days.      Vancomycin IV Administrations (past 72 hours)        No vancomycin orders with administrations in past 72 hours.                    Nephrotoxins and other renal medications (From now, onward)      Start     Dose/Rate Route Frequency Ordered Stop    25 1100  vancomycin (VANCOCIN) 750 mg in sodium chloride 0.9 % 282.5 mL intermittent infusion         750 mg  over 90 Minutes Intravenous EVERY 24 HOURS 25 1023      25 1100  vancomycin (VANCOCIN) 1,000 mg in 200 mL dextrose intermittent infusion         1,000 mg  200 mL/hr over 1 Hours Intravenous ONCE 25 1021      25 1000  piperacillin-tazobactam (ZOSYN) 2.25 g vial to attach to  ml bag        Note to Pharmacy: For [facility], [facility] and [facility]: For Zosyn-naive patients, use the \"Zosyn initial dose + extended infusion\" order panel.    2.25 g  over 30 Minutes Intravenous EVERY 6 HOURS 25 0926      25 0900  furosemide (LASIX) tablet 40 mg        Note to Pharmacy: PTA Sig:TAKE 1 TABLET BY MOUTH ONCE DAILY      40 mg Oral DAILY 25 0813      25 0900  lisinopril (ZESTRIL) tablet 20 mg        Note to Pharmacy: PTA Sig:TAKE 1 TABLET BY MOUTH ONCE DAILY      20 mg Oral DAILY 25 0813              Contrast Orders - past 72 hours (72h ago, onward)      None            [employer] Prediction of Planned Initial Vancomycin Regimen  Loading dose: 1000 mg at 11:00 2025.  Regimen: 750 mg IV every 24 " hours.  Start time: 11:00 on 03/17/2025  Exposure target: AUC24 (range)400-600 mg/L.hr   AUC24,ss: 508 mg/L.hr  Probability of AUC24 > 400: 76 %  Ctrough,ss: 16.5 mg/L  Probability of Ctrough,ss > 20: 31 %  Probability of nephrotoxicity (Lodise ABIMBOLA 2009): 12 %          Plan:  Start vancomycin  750 mg IV q24h.   Vancomycin monitoring method: AUC  Vancomycin therapeutic monitoring goal: 400-600 mg*h/L  Pharmacy will check vancomycin levels as appropriate in 1-3 Days.    Serum creatinine levels will be ordered daily for the first week of therapy and at least twice weekly for subsequent weeks.      Olu Nick [facility]  \

## 2025-03-16 NOTE — PLAN OF CARE
Shift Summary:  VSS  PRN tylenol given for pain  LLE dressing re-wrapped  Remains on IV antibiotics      Goal Outcome Evaluation:      Plan of Care Reviewed With: patient    Overall Patient Progress: no changeOverall Patient Progress: no change    Outcome Evaluation: Remains on IV antibiotics      Problem: Adult Inpatient Plan of Care  Goal: Plan of Care Review  Description: The Plan of Care Review/Shift note should be completed every shift.  The Outcome Evaluation is a brief statement about your assessment that the patient is improving, declining, or no change.  This information will be displayed automatically on your shift  note.  Outcome: Progressing  Flowsheets (Taken 3/16/2025 0503)  Outcome Evaluation: Remains on IV antibiotics  Plan of Care Reviewed With: patient  Overall Patient Progress: no change     Problem: Infection  Goal: Absence of Infection Signs and Symptoms  Outcome: Progressing     Sasha Kirk RN on 3/16/2025 at 5:15 AM

## 2025-03-16 NOTE — PLAN OF CARE
"Cared for [id number]     Pt A/O x 4, VSS; Pt denies headache, dizziness, N/V & SOB. Pt reports increased leg pain 8/10 (PRN Tylenol, Oxycodone). Pt up Asst: 1 w/gait belt & walker. Lung sounds clear/diminished, RA. Wounds/dressings - open to air per patient; blanchable redness on sacrum. Antibiotics chanaged to IV Zosyn, IV Vanco, PO Lasix. WOC & Social Work following. US L Ankle/leg. Plan to discharge 1-2 days. Will continue with plan of care.    +++++++++++++++++++++++++++++++++++++++++    Goal Outcome Evaluation:      Plan of Care Reviewed With: patient    Overall Patient Progress: no changeOverall Patient Progress: no change    Outcome Evaluation: 8/10 left leg pain      Problem: Adult Inpatient Plan of Care  Goal: Plan of Care Review  Description: The Plan of Care Review/Shift note should be completed every shift.  The Outcome Evaluation is a brief statement about your assessment that the patient is improving, declining, or no change.  This information will be displayed automatically on your shift  note.  Outcome: Not Progressing  Flowsheets (Taken 3/16/2025 5097)  Outcome Evaluation: 8/10 left leg pain  Plan of Care Reviewed With: patient  Overall Patient Progress: no change  Goal: Patient-Specific Goal (Individualized)  Description: You can add care plan individualizations to a care plan. Examples of Individualization might be:  \"Parent requests to be called daily at 9am for status\", \"I have a hard time hearing out of my right ear\", or \"Do not touch me to wake me up as it startles  me\".  Outcome: Not Progressing  Goal: Absence of Hospital-Acquired Illness or Injury  Outcome: Not Progressing  Intervention: Identify and Manage Fall Risk  Recent Flowsheet Documentation  Taken 3/16/2025 5420 by Mary Beth Ledbetter, RN  Safety Promotion/Fall Prevention:   activity supervised   assistive device/personal items within reach   clutter free environment maintained   lighting adjusted   mobility aid in reach   nonskid " shoes/slippers when out of bed   safety round/check completed   supervised activity  Intervention: Prevent Skin Injury  Recent Flowsheet Documentation  Taken 3/16/2025 0915 by Mary Beth Ledbetter RN  Body Position: position changed independently  Intervention: Prevent Infection  Recent Flowsheet Documentation  Taken 3/16/2025 0915 by Mary Beth Ledbetter RN  Infection Prevention:   rest/sleep promoted   hand hygiene promoted   single patient room provided  Goal: Optimal Comfort and Wellbeing  Outcome: Not Progressing  Intervention: Monitor Pain and Promote Comfort  Recent Flowsheet Documentation  Taken 3/16/2025 1213 by Mary Beth Ledbetter RN  Pain Management Interventions: medication (see MAR)  Taken 3/16/2025 0916 by Mary Beth Ledbetter RN  Pain Management Interventions: medication (see MAR)  Goal: Readiness for Transition of Care  Outcome: Not Progressing     Problem: Delirium  Goal: Optimal Coping  Outcome: Not Progressing  Goal: Improved Behavioral Control  Outcome: Not Progressing  Intervention: Minimize Safety Risk  Recent Flowsheet Documentation  Taken 3/16/2025 0915 by Mary Beth Ledbetter RN  Enhanced Safety Measures:   pain management   review medications for side effects with activity  Goal: Improved Attention and Thought Clarity  Outcome: Not Progressing  Goal: Improved Sleep  Outcome: Not Progressing     Problem: Infection  Goal: Absence of Infection Signs and Symptoms  Outcome: Not Progressing     Problem: Skin Injury Risk Increased  Goal: Skin Health and Integrity  Outcome: Not Progressing  Intervention: Plan: Nurse Driven Intervention: Moisture Management  Recent Flowsheet Documentation  Taken 3/16/2025 0915 by Mary Beth Ledbetter RN  Moisture Interventions: Encourage regular toileting  Bathing/Skin Care: incontinence care  Intervention: Plan: Nurse Driven Intervention: Friction and Shear  Recent Flowsheet Documentation  Taken 3/16/2025 0915 by Mary Beth Ledbetter RN  Friction/Shear  Interventions: HOB 30 degrees or less  Intervention: Optimize Skin Protection  Recent Flowsheet Documentation  Taken 3/16/2025 0915 by Mary Beth Ledbetter, RN  Activity Management:   up in chair   ambulated to bathroom

## 2025-03-17 LAB
CREAT SERPL-MCNC: 1.14 MG/DL (ref 0.51–0.95)
EGFRCR SERPLBLD CKD-EPI 2021: 44 ML/MIN/1.73M2

## 2025-03-17 PROCEDURE — 36415 COLL VENOUS BLD VENIPUNCTURE: CPT | Performed by: INTERNAL MEDICINE

## 2025-03-17 PROCEDURE — 258N000003 HC RX IP 258 OP 636: Performed by: INTERNAL MEDICINE

## 2025-03-17 PROCEDURE — 99233 SBSQ HOSP IP/OBS HIGH 50: CPT | Performed by: INTERNAL MEDICINE

## 2025-03-17 PROCEDURE — 82565 ASSAY OF CREATININE: CPT | Performed by: INTERNAL MEDICINE

## 2025-03-17 PROCEDURE — 250N000011 HC RX IP 250 OP 636: Performed by: INTERNAL MEDICINE

## 2025-03-17 PROCEDURE — 120N000001 HC R&B MED SURG/OB

## 2025-03-17 PROCEDURE — 250N000013 HC RX MED GY IP 250 OP 250 PS 637: Performed by: PHYSICIAN ASSISTANT

## 2025-03-17 PROCEDURE — 250N000013 HC RX MED GY IP 250 OP 250 PS 637: Performed by: INTERNAL MEDICINE

## 2025-03-17 RX ADMIN — FUROSEMIDE 40 MG: 40 TABLET ORAL at 08:21

## 2025-03-17 RX ADMIN — CARBOXYMETHYLCELLULOSE SODIUM 1 DROP: 5 SOLUTION/ DROPS OPHTHALMIC at 16:05

## 2025-03-17 RX ADMIN — HYDRALAZINE HYDROCHLORIDE 50 MG: 50 TABLET ORAL at 08:21

## 2025-03-17 RX ADMIN — VANCOMYCIN HYDROCHLORIDE 750 MG: 1 INJECTION, POWDER, LYOPHILIZED, FOR SOLUTION INTRAVENOUS at 11:49

## 2025-03-17 RX ADMIN — ACETAMINOPHEN 650 MG: 325 TABLET, FILM COATED ORAL at 16:07

## 2025-03-17 RX ADMIN — HYDRALAZINE HYDROCHLORIDE 50 MG: 50 TABLET ORAL at 21:24

## 2025-03-17 RX ADMIN — ESCITALOPRAM OXALATE 10 MG: 10 TABLET ORAL at 21:23

## 2025-03-17 RX ADMIN — THERA TABS 1 TABLET: TAB at 08:21

## 2025-03-17 RX ADMIN — PIPERACILLIN AND TAZOBACTAM 2.25 G: 2; .25 INJECTION, POWDER, FOR SOLUTION INTRAVENOUS at 16:04

## 2025-03-17 RX ADMIN — OXYCODONE HYDROCHLORIDE 2.5 MG: 5 TABLET ORAL at 10:55

## 2025-03-17 RX ADMIN — Medication 25 MCG: at 08:21

## 2025-03-17 RX ADMIN — PIPERACILLIN AND TAZOBACTAM 2.25 G: 2; .25 INJECTION, POWDER, FOR SOLUTION INTRAVENOUS at 21:29

## 2025-03-17 RX ADMIN — PIPERACILLIN AND TAZOBACTAM 2.25 G: 2; .25 INJECTION, POWDER, FOR SOLUTION INTRAVENOUS at 11:16

## 2025-03-17 RX ADMIN — PIPERACILLIN AND TAZOBACTAM 2.25 G: 2; .25 INJECTION, POWDER, FOR SOLUTION INTRAVENOUS at 04:59

## 2025-03-17 RX ADMIN — CARBOXYMETHYLCELLULOSE SODIUM 1 DROP: 5 SOLUTION/ DROPS OPHTHALMIC at 08:21

## 2025-03-17 RX ADMIN — UMECLIDINIUM 1 PUFF: 62.5 AEROSOL, POWDER ORAL at 08:23

## 2025-03-17 RX ADMIN — CARBOXYMETHYLCELLULOSE SODIUM 1 DROP: 5 SOLUTION/ DROPS OPHTHALMIC at 21:24

## 2025-03-17 RX ADMIN — LOPERAMIDE HYDROCHLORIDE 2 MG: 2 CAPSULE ORAL at 16:04

## 2025-03-17 RX ADMIN — LISINOPRIL 20 MG: 20 TABLET ORAL at 08:21

## 2025-03-17 RX ADMIN — AMLODIPINE BESYLATE 7.5 MG: 5 TABLET ORAL at 21:23

## 2025-03-17 RX ADMIN — ACETAMINOPHEN 650 MG: 325 TABLET, FILM COATED ORAL at 08:21

## 2025-03-17 RX ADMIN — LOPERAMIDE HYDROCHLORIDE 2 MG: 2 CAPSULE ORAL at 08:22

## 2025-03-17 RX ADMIN — ACETAMINOPHEN 650 MG: 325 TABLET, FILM COATED ORAL at 21:24

## 2025-03-17 RX ADMIN — ASPIRIN 81 MG: 81 TABLET, COATED ORAL at 08:21

## 2025-03-17 ASSESSMENT — ACTIVITIES OF DAILY LIVING (ADL)
ADLS_ACUITY_SCORE: 55

## 2025-03-17 NOTE — PLAN OF CARE
"Goal Outcome Evaluation:      Plan of Care Reviewed With: patient    Overall Patient Progress: improvingOverall Patient Progress: improving    Outcome Evaluation: c/o left leg pain, schedule and prn meds given.  A & O x 4, VSS, LS clear, O2 RA. Up A x 1/gb/w, Regular diet, meds with applesauce, IV abx x2 given. Prn Tylenol given for left leg pain. Plan: 1-2 days.  Will continue POC and monitoring.     Problem: Adult Inpatient Plan of Care  Goal: Plan of Care Review  Description: The Plan of Care Review/Shift note should be completed every shift.  The Outcome Evaluation is a brief statement about your assessment that the patient is improving, declining, or no change.  This information will be displayed automatically on your shift  note.  Outcome: Progressing  Flowsheets (Taken 3/16/2025 2148)  Outcome Evaluation: c/o left leg pain, schedule and prn meds given.  Plan of Care Reviewed With: patient  Overall Patient Progress: improving  Goal: Patient-Specific Goal (Individualized)  Description: You can add care plan individualizations to a care plan. Examples of Individualization might be:  \"Parent requests to be called daily at 9am for status\", \"I have a hard time hearing out of my right ear\", or \"Do not touch me to wake me up as it startles  me\".  Outcome: Progressing  Goal: Absence of Hospital-Acquired Illness or Injury  Outcome: Progressing  Intervention: Identify and Manage Fall Risk  Recent Flowsheet Documentation  Taken 3/16/2025 1545 by Jamie Fairbanks RN  Safety Promotion/Fall Prevention: activity supervised  Intervention: Prevent Skin Injury  Recent Flowsheet Documentation  Taken 3/16/2025 1545 by Jamie Fairbanks RN  Body Position: position changed independently  Intervention: Prevent and Manage VTE (Venous Thromboembolism) Risk  Recent Flowsheet Documentation  Taken 3/16/2025 1545 by Jamie Fairbanks RN  VTE Prevention/Management: patient refused intervention  Intervention: Prevent Infection  Recent " Flowsheet Documentation  Taken 3/16/2025 1545 by Jamie Fairbanks RN  Infection Prevention: rest/sleep promoted  Goal: Optimal Comfort and Wellbeing  Outcome: Progressing  Intervention: Monitor Pain and Promote Comfort  Recent Flowsheet Documentation  Taken 3/16/2025 2126 by Jamie Fairbanks RN  Pain Management Interventions: medication (see MAR)  Intervention: Provide Person-Centered Care  Recent Flowsheet Documentation  Taken 3/16/2025 1545 by Jamie Fairbanks RN  Trust Relationship/Rapport:   choices provided   care explained  Goal: Readiness for Transition of Care  Outcome: Progressing     Problem: Delirium  Goal: Optimal Coping  Outcome: Progressing  Goal: Improved Behavioral Control  Outcome: Progressing  Intervention: Prevent and Manage Agitation  Recent Flowsheet Documentation  Taken 3/16/2025 1545 by Jamie Fairbanks RN  Environment Familiarity/Consistency: daily routine followed  Intervention: Minimize Safety Risk  Recent Flowsheet Documentation  Taken 3/16/2025 1545 by Jamie Fairbanks RN  Communication Support Strategies: active listening utilized  Enhanced Safety Measures: pain management  Trust Relationship/Rapport:   choices provided   care explained  Goal: Improved Attention and Thought Clarity  Outcome: Progressing  Goal: Improved Sleep  Outcome: Progressing  Intervention: Promote Sleep  Recent Flowsheet Documentation  Taken 3/16/2025 1545 by Jamie Fairbanks RN  Sleep/Rest Enhancement: regular sleep/rest pattern promoted     Problem: Infection  Goal: Absence of Infection Signs and Symptoms  Outcome: Progressing     Problem: Skin Injury Risk Increased  Goal: Skin Health and Integrity  Outcome: Progressing  Intervention: Plan: Nurse Driven Intervention: Moisture Management  Recent Flowsheet Documentation  Taken 3/16/2025 1545 by Jamie Fairbanks RN  Moisture Interventions: Encourage regular toileting  Bathing/Skin Care: incontinence care  Intervention: Plan: Nurse Driven Intervention: Friction  and Shear  Recent Flowsheet Documentation  Taken 3/16/2025 1545 by Jamie Fairbanks RN  Friction/Shear Interventions: HOB 30 degrees or less  Intervention: Optimize Skin Protection  Recent Flowsheet Documentation  Taken 3/16/2025 1545 by Jamie Fairbanks RN  Pressure Reduction Techniques: heels elevated off bed  Pressure Reduction Devices: elbow protectors utilized  Activity Management: activity adjusted per tolerance  Head of Bed (HOB) Positioning: HOB at 20-30 degrees

## 2025-03-17 NOTE — PROGRESS NOTES
[facility]    Medicine Progress Note - Hospitalist Service    Date of Admission:  3/13/2025    Assessment & Plan   Shiloh Covarrubias is a 89 year old female with PMH significant for hypertension, asthma/COPD, anxiety, depression, CKD stage III, PAD, PVD with known nonhealing wound of left lower extremity who presented to the ED on 3/13/2025 for evaluation of ongoing wound with concern for surrounding cellulitis even after being on oral antibiotics.     ED workup reveals: VSS, hemoglobin of 11.3, sodium of 133, chloride of 97, BUN of 39.9, creatinine of 1.8, glucose of 132, ESR 55, CRP of 57.18, blood cultures obtained x 2 and pending, and per patient LE ultrasound at her facility was negative for DVT.  She was admitted with cellulitis that failed outpatient treatment.  She was treated with Ancef.  Initially she showed some improvement with reduction of erythema and resolution of wound drainage.  She continued to have quite a bit of pain, however.  The pain was worse around and below the ankle.  This is the area where the erythema persisted though worse.  She developed increase in posterior calf pain.  Ancef was changed to vancomycin and Zosyn on 3/16.  MRSA nares swab was done and was negative.     Problem list:     Nonhealing left LE wound   Left LE cellulitis   H/o PAD, PVD  -Has improved in some ways with no more drainage from medial wound above ankle and decrease in initial swelling and erythema.  She has continued to have persistent pain, however.  The pain seems the worst around and below the medial ankle which is where the erythema is still a bit more persistent.  She also has some pain behind her lower calf today where there is also some persistent erythema.  -X-ray of left ankle showed no fracture  -Ultrasound of left leg showed no DVT  -Wound care nurse consult appreciated.    -Was initially treated with Ancef, changed to Zosyn and vancomycin on 3/16.   -Nare MRSA swab was  negative  -Still reporting pain but not using opiate. Exam is similar- no weeping, some erythema that is worse around medial ankle. Continue Zosyn and Vancomycin and reassess tomorrow  -PT eval     Hyponatremia  Hypochloremia  SHIVANI on CKD stage III  -Sodium and chloride have normalized  -Kidney function has improved  -AM BMP     COPD  -Continue PTA Spiriva if patient has with her     HFpEF  -Baseline LE edema. No current shortness of breath.   -Held PTA PO Lasix while giving IV Lasix; now back on oral Lasix   -Monitor I&Os and daily weights      HTN  -BP stable  -Continue PTA Amlodipine, Hydralazine, and Lisinopril with parameters     DM2  Diet controlled, most recent HgbA1c of 5.2 in 08/2024      Depression  -Continue PTA Lexapro and Mirtazapine           Diet: Regular Diet Adult    DVT Prophylaxis: Pneumatic Compression Devices  Ochoa Catheter: Not present  Lines: None     Cardiac Monitoring: None  Code Status: No CPR- Do NOT Intubate      Clinically Significant Risk Factors                   # Hypertension: Noted on problem list                # Financial/Environmental Concerns: none  # COPD: noted on problem list        Social Drivers of Health            Disposition Plan     Medically Ready for Discharge: Anticipated in 2-4 Days             Beka Russell MD  Hospitalist Service  [facility]  Securely message with [employer] (more info)  Text page via [employer] Paging/Directory   ______________________________________________________________________    Interval History   No new problems. Still reporting pain. Pain scores are similar but needing less pain medicine.     Physical Exam   Vital Signs: Temp: 98.2  F (36.8  C) Temp src: Oral BP: 108/41 Pulse: 61   Resp: 14 SpO2: 95 % O2 Device: None (Room air)    Weight: 107 lbs 9.35 oz    GENERAL:  Comfortable at rest at time of exam. Cooperative.  PSYCH: pleasant, oriented, No acute distress.  EYES: PERRLA, Normal conjunctiva.  HEART:  Regular rate  and rhythm. No JVD. Pulses normal. No edema.  LUNGS:  Clear to auscultation, normal Respiratory effort.  ABDOMEN:  Soft, no hepatosplenomegaly, normal bowel sounds.  EXTREMETIES: No clubbing, cyanosis or ischemia  SKIN:  Dry to touch, No rash.      Medical Decision Making       40 MINUTES SPENT BY ME on the date of service doing chart review, history, exam, documentation & further activities per the note.      Data     I have personally reviewed the following data over the past 24 hrs:    N/A  \   N/A   / N/A     N/A N/A N/A /  N/A   N/A N/A 1.14 (H) \       Imaging results reviewed over the past 24 hrs:   No results found for this or any previous visit (from the past 24 hours).  Recent Labs   Lab 03/17/25  0826 03/16/25  0601 03/14/25  0616 03/13/25  1604   WBC  --  9.8 7.5 9.4   HGB  --  10.4* 9.9* 11.3*   MCV  --  91 90 92   PLT  --  318 295 360   NA  --  139 136 133*   POTASSIUM  --  3.9 4.8 4.7   CHLORIDE  --  101 101 97*   CO2  --  27 24 23   BUN  --  17.9 33.8* 39.9*   CR 1.14* 0.99* 1.48* 1.80*   ANIONGAP  --  11 11 13   TANIA  --  8.9 9.1 9.4   GLC  --  98 103* 132*

## 2025-03-17 NOTE — PLAN OF CARE
Shift Summary:  VSS  No pain meds given this shift  Slept most of shift  Remains on IV abx  1 loose BM    Goal Outcome Evaluation:      Plan of Care Reviewed With: patient    Overall Patient Progress: improvingOverall Patient Progress: improving    Outcome Evaluation: Minimal complaints of LLE pain this shift, remains on IV abx      Problem: Adult Inpatient Plan of Care  Goal: Plan of Care Review  Description: The Plan of Care Review/Shift note should be completed every shift.  The Outcome Evaluation is a brief statement about your assessment that the patient is improving, declining, or no change.  This information will be displayed automatically on your shift  note.  Outcome: Progressing  Flowsheets (Taken 3/17/2025 0540)  Outcome Evaluation: Minimal complaints of LLE pain this shift, remains on IV abx  Plan of Care Reviewed With: patient  Overall Patient Progress: improving     Problem: Infection  Goal: Absence of Infection Signs and Symptoms  Outcome: Progressing     Problem: Pain Acute  Goal: Optimal Pain Control and Function  Outcome: Progressing  Intervention: Prevent or Manage Pain  Recent Flowsheet Documentation  Taken 3/16/2025 2300 by Sasha Kirk, RN  Sleep/Rest Enhancement:   regular sleep/rest pattern promoted   room darkened     Sasha Kirk RN on 3/17/2025 at 5:23 AM

## 2025-03-17 NOTE — PROGRESS NOTES
Care Management Follow Up    Length of Stay (days): 3    Expected Discharge Date: 03/19/2025     Concerns to be Addressed: discharge planning     Patient plan of care discussed at interdisciplinary rounds: Yes    Anticipated Discharge Disposition: Assisted Living, Home Care              Anticipated Discharge Services: None  Anticipated Discharge DME: None    Patient/family educated on Medicare website which has current facility and service quality ratings: yes  Education Provided on the Discharge Plan:    Patient/Family in Agreement with the Plan: yes    Referrals Placed by CM/SW:    Private pay costs discussed: Not applicable    Discussed  Partnership in Safe Discharge Planning  document with patient/family: No     Handoff Completed: No, handoff not indicated or clinically appropriate    Additional Information:  SW obtained verbal permission from pt to speak with [facility] RN. SW confirmed that pt is receiving medication, housekeeping and meal support from the facility.     Pt stated that she believes that her daughter, Skylar, will be able to transport her back to the facility when medically ready.     Update provided to facility RN Marii [phone].     Next Steps: Follow for discharge.    GINO Vargas, LGSW  [facility] Care Coordinator/ Med Surg [address] [place] [address]  [phone]

## 2025-03-17 NOTE — PLAN OF CARE
"Pt a&Ox4, able to make needs known. Ambulating SBA with GB and FWW. LLE wound care completed. Pt reporting pain in LLE, tx with available medications. X1 BM this shift, voiding to BR.     Goal Outcome Evaluation:      Plan of Care Reviewed With: patient    Overall Patient Progress: no changeOverall Patient Progress: no change    Outcome Evaluation: c/o LLE pain, continuing to have diarrhea. on IV ABX      Problem: Adult Inpatient Plan of Care  Goal: Plan of Care Review  Description: The Plan of Care Review/Shift note should be completed every shift.  The Outcome Evaluation is a brief statement about your assessment that the patient is improving, declining, or no change.  This information will be displayed automatically on your shift  note.  Outcome: Progressing  Flowsheets (Taken 3/17/2025 1408)  Outcome Evaluation: c/o LLE pain, continuing to have diarrhea. on IV ABX  Plan of Care Reviewed With: patient  Overall Patient Progress: no change  Goal: Patient-Specific Goal (Individualized)  Description: You can add care plan individualizations to a care plan. Examples of Individualization might be:  \"Parent requests to be called daily at 9am for status\", \"I have a hard time hearing out of my right ear\", or \"Do not touch me to wake me up as it startles  me\".  Outcome: Progressing  Goal: Absence of Hospital-Acquired Illness or Injury  Outcome: Progressing  Intervention: Identify and Manage Fall Risk  Recent Flowsheet Documentation  Taken 3/17/2025 0819 by Adriana Martin, RN  Safety Promotion/Fall Prevention: safety round/check completed  Intervention: Prevent Skin Injury  Recent Flowsheet Documentation  Taken 3/17/2025 1115 by Adriana Martin, RN  Body Position: position changed independently  Taken 3/17/2025 0819 by Adriana Martin, RN  Body Position: position changed independently  Goal: Optimal Comfort and Wellbeing  Outcome: Progressing  Intervention: Monitor Pain and Promote Comfort  Recent Flowsheet Documentation  Taken " 3/17/2025 1339 by Adriana Martin, RN  Pain Management Interventions: rest  Taken 3/17/2025 1200 by Adriana Martin, RN  Pain Management Interventions: rest  Taken 3/17/2025 1055 by Adriana Martin, RN  Pain Management Interventions: medication (see MAR)  Taken 3/17/2025 0819 by Adriana Mratin, RN  Pain Management Interventions: medication (see MAR)  Goal: Readiness for Transition of Care  Outcome: Progressing

## 2025-03-18 ENCOUNTER — APPOINTMENT (OUTPATIENT)
Dept: PHYSICAL THERAPY | Facility: CLINIC | Age: OVER 89
DRG: 602 | End: 2025-03-18
Attending: INTERNAL MEDICINE
Payer: COMMERCIAL

## 2025-03-18 LAB
ANION GAP SERPL CALCULATED.3IONS-SCNC: 8 MMOL/L (ref 7–15)
BACTERIA BLD CULT: NO GROWTH
BACTERIA BLD CULT: NO GROWTH
BUN SERPL-MCNC: 15.4 MG/DL (ref 8–23)
CALCIUM SERPL-MCNC: 8.8 MG/DL (ref 8.8–10.4)
CHLORIDE SERPL-SCNC: 104 MMOL/L (ref 98–107)
CREAT SERPL-MCNC: 0.94 MG/DL (ref 0.51–0.95)
CRP SERPL-MCNC: 20.12 MG/L
EGFRCR SERPLBLD CKD-EPI 2021: 56 ML/MIN/1.73M2
ERYTHROCYTE [DISTWIDTH] IN BLOOD BY AUTOMATED COUNT: 13.5 % (ref 10–15)
GLUCOSE SERPL-MCNC: 113 MG/DL (ref 70–99)
HCO3 SERPL-SCNC: 25 MMOL/L (ref 22–29)
HCT VFR BLD AUTO: 32.9 % (ref 35–47)
HGB BLD-MCNC: 10.5 G/DL (ref 11.7–15.7)
MCH RBC QN AUTO: 29.1 PG (ref 26.5–33)
MCHC RBC AUTO-ENTMCNC: 31.9 G/DL (ref 31.5–36.5)
MCV RBC AUTO: 91 FL (ref 78–100)
PLATELET # BLD AUTO: 326 10E3/UL (ref 150–450)
POTASSIUM SERPL-SCNC: 4.2 MMOL/L (ref 3.4–5.3)
RBC # BLD AUTO: 3.61 10E6/UL (ref 3.8–5.2)
SODIUM SERPL-SCNC: 137 MMOL/L (ref 135–145)
VANCOMYCIN SERPL-MCNC: 10.2 UG/ML
WBC # BLD AUTO: 11.8 10E3/UL (ref 4–11)

## 2025-03-18 PROCEDURE — 97530 THERAPEUTIC ACTIVITIES: CPT | Mod: GP | Performed by: PHYSICAL THERAPIST

## 2025-03-18 PROCEDURE — 97161 PT EVAL LOW COMPLEX 20 MIN: CPT | Mod: GP | Performed by: PHYSICAL THERAPIST

## 2025-03-18 PROCEDURE — 97116 GAIT TRAINING THERAPY: CPT | Mod: GP | Performed by: PHYSICAL THERAPIST

## 2025-03-18 PROCEDURE — 80048 BASIC METABOLIC PNL TOTAL CA: CPT | Performed by: INTERNAL MEDICINE

## 2025-03-18 PROCEDURE — 36415 COLL VENOUS BLD VENIPUNCTURE: CPT | Performed by: INTERNAL MEDICINE

## 2025-03-18 PROCEDURE — 85041 AUTOMATED RBC COUNT: CPT | Performed by: INTERNAL MEDICINE

## 2025-03-18 PROCEDURE — 250N000013 HC RX MED GY IP 250 OP 250 PS 637: Performed by: PHYSICIAN ASSISTANT

## 2025-03-18 PROCEDURE — 250N000011 HC RX IP 250 OP 636: Performed by: INTERNAL MEDICINE

## 2025-03-18 PROCEDURE — 250N000013 HC RX MED GY IP 250 OP 250 PS 637: Performed by: INTERNAL MEDICINE

## 2025-03-18 PROCEDURE — 85014 HEMATOCRIT: CPT | Performed by: INTERNAL MEDICINE

## 2025-03-18 PROCEDURE — 120N000001 HC R&B MED SURG/OB

## 2025-03-18 PROCEDURE — 80202 ASSAY OF VANCOMYCIN: CPT | Performed by: INTERNAL MEDICINE

## 2025-03-18 PROCEDURE — 94640 AIRWAY INHALATION TREATMENT: CPT

## 2025-03-18 PROCEDURE — 99232 SBSQ HOSP IP/OBS MODERATE 35: CPT | Performed by: INTERNAL MEDICINE

## 2025-03-18 PROCEDURE — 86140 C-REACTIVE PROTEIN: CPT | Performed by: INTERNAL MEDICINE

## 2025-03-18 RX ADMIN — PIPERACILLIN AND TAZOBACTAM 2.25 G: 2; .25 INJECTION, POWDER, FOR SOLUTION INTRAVENOUS at 04:05

## 2025-03-18 RX ADMIN — Medication 25 MCG: at 10:19

## 2025-03-18 RX ADMIN — HYDRALAZINE HYDROCHLORIDE 50 MG: 50 TABLET ORAL at 21:05

## 2025-03-18 RX ADMIN — CARBOXYMETHYLCELLULOSE SODIUM 1 DROP: 5 SOLUTION/ DROPS OPHTHALMIC at 07:55

## 2025-03-18 RX ADMIN — ACETAMINOPHEN 650 MG: 325 TABLET, FILM COATED ORAL at 04:10

## 2025-03-18 RX ADMIN — ESCITALOPRAM OXALATE 10 MG: 10 TABLET ORAL at 21:06

## 2025-03-18 RX ADMIN — AMLODIPINE BESYLATE 7.5 MG: 5 TABLET ORAL at 21:05

## 2025-03-18 RX ADMIN — THERA TABS 1 TABLET: TAB at 10:19

## 2025-03-18 RX ADMIN — CARBOXYMETHYLCELLULOSE SODIUM 1 DROP: 5 SOLUTION/ DROPS OPHTHALMIC at 17:08

## 2025-03-18 RX ADMIN — ASPIRIN 81 MG: 81 TABLET, COATED ORAL at 07:55

## 2025-03-18 RX ADMIN — PIPERACILLIN AND TAZOBACTAM 2.25 G: 2; .25 INJECTION, POWDER, FOR SOLUTION INTRAVENOUS at 21:06

## 2025-03-18 RX ADMIN — HYDRALAZINE HYDROCHLORIDE 50 MG: 50 TABLET ORAL at 07:55

## 2025-03-18 RX ADMIN — CARBOXYMETHYLCELLULOSE SODIUM 1 DROP: 5 SOLUTION/ DROPS OPHTHALMIC at 21:06

## 2025-03-18 RX ADMIN — FUROSEMIDE 40 MG: 40 TABLET ORAL at 07:55

## 2025-03-18 RX ADMIN — LOPERAMIDE HYDROCHLORIDE 2 MG: 2 CAPSULE ORAL at 07:07

## 2025-03-18 RX ADMIN — UMECLIDINIUM 1 PUFF: 62.5 AEROSOL, POWDER ORAL at 08:10

## 2025-03-18 RX ADMIN — PIPERACILLIN AND TAZOBACTAM 2.25 G: 2; .25 INJECTION, POWDER, FOR SOLUTION INTRAVENOUS at 10:26

## 2025-03-18 RX ADMIN — LOPERAMIDE HYDROCHLORIDE 2 MG: 2 CAPSULE ORAL at 17:07

## 2025-03-18 RX ADMIN — LISINOPRIL 20 MG: 20 TABLET ORAL at 07:55

## 2025-03-18 RX ADMIN — PIPERACILLIN AND TAZOBACTAM 2.25 G: 2; .25 INJECTION, POWDER, FOR SOLUTION INTRAVENOUS at 17:08

## 2025-03-18 ASSESSMENT — ACTIVITIES OF DAILY LIVING (ADL)
ADLS_ACUITY_SCORE: 57
ADLS_ACUITY_SCORE: 55
ADLS_ACUITY_SCORE: 55
ADLS_ACUITY_SCORE: 58
ADLS_ACUITY_SCORE: 56
ADLS_ACUITY_SCORE: 58
ADLS_ACUITY_SCORE: 55
ADLS_ACUITY_SCORE: 55
ADLS_ACUITY_SCORE: 57
ADLS_ACUITY_SCORE: 55
ADLS_ACUITY_SCORE: 58
ADLS_ACUITY_SCORE: 55
ADLS_ACUITY_SCORE: 58
ADLS_ACUITY_SCORE: 55
ADLS_ACUITY_SCORE: 58
ADLS_ACUITY_SCORE: 58
ADLS_ACUITY_SCORE: 57
ADLS_ACUITY_SCORE: 57
ADLS_ACUITY_SCORE: 55
ADLS_ACUITY_SCORE: 57
ADLS_ACUITY_SCORE: 58

## 2025-03-18 NOTE — PROGRESS NOTES
[facility]    Medicine Progress Note - Hospitalist Service    Date of Admission:  3/13/2025    Assessment & Plan   Shiloh Covarrubias is a 89 year old female with PMH significant for hypertension, asthma/COPD, anxiety, depression, CKD stage III, PAD, PVD with known nonhealing wound of left lower extremity who presented to the ED on 3/13/2025 for evaluation of ongoing wound with concern for surrounding cellulitis even after being on oral antibiotics.     ED workup reveals: VSS, hemoglobin of 11.3, sodium of 133, chloride of 97, BUN of 39.9, creatinine of 1.8, glucose of 132, ESR 55, CRP of 57.18, blood cultures obtained x 2 and pending, and per patient LE ultrasound at her facility was negative for DVT.  She was admitted with cellulitis that failed outpatient treatment.  She was treated with Ancef.  Initially she showed some improvement with reduction of erythema and resolution of wound drainage.  She continued to have quite a bit of pain, however.  The pain was worse around and below the ankle.  This is the area where the erythema persisted though worse.  She developed increase in posterior calf pain.  Ancef was changed to vancomycin and Zosyn on 3/16.  MRSA nares swab was done and was negative.    Today:  Stopped vancomycin  Recheck CBC in AM.    ?discharge in 1-2 days though would like to see WBC, CRP trend down..  Updated son, Chuck, via phone.  -Somewhat unclear to me if she will be strong enough to go back to her assisted living or need to go to TCU.     Problem list:     Nonhealing left LE wound   Left LE cellulitis   H/o PAD, PVD  -Has improved in some ways with no more drainage from medial wound above ankle and decrease in initial swelling and erythema.    -X-ray of left ankle showed no fracture  -Ultrasound of left leg showed no DVT  -Wound care nurse consult appreciated.    -Was initially treated with Ancef, changed to Zosyn and vancomycin on 3/16. Stopped vanco 3/18.  -Nare MRSA  swab was negative  -Still reporting pain but not using opiate. Exam is similar- no weeping, some erythema that is worse around medial ankle. Continue Zosyn and Vancomycin and reassess tomorrow  -PT eval     Hyponatremia  Hypochloremia  SHIVANI on CKD stage III  -Sodium and chloride have normalized  -Kidney function has improved  -AM BMP     COPD  -Continue PTA Spiriva if patient has with her     HFpEF with mild exacerbation upon admission.  Gave brief course of IV lasix.  -Baseline LE edema. No current shortness of breath.   -Held PTA PO Lasix while giving IV Lasix; now back on oral Lasix   -Monitor I&Os and daily weights      HTN  -BP stable  -Continue PTA Amlodipine, Hydralazine, and Lisinopril with parameters     DM2  Diet controlled, most recent HgbA1c of 5.2 in 08/2024      Depression  -Continue PTA Lexapro and Mirtazapine           Diet: Regular Diet Adult    DVT Prophylaxis: Pneumatic Compression Devices  Ochoa Catheter: Not present  Lines: None     Cardiac Monitoring: None  Code Status: No CPR- Do NOT Intubate      Clinically Significant Risk Factors                   # Hypertension: Noted on problem list                # Financial/Environmental Concerns: none  # COPD: noted on problem list        Social Drivers of Health            Disposition Plan     Medically Ready for Discharge: Anticipated Tomorrow if wbc better, exam stable to improved.  Need to determine assisted living versus TCU.  May need TCU.             Art Levine MD  Hospitalist Service  [facility]  Securely message with [employer] (more info)  Text page via [employer] Paging/Directory   ______________________________________________________________________    Interval History     Pain somewhat better  Stopped vancomycin  Recheck CBC in AM.    ?discharge in 1-2 days though would like to see WBC, CRP trend down..  Called her son Chuck for an update      Physical Exam   Vital Signs: Temp: 98.1  F (36.7  C) Temp src: Oral  BP: (!) 144/54 Pulse: 63   Resp: 16 SpO2: 93 % O2 Device: None (Room air)    Weight: 110 lbs 4.8 oz    GENERAL:  Comfortable at rest at time of exam. Cooperative.  PSYCH: pleasant, oriented, No acute distress.  EYES: PERRLA, Normal conjunctiva.  HEART:  Regular rate and rhythm. No JVD. Pulses normal. No edema.  LUNGS:  Clear to auscultation, normal Respiratory effort.  ABDOMEN:  Soft, no hepatosplenomegaly, normal bowel sounds.  EXTREMETIES: No clubbing, cyanosis or ischemia  SKIN:  Dry to touch, No rash.      Medical Decision Making       40 MINUTES SPENT BY ME on the date of service doing chart review, history, exam, documentation & further activities per the note.      Data     I have personally reviewed the following data over the past 24 hrs:    11.8 (H)  \   10.5 (L)   / 326     137 104 15.4 /  113 (H)   4.2 25 0.94 \       Imaging results reviewed over the past 24 hrs:   No results found for this or any previous visit (from the past 24 hours).  Recent Labs   Lab 03/18/25  0635 03/17/25  0826 03/16/25  0601 03/14/25  0616   WBC 11.8*  --  9.8 7.5   HGB 10.5*  --  10.4* 9.9*   MCV 91  --  91 90     --  318 295     --  139 136   POTASSIUM 4.2  --  3.9 4.8   CHLORIDE 104  --  101 101   CO2 25  --  27 24   BUN 15.4  --  17.9 33.8*   CR 0.94 1.14* 0.99* 1.48*   ANIONGAP 8  --  11 11   TANIA 8.8  --  8.9 9.1   *  --  98 103*

## 2025-03-18 NOTE — PHARMACY-VANCOMYCIN DOSING SERVICE
"Pharmacy Vancomycin Note  Date of Service 2025  Patient's  11/3/1929   89 year old, female    Indication: Skin and Soft Tissue Infection  Day of Therapy: 3  Current vancomycin regimen:  750 mg IV q24h  Current vancomycin monitoring method: AUC  Current vancomycin therapeutic monitoring goal: 400-600 mg*h/L    InsightRX Prediction of Current Vancomycin Regimen  Loading dose: N/A  Regimen: 750 mg IV every 24 hours.  Start time: 11:00 on 2025  Exposure target: AUC24 (range)400-600 mg/L.hr   AUC24,ss: 460 mg/L.hr  Probability of AUC24 > 400: 76 %  Ctrough,ss: 15 mg/L  Probability of Ctrough,ss > 20: 15 %  Probability of nephrotoxicity (Lodise ABIMBOLA ): 10 %      Current estimated CrCl = Estimated Creatinine Clearance: 28.3 mL/min (based on SCr of 0.94 mg/dL).    Creatinine for last 3 days  3/16/2025:  6:01 AM Creatinine 0.99 mg/dL  3/17/2025:  8:26 AM Creatinine 1.14 mg/dL  3/18/2025:  6:35 AM Creatinine 0.94 mg/dL    Recent Vancomycin Levels (past 3 days)  3/18/2025:  6:35 AM Vancomycin 10.2 ug/mL    Vancomycin IV Administrations (past 72 hours)                     vancomycin (VANCOCIN) 750 mg in sodium chloride 0.9 % 282.5 mL intermittent infusion (mg) 750 mg New Bag 25 1149    vancomycin (VANCOCIN) 1,000 mg in 200 mL dextrose intermittent infusion (mg) 1,000 mg New Bag 25 1214                    Nephrotoxins and other renal medications (From now, onward)      Start     Dose/Rate Route Frequency Ordered Stop    25 1100  vancomycin (VANCOCIN) 750 mg in sodium chloride 0.9 % 282.5 mL intermittent infusion         750 mg  over 90 Minutes Intravenous EVERY 24 HOURS 25 1023      25 1000  piperacillin-tazobactam (ZOSYN) 2.25 g vial to attach to  ml bag        Note to Pharmacy: For [facility], [facility] and [facility]: For Zosyn-naive patients, use the \"Zosyn initial dose + extended infusion\" order panel.    2.25 g  over 30 Minutes Intravenous EVERY 6 HOURS 25 0926      25 0900 "  furosemide (LASIX) tablet 40 mg        Note to Pharmacy: PTA Sig:TAKE 1 TABLET BY MOUTH ONCE DAILY      40 mg Oral DAILY 03/14/25 0813      03/14/25 0900  lisinopril (ZESTRIL) tablet 20 mg        Note to Pharmacy: PTA Sig:TAKE 1 TABLET BY MOUTH ONCE DAILY      20 mg Oral DAILY 03/14/25 0813                 Contrast Orders - past 72 hours (72h ago, onward)      None            Interpretation of levels and current regimen:  Vancomycin level is reflective of -600    Has serum creatinine changed greater than 50% in last 72 hours: No    Urine output:  unable to determine    Renal Function: Stable    InsightRX Prediction of Planned New Vancomycin Regimen  Loading dose: N/A  Regimen: 750 mg IV every 24 hours.  Start time: 11:00 on 03/18/2025  Exposure target: AUC24 (range)400-600 mg/L.hr   AUC24,ss: 460 mg/L.hr  Probability of AUC24 > 400: 76 %  Ctrough,ss: 15 mg/L  Probability of Ctrough,ss > 20: 15 %  Probability of nephrotoxicity (Lodise ABIMBOLA 2009): 10 %      Plan:  Continue Current Dose  Vancomycin monitoring method: AUC  Vancomycin therapeutic monitoring goal: 400-600 mg*h/L  Pharmacy will check vancomycin levels as appropriate in 1-3 Days.  Serum creatinine levels will be ordered daily for the first week of therapy and at least twice weekly for subsequent weeks.    Olu Nick, [facility]  2

## 2025-03-18 NOTE — PROGRESS NOTES
"   03/18/25 4950   Appointment Info   Signing Clinician's Name / Credentials (PT) Anne-Marie Rosa, ADIRAN   Living Environment   People in Home facility resident   Current Living Arrangements assisted living   Home Accessibility no concerns   Transportation Anticipated family or friend will provide   Living Environment Comments lives in [facility]   Self-Care   Usual Activity Tolerance good   Current Activity Tolerance fair   Equipment Currently Used at Home walker, rolling  (4WW)   Fall history within last six months yes   Number of times patient has fallen within last six months 1   Activity/Exercise/Self-Care Comment reports mod independence with mobility and cares at baseline; gets assist with meds, meals, cleaning   General Information   Onset of Illness/Injury or Date of Surgery 03/13/25   Referring Physician Beka Russell MD   Patient/Family Therapy Goals Statement (PT) go to TCU   Pertinent History of Current Problem (include personal factors and/or comorbidities that impact the POC) per chart: \"Shiloh Covarrubias is a 89 year old female with PMH significant for hypertension, asthma/COPD, anxiety, depression, CKD stage III, PAD, PVD with known nonhealing wound of left lower extremity who presented to the ED on 3/13/2025 for evaluation of ongoing wound with concern for surrounding cellulitis even after being on oral antibiotics; found to have \"Nonhealing left LE wound   Left LE cellulitis   H/o PAD, PVD\"; see medical record for further information   Existing Precautions/Restrictions fall   General Observations Patient in recliner chair upon therapist arrival   Cognition   Cognitive Status Comments appears intact during session   Pain Assessment   Patient Currently in Pain Yes, see Vital Sign flowsheet  (L LE)   Integumentary/Edema   Integumentary/Edema Comments L lower leg wound; see wound care/nursing notes for further information   Posture    Posture Comments forward flexed at head and trunk   Range of Motion (ROM) "   ROM Comment UE's and LE's appear WFL except pain with movement of left ankle   Strength (Manual Muscle Testing)   Strength Comments functional weakness noted with mobility; further limited by L LE pain   Bed Mobility   Comment, (Bed Mobility) NT; patient up in chair and wanting to remain there   Transfers   Comment, (Transfers) sit>stand with use of walker and min A of 1   Gait/Stairs (Locomotion)   Distance in Feet (Gait) 5   Comment, (Gait/Stairs) min/CGA and use of a 2WW   Balance   Balance Comments needs UE support of walker; no gross LOB   Sensory Examination   Sensory Perception Comments denies numbness or tingling   Clinical Impression   Criteria for Skilled Therapeutic Intervention Yes, treatment indicated   PT Diagnosis (PT) impaired functional mobility   Influenced by the following impairments weakness; decreased activity tolerance, pain in L LE; inpaired balance; decreased L ankle ROM   Functional limitations due to impairments impaired independence with mobility and cares secondary to above deficits   Clinical Presentation (PT Evaluation Complexity) evolving   Clinical Presentation Rationale clinical judgement/level of assist   Clinical Decision Making (Complexity) low complexity   Planned Therapy Interventions (PT) balance training;bed mobility training;gait training;patient/family education;strengthening;transfer training;progressive activity/exercise;ROM (range of motion)   Risk & Benefits of therapy have been explained evaluation/treatment results reviewed;care plan/treatment goals reviewed;risks/benefits reviewed;current/potential barriers reviewed;participants voiced agreement with care plan;participants included;patient   Clinical Impression Comments below reported baseline level of function   PT Total Evaluation Time   PT Eval, Low Complexity Minutes (68409) 10   Physical Therapy Goals   PT Frequency 5x/week   PT Predicted Duration/Target Date for Goal Attainment 03/13/25   PT Goals Bed  Mobility;Transfers;Gait   PT: Bed Mobility Independent;Supine to/from sit;Rolling   PT: Transfers Sit to/from stand;Bed to/from chair;Assistive device  (CGA)   PT: Gait Supervision/stand-by assist;Rolling walker;100 feet   PT Discharge Planning   PT Plan assess bed mobility; progress tx, gait   PT Discharge Recommendation (DC Rec) Transitional Care Facility   PT Rationale for DC Rec Patient significantly below her reported baseline level of function and would benefit from ongoing PT to maximize return to PLOF; patient at baseline only receives limited assist; currently needing assist with all mobility and cares   PT Brief overview of current status A x 1 with FWW   PT Total Distance Amb During Session (feet) 40   PT Equipment Needed at Discharge walker, rolling   Physical Therapy Time and Intention   Total Session Time (sum of timed and untimed services) 10

## 2025-03-18 NOTE — PLAN OF CARE
"Shift summary ([id number])    Pt Ox4. VSS on RA. Denies pain, CP, SOB. Up to bathroom and chair Ax1 GB W, spontaneously voiding. LLE wound cares and dsg change provided per orders. Discharge to TCU pending placement.    Goal Outcome Evaluation:      Plan of Care Reviewed With: patient    Overall Patient Progress: improvingOverall Patient Progress: improving    Outcome Evaluation: LLE warm/hot, red/jennifer, wound cares and dsg change provided per orders. Afebrile.      Problem: Adult Inpatient Plan of Care  Goal: Plan of Care Review  Description: The Plan of Care Review/Shift note should be completed every shift.  The Outcome Evaluation is a brief statement about your assessment that the patient is improving, declining, or no change.  This information will be displayed automatically on your shift  note.  Outcome: Progressing  Flowsheets (Taken 3/18/2025 1439)  Outcome Evaluation: LLE warm/hot, red/jennifer, wound cares and dsg change provided per orders. Afebrile.  Plan of Care Reviewed With: patient  Overall Patient Progress: improving  Goal: Patient-Specific Goal (Individualized)  Description: You can add care plan individualizations to a care plan. Examples of Individualization might be:  \"Parent requests to be called daily at 9am for status\", \"I have a hard time hearing out of my right ear\", or \"Do not touch me to wake me up as it startles  me\".  Outcome: Progressing  Goal: Absence of Hospital-Acquired Illness or Injury  Outcome: Progressing  Intervention: Identify and Manage Fall Risk  Recent Flowsheet Documentation  Taken 3/18/2025 1403 by Marbella Piña RN  Safety Promotion/Fall Prevention: safety round/check completed  Taken 3/18/2025 1312 by Marbella Piña RN  Safety Promotion/Fall Prevention: safety round/check completed  Taken 3/18/2025 1209 by Marbella Piña RN  Safety Promotion/Fall Prevention: safety round/check completed  Taken 3/18/2025 1126 by Marbelal Piña RN  Safety " Promotion/Fall Prevention: safety round/check completed  Taken 3/18/2025 1021 by Marbella Piña RN  Safety Promotion/Fall Prevention: safety round/check completed  Taken 3/18/2025 0801 by Marbella Piña RN  Safety Promotion/Fall Prevention:   activity supervised   assistive device/personal items within reach   clutter free environment maintained   increased rounding and observation   increase visualization of patient   lighting adjusted   mobility aid in reach   nonskid shoes/slippers when out of bed   patient and family education   room door open   room near nurse's station   room organization consistent   safety round/check completed   supervised activity   treat reversible contributory factors   treat underlying cause  Taken 3/18/2025 0717 by Marbella Piña RN  Safety Promotion/Fall Prevention: safety round/check completed  Intervention: Prevent Skin Injury  Recent Flowsheet Documentation  Taken 3/18/2025 0801 by Marbella Piña RN  Body Position: position changed independently  Skin Protection:   adhesive use limited   incontinence pads utilized   tubing/devices free from skin contact  Intervention: Prevent and Manage VTE (Venous Thromboembolism) Risk  Recent Flowsheet Documentation  Taken 3/18/2025 0801 by Marbella Piña RN  VTE Prevention/Management: (LLE cellulitis) SCDs off (sequential compression devices)  Intervention: Prevent Infection  Recent Flowsheet Documentation  Taken 3/18/2025 0801 by Marbella Piña RN  Infection Prevention:   equipment surfaces disinfected   hand hygiene promoted   personal protective equipment utilized   rest/sleep promoted   single patient room provided  Goal: Optimal Comfort and Wellbeing  Outcome: Progressing  Goal: Readiness for Transition of Care  Outcome: Progressing

## 2025-03-18 NOTE — PLAN OF CARE
" AOX4, RA. Given tylenol X2 for mild leg pain. Up 1 walker and GB, voiding. LLE wound dressing CDI. On ABX. SL  Plan of discharge TBD.  /42 (BP Location: Right arm)   Pulse 63   Temp 98  F (36.7  C) (Oral)   Resp 16   Ht 1.6 m (5' 3\")   Wt 50 kg (110 lb 4.8 oz)   LMP  (LMP Unknown)   SpO2 95%   BMI 19.54 kg/m       Goal Outcome Evaluation:      Plan of Care Reviewed With: patient    Overall Patient Progress: improvingOverall Patient Progress: improving    Problem: Adult Inpatient Plan of Care  Goal: Plan of Care Review  Description: The Plan of Care Review/Shift note should be completed every shift.  The Outcome Evaluation is a brief statement about your assessment that the patient is improving, declining, or no change.  This information will be displayed automatically on your shift  note.  Outcome: Progressing  Flowsheets (Taken 3/18/2025 0110)  Plan of Care Reviewed With: patient  Overall Patient Progress: improving  Goal: Patient-Specific Goal (Individualized)  Description: You can add care plan individualizations to a care plan. Examples of Individualization might be:  \"Parent requests to be called daily at 9am for status\", \"I have a hard time hearing out of my right ear\", or \"Do not touch me to wake me up as it startles  me\".  Outcome: Progressing  Goal: Absence of Hospital-Acquired Illness or Injury  Outcome: Progressing  Intervention: Identify and Manage Fall Risk  Recent Flowsheet Documentation  Taken 3/17/2025 2030 by Holly Velez RN  Safety Promotion/Fall Prevention:   safety round/check completed   clutter free environment maintained  Intervention: Prevent and Manage VTE (Venous Thromboembolism) Risk  Recent Flowsheet Documentation  Taken 3/17/2025 2030 by Holly Velez RN  VTE Prevention/Management: patient refused intervention  Intervention: Prevent Infection  Recent Flowsheet Documentation  Taken 3/17/2025 2030 by Holly Velez RN  Infection Prevention:   rest/sleep " promoted   hand hygiene promoted   single patient room provided  Goal: Optimal Comfort and Wellbeing  Outcome: Progressing  Goal: Readiness for Transition of Care  Outcome: Progressing     Problem: Delirium  Goal: Optimal Coping  Outcome: Progressing  Goal: Improved Behavioral Control  Outcome: Progressing  Goal: Improved Attention and Thought Clarity  Outcome: Progressing  Goal: Improved Sleep  Outcome: Progressing     Problem: Infection  Goal: Absence of Infection Signs and Symptoms  Outcome: Progressing     Problem: Skin Injury Risk Increased  Goal: Skin Health and Integrity  Outcome: Progressing  Intervention: Plan: Nurse Driven Intervention: Moisture Management  Recent Flowsheet Documentation  Taken 3/17/2025 2100 by Holly Vleez RN  Moisture Interventions: Incontinence pad  Bathing/Skin Care: incontinence care  Taken 3/17/2025 2030 by Holly Velez RN  Moisture Interventions: Incontinence pad  Intervention: Plan: Nurse Driven Intervention: Friction and Shear  Recent Flowsheet Documentation  Taken 3/17/2025 2030 by Holly Velez RN  Friction/Shear Interventions: HOB 30 degrees or less     Problem: Pain Acute  Goal: Optimal Pain Control and Function  Outcome: Progressing

## 2025-03-18 NOTE — PROGRESS NOTES
Care Management Follow Up    Length of Stay (days): 4    Expected Discharge Date: 03/21/2025     Concerns to be Addressed: discharge planning     Patient plan of care discussed at interdisciplinary rounds: Yes    Anticipated Discharge Disposition: Assisted Living, Home Care              Anticipated Discharge Services: None  Anticipated Discharge DME: None    Patient/family educated on Medicare website which has current facility and service quality ratings: yes  Education Provided on the Discharge Plan:    Patient/Family in Agreement with the Plan: yes    Referrals Placed by CM/SW:    Private pay costs discussed: Not applicable    Discussed  Partnership in Safe Discharge Planning  document with patient/family: No     Handoff Completed: No, handoff not indicated or clinically appropriate    Additional Information:  JOSE G followed up with pt and daughter, Loyda, regarding PT referral for TCU. Pt is receptive to TCU and expressed her preference as [facility] due to it being next door to her [facility].     Referral sent to [facility].     Update provided to pt.    Family able to transport pt back to TCU.     Next Steps: JOSE G to follow for discharge plans    GINO Vargas, LGSW  [facility] Care Coordinator/ Med Surg [address] [place] [address]  [phone]

## 2025-03-19 ENCOUNTER — DOCUMENTATION ONLY (OUTPATIENT)
Dept: GERIATRICS | Facility: CLINIC | Age: OVER 89
End: 2025-03-19
Payer: COMMERCIAL

## 2025-03-19 ENCOUNTER — LAB REQUISITION (OUTPATIENT)
Dept: LAB | Facility: CLINIC | Age: OVER 89
End: 2025-03-19
Payer: COMMERCIAL

## 2025-03-19 VITALS
OXYGEN SATURATION: 95 % | BODY MASS INDEX: 17.91 KG/M2 | WEIGHT: 101.1 LBS | TEMPERATURE: 98 F | RESPIRATION RATE: 18 BRPM | HEART RATE: 95 BPM | DIASTOLIC BLOOD PRESSURE: 60 MMHG | HEIGHT: 63 IN | SYSTOLIC BLOOD PRESSURE: 162 MMHG

## 2025-03-19 DIAGNOSIS — Z11.1 ENCOUNTER FOR SCREENING FOR RESPIRATORY TUBERCULOSIS: ICD-10-CM

## 2025-03-19 LAB
CREAT SERPL-MCNC: 0.94 MG/DL (ref 0.51–0.95)
CRP SERPL-MCNC: 14.07 MG/L
EGFRCR SERPLBLD CKD-EPI 2021: 56 ML/MIN/1.73M2
ERYTHROCYTE [DISTWIDTH] IN BLOOD BY AUTOMATED COUNT: 13.6 % (ref 10–15)
HCT VFR BLD AUTO: 32.7 % (ref 35–47)
HGB BLD-MCNC: 10.5 G/DL (ref 11.7–15.7)
MCH RBC QN AUTO: 29.2 PG (ref 26.5–33)
MCHC RBC AUTO-ENTMCNC: 32.1 G/DL (ref 31.5–36.5)
MCV RBC AUTO: 91 FL (ref 78–100)
PLATELET # BLD AUTO: 286 10E3/UL (ref 150–450)
RBC # BLD AUTO: 3.59 10E6/UL (ref 3.8–5.2)
WBC # BLD AUTO: 9.2 10E3/UL (ref 4–11)

## 2025-03-19 PROCEDURE — 82565 ASSAY OF CREATININE: CPT | Performed by: INTERNAL MEDICINE

## 2025-03-19 PROCEDURE — 94640 AIRWAY INHALATION TREATMENT: CPT

## 2025-03-19 PROCEDURE — G0463 HOSPITAL OUTPT CLINIC VISIT: HCPCS

## 2025-03-19 PROCEDURE — 99239 HOSP IP/OBS DSCHRG MGMT >30: CPT | Performed by: INTERNAL MEDICINE

## 2025-03-19 PROCEDURE — 250N000013 HC RX MED GY IP 250 OP 250 PS 637: Performed by: INTERNAL MEDICINE

## 2025-03-19 PROCEDURE — 86140 C-REACTIVE PROTEIN: CPT | Performed by: INTERNAL MEDICINE

## 2025-03-19 PROCEDURE — 36415 COLL VENOUS BLD VENIPUNCTURE: CPT | Performed by: INTERNAL MEDICINE

## 2025-03-19 PROCEDURE — 250N000011 HC RX IP 250 OP 636: Performed by: INTERNAL MEDICINE

## 2025-03-19 PROCEDURE — 250N000013 HC RX MED GY IP 250 OP 250 PS 637: Performed by: PHYSICIAN ASSISTANT

## 2025-03-19 PROCEDURE — 85027 COMPLETE CBC AUTOMATED: CPT | Performed by: INTERNAL MEDICINE

## 2025-03-19 RX ORDER — AMOXICILLIN AND CLAVULANATE POTASSIUM 500; 125 MG/1; MG/1
1 TABLET, FILM COATED ORAL 2 TIMES DAILY
DISCHARGE
Start: 2025-03-19 | End: 2025-03-24

## 2025-03-19 RX ORDER — OXYCODONE HYDROCHLORIDE 5 MG/1
2.5 TABLET ORAL EVERY 6 HOURS PRN
Qty: 4 TABLET | Status: SHIPPED | DISCHARGE
Start: 2025-03-19 | End: 2025-03-26

## 2025-03-19 RX ADMIN — THERA TABS 1 TABLET: TAB at 09:14

## 2025-03-19 RX ADMIN — LOPERAMIDE HYDROCHLORIDE 2 MG: 2 CAPSULE ORAL at 11:31

## 2025-03-19 RX ADMIN — UMECLIDINIUM 1 PUFF: 62.5 AEROSOL, POWDER ORAL at 09:02

## 2025-03-19 RX ADMIN — PIPERACILLIN AND TAZOBACTAM 2.25 G: 2; .25 INJECTION, POWDER, FOR SOLUTION INTRAVENOUS at 04:29

## 2025-03-19 RX ADMIN — CARBOXYMETHYLCELLULOSE SODIUM 1 DROP: 5 SOLUTION/ DROPS OPHTHALMIC at 09:15

## 2025-03-19 RX ADMIN — FUROSEMIDE 40 MG: 40 TABLET ORAL at 09:15

## 2025-03-19 RX ADMIN — LISINOPRIL 20 MG: 20 TABLET ORAL at 09:14

## 2025-03-19 RX ADMIN — ASPIRIN 81 MG: 81 TABLET, COATED ORAL at 09:15

## 2025-03-19 RX ADMIN — OXYCODONE HYDROCHLORIDE 2.5 MG: 5 TABLET ORAL at 12:48

## 2025-03-19 RX ADMIN — Medication 25 MCG: at 09:14

## 2025-03-19 RX ADMIN — LOPERAMIDE HYDROCHLORIDE 2 MG: 2 CAPSULE ORAL at 06:19

## 2025-03-19 RX ADMIN — PIPERACILLIN AND TAZOBACTAM 2.25 G: 2; .25 INJECTION, POWDER, FOR SOLUTION INTRAVENOUS at 10:09

## 2025-03-19 RX ADMIN — ACETAMINOPHEN 650 MG: 325 TABLET, FILM COATED ORAL at 01:44

## 2025-03-19 RX ADMIN — ACETAMINOPHEN 650 MG: 325 TABLET, FILM COATED ORAL at 09:14

## 2025-03-19 RX ADMIN — HYDRALAZINE HYDROCHLORIDE 50 MG: 50 TABLET ORAL at 09:15

## 2025-03-19 ASSESSMENT — ACTIVITIES OF DAILY LIVING (ADL)
ADLS_ACUITY_SCORE: 57

## 2025-03-19 NOTE — DISCHARGE INSTRUCTIONS
Left leg wound(s): Every other day  Cleanse with wound cleanser and dry  Apply nonadhesive Optifoam to wound  Wrap with kerlix

## 2025-03-19 NOTE — PROGRESS NOTES
[facility] Nurse Inpatient Assessment     Consulted for: Left leg    Summary: Patient reports pain is significantly better in left leg.  Still recommend patient keeps her appointment with vascular surgery next month.    [facility] nurse follow-up plan: weekly    Patient History (according to provider note(s):      Shiloh Covarrubias is a 89 year old female with PMH significant for hypertension, asthma/COPD, anxiety, depression, CKD stage III, PAD, PVD with known nonhealing wound of left lower extremity who presents to the ED on 3/13/2025 for evaluation of ongoing wound with concern for surrounding cellulitis even after being on oral antibiotics.     Assessment:      Areas visualized during today's visit: Focused:    Wound location: Left medial leg  Last photo: 3/19/25    Left medial leg      Left calf      Wound due to: Venous Ulcer  Wound history/plan of care: Venous ulcer but patient with known arterial insufficiency.  Is supposed to have appointment with vascular next month.     Wound base: Glass is now covered with superficial dry drainage measuring 5x3cm.  Calf with 2 areas of dermis measuring 1x1cm     Palpation of the wound bed: normal      Drainage: small     Description of drainage: serous     Measurements (length x width x depth, in cm): see above     Tunneling: N/A     Undermining: N/A  Periwound skin: Dry/scaly and Erythema- blanchable (erythema decreased)      Color: pink      Temperature: warm  Odor: none  Pain: mild  Pain interventions prior to dressing change: slow and gentle cares   Treatment goal: Heal  and Protection  STATUS: improving  Supplies ordered: gathered       Treatment Plan:     Left leg wound(s): Every other day  Cleanse with wound cleanser and dry  Apply nonadhesive Optifoam (\[facility]\""#[id number]) to wound  Wrap with kerlix  No compression to left leg     Orders: Written    RECOMMEND PRIMARY TEAM ORDER:  see above  Education provided: plan of care  Discussed plan of care  with: Patient  Notify WOC if wound(s) deteriorate.  Nursing to notify the Provider(s) and re-consult the WOC Nurse if new skin concern.    DATA:     Current support surface: Standard  Standard gel mattress (Isoflex)  BMI: Body mass index is 17.91 kg/m .   Active diet order: Orders Placed This Encounter      Regular Diet Adult      Diet     Output: I/O last 3 completed shifts:  In: 259 [P.O.:240; I.V.:19]  Out: 700 [Urine:700]     Labs:   Recent Labs   Lab 03/19/25  0653   HGB 10.5*   WBC 9.2     Pressure injury risk assessment:   Sensory Perception: 4-->no impairment  Moisture: 3-->occasionally moist  Activity: 3-->walks occasionally  Mobility: 3-->slightly limited  Nutrition: 3-->adequate  Friction and Shear: 2-->potential problem  Neeraj Score: 18    Chuck Clarke RN CWOCN  Contact Via [facility] Nurse ([place])  Dept. Office Number: [phone]

## 2025-03-19 NOTE — PLAN OF CARE
Physical Therapy Discharge Summary    Reason for therapy discharge:    Discharged to transitional care facility.    Progress towards therapy goal(s). See goals on Care Plan in [facility] electronic health record for goal details.  Goals not met.  Barriers to achieving goals:   discharge from facility.    Therapy recommendation(s):    Continued therapy is recommended.  Rationale/Recommendations:  TCU recommended for further progression of strength and IND mobility.

## 2025-03-19 NOTE — PROGRESS NOTES
Care Management Discharge Note    Discharge Date: 03/19/2025       Discharge Disposition: Assisted Living, Home Care    Discharge Services: None    Discharge DME: None    Discharge Transportation: family or friend will provide    Private pay costs discussed: Not applicable    Does the patient's insurance plan have a 3 day qualifying hospital stay waiver?  Yes     Which insurance plan 3 day waiver is available? Alternative insurance waiver    Will the waiver be used for post-acute placement? Yes    PAS Confirmation Code: [id number]  Patient/family educated on Medicare website which has current facility and service quality ratings: yes    Education Provided on the Discharge Plan:    Persons Notified of Discharge Plans: Pt and family  Patient/Family in Agreement with the Plan: yes    Handoff Referral Completed: No, handoff not indicated or clinically appropriate    Additional Information:  Pt medically ready for discharge. Pt is alert and orientated with a positive disposition.     [facility] with sonChuck, transporting pt.     Discharge orders sent to TCU.      GINO Vargas, LGSW  SW Care Coordinator/ Med Surg [address] [place] [address]  [phone]

## 2025-03-19 NOTE — DISCHARGE SUMMARY
[facility]  Discharge Summary  Name: Shiloh Covarrubias    MRN: [id number]  YOB: 1929    Age: 89 year old  Date of Discharge:  3/19/2025  Date of Admission: 3/13/2025  Primary Care Provider: Israel Scott  Discharge Physician:  Uriah Levine MD  Discharging Service:  Hospitalist      Hospital Course/Discharge Diagnoses:  Shiloh Covarrubias is a 89 year old female with PMH significant for hypertension, asthma/COPD, anxiety, depression, CKD stage III, PAD, PVD with known nonhealing wound of left lower extremity who presented to the ED on 3/13/2025 for evaluation of ongoing wound with concern for surrounding cellulitis even after being on oral antibiotics.     ED workup reveals: VSS, hemoglobin of 11.3, sodium of 133, chloride of 97, BUN of 39.9, creatinine of 1.8, glucose of 132, ESR 55, CRP of 57.18, blood cultures obtained x 2 and pending, and per patient LE ultrasound at her facility was negative for DVT.  She was admitted with cellulitis that failed outpatient treatment.  She was treated with Ancef.  Initially she showed some improvement with reduction of erythema and resolution of wound drainage.  She continued to have quite a bit of pain, however.  The pain was worse around and below the ankle.  This is the area where the erythema persisted though worse.  She developed increase in posterior calf pain.  Ancef was changed to vancomycin and Zosyn on 3/16.  MRSA nares swab was done and was negative.    Now over the past few days she has steadily improved and feels well.  Erythema seems to be improving significantly.  Planning to transition to oral Augmentin and discharged to TCU for ongoing wound cares.          Problem list:     Nonhealing left LE wound   Left LE cellulitis   H/o PAD, PVD  -X-ray of left ankle showed no fracture  -Ultrasound of left leg showed no DVT  -Wound care nurse consult appreciated.    -Was initially treated with Ancef, changed to Zosyn and vancomycin  on 3/16. Stopped vanco 3/18.  -Nare MRSA swab was negative  -Pain much improved  -Discharging with 5 days more oral Augmentin, will need ongoing wound care     Hyponatremia  Hypochloremia  SHIVANI on CKD stage III  -Sodium and chloride have normalized  -Kidney function has improved     COPD  -Continue PTA Spiriva if patient has with her     HFpEF with mild exacerbation upon admission.  Gave brief course of IV lasix.  -Baseline LE edema. No current shortness of breath.   -Held PTA PO Lasix while giving IV Lasix; now back on oral Lasix      HTN  -BP stable  -Continue PTA Amlodipine, Hydralazine, and Lisinopril with parameters     DM2  Diet controlled, most recent HgbA1c of 5.2 in 08/2024      Depression  -Continue PTA Lexapro and Mirtazapine         Discharge Disposition:  Discharged to short-term care facility     Allergies:  Allergies   Allergen Reactions    Clonidine Derivatives      Severe side effects      Entocort [Corticosteroids]     Macrobid [Nitrofurantoin]      Diarrhea      Sulfa Antibiotics Itching     itch        Discharge Medications:        Review of your medicines        START taking        Dose / Directions   amoxicillin-clavulanate 500-125 MG tablet  Commonly known as: AUGMENTIN      Dose: 1 tablet  Take 1 tablet by mouth 2 times daily for 5 days.  Refills: 0            CONTINUE these medicines which may have CHANGED, or have new prescriptions. If we are uncertain of the size of tablets/capsules you have at home, strength may be listed as something that might have changed.        Dose / Directions   * acetaminophen 500 MG tablet  Commonly known as: TYLENOL  This may have changed: Another medication with the same name was removed. Continue taking this medication, and follow the directions you see here.      Dose: 1,000 mg  Take 1,000 mg by mouth every morning.  Refills: 0     * acetaminophen 500 MG tablet  Commonly known as: TYLENOL  This may have changed: Another medication with the same name was  removed. Continue taking this medication, and follow the directions you see here.      Dose: 1,000 mg  Take 1,000 mg by mouth daily as needed for mild pain.  Refills: 0     loperamide 2 MG capsule  Commonly known as: IMODIUM  This may have changed: Another medication with the same name was removed. Continue taking this medication, and follow the directions you see here.      Dose: 2 mg  Take 2 mg by mouth 2 times daily.  Refills: 0           * This list has 2 medication(s) that are the same as other medications prescribed for you. Read the directions carefully, and ask your doctor or other care provider to review them with you.                CONTINUE these medicines which have NOT CHANGED        Dose / Directions   AMLODIPINE BESYLATE PO      Dose: 7.5 mg  Take 7.5 mg by mouth daily.  Refills: 0     Aspirin Adult Low Dose 81 MG EC tablet  Used for: Cerebral infarction (H)  Generic drug: aspirin      Dose: 81 mg  TAKE 1 TABLET BY MOUTH ONCE DAILY  Quantity: 90 tablet  Refills: 97     escitalopram 10 MG tablet  Commonly known as: LEXAPRO  Used for: LOUIS (generalized anxiety disorder)      TAKE 1 TABLET BY MOUTH ONCE DAILY  Quantity: 90 tablet  Refills: 97     furosemide 40 MG tablet  Commonly known as: LASIX  Used for: Edema, unspecified type      TAKE 1 TABLET BY MOUTH ONCE DAILY  Quantity: 90 tablet  Refills: 97     hydrALAZINE 50 MG tablet  Commonly known as: APRESOLINE  Used for: Essential hypertension with goal blood pressure less than 130/80      TAKE 1 TABLET BY MOUTH TWICE  DAILY  Quantity: 270 tablet  Refills: 11     lisinopril 20 MG tablet  Commonly known as: ZESTRIL  Used for: Essential hypertension with goal blood pressure less than 130/80      TAKE 1 TABLET BY MOUTH ONCE DAILY  Quantity: 90 tablet  Refills: 97     loratadine 10 MG tablet  Commonly known as: CLARITIN  Used for: Chronic rhinitis      Dose: 5 mg  Take 0.5 tablets (5 mg) by mouth every other day  Quantity: 90 tablet  Refills: 97     oxyCODONE  "5 MG tablet  Commonly known as: ROXICODONE  Used for: Pain and swelling of left lower extremity      Dose: 2.5 mg  Take 0.5 tablets (2.5 mg) by mouth every 6 hours as needed for pain.  Quantity: 4 tablet  Refills: 0     PreserVision AREDS Caps  Used for: Senile cataract      Dose: 1 capsule  TAKE 1 CAPSULE BY MOUTH TWICE DAILY WITH MEALS  Quantity: 180 capsule  Refills: 97     * PROPYLENE GLYCOL-GLYCERIN OP      Dose: 1 drop  Place 1 drop into both eyes 3 times daily.  Refills: 0     * PROPYLENE GLYCOL-GLYCERIN OP      Dose: 2 drop  Place 2 drops into both eyes daily as needed.  Refills: 0     Spiriva Respimat 2.5 MCG/ACT inhaler  Used for: Chronic obstructive pulmonary disease, unspecified COPD type (H)  Generic drug: tiotropium      INHALE 2 PUFFS INTO THE LUNGS ONCE DAILY  Quantity: 4 g  Refills: 97     Tab-A-Vin Tabs  Used for: Takes dietary supplements      TAKE 1 TABLET BY MOUTH ONCE DAILY  Quantity: 90 tablet  Refills: 97     Vitamin D3 25 mcg (1000 units) tablet  Commonly known as: CHOLECALCIFEROL      Dose: 1 tablet  Take 1 tablet (25 mcg) by mouth daily  Refills: 0           * This list has 2 medication(s) that are the same as other medications prescribed for you. Read the directions carefully, and ask your doctor or other care provider to review them with you.                STOP taking      sulfamethoxazole-trimethoprim 400-80 MG tablet  Commonly known as: Bactrim                  Where to get your medicines        Information about where to get these medications is not yet available    Ask your nurse or doctor about these medications  oxyCODONE 5 MG tablet         Condition on Discharge:  Discharge condition: Stable       Code status on discharge: DNR / DNI     History of Illness:  See detailed admission note for full details.    Physical Exam:  Vital signs:  Temp: 98  F (36.7  C) Temp src: Oral BP: (!) 162/60 Pulse: 95   Resp: 18 SpO2: 95 % O2 Device: None (Room air)   Height: 160 cm (5' 3\") Weight: " "45.9 kg (101 lb 1.6 oz)  Estimated body mass index is 17.91 kg/m  as calculated from the following:    Height as of this encounter: 1.6 m (5' 3\").    Weight as of this encounter: 45.9 kg (101 lb 1.6 oz).    Wt Readings from Last 1 Encounters:   03/19/25 45.9 kg (101 lb 1.6 oz)     GENERAL:  Comfortable at rest at time of exam. Cooperative.  PSYCH: pleasant, oriented, No acute distress.  EYES: PERRLA, Normal conjunctiva.  HEART:  Regular rate and rhythm. No JVD. Pulses normal. No edema.  LUNGS:  Clear to auscultation, normal Respiratory effort.  ABDOMEN:  Soft, no hepatosplenomegaly, normal bowel sounds.  EXTREMETIES: No clubbing, cyanosis or ischemia  SKIN:  Dry to touch, No rash.       Procedures other than Imaging:  none     Imaging:  Results for orders placed or performed during the hospital encounter of 03/13/25   XR Ankle Left G/E 3 Views    Narrative    EXAM: XR ANKLE LEFT G/E 3 VIEWS  LOCATION: [facility]  DATE: 3/15/2025    INDICATION: cellulitis, pain with weight bearing. Evaluate for fracture. No obvious traumatic injury  COMPARISON: None.      Impression    IMPRESSION: Mild soft tissue swelling around the ankle. Small linear radiodensity at the dorsal talar head is consistent with an age-indeterminate avulsion fragment. There is normal joint alignment. Mild tibiotalar joint degenerative changes. The ankle   mortise is congruent. Osteopenia. Vascular calcification.   US Lower Extremity Venous Duplex Left    Narrative    EXAM: US LOWER EXTREMITY VENOUS DUPLEX LEFT  LOCATION: [facility]  DATE: 3/16/2025    INDICATION: Left calf pain.  COMPARISON: None.  TECHNIQUE: Venous Duplex ultrasound of the left lower extremity with and without compression, augmentation and duplex. Color flow and spectral Doppler with waveform analysis performed.    FINDINGS: Exam includes the common femoral, femoral, popliteal, and contralateral common femoral veins as well as segmentally " visualized deep calf veins and greater saphenous vein.     LEFT: No deep vein thrombosis. No superficial thrombophlebitis. No popliteal cyst.      Impression    IMPRESSION:  1.  Negative left leg venous Doppler.        Consultations:  Consultation during this admission received from wound RN.       Recent Lab Results:  Recent Labs   Lab 03/19/25  0653 03/18/25  0635 03/16/25  0601   WBC 9.2 11.8* 9.8   HGB 10.5* 10.5* 10.4*   HCT 32.7* 32.9* 32.2*   MCV 91 91 91    326 318          Lab Results   Component Value Date     03/18/2025     03/16/2025     03/14/2025     04/09/2021     04/08/2019     08/27/2018    Lab Results   Component Value Date    CHLORIDE 104 03/18/2025    CHLORIDE 101 03/16/2025    CHLORIDE 101 03/14/2025    CHLORIDE 104 07/28/2022    CHLORIDE 103 10/08/2021    CHLORIDE 107 09/29/2021    CHLORIDE 104 04/09/2021    CHLORIDE 105 04/08/2019    CHLORIDE 102 08/27/2018    Lab Results   Component Value Date    BUN 15.4 03/18/2025    BUN 17.9 03/16/2025    BUN 33.8 03/14/2025    BUN 37 07/28/2022    BUN 29 10/08/2021    BUN 54 09/29/2021    BUN 30 04/09/2021    BUN 18 04/08/2019    BUN 39 08/27/2018      Lab Results   Component Value Date    POTASSIUM 4.2 03/18/2025    POTASSIUM 3.9 03/16/2025    POTASSIUM 4.8 03/14/2025    POTASSIUM 4.7 07/28/2022    POTASSIUM 5.2 10/08/2021    POTASSIUM 5.8 09/29/2021    POTASSIUM 4.4 04/09/2021    POTASSIUM 4.0 04/08/2019    POTASSIUM 4.4 08/27/2018    Lab Results   Component Value Date    CO2 25 03/18/2025    CO2 27 03/16/2025    CO2 24 03/14/2025    CO2 25 07/28/2022    CO2 28 10/08/2021    CO2 27 09/29/2021    CO2 28 04/09/2021    CO2 25 04/08/2019    CO2 28 08/27/2018    Lab Results   Component Value Date    CR 0.94 03/19/2025    CR 0.94 03/18/2025    CR 1.14 03/17/2025    CR 1.24 04/09/2021    CR 0.99 04/08/2019    CR 1.36 09/18/2018        7-Day Micro Results       Collected Updated Procedure Result Status      03/16/2025  1016 03/16/2025 1404 MRSA MSSA PCR, Nasal Swab [[id number]]    Swab from Nares, Bilateral    Final result Component Value   MRSA Target DNA Negative   SA Target DNA Negative            03/13/2025 1726 03/18/2025 2031 Blood Culture Line, venous [[id number]]   Blood from Line, venous    Final result Component Value   Culture No Growth               03/13/2025 1602 03/18/2025 2031 Blood Culture Arm, Left [[id number]]   Blood from Arm, Left    Final result Component Value   Culture No Growth                          Pending Results:    Unresulted Labs Ordered in the Past 30 Days of this Admission       No orders found from 2/11/2025 to 3/14/2025.             Discharge Instructions and Follow-Up:   Discharge Procedure Orders   General info for SNF   Order Comments: Length of Stay Estimate: Short Term Care: Estimated # of Days <30  Condition at Discharge: Stable  Level of care:skilled   Rehabilitation Potential: Good  Admission H&P remains valid and up-to-date: Yes  Recent Chemotherapy: N/A  Use Nursing Home Standing Orders: Yes     Mantoux instructions   Order Comments: Give two-step Mantoux (PPD) Per Facility Policy Yes     Follow Up and recommended labs and tests   Order Comments: [facility].  The following labs/tests are recommended: Follow-up on wound healing.     Reason for your hospital stay   Order Comments: Admitted for soft tissue infection surrounding your left lower extremity wound.  You were treated with IV antibiotics as well as wound cares.  He will continue 5 days more of oral antibiotics at transitional care and will need ongoing daily wound cares.     Activity - Up with nursing assistance     Order Specific Question Answer Comments   Is discharge order? Yes      Physical Therapy Adult Consult   Order Comments: Evaluate and treat as clinically indicated.    Reason: Deconditioning     Occupational Therapy Adult Consult   Order Comments: Evaluate and treat as clinically  indicated.    Reason: Deconditioning     Fall precautions     Diet   Order Comments: Follow this diet upon discharge: Current Diet:Orders Placed This Encounter      Regular Diet Adult     Order Specific Question Answer Comments   Is discharge order? Yes        Total time spent in face to face contact with the patient and coordinating discharge was:  60 Minutes.

## 2025-03-19 NOTE — PLAN OF CARE
"Goal Outcome Evaluation:      Plan of Care Reviewed With: patient    Overall Patient Progress: improvingOverall Patient Progress: improving    Outcome Evaluation: LLE CDI. C/o pain, prn tylenol given. VSS, on RA.     Problem: Adult Inpatient Plan of Care  Goal: Plan of Care Review  Description: The Plan of Care Review/Shift note should be completed every shift.  The Outcome Evaluation is a brief statement about your assessment that the patient is improving, declining, or no change.  This information will be displayed automatically on your shift  note.  3/19/2025 0336 by Tate Pillai RN  Outcome: Progressing  Flowsheets (Taken 3/19/2025 0336)  Outcome Evaluation: LLE CDI. C/o pain, prn tylenol given.  Plan of Care Reviewed With: patient  Overall Patient Progress: improving  3/19/2025 0327 by Tate Pillai RN  Outcome: Progressing  Flowsheets (Taken 3/19/2025 0327)  Overall Patient Progress: improving  Goal: Patient-Specific Goal (Individualized)  Description: You can add care plan individualizations to a care plan. Examples of Individualization might be:  \"Parent requests to be called daily at 9am for status\", \"I have a hard time hearing out of my right ear\", or \"Do not touch me to wake me up as it startles  me\".  3/19/2025 0336 by Tate Pillai RN  Outcome: Progressing  3/19/2025 0327 by Tate Pillai RN  Outcome: Progressing  Goal: Absence of Hospital-Acquired Illness or Injury  3/19/2025 0336 by Tate Pillai RN  Outcome: Progressing  3/19/2025 0327 by Tate Pillai RN  Outcome: Progressing  Intervention: Identify and Manage Fall Risk  Recent Flowsheet Documentation  Taken 3/19/2025 0100 by Tate Pillai RN  Safety Promotion/Fall Prevention:   activity supervised   assistive device/personal items within reach  Intervention: Prevent Skin Injury  Recent Flowsheet Documentation  Taken 3/19/2025 0100 by Tate Pillai RN  Skin Protection: adhesive use limited  Intervention: Prevent and " Manage VTE (Venous Thromboembolism) Risk  Recent Flowsheet Documentation  Taken 3/19/2025 0100 by Tate Pillai RN  VTE Prevention/Management: SCDs off (sequential compression devices)  Intervention: Prevent Infection  Recent Flowsheet Documentation  Taken 3/19/2025 0100 by Tate Pillai RN  Infection Prevention: hand hygiene promoted  Goal: Optimal Comfort and Wellbeing  3/19/2025 0336 by Tate Pillai RN  Outcome: Progressing  3/19/2025 0327 by Tate Pillai RN  Outcome: Progressing  Intervention: Monitor Pain and Promote Comfort  Recent Flowsheet Documentation  Taken 3/19/2025 0023 by Tate Pillai RN  Pain Management Interventions:   repositioned   rest  Intervention: Provide Person-Centered Care  Recent Flowsheet Documentation  Taken 3/19/2025 0100 by Tate Pillai RN  Trust Relationship/Rapport:   care explained   choices provided  Goal: Readiness for Transition of Care  3/19/2025 0336 by Tate Pillai RN  Outcome: Progressing  3/19/2025 0327 by Tate Pillai RN  Outcome: Progressing     Problem: Delirium  Goal: Optimal Coping  3/19/2025 0336 by Tate Pillai RN  Outcome: Progressing  3/19/2025 0327 by Tate Pillai RN  Outcome: Progressing  Goal: Improved Behavioral Control  3/19/2025 0336 by Tate Pillai RN  Outcome: Progressing  3/19/2025 0327 by Tate Pillai RN  Outcome: Progressing  Intervention: Minimize Safety Risk  Recent Flowsheet Documentation  Taken 3/19/2025 0100 by Tate Pillai RN  Enhanced Safety Measures: review medications for side effects with activity  Trust Relationship/Rapport:   care explained   choices provided  Goal: Improved Attention and Thought Clarity  3/19/2025 0336 by Tate Pillai RN  Outcome: Progressing  3/19/2025 0327 by Tate Pillai RN  Outcome: Progressing  Goal: Improved Sleep  3/19/2025 0336 by Tate Pillai RN  Outcome: Progressing  3/19/2025 0327 by Tate Pillai RN  Outcome:  Progressing  Intervention: Promote Sleep  Recent Flowsheet Documentation  Taken 3/19/2025 0100 by Tate Pillai RN  Sleep/Rest Enhancement: awakenings minimized     Problem: Infection  Goal: Absence of Infection Signs and Symptoms  3/19/2025 0336 by Tate Pillai RN  Outcome: Progressing  3/19/2025 0327 by Tate Pillai RN  Outcome: Progressing     Problem: Skin Injury Risk Increased  Goal: Skin Health and Integrity  3/19/2025 0336 by Tate Pillai RN  Outcome: Progressing  3/19/2025 0327 by Tate Pillai RN  Outcome: Progressing  Intervention: Plan: Nurse Driven Intervention: Moisture Management  Recent Flowsheet Documentation  Taken 3/19/2025 0100 by Tate Pillai RN  Moisture Interventions: Encourage regular toileting  Intervention: Plan: Nurse Driven Intervention: Friction and Shear  Recent Flowsheet Documentation  Taken 3/19/2025 0100 by Tate Pillai RN  Friction/Shear Interventions: HOB 30 degrees or less  Intervention: Optimize Skin Protection  Recent Flowsheet Documentation  Taken 3/19/2025 0100 by Tate Pillai RN  Pressure Reduction Techniques: frequent weight shift encouraged  Pressure Reduction Devices: elbow protectors utilized  Skin Protection: adhesive use limited     Problem: Pain Acute  Goal: Optimal Pain Control and Function  3/19/2025 0336 by Tate Pillai RN  Outcome: Progressing  3/19/2025 0327 by Tate Pillai RN  Outcome: Progressing  Intervention: Develop Pain Management Plan  Recent Flowsheet Documentation  Taken 3/19/2025 0023 by Tate Pillai RN  Pain Management Interventions:   repositioned   rest  Intervention: Prevent or Manage Pain  Recent Flowsheet Documentation  Taken 3/19/2025 0100 by Tate Pillai RN  Sleep/Rest Enhancement: awakenings minimized  Medication Review/Management: medications reviewed

## 2025-03-19 NOTE — PLAN OF CARE
"Goal Outcome Evaluation:      Plan of Care Reviewed With: patient      BP (!) 162/60 (BP Location: Right arm)   Pulse 95   Temp 98  F (36.7  C) (Oral)   Resp 18   Ht 1.6 m (5' 3\")   Wt 45.9 kg (101 lb 1.6 oz)   LMP  (LMP Unknown)   SpO2 95%   BMI 17.91 kg/m     Problem: Adult Inpatient Plan of Care  Goal: Plan of Care Review  Description: The Plan of Care Review/Shift note should be completed every shift.  The Outcome Evaluation is a brief statement about your assessment that the patient is improving, declining, or no change.  This information will be displayed automatically on your shift  note.  3/19/2025 1230 by Suzanne Segovia RN  Outcome: Met  3/19/2025 1040 by Suzanne Segovia RN  Outcome: Progressing  Flowsheets (Taken 3/19/2025 1040)  Outcome Evaluation: Pt A&O, one assist for mobility,  with gait belt, walker,ate 75% of her meal, on IV Zosyn @ 6hrs, wound on left leg was changed today, just had headach, prn Tylenol was given.  Plan of Care Reviewed With: patient  Goal: Patient-Specific Goal (Individualized)  Description: You can add care plan individualizations to a care plan. Examples of Individualization might be:  \"Parent requests to be called daily at 9am for status\", \"I have a hard time hearing out of my right ear\", or \"Do not touch me to wake me up as it startles  me\".  3/19/2025 1230 by Suzanne Segovia RN  Outcome: Met  3/19/2025 1040 by Suzanne Segovia RN  Outcome: Progressing  Goal: Absence of Hospital-Acquired Illness or Injury  3/19/2025 1230 by Suzanne Segovia RN  Outcome: Met  3/19/2025 1040 by Suzanne Segovia RN  Outcome: Progressing  Intervention: Identify and Manage Fall Risk  Recent Flowsheet Documentation  Taken 3/19/2025 1009 by Suzanne Segovia RN  Safety Promotion/Fall Prevention:   safety round/check completed   room organization consistent   increased rounding and observation   increase visualization of patient  Intervention: Prevent Skin " Injury  Recent Flowsheet Documentation  Taken 3/19/2025 1009 by Suzanne Segovia RN  Body Position: position changed independently  Skin Protection: adhesive use limited  Taken 3/19/2025 1000 by Suzanne Segovia RN  Body Position: position changed independently  Intervention: Prevent and Manage VTE (Venous Thromboembolism) Risk  Recent Flowsheet Documentation  Taken 3/19/2025 1009 by Suzanne Segovia RN  VTE Prevention/Management: SCDs off (sequential compression devices)  Intervention: Prevent Infection  Recent Flowsheet Documentation  Taken 3/19/2025 1009 by Suzanne Segovia RN  Infection Prevention: hand hygiene promoted  Goal: Optimal Comfort and Wellbeing  3/19/2025 1230 by Suzanne Segovia RN  Outcome: Met  3/19/2025 1040 by Suzanne Segovia RN  Outcome: Progressing  Intervention: Monitor Pain and Promote Comfort  Recent Flowsheet Documentation  Taken 3/19/2025 1000 by Suzanne Segovia RN  Pain Management Interventions:   repositioned   rest  Taken 3/19/2025 0914 by Suzanne Segovia RN  Pain Management Interventions:   repositioned   rest  Intervention: Provide Person-Centered Care  Recent Flowsheet Documentation  Taken 3/19/2025 1009 by Suzanne Segovia RN  Trust Relationship/Rapport:   care explained   choices provided  Goal: Readiness for Transition of Care  3/19/2025 1230 by Suzanne Segovia RN  Outcome: Met  3/19/2025 1040 by Suzanne Segovia RN  Outcome: Progressing     Problem: Delirium  Goal: Optimal Coping  3/19/2025 1230 by Suzanne Segovia RN  Outcome: Met  3/19/2025 1040 by Suzanne Segovia RN  Outcome: Progressing  Goal: Improved Behavioral Control  3/19/2025 1230 by Suzanne Segovia RN  Outcome: Met  3/19/2025 1040 by Suzanne Segovia RN  Outcome: Progressing  Intervention: Prevent and Manage Agitation  Recent Flowsheet Documentation  Taken 3/19/2025 1009 by Suzanne Segovia RN  Environment Familiarity/Consistency: daily routine  followed  Intervention: Minimize Safety Risk  Recent Flowsheet Documentation  Taken 3/19/2025 1009 by Suzanne Segovia RN  Communication Support Strategies: active listening utilized  Enhanced Safety Measures: review medications for side effects with activity  Trust Relationship/Rapport:   care explained   choices provided  Goal: Improved Attention and Thought Clarity  3/19/2025 1230 by Suzanne Segovia RN  Outcome: Met  3/19/2025 1040 by Suzanne Segovia RN  Outcome: Progressing  Goal: Improved Sleep  3/19/2025 1230 by Suzanne Segovia RN  Outcome: Met  3/19/2025 1040 by Suzanne Segovia RN  Outcome: Progressing  Intervention: Promote Sleep  Recent Flowsheet Documentation  Taken 3/19/2025 1009 by Suzanne Segovia RN  Sleep/Rest Enhancement: awakenings minimized     Problem: Infection  Goal: Absence of Infection Signs and Symptoms  3/19/2025 1230 by Suzanne Segovia RN  Outcome: Met  3/19/2025 1040 by Suzanne Segovia RN  Outcome: Progressing  Intervention: Prevent or Manage Infection  Recent Flowsheet Documentation  Taken 3/19/2025 1009 by Suzanne Segovia RN  Infection Management: aseptic technique maintained     Problem: Skin Injury Risk Increased  Goal: Skin Health and Integrity  3/19/2025 1230 by Suzanne Segovia RN  Outcome: Met  3/19/2025 1040 by Suzanne Segovia RN  Outcome: Progressing  Intervention: Plan: Nurse Driven Intervention: Moisture Management  Recent Flowsheet Documentation  Taken 3/19/2025 1009 by Suzanne Segovia RN  Moisture Interventions: Incontinence pad  Bathing/Skin Care: incontinence care  Taken 3/19/2025 1000 by Suzanne Segovia RN  Bathing/Skin Care: incontinence care  Taken 3/19/2025 0800 by Suzanne Segovia RN  Moisture Interventions: Incontinence pad  Bathing/Skin Care: incontinence care  Intervention: Plan: Nurse Driven Intervention: Friction and Shear  Recent Flowsheet Documentation  Taken 3/19/2025 1009 by Suzanne Segovia  RN  Friction/Shear Interventions: HOB 30 degrees or less  Intervention: Optimize Skin Protection  Recent Flowsheet Documentation  Taken 3/19/2025 1009 by Suzanne Segovia RN  Pressure Reduction Techniques: frequent weight shift encouraged  Pressure Reduction Devices: elbow protectors utilized  Skin Protection: adhesive use limited  Activity Management: ambulated to bathroom  Head of Bed (HOB) Positioning: HOB at 60-90 degrees  Taken 3/19/2025 1000 by Suzanne Segovia RN  Activity Management: ambulated to bathroom  Head of Bed (HOB) Positioning: HOB at 60-90 degrees     Problem: Pain Acute  Goal: Optimal Pain Control and Function  3/19/2025 1230 by Suzanne Segovia RN  Outcome: Met  3/19/2025 1040 by Suzanne Segovia RN  Outcome: Progressing  Intervention: Develop Pain Management Plan  Recent Flowsheet Documentation  Taken 3/19/2025 1000 by Suzanne Segovia RN  Pain Management Interventions:   repositioned   rest  Taken 3/19/2025 0914 by Suzanne Segovia RN  Pain Management Interventions:   repositioned   rest  Intervention: Prevent or Manage Pain  Recent Flowsheet Documentation  Taken 3/19/2025 1009 by Suzanne Segovia RN  Sleep/Rest Enhancement: awakenings minimized  Medication Review/Management: medications reviewed   Patient's After Visit Summary was reviewed with patient .   Patient verbalized understanding of After Visit Summary, recommended follow up and was given an opportunity to ask questions.   Discharge medications sent home with patient/family: YES, No   Discharged with transport tech        Outcome Evaluation: Pt A&O, one assist for mobility,  with gait belt, walker,ate 75% of her meal, on IV Zosyn @ 6hrs, wound on left leg was changed today, just had headach, prn Tylenol was given.

## 2025-03-19 NOTE — PLAN OF CARE
"A&Ox4. Reg diet. Ax1 walker/GB. VS Q4hrs. Continent. Left leg wound DENY, area around is red/hot/peeling on ankle. Wound care completed on AM shift. On lasix and zosyn. RA. Social work following. Plan to discharge to TCU when medically ready.             Goal Outcome Evaluation:      Plan of Care Reviewed With: patient          Outcome Evaluation: LLE hot/red/peeling. Wound care performed on AM shift.      Problem: Adult Inpatient Plan of Care  Goal: Plan of Care Review  Description: The Plan of Care Review/Shift note should be completed every shift.  The Outcome Evaluation is a brief statement about your assessment that the patient is improving, declining, or no change.  This information will be displayed automatically on your shift  note.  Outcome: Progressing  Flowsheets (Taken 3/18/2025 1927)  Outcome Evaluation: LLE hot/red/peeling. Wound care performed on AM shift.  Plan of Care Reviewed With: patient  Goal: Patient-Specific Goal (Individualized)  Description: You can add care plan individualizations to a care plan. Examples of Individualization might be:  \"Parent requests to be called daily at 9am for status\", \"I have a hard time hearing out of my right ear\", or \"Do not touch me to wake me up as it startles  me\".  Outcome: Progressing  Goal: Absence of Hospital-Acquired Illness or Injury  Outcome: Progressing  Intervention: Identify and Manage Fall Risk  Recent Flowsheet Documentation  Taken 3/18/2025 1700 by Denzel Manzanares RN  Safety Promotion/Fall Prevention: safety round/check completed  Intervention: Prevent Skin Injury  Recent Flowsheet Documentation  Taken 3/18/2025 1700 by Denzel Manzanares RN  Body Position: position changed independently  Intervention: Prevent and Manage VTE (Venous Thromboembolism) Risk  Recent Flowsheet Documentation  Taken 3/18/2025 1700 by Denzel Manzanares RN  VTE Prevention/Management: SCDs off (sequential compression devices)  Intervention: Prevent Infection  Recent " Flowsheet Documentation  Taken 3/18/2025 1700 by Denzel Manzanares RN  Infection Prevention:   hand hygiene promoted   rest/sleep promoted  Goal: Optimal Comfort and Wellbeing  Outcome: Progressing  Goal: Readiness for Transition of Care  Outcome: Progressing     Problem: Delirium  Goal: Optimal Coping  Outcome: Progressing  Goal: Improved Behavioral Control  Outcome: Progressing  Intervention: Minimize Safety Risk  Recent Flowsheet Documentation  Taken 3/18/2025 1700 by Denzel Manzanares RN  Enhanced Safety Measures: review medications for side effects with activity  Goal: Improved Attention and Thought Clarity  Outcome: Progressing  Goal: Improved Sleep  Outcome: Progressing     Problem: Infection  Goal: Absence of Infection Signs and Symptoms  Outcome: Progressing     Problem: Skin Injury Risk Increased  Goal: Skin Health and Integrity  Outcome: Progressing     Problem: Pain Acute  Goal: Optimal Pain Control and Function  Outcome: Progressing  Intervention: Prevent or Manage Pain  Recent Flowsheet Documentation  Taken 3/18/2025 1700 by Denzel Manzanares RN  Medication Review/Management: medications reviewed

## 2025-03-19 NOTE — PLAN OF CARE
"Goal Outcome Evaluation:      Plan of Care Reviewed With: patient      BP (!) 162/60 (BP Location: Right arm)   Pulse 95   Temp 98  F (36.7  C) (Oral)   Resp 18   Ht 1.6 m (5' 3\")   Wt 45.9 kg (101 lb 1.6 oz)   LMP  (LMP Unknown)   SpO2 95%   BMI 17.91 kg/m     Problem: Adult Inpatient Plan of Care  Goal: Plan of Care Review  Description: The Plan of Care Review/Shift note should be completed every shift.  The Outcome Evaluation is a brief statement about your assessment that the patient is improving, declining, or no change.  This information will be displayed automatically on your shift  note.  Outcome: Progressing  Flowsheets (Taken 3/19/2025 1040)  Outcome Evaluation: Pt A&O, one assist for mobility,  with gait belt, walker,ate 75% of her meal, on IV Zosyn @ 6hrs, wound on left leg was changed today, just had headach, prn Tylenol was given.  Plan of Care Reviewed With: patient  Goal: Patient-Specific Goal (Individualized)  Description: You can add care plan individualizations to a care plan. Examples of Individualization might be:  \"Parent requests to be called daily at 9am for status\", \"I have a hard time hearing out of my right ear\", or \"Do not touch me to wake me up as it startles  me\".  Outcome: Progressing  Goal: Absence of Hospital-Acquired Illness or Injury  Outcome: Progressing  Intervention: Identify and Manage Fall Risk  Recent Flowsheet Documentation  Taken 3/19/2025 1009 by Suzanne Segovia RN  Safety Promotion/Fall Prevention:   safety round/check completed   room organization consistent   increased rounding and observation   increase visualization of patient  Intervention: Prevent Skin Injury  Recent Flowsheet Documentation  Taken 3/19/2025 1009 by Suzanne Segovia RN  Body Position: position changed independently  Skin Protection: adhesive use limited  Taken 3/19/2025 1000 by Suzanne Segovia RN  Body Position: position changed independently  Intervention: Prevent and Manage VTE " (Venous Thromboembolism) Risk  Recent Flowsheet Documentation  Taken 3/19/2025 1009 by Suzanne Segovia RN  VTE Prevention/Management: SCDs off (sequential compression devices)  Intervention: Prevent Infection  Recent Flowsheet Documentation  Taken 3/19/2025 1009 by Suzanne Segovia RN  Infection Prevention: hand hygiene promoted  Goal: Optimal Comfort and Wellbeing  Outcome: Progressing  Intervention: Monitor Pain and Promote Comfort  Recent Flowsheet Documentation  Taken 3/19/2025 1000 by Suzanne Segovia RN  Pain Management Interventions:   repositioned   rest  Taken 3/19/2025 0914 by Suzanne Segovia RN  Pain Management Interventions:   repositioned   rest  Intervention: Provide Person-Centered Care  Recent Flowsheet Documentation  Taken 3/19/2025 1009 by Suzanne Segovia RN  Trust Relationship/Rapport:   care explained   choices provided  Goal: Readiness for Transition of Care  Outcome: Progressing     Problem: Delirium  Goal: Optimal Coping  Outcome: Progressing  Goal: Improved Behavioral Control  Outcome: Progressing  Intervention: Prevent and Manage Agitation  Recent Flowsheet Documentation  Taken 3/19/2025 1009 by Suzanne Segovia RN  Environment Familiarity/Consistency: daily routine followed  Intervention: Minimize Safety Risk  Recent Flowsheet Documentation  Taken 3/19/2025 1009 by Suzanne Segovia RN  Communication Support Strategies: active listening utilized  Enhanced Safety Measures: review medications for side effects with activity  Trust Relationship/Rapport:   care explained   choices provided  Goal: Improved Attention and Thought Clarity  Outcome: Progressing  Goal: Improved Sleep  Outcome: Progressing  Intervention: Promote Sleep  Recent Flowsheet Documentation  Taken 3/19/2025 1009 by Suzanne Segovia RN  Sleep/Rest Enhancement: awakenings minimized     Problem: Infection  Goal: Absence of Infection Signs and Symptoms  Outcome: Progressing  Intervention: Prevent or  Manage Infection  Recent Flowsheet Documentation  Taken 3/19/2025 1009 by Suzanne Segovia RN  Infection Management: aseptic technique maintained     Problem: Skin Injury Risk Increased  Goal: Skin Health and Integrity  Outcome: Progressing  Intervention: Plan: Nurse Driven Intervention: Moisture Management  Recent Flowsheet Documentation  Taken 3/19/2025 1009 by Suzanne Segovia RN  Moisture Interventions: Incontinence pad  Bathing/Skin Care: incontinence care  Taken 3/19/2025 1000 by Suzanne Segovia RN  Bathing/Skin Care: incontinence care  Taken 3/19/2025 0800 by Suzanne Segovia RN  Moisture Interventions: Incontinence pad  Bathing/Skin Care: incontinence care  Intervention: Plan: Nurse Driven Intervention: Friction and Shear  Recent Flowsheet Documentation  Taken 3/19/2025 1009 by Suzanne Segovia RN  Friction/Shear Interventions: HOB 30 degrees or less  Intervention: Optimize Skin Protection  Recent Flowsheet Documentation  Taken 3/19/2025 1009 by Suzanne Segovia RN  Pressure Reduction Techniques: frequent weight shift encouraged  Pressure Reduction Devices: elbow protectors utilized  Skin Protection: adhesive use limited  Activity Management: ambulated to bathroom  Head of Bed (HOB) Positioning: HOB at 60-90 degrees  Taken 3/19/2025 1000 by Suzanne Segovia RN  Activity Management: ambulated to bathroom  Head of Bed (HOB) Positioning: HOB at 60-90 degrees     Problem: Pain Acute  Goal: Optimal Pain Control and Function  Outcome: Progressing  Intervention: Develop Pain Management Plan  Recent Flowsheet Documentation  Taken 3/19/2025 1000 by Suzanne Segovia RN  Pain Management Interventions:   repositioned   rest  Taken 3/19/2025 0914 by Suzanne Segovia RN  Pain Management Interventions:   repositioned   rest  Intervention: Prevent or Manage Pain  Recent Flowsheet Documentation  Taken 3/19/2025 1009 by Suzanne Segovia RN  Sleep/Rest Enhancement: awakenings  minimized  Medication Review/Management: medications reviewed       Outcome Evaluation: Pt A&O, one assist for mobility,  with gait belt, walker,ate 75% of her meal, on IV Zosyn @ 6hrs, wound on left leg was changed today, just had headach, prn Tylenol was given.

## 2025-03-19 NOTE — CONSULTS
"SPIRITUAL HEALTH SERVICES - Consult Note   RH MS 3     Referral Source/Reason for Visit: [facility] consult to assess pt's emotional/spiritual resources and needs per her length of stay.     Summary and Recommendations -     Pt. Shiloh mentioned that she is concerned with one of her children's struggle with alcohol addiction.  Shiloh named her family.  She has 5 children, 12 grandchildren, and 12 great grandchildren that she named as a part of her support network.  Shiloh is Episcopal, but is not a member of a Sikh.  She shared that she attends Episcopal services on Tuesdays and [employer]ism services on Wednesdays at the [facility] in [place].    Plan: Informed pt how she can request further  support.  This author and other chaplains remain available per pt/family request.       CASSY Jaramillo   Intern    SHS available 24/7 for emergent requests/referrals, either by paging the on-call  or by entering an ASAP/STAT consult in [facility], which will also page the on-call .       Assessment     Saw pt Shiloh Covarrubias per [facility] consult to assess pt's emotional/spiritual resources and needs per her length of stay..     Patient/Family Understanding of Illness and Goals of Care - Shiloh informed me that she came to the hospital because she was having leg pain due to ulcers that weren't healing.     Distress and Loss - Shiloh named the loss of her  15 years ago.  She also voiced concern over her one of her child's struggle with alcohol addiction.     Strengths, Coping, and Resources -   Shiloh named her 5 children, her 12 grandchildren, and her 12 great grandchildren as a part of her support network.  Shiloh enjoys spending time doing activities at [facility] living [facility] in [place] and mentioned her love of \"playing cards\".  Shiloh shared she enjoys spending time with all her family, especially when all of them get together.  She said, \"there is " "always so much laughter\".    Meaning, Beliefs, and Spirituality -   Shiloh is [employer], but is not part of a [employer].  She shared she goes to the [employer]ar[employer] services on Tuesdays and the Gnosticist services on Wednesdays at her care facility, and stated, \"We all Adventism the same God\".  Shiloh and I shared prayer together.   "

## 2025-03-20 ENCOUNTER — TRANSITIONAL CARE UNIT VISIT (OUTPATIENT)
Dept: GERIATRICS | Facility: CLINIC | Age: OVER 89
End: 2025-03-20
Payer: COMMERCIAL

## 2025-03-20 ENCOUNTER — DOCUMENTATION ONLY (OUTPATIENT)
Dept: OTHER | Facility: CLINIC | Age: OVER 89
End: 2025-03-20

## 2025-03-20 VITALS
BODY MASS INDEX: 17.89 KG/M2 | RESPIRATION RATE: 18 BRPM | WEIGHT: 101 LBS | HEIGHT: 63 IN | TEMPERATURE: 97.9 F | HEART RATE: 65 BPM | SYSTOLIC BLOOD PRESSURE: 173 MMHG | DIASTOLIC BLOOD PRESSURE: 62 MMHG | OXYGEN SATURATION: 92 %

## 2025-03-20 DIAGNOSIS — I10 ESSENTIAL HYPERTENSION: ICD-10-CM

## 2025-03-20 DIAGNOSIS — E78.5 DYSLIPIDEMIA: ICD-10-CM

## 2025-03-20 DIAGNOSIS — R53.81 PHYSICAL DECONDITIONING: ICD-10-CM

## 2025-03-20 DIAGNOSIS — J45.20 MILD INTERMITTENT ASTHMA WITHOUT COMPLICATION: ICD-10-CM

## 2025-03-20 DIAGNOSIS — I73.9 PAD (PERIPHERAL ARTERY DISEASE): ICD-10-CM

## 2025-03-20 DIAGNOSIS — J44.9 CHRONIC OBSTRUCTIVE PULMONARY DISEASE, UNSPECIFIED COPD TYPE (H): ICD-10-CM

## 2025-03-20 DIAGNOSIS — F32.A DEPRESSION, UNSPECIFIED DEPRESSION TYPE: ICD-10-CM

## 2025-03-20 DIAGNOSIS — L03.116 CELLULITIS OF LEFT LOWER EXTREMITY: Primary | ICD-10-CM

## 2025-03-20 DIAGNOSIS — L97.929 ULCER OF LEFT LOWER EXTREMITY, UNSPECIFIED ULCER STAGE (H): ICD-10-CM

## 2025-03-20 DIAGNOSIS — I50.33 ACUTE ON CHRONIC HEART FAILURE WITH PRESERVED EJECTION FRACTION (HFPEF) (H): ICD-10-CM

## 2025-03-20 DIAGNOSIS — F41.9 ANXIETY: ICD-10-CM

## 2025-03-20 DIAGNOSIS — N18.32 STAGE 3B CHRONIC KIDNEY DISEASE (H): ICD-10-CM

## 2025-03-20 DIAGNOSIS — I25.10 CORONARY ARTERY DISEASE WITHOUT ANGINA PECTORIS, UNSPECIFIED VESSEL OR LESION TYPE, UNSPECIFIED WHETHER NATIVE OR TRANSPLANTED HEART: ICD-10-CM

## 2025-03-20 DIAGNOSIS — D64.9 CHRONIC ANEMIA: ICD-10-CM

## 2025-03-20 DIAGNOSIS — I27.20 PULMONARY HYPERTENSION (H): ICD-10-CM

## 2025-03-20 PROCEDURE — 36415 COLL VENOUS BLD VENIPUNCTURE: CPT | Mod: ORL | Performed by: NURSE PRACTITIONER

## 2025-03-20 PROCEDURE — P9604 ONE-WAY ALLOW PRORATED TRIP: HCPCS | Mod: ORL | Performed by: NURSE PRACTITIONER

## 2025-03-20 PROCEDURE — 86481 TB AG RESPONSE T-CELL SUSP: CPT | Mod: ORL | Performed by: NURSE PRACTITIONER

## 2025-03-20 NOTE — PATIENT INSTRUCTIONS
Jun Covarrubias  11/3/1929  1) Add loperamide 2 mg daily PRN and continue scheduled loperamide 2 mg BID for loose stools  2)  Daily weights. Notify provider with weight gain >2 lbs in a day, >5 lbs in on week.   3) Start FABIOLA diet.   4) CBC, BMP 3/24. Diagnosis: anemia, CKD  Sherry Avila, CORNELIUS CNP on 3/20/2025 at 8:19 AM

## 2025-03-20 NOTE — PROGRESS NOTES
[facility] GERIATRICS    PRIMARY CARE PROVIDER AND CLINIC:  CORNELIUS Kenyon CNP, [address] [facility] [address]  Chief Complaint   Patient presents with    Hospital F/U      [facility] Record Number:  [id number]  Place of Service where encounter took place:  [facility]  ([facility]) [[id number]]    Shiloh Covarrubias  is a 89 year old  (11/3/1929), admitted to the above facility from  [facility]. Hospital stay 3/13/2025 through 3/19/2025.   HPI:      PMH significant for HFpEF, pulmonary hypertension, HTN, dyslipidemia, history of CVA, asthma, COPD, type 2 DM, CKD stage 3, chronic anemia, PAD, PVD with nonhealing wound of left lower extremity, anxiety, depression, microscopic colitis    Summary of recent hospitalization:   Patient was hospitalized at [facility] from 3/13 to 3/19/2025 for cellulitis.  Follow-up patient presented to the emergency department for evaluation of left lower extremity worsening wound.  Chart evaluation significant for sodium 133, creatinine 1.8, hemoglobin 11.3, ESR 55, CRP 57.18.  Left ankle x-ray revealed mild soft tissue swelling around the ankle, small linear radiodensity at the dorsal talar head consistent with age indeterminant avulsion fragment, normal joint alignment, mild tibial talar joint degenerative changes, ankle mortise is congruent, osteopenia, vascular calcification.  Ultrasound to left extremity negative for DVT.  Patient treated for left lower extremity cellulitis in setting of nonhealing left lower extremity wound.  Patient received course of doxycycline and Keflex, most recently changed to Bactrim with PCP prior to hospitalization.  Patient started on antibiotics, received Rocephin--> Ancef--> vanco/zosyn, discharging on augmentin.  MRSA swab negative. WOC RN was consulted and followed. Received IV lasix for mild HFpEF exacerbation, transitioned back to PTA lasix. Creatine improved prior to discharge.  Discharged to TCU for physical rehabilitation and medical management.     Reviewed ER note, H&P, discharge summary, WOC RN, labs and imaging from recent hospitalization.    Today patient was seen for admission to the TCU.  Patient is oriented x 3.  She lives in [facility].  She denies any leg pain, reports pain has improved since infection has been treated.  She denies any fever/chills.  She denies any shortness of breath, chest pain, dizziness/lightheadedness, dysuria.  She reports her bowels are moving frequently, recently 3 times per day, she reports that consistency is the same as it has been.  She tells me that at home she takes loperamide twice a day and once daily if needed.  I did discuss with nursing staff to ensure to monitor closely for any changes in her stool consistency or symptoms since she is on antibiotics.  We discussed what to expect in the TCU.    Reviewed facility EMR including medications, recent nursing notes and vital signs. Discussed plan of care with nursing.     CODE STATUS/ADVANCE DIRECTIVES DISCUSSION:  DNR / DNI  ALLERGIES:   Allergies   Allergen Reactions    Clonidine Derivatives      Severe side effects      Entocort [Corticosteroids]     Macrobid [Nitrofurantoin]      Diarrhea      Sulfa Antibiotics Itching     itch      PAST MEDICAL HISTORY:   Past Medical History:   Diagnosis Date    Asthma 1/4/2023    CAD (coronary artery disease) 1/4/2023    Cerebral infarction (H) 1/4/2023    Chronic kidney disease, stage 3 (H) 11/16/2020    COPD (chronic obstructive pulmonary disease) (H) 1/4/2023    Disorder of thyroid 1/4/2023    Edema     Peripheral edema    Essential hypertension with goal blood pressure less than 130/80 5/24/2016    Generalized muscle weakness 1/4/2023    GERD (gastroesophageal reflux disease) 1/19/2011    Heart failure (H) 1/4/2023    Hyperlipidemia LDL goal <100 3/30/2011    Hypertension     Hypertriglyceridemia 5/18/2010    Major depressive disorder, single episode,  mild 2/15/2016    Osteoarthrosis, unspecified whether generalized or localized, involving lower leg 1/8/2007    Problem list name updated by automated process. Provider to review Replacing diagnoses that were inactivated after the 10/1/2021 regulatory import.    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  FAMILY HISTORY: family history includes Arthritis in her mother; Depression in her maternal uncle; Osteoporosis in her mother; Thyroid Disease in her mother.  SOCIAL HISTORY:   reports that she has never smoked. She has never used smokeless tobacco. She reports current alcohol use. She reports that she does not use drugs.  Patient's living condition: lives in an assisted living facility    Post Discharge Medication Reconciliation Status:   MED REC REQUIRED  Post Medication Reconciliation Status:  Discharge medications reconciled and changed, see notes/orders       Current Outpatient Medications   Medication Sig Dispense Refill    acetaminophen (TYLENOL) 500 MG tablet Take 1,000 mg by mouth every morning.      acetaminophen (TYLENOL) 500 MG tablet Take 1,000 mg by mouth daily as needed for mild pain.      AMLODIPINE BESYLATE PO Take 7.5 mg by mouth daily.      amoxicillin-clavulanate (AUGMENTIN) 500-125 MG tablet Take 1 tablet by mouth 2 times daily for 5 days.      ASPIRIN ADULT LOW DOSE 81 MG EC tablet TAKE 1 TABLET BY MOUTH ONCE DAILY 90 tablet 97    escitalopram (LEXAPRO) 10 MG tablet TAKE 1 TABLET BY MOUTH ONCE DAILY 90  "tablet 97    furosemide (LASIX) 40 MG tablet TAKE 1 TABLET BY MOUTH ONCE DAILY 90 tablet 97    hydrALAZINE (APRESOLINE) 50 MG tablet TAKE 1 TABLET BY MOUTH TWICE  DAILY 270 tablet 11    lisinopril (ZESTRIL) 20 MG tablet TAKE 1 TABLET BY MOUTH ONCE DAILY 90 tablet 97    loperamide (IMODIUM) 2 MG capsule Take 2 mg by mouth 2 times daily. Daily PRN      loratadine (CLARITIN) 10 MG tablet Take 0.5 tablets (5 mg) by mouth every other day 90 tablet 97    Multiple Vitamin (TAB-A-VITA) TABS TAKE 1 TABLET BY MOUTH ONCE DAILY 90 tablet 97    Multiple Vitamins-Minerals (PRESERVISION AREDS) CAPS TAKE 1 CAPSULE BY MOUTH TWICE DAILY WITH MEALS 180 capsule 97    oxyCODONE (ROXICODONE) 5 MG tablet Take 0.5 tablets (2.5 mg) by mouth every 6 hours as needed for pain. 4 tablet     PROPYLENE GLYCOL-GLYCERIN OP Place 1 drop into both eyes 3 times daily.      PROPYLENE GLYCOL-GLYCERIN OP Place 2 drops into both eyes daily as needed.      SPIRIVA RESPIMAT 2.5 MCG/ACT inhaler INHALE 2 PUFFS INTO THE LUNGS ONCE DAILY 4 g 97    Vitamin D3 (CHOLECALCIFEROL) 25 mcg (1000 units) tablet Take 1 tablet (25 mcg) by mouth daily       No current facility-administered medications for this visit.       ROS:  10 point ROS of systems including Constitutional, Eyes, Respiratory, Cardiovascular, Gastroenterology, Genitourinary, Integumentary, Musculoskeletal, Psychiatric were all negative except for pertinent positives noted in my HPI.    Vitals:  BP (!) 173/62   Pulse 65   Temp 97.9  F (36.6  C)   Resp 18   Ht 1.6 m (5' 3\")   Wt 45.8 kg (101 lb)   LMP  (LMP Unknown)   SpO2 92%   BMI 17.89 kg/m    Exam:  GENERAL APPEARANCE:  Alert, in NAD  HEENT: normocephalic, moist mucous membranes, nose without drainage or crusting  RESP:  respiratory effort normal, no respiratory distress, Lung sounds clear, patient is on RA  CV: auscultation of heart done, rate and rhythm regular.  ABDOMEN: + bowel sounds, soft, nontender, no grimacing or guarding with " palpation.  M/S:  generalized left lower extremity edema  SKIN: left leg with some ongoing erythema, no warmth- wound covered with dressing at time of visit  NEURO: Moves extremities freely, speech is normal  PSYCH: oriented x 3, affect and mood normal      Lab/Diagnostic data:  Labs done in SNF are in [facility]. Please refer to them using EPIC/Care Everywhere. and Recent labs in [facility] reviewed by me today.     ASSESSMENT/PLAN:  Nonhealing left lower extremity wound  Left lower extremity cellulitis  PAD, PVD  Treated with antibiotics--> Rocephin--> Ancef--> vanco/zosyn, discharging on augmentin    MRSA swab negative  WOC RN was consulted and followed inpatient  WBC 9.2 and CRP 14.07 on 3/19 (all down from admission)  Denies pain today  Plan: Continue augmentin 500-125 mg tablet, take 1 tab BID x5 days through 3/24. CBC 3/24. Continue tylenol 1000 mg daily and 1000 mg daily PRN and oxycodone 2.5 mg q6h PRN. WOC RN to follow in the TCU. Continue wound care as ordered, update provider if worsening. Monitor infection and pain.    Acute kidney injury, resolved  Stage 3b CKD  Creatinine peaked at 1.8 on admission  Baseline creatinine 1.2-1.4  Creatinine 0.94 on 3/19/2025  Plan: BMP 3/24. Avoid nephrotoxins. Renally dose medications as indicated.    Hyponatremia, resolved  Sodium 137 on 3/18/2025  Plan: BMP 3/24    Acute on chronic HFpEF, LVEF 65-70%  9/2021  Pulmonary hypertension  Received IV diuresis inpatient and discharged on home lasix  No significant leg swelling today, weight 106.4 lb  Plan: Continue lasix 40 mg daily. BMP 3/24. Daily weights. Notify provider with weight gain >2 lbs in a day, >5 lbs in on week. Start FABIOLA diet.     Essential hypertension  Coronary artery disease  Dyslipidemia  SBP limited 140 since admission  Plan: Continue amlodipine 7.5 mg daily, furosemide 40 mg daily, hydralazine 50 mg twice daily, lisinopril 20 mg daily.  Continue aspirin 81 mg daily.  Monitor blood pressure.  Monitor  cardiac status.    COPD  Asthma  Respiratory status is stable  Plan: Continue spiriva 2 puff daily. Monitor respiratory status.    History of Type 2 DM  Hemoglobin A1c 5.2% on 8/13/2024  Plan: Monitor BS PRN.    Chronic anemia  Recent baseline around 10  Hemoglobin 10.5 on 3/19/2025-at baseline  Plan: CBC 3/24. Monitor for s/s of bleeding    Anxiety  Depression  Plan: Continue escitalopram 10 mg daily. Monitor mood and symptoms. Consider ACP referral as needed.    Microscopic colitis  Bowels moving about 3 times per day, consistency and pattern is similar to baseline  Plan: Discussed with nursing to monitor for any changes to the loose stool since patient is on antibiotics.  Continue loperamide 2 mg BID, and add 2 mg daily PRN, which patient reports she uses at home sometimes. Monitor bowels closely since patient is on oxycodone at risk for constipation.    Physical deconditioning  Secondary to recent hospitalization, medical conditions as above  Plan: Encourage participation in physical therapy/occupational therapy for strengthening and deconditioning. Discharge planning per their recommendation. Social work to assist with d/c planning.      Disclaimer: This note may contain text created using speech-recognition software and may contain unintended word substitutions.        Total time spent with patient visit at the skilled nursing facility was 47 minutes including patient visit, review of past records, discussion with nursing staff regarding plan of care, medication reconciliation, review of admission orders.      Electronically signed by:  CORNELIUS Dick CNP

## 2025-03-20 NOTE — LETTER
3/20/2025      Shiloh Covarrubias  C/o Chuck Covarrubias  [address] [address] [place] [id number]        [facility] GERIATR[facility]    PRIMARY CARE PROVIDER AND CLINIC:  CORNELIUS Kenyon [facility], [address] [facility] [address]  Chief Complaint   Patient presents with     Hospital F/U      [place] Medical Record Number:  [id number]  Place of Service where encounter took place:  [facility]  (U) [[id number]]    Shiloh Covarrubias  is a 89 year old  (11/3/1929), admitted to the above facility from  [facility]. Hospital stay 3/13/2025 through 3/19/2025.   HPI:      PMH significant for HFpEF, pulmonary hypertension, HTN, dyslipidemia, history of CVA, asthma, COPD, type 2 DM, CKD stage 3, chronic anemia, PAD, PVD with nonhealing wound of left lower extremity, anxiety, depression, microscopic colitis    Summary of recent hospitalization:   Patient was hospitalized at [facility] from 3/13 to 3/19/2025 for cellulitis.  Follow-up patient presented to the emergency department for evaluation of left lower extremity worsening wound.  Chart evaluation significant for sodium 133, creatinine 1.8, hemoglobin 11.3, ESR 55, CRP 57.18.  Left ankle x-ray revealed mild soft tissue swelling around the ankle, small linear radiodensity at the dorsal talar head consistent with age indeterminant avulsion fragment, normal joint alignment, mild tibial talar joint degenerative changes, ankle mortise is congruent, osteopenia, vascular calcification.  Ultrasound to left extremity negative for DVT.  Patient treated for left lower extremity cellulitis in setting of nonhealing left lower extremity wound.  Patient received course of doxycycline and Keflex, most recently changed to Bactrim with PCP prior to hospitalization.  Patient started on antibiotics, received Rocephin--> Ancef--> vanco/zosyn, discharging on augmentin.  MRSA swab negative. WOC RN was consulted and followed. Received IV lasix  for mild HFpEF exacerbation, transitioned back to PTA lasix. Creatine improved prior to discharge. Discharged to TCU for physical rehabilitation and medical management.     Reviewed ER note, H&P, discharge summary, WOC RN, labs and imaging from recent hospitalization.    Today patient was seen for admission to the TCU.  Patient is oriented x 3.  She lives in [facility].  She denies any leg pain, reports pain has improved since infection has been treated.  She denies any fever/chills.  She denies any shortness of breath, chest pain, dizziness/lightheadedness, dysuria.  She reports her bowels are moving frequently, recently 3 times per day, she reports that consistency is the same as it has been.  She tells me that at home she takes loperamide twice a day and once daily if needed.  I did discuss with nursing staff to ensure to monitor closely for any changes in her stool consistency or symptoms since she is on antibiotics.  We discussed what to expect in the TCU.    Reviewed facility EMR including medications, recent nursing notes and vital signs. Discussed plan of care with nursing.     CODE STATUS/ADVANCE DIRECTIVES DISCUSSION:  DNR / DNI  ALLERGIES:   Allergies   Allergen Reactions     Clonidine Derivatives      Severe side effects       Entocort [Corticosteroids]      Macrobid [Nitrofurantoin]      Diarrhea       Sulfa Antibiotics Itching     itch      PAST MEDICAL HISTORY:   Past Medical History:   Diagnosis Date     Asthma 1/4/2023     CAD (coronary artery disease) 1/4/2023     Cerebral infarction (H) 1/4/2023     Chronic kidney disease, stage 3 (H) 11/16/2020     COPD (chronic obstructive pulmonary disease) (H) 1/4/2023     Disorder of thyroid 1/4/2023     Edema     Peripheral edema     Essential hypertension with goal blood pressure less than 130/80 5/24/2016     Generalized muscle weakness 1/4/2023     GERD (gastroesophageal reflux disease) 1/19/2011     Heart failure (H) 1/4/2023     Hyperlipidemia LDL goal  <100 3/30/2011     Hypertension      Hypertriglyceridemia 5/18/2010     Major depressive disorder, single episode, mild 2/15/2016     Osteoarthrosis, unspecified whether generalized or localized, involving lower leg 1/8/2007    Problem list name updated by automated process. Provider to review Replacing diagnoses that were inactivated after the 10/1/2021 regulatory import.     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  FAMILY HISTORY: family history includes Arthritis in her mother; Depression in her maternal uncle; Osteoporosis in her mother; Thyroid Disease in her mother.  SOCIAL HISTORY:   reports that she has never smoked. She has never used smokeless tobacco. She reports current alcohol use. She reports that she does not use drugs.  Patient's living condition: lives in an assisted living facility    Post Discharge Medication Reconciliation Status:   MED REC REQUIRED  Post Medication Reconciliation Status:  Discharge medications reconciled and changed, see notes/orders       Current Outpatient Medications   Medication Sig Dispense Refill     acetaminophen (TYLENOL) 500 MG tablet Take 1,000 mg by mouth every morning.       acetaminophen (TYLENOL) 500 MG tablet Take 1,000 mg by mouth daily as needed for mild pain.       AMLODIPINE BESYLATE PO Take 7.5 mg by mouth daily.       amoxicillin-clavulanate (AUGMENTIN) 500-125 MG tablet Take 1 tablet by mouth 2 times daily for 5 days.       ASPIRIN ADULT LOW DOSE 81 MG EC  "tablet TAKE 1 TABLET BY MOUTH ONCE DAILY 90 tablet 97     escitalopram (LEXAPRO) 10 MG tablet TAKE 1 TABLET BY MOUTH ONCE DAILY 90 tablet 97     furosemide (LASIX) 40 MG tablet TAKE 1 TABLET BY MOUTH ONCE DAILY 90 tablet 97     hydrALAZINE (APRESOLINE) 50 MG tablet TAKE 1 TABLET BY MOUTH TWICE  DAILY 270 tablet 11     lisinopril (ZESTRIL) 20 MG tablet TAKE 1 TABLET BY MOUTH ONCE DAILY 90 tablet 97     loperamide (IMODIUM) 2 MG capsule Take 2 mg by mouth 2 times daily. Daily PRN       loratadine (CLARITIN) 10 MG tablet Take 0.5 tablets (5 mg) by mouth every other day 90 tablet 97     Multiple Vitamin (TAB-A-VITA) TABS TAKE 1 TABLET BY MOUTH ONCE DAILY 90 tablet 97     Multiple Vitamins-Minerals (PRESERVISION AREDS) CAPS TAKE 1 CAPSULE BY MOUTH TWICE DAILY WITH MEALS 180 capsule 97     oxyCODONE (ROXICODONE) 5 MG tablet Take 0.5 tablets (2.5 mg) by mouth every 6 hours as needed for pain. 4 tablet      PROPYLENE GLYCOL-GLYCERIN OP Place 1 drop into both eyes 3 times daily.       PROPYLENE GLYCOL-GLYCERIN OP Place 2 drops into both eyes daily as needed.       SPIRIVA RESPIMAT 2.5 MCG/ACT inhaler INHALE 2 PUFFS INTO THE LUNGS ONCE DAILY 4 g 97     Vitamin D3 (CHOLECALCIFEROL) 25 mcg (1000 units) tablet Take 1 tablet (25 mcg) by mouth daily       No current facility-administered medications for this visit.       ROS:  10 point ROS of systems including Constitutional, Eyes, Respiratory, Cardiovascular, Gastroenterology, Genitourinary, Integumentary, Musculoskeletal, Psychiatric were all negative except for pertinent positives noted in my HPI.    Vitals:  BP (!) 173/62   Pulse 65   Temp 97.9  F (36.6  C)   Resp 18   Ht 1.6 m (5' 3\")   Wt 45.8 kg (101 lb)   LMP  (LMP Unknown)   SpO2 92%   BMI 17.89 kg/m    Exam:  GENERAL APPEARANCE:  Alert, in NAD  HEENT: normocephalic, moist mucous membranes, nose without drainage or crusting  RESP:  respiratory effort normal, no respiratory distress, Lung sounds clear, patient is " on RA  CV: auscultation of heart done, rate and rhythm regular.  ABDOMEN: + bowel sounds, soft, nontender, no grimacing or guarding with palpation.  M/S:  generalized left lower extremity edema  SKIN: left leg with some ongoing erythema, no warmth- wound covered with dressing at time of visit  NEURO: Moves extremities freely, speech is normal  PSYCH: oriented x 3, affect and mood normal      Lab/Diagnostic data:  [facility]. Please refer to them using [facility] Everywhere. and Recent labs in [facility] reviewed by me today.     ASSESSMENT/PLAN:  Nonhealing left lower extremity wound  Left lower extremity cellulitis  PAD, PVD  Treated with antibiotics--> Rocephin--> Ancef--> vanco/zosyn, discharging on augmentin    MRSA swab negative  WOC RN was consulted and followed inpatient  WBC 9.2 and CRP 14.07 on 3/19 (all down from admission)  Denies pain today  Plan: Continue augmentin 500-125 mg tablet, take 1 tab BID x5 days through 3/24. CBC 3/24. Continue tylenol 1000 mg daily and 1000 mg daily PRN and oxycodone 2.5 mg q6h PRN. WOC RN to follow in the TCU. Continue wound care as ordered, update provider if worsening. Monitor infection and pain.    Acute kidney injury, resolved  Stage 3b CKD  Creatinine peaked at 1.8 on admission  Baseline creatinine 1.2-1.4  Creatinine 0.94 on 3/19/2025  Plan: BMP 3/24. Avoid nephrotoxins. Renally dose medications as indicated.    Hyponatremia, resolved  Sodium 137 on 3/18/2025  Plan: BMP 3/24    Acute on chronic HFpEF, LVEF 65-70%  9/2021  Pulmonary hypertension  Received IV diuresis inpatient and discharged on home lasix  No significant leg swelling today, weight 106.4 lb  Plan: Continue lasix 40 mg daily. BMP 3/24. Daily weights. Notify provider with weight gain >2 lbs in a day, >5 lbs in on week. Start FABIOLA diet.     Essential hypertension  Coronary artery disease  Dyslipidemia  SBP limited 140 since admission  Plan: Continue amlodipine 7.5 mg daily, furosemide 40  mg daily, hydralazine 50 mg twice daily, lisinopril 20 mg daily.  Continue aspirin 81 mg daily.  Monitor blood pressure.  Monitor cardiac status.    COPD  Asthma  Respiratory status is stable  Plan: Continue spiriva 2 puff daily. Monitor respiratory status.    History of Type 2 DM  Hemoglobin A1c 5.2% on 8/13/2024  Plan: Monitor BS PRN.    Chronic anemia  Recent baseline around 10  Hemoglobin 10.5 on 3/19/2025-at baseline  Plan: CBC 3/24. Monitor for s/s of bleeding    Anxiety  Depression  Plan: Continue escitalopram 10 mg daily. Monitor mood and symptoms. Consider ACP referral as needed.    Microscopic colitis  Bowels moving about 3 times per day, consistency and pattern is similar to baseline  Plan: Discussed with nursing to monitor for any changes to the loose stool since patient is on antibiotics.  Continue loperamide 2 mg BID, and add 2 mg daily PRN, which patient reports she uses at home sometimes. Monitor bowels closely since patient is on oxycodone at risk for constipation.    Physical deconditioning  Secondary to recent hospitalization, medical conditions as above  Plan: Encourage participation in physical therapy/occupational therapy for strengthening and deconditioning. Discharge planning per their recommendation. Social work to assist with d/c planning.      Disclaimer: This note may contain text created using speech-recognition software and may contain unintended word substitutions.        Total time spent with patient visit at the skilled nursing facility was 47 minutes including patient visit, review of past records, discussion with nursing staff regarding plan of care, medication reconciliation, review of admission orders.      Electronically signed by:  CORNELIUS Dick CNP               Sincerely,        CORNELIUS Dick CNP    Electronically signed

## 2025-03-21 LAB
GAMMA INTERFERON BACKGROUND BLD IA-ACNC: 0.04 IU/ML
M TB IFN-G BLD-IMP: NEGATIVE
M TB IFN-G CD4+ BCKGRND COR BLD-ACNC: 0.84 IU/ML
MITOGEN IGNF BCKGRD COR BLD-ACNC: -0.01 IU/ML
MITOGEN IGNF BCKGRD COR BLD-ACNC: 0 IU/ML
QUANTIFERON MITOGEN: 0.88 IU/ML
QUANTIFERON NIL TUBE: 0.04 IU/ML
QUANTIFERON TB1 TUBE: 0.04 IU/ML
QUANTIFERON TB2 TUBE: 0.03

## 2025-03-22 NOTE — PROGRESS NOTES
[facility] GERIATRICS  Initial Visit Note  March 24, 2025       PRIMARY CARE PROVIDER AND CLINIC:  CORNELIUS Kenyon CNP, [address] [facility] [address]    [place] Medical Record Number:  [id number]  Place of Service where encounter took place:  No question data found.    No chief complaint on file.       HPI:  Shiloh Covarrubias is a 89 year old (11/3/1929) admitted to No question data found. from Hospital  [facility].     Reviewed ED note, H&P, discharge summary, labs, and imaging from recent hospital stay     [facility]    Summary:   89 year old female with hx of HFpEF, PAD with known nonhealing wound to LLE who was hospitalized at [facility] 3/13-3/19/25 for ongoing LLE cellulitis after failed attempt at outpatient management with various courses of oral antibiotics; mild CHF; and SHIVANI. Re: cellulitis, pt was given a dose of IV ceftriaxone in the ED, changed to IV cefazolin on admission. WOCN consulted, wound care initiated. Due to lack of improvement, antibiotics were broadened to IV vancomycin and pip-tazo. Cellulitis subsequently improved, and she was discharged with Augmentin x5 days. Treated for mild CHF exacerbation with IV Lasi; resumed on PTA oral Lasix prior to discharge. Renal function gradually returned to baseline during hospital stay. Referred to TCU for ongoing wound care. Lives at [facility].     Admitted to this facility for: rehab, medical management, and nursing care.    Patient's prior living condition: lives in an assisted living facility, [facility]    Today, patient was seen as an initial visit. Facility records reviewed. VSS. A1 with transfers/ADLs. ***      CODE STATUS: DNR / DNI    ALLERGIES:   Allergies   Allergen Reactions    Clonidine Derivatives      Severe side effects      Entocort [Corticosteroids]     Macrobid [Nitrofurantoin]      Diarrhea      Sulfa Antibiotics Itching     itch        REVIEW OF  SYSTEMS:   6 point ROS was completed. Pertinent positives and negatives are noted above in the HPI. All others negative  Unable to obtain due to cognitive impairment or aphasia ***    PAST MEDICAL HISTORY  Past Medical History:   Diagnosis Date    Asthma 1/4/2023    CAD (coronary artery disease) 1/4/2023    Cerebral infarction (H) 1/4/2023    Chronic kidney disease, stage 3 (H) 11/16/2020    COPD (chronic obstructive pulmonary disease) (H) 1/4/2023    Disorder of thyroid 1/4/2023    Edema     Peripheral edema    Essential hypertension with goal blood pressure less than 130/80 5/24/2016    Generalized muscle weakness 1/4/2023    GERD (gastroesophageal reflux disease) 1/19/2011    Heart failure (H) 1/4/2023    Hyperlipidemia LDL goal <100 3/30/2011    Hypertension     Hypertriglyceridemia 5/18/2010    Major depressive disorder, single episode, mild 2/15/2016    Osteoarthrosis, unspecified whether generalized or localized, involving lower leg 1/8/2007    Problem list name updated by automated process. Provider to review Replacing diagnoses that were inactivated after the 10/1/2021 regulatory import.    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    Type 2 diabetes mellitus (H) 1/4/2023    Unspecified asthma(493.90)     Unspecified intestinal obstruction 02/17/10    D/C 02/20/10       PAST SURGICAL HISTORY  Past Surgical History:   Procedure Laterality Date    ESOPHAGOSCOPY, GASTROSCOPY, DUODENOSCOPY (EGD), COMBINED  2/8/2011    COMBINED ESOPHAGOSCOPY, GASTROSCOPY, DUODENOSCOPY (EGD), BIOPSY SINGLE OR MULTIPLE performed by ANGY LIMA at  GI    ESOPHAGOSCOPY, GASTROSCOPY, DUODENOSCOPY (EGD), DILATATION, COMBINED  2/8/2011    COMBINED ESOPHAGOSCOPY, GASTROSCOPY, DUODENOSCOPY (EGD), DILATATION performed by ANGY LIMA at  GI    [facility] UGI ENDOSCOPY, SIMPLE EXAM  02/23/10       FAMILY HISTORY  Reviewed and  noncontributory    SOCIAL HISTORY  Patient's living condition: lives in an assisted [facility]   reports that she has never smoked. She has never used smokeless tobacco. She reports current alcohol use. She reports that she does not use drugs.    MEDICATIONS  MED REC REQUIRED{TIP  Click the link below to document or use med rec list, use list to pull in response :139672}  Post Medication Reconciliation Status: {MED REC LIST:256424}     Current Outpatient Medications   Medication Sig Dispense Refill    acetaminophen (TYLENOL) 500 MG tablet Take 1,000 mg by mouth every morning.      acetaminophen (TYLENOL) 500 MG tablet Take 1,000 mg by mouth daily as needed for mild pain.      AMLODIPINE BESYLATE PO Take 7.5 mg by mouth daily.      amoxicillin-clavulanate (AUGMENTIN) 500-125 MG tablet Take 1 tablet by mouth 2 times daily for 5 days.      ASPIRIN ADULT LOW DOSE 81 MG EC tablet TAKE 1 TABLET BY MOUTH ONCE DAILY 90 tablet 97    escitalopram (LEXAPRO) 10 MG tablet TAKE 1 TABLET BY MOUTH ONCE DAILY 90 tablet 97    furosemide (LASIX) 40 MG tablet TAKE 1 TABLET BY MOUTH ONCE DAILY 90 tablet 97    hydrALAZINE (APRESOLINE) 50 MG tablet TAKE 1 TABLET BY MOUTH TWICE  DAILY 270 tablet 11    lisinopril (ZESTRIL) 20 MG tablet TAKE 1 TABLET BY MOUTH ONCE DAILY 90 tablet 97    loperamide (IMODIUM) 2 MG capsule Take 2 mg by mouth 2 times daily. Daily PRN      loratadine (CLARITIN) 10 MG tablet Take 0.5 tablets (5 mg) by mouth every other day 90 tablet 97    Multiple Vitamin (TAB-A-VITA) TABS TAKE 1 TABLET BY MOUTH ONCE DAILY 90 tablet 97    Multiple Vitamins-Minerals (PRESERVISION AREDS) CAPS TAKE 1 CAPSULE BY MOUTH TWICE DAILY WITH MEALS 180 capsule 97    oxyCODONE (ROXICODONE) 5 MG tablet Take 0.5 tablets (2.5 mg) by mouth every 6 hours as needed for pain. 4 tablet     PROPYLENE GLYCOL-GLYCERIN OP Place 1 drop into both eyes 3 times daily.      PROPYLENE GLYCOL-GLYCERIN OP Place 2 drops into both eyes daily as  needed.      SPIRIVA RESPIMAT 2.5 MCG/ACT inhaler INHALE 2 PUFFS INTO THE LUNGS ONCE DAILY 4 g 97    Vitamin D3 (CHOLECALCIFEROL) 25 mcg (1000 units) tablet Take 1 tablet (25 mcg) by mouth daily       No current facility-administered medications for this visit.       PHYSICAL EXAM  Vital Signs:                    Weight: 0 lbs 0 oz  Constitutional: Resting in bed, NAD  HEENT: Sclera white, MMM  Respiratory: Breathing non-labored. Lungs CTAB - no wheezes, crackles, or rhonchi  Cardiovascular: Heart RRR, no m/r/g. No pedal edema  GI: +BS, abd soft/NT  : Ochoa catheter ***  Skin/Integument: No rash  Musculoskeletal: Normal muscle bulk and tone  Neuro: Alert and appropriate, AZAR  Psych: Calm and cooperative    LAB/IMAGING DATA:  Reviewed in Epic and/or [facility]  {fgslab:[id number]}      ASSESSMENT/PLAN:      LLE cellulitis: Improved  Chronic LLE ulcer  PAD, PVD  Physical deconditioning  Referred to the ER after failing attempt at outpatient management. Given ceftriaxone x1 in the ED; started on IV cefazolin on admission then broadened to IV vancomycin/pip-tazo with eventual improvement. WOC consulted - wound care initiated. Discharged with Augmentin 500-125 mg BID x5 days   - will complete 5-d course of Augmentin today, 3/24  - cont wound care as ordered   - cont PTA pain regimen: APAP 1000 mg daily and daily prn, oxycodone 2.5 mg q6h prn   - cont PT/OT  - SW for discharge planning  - has appt with Vascular Surgery on 4/9/25     Diarrhea associated with antibiotics  Hx of microscopic colitis  At baseline has ~3 BMs per day. Conts on PTA Imodium BID and daily prn    Acute on chronic HFpEF: Resolved  Pulmonary hypertension  Treated with IV Lasix on admission. Resumed on PTA Lasix 40 mg daily at discharge  - ***     SHIVANI on CKD stage II: Resolved  Hyponatremia: Resolved  Creatinine up to 1.8 from baseline ~1. Returned to baseline prior to hospital discharge    CAD, native heart, native vessels  Essential  hypertension  PTA regimen: ASA 81 mg daily, amlodipine 7.5 mg daily, hydralazine 50 mg BID, lisinopril 20 mg daily, Lasix 40 mg daily  - conts on PTA meds  - monitor BMP periodically     Asthma/COPD, uncomplicated  Chronic, stable. Cont PTA Spiriva    Hx of DM2  A1c 5.2 on 8/13/24. No need for routine BG monitoring at TCU     Chronic anemia  Hgb has been stable within baseline ~10. Monitor prn     Major recurrent depressive disorder with anxiety  Chronic, stable. Cont PTA escitalopram 10 mg daily     Orders: ***    Total time spent with patient visit at the skilled nursing facility was *** min including patient visit and review of past records.     Electronically signed by:  Mary Kate Rutledge MD

## 2025-03-23 ENCOUNTER — LAB REQUISITION (OUTPATIENT)
Dept: LAB | Facility: CLINIC | Age: OVER 89
End: 2025-03-23
Payer: COMMERCIAL

## 2025-03-23 DIAGNOSIS — N18.9 CHRONIC KIDNEY DISEASE, UNSPECIFIED: ICD-10-CM

## 2025-03-23 DIAGNOSIS — D64.9 ANEMIA, UNSPECIFIED: ICD-10-CM

## 2025-03-24 ENCOUNTER — TRANSITIONAL CARE UNIT VISIT (OUTPATIENT)
Dept: GERIATRICS | Facility: CLINIC | Age: OVER 89
End: 2025-03-24
Payer: COMMERCIAL

## 2025-03-24 VITALS
SYSTOLIC BLOOD PRESSURE: 101 MMHG | OXYGEN SATURATION: 92 % | BODY MASS INDEX: 19.81 KG/M2 | WEIGHT: 111.8 LBS | RESPIRATION RATE: 18 BRPM | HEIGHT: 63 IN | HEART RATE: 65 BPM | DIASTOLIC BLOOD PRESSURE: 57 MMHG | TEMPERATURE: 97.9 F

## 2025-03-24 DIAGNOSIS — I73.9 PAD (PERIPHERAL ARTERY DISEASE): ICD-10-CM

## 2025-03-24 DIAGNOSIS — R53.81 PHYSICAL DECONDITIONING: ICD-10-CM

## 2025-03-24 DIAGNOSIS — K52.839 MICROSCOPIC COLITIS, UNSPECIFIED MICROSCOPIC COLITIS TYPE: ICD-10-CM

## 2025-03-24 DIAGNOSIS — I10 ESSENTIAL HYPERTENSION: ICD-10-CM

## 2025-03-24 DIAGNOSIS — L03.116 CELLULITIS OF LEFT LOWER EXTREMITY: Primary | ICD-10-CM

## 2025-03-24 DIAGNOSIS — L97.929 ULCER OF LEFT LOWER EXTREMITY, UNSPECIFIED ULCER STAGE (H): ICD-10-CM

## 2025-03-24 DIAGNOSIS — R19.7 DIARRHEA, UNSPECIFIED TYPE: ICD-10-CM

## 2025-03-24 DIAGNOSIS — I25.10 CORONARY ARTERY DISEASE WITHOUT ANGINA PECTORIS, UNSPECIFIED VESSEL OR LESION TYPE, UNSPECIFIED WHETHER NATIVE OR TRANSPLANTED HEART: ICD-10-CM

## 2025-03-24 DIAGNOSIS — I50.33 ACUTE ON CHRONIC HEART FAILURE WITH PRESERVED EJECTION FRACTION (HFPEF) (H): ICD-10-CM

## 2025-03-24 LAB
ANION GAP SERPL CALCULATED.3IONS-SCNC: 11 MMOL/L (ref 7–15)
BUN SERPL-MCNC: 21.4 MG/DL (ref 8–23)
CALCIUM SERPL-MCNC: 9.1 MG/DL (ref 8.8–10.4)
CHLORIDE SERPL-SCNC: 102 MMOL/L (ref 98–107)
CREAT SERPL-MCNC: 1.13 MG/DL (ref 0.51–0.95)
EGFRCR SERPLBLD CKD-EPI 2021: 45 ML/MIN/1.73M2
ERYTHROCYTE [DISTWIDTH] IN BLOOD BY AUTOMATED COUNT: 13.9 % (ref 10–15)
GLUCOSE SERPL-MCNC: 91 MG/DL (ref 70–99)
HCO3 SERPL-SCNC: 28 MMOL/L (ref 22–29)
HCT VFR BLD AUTO: 32.8 % (ref 35–47)
HGB BLD-MCNC: 10.5 G/DL (ref 11.7–15.7)
MCH RBC QN AUTO: 29.6 PG (ref 26.5–33)
MCHC RBC AUTO-ENTMCNC: 32 G/DL (ref 31.5–36.5)
MCV RBC AUTO: 92 FL (ref 78–100)
PLATELET # BLD AUTO: 267 10E3/UL (ref 150–450)
POTASSIUM SERPL-SCNC: 5.2 MMOL/L (ref 3.4–5.3)
RBC # BLD AUTO: 3.55 10E6/UL (ref 3.8–5.2)
SODIUM SERPL-SCNC: 141 MMOL/L (ref 135–145)
WBC # BLD AUTO: 8.6 10E3/UL (ref 4–11)

## 2025-03-24 PROCEDURE — 85027 COMPLETE CBC AUTOMATED: CPT | Mod: ORL | Performed by: NURSE PRACTITIONER

## 2025-03-24 PROCEDURE — 99305 1ST NF CARE MODERATE MDM 35: CPT | Performed by: INTERNAL MEDICINE

## 2025-03-24 PROCEDURE — P9604 ONE-WAY ALLOW PRORATED TRIP: HCPCS | Mod: ORL | Performed by: NURSE PRACTITIONER

## 2025-03-24 PROCEDURE — 36415 COLL VENOUS BLD VENIPUNCTURE: CPT | Mod: ORL | Performed by: NURSE PRACTITIONER

## 2025-03-24 PROCEDURE — 80048 BASIC METABOLIC PNL TOTAL CA: CPT | Mod: ORL | Performed by: NURSE PRACTITIONER

## 2025-03-24 NOTE — LETTER
3/24/2025      Shiloh Covarrubias  C/o Chuck Covarrubias  [address] [address] [place] [id number]        [facility] GERIATRICS  Initial Visit Note  March 24, 2025       PRIMARY CARE PROVIDER AND CLINIC:  CORNELIUS Kenyon CNP, [address] [facility] [address]    [place] Medical Record Number:  [id number]  Place of Service where encounter took place:  [facility]  ([facility]) [[id number]]    Chief Complaint   Patient presents with     Hospital F/U        HPI:  Shiloh Covarrubias is a 89 year old (11/3/1929) admitted to [facility]  ([facility]) [[id number]] from [facility].     Reviewed ED note, H&P, discharge summary, labs, and imaging from recent hospital stay     Hospital stay: 3/13/25-3/19/25, [facility]    Summary:   89 year old female with hx of HFpEF, PAD with known nonhealing wound to LLE who was hospitalized at [facility] 3/13-3/19/25 for ongoing LLE cellulitis after failed attempt at outpatient management with various courses of oral antibiotics; mild CHF; and SHIVANI. Re: cellulitis, pt was given a dose of IV ceftriaxone in the ED, changed to IV cefazolin on admission. WOCN consulted, wound care initiated. Due to lack of improvement, antibiotics were broadened to IV vancomycin and pip-tazo. Cellulitis subsequently improved, and she was discharged with Augmentin x5 days. Treated for mild CHF exacerbation with IV Lasix; resumed on PTA oral Lasix prior to discharge. Renal function gradually returned to baseline during hospital stay. Referred to TCU for ongoing wound care. Lives at [facility].     Admitted to this facility for: rehab, medical management, and nursing care.    Patient's prior living condition: lives in an assisted living facility, [facility]    Today, patient was seen as an initial visit. Facility records reviewed. VSS. A1 with transfers/ADLs. Reports ongoing left ankle pain and LLE skin breakdown though overall improved, and pain  mainly controlled with Tylenol. Otherwise offers no concerns. Denies cp/sob. Eating and drinking well.       CODE STATUS: DNR / DNI    ALLERGIES:   Allergies   Allergen Reactions     Clonidine Derivatives      Severe side effects       Entocort [Corticosteroids]      Macrobid [Nitrofurantoin]      Diarrhea       Sulfa Antibiotics Itching     itch        REVIEW OF SYSTEMS:   6 point ROS was completed. Pertinent positives and negatives are noted above in the HPI. All others negative    PAST MEDICAL HISTORY  Past Medical History:   Diagnosis Date     Asthma 1/4/2023     CAD (coronary artery disease) 1/4/2023     Cerebral infarction (H) 1/4/2023     Chronic kidney disease, stage 3 (H) 11/16/2020     COPD (chronic obstructive pulmonary disease) (H) 1/4/2023     Disorder of thyroid 1/4/2023     Edema     Peripheral edema     Essential hypertension with goal blood pressure less than 130/80 5/24/2016     Generalized muscle weakness 1/4/2023     GERD (gastroesophageal reflux disease) 1/19/2011     Heart failure (H) 1/4/2023     Hyperlipidemia LDL goal <100 3/30/2011     Hypertension      Hypertriglyceridemia 5/18/2010     Major depressive disorder, single episode, mild 2/15/2016     Osteoarthrosis, unspecified whether generalized or localized, involving lower leg 1/8/2007    Problem list name updated by automated process. Provider to review Replacing diagnoses that were inactivated after the 10/1/2021 regulatory import.     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     Type 2 diabetes mellitus (H) 1/4/2023     Unspecified asthma(493.90)      Unspecified intestinal obstruction 02/17/10    D/C 02/20/10       PAST SURGICAL HISTORY  Past Surgical History:   Procedure Laterality Date     ESOPHAGOSCOPY, GASTROSCOPY, DUODENOSCOPY (EGD), COMBINED  2/8/2011    COMBINED ESOPHAGOSCOPY, GASTROSCOPY, DUODENOSCOPY (EGD), BIOPSY SINGLE  OR MULTIPLE performed by ANGY LIMA at  GI     ESOPHAGOSCOPY, GASTROSCOPY, DUODENOSCOPY (EGD), DILATATION, COMBINED  2/8/2011    COMBINED ESOPHAGOSCOPY, GASTROSCOPY, DUODENOSCOPY (EGD), DILATATION performed by ANGY LIMA at  GI     [facility] UGI ENDOSCOPY, SIMPLE EXAM  02/23/10       FAMILY HISTORY  Reviewed and noncontributory    SOCIAL HISTORY  Patient's living condition: lives in an assisted living facility, [facility]   reports that she has never smoked. She has never used smokeless tobacco. She reports current alcohol use. She reports that she does not use drugs.    MEDICATIONS  Post Medication Reconciliation Status: discharge medications reconciled and changed, per note/orders     Current Outpatient Medications   Medication Sig Dispense Refill     acetaminophen (TYLENOL) 500 MG tablet Take 1,000 mg by mouth every morning.       acetaminophen (TYLENOL) 500 MG tablet Take 1,000 mg by mouth daily as needed for mild pain.       AMLODIPINE BESYLATE PO Take 7.5 mg by mouth daily.       amoxicillin-clavulanate (AUGMENTIN) 500-125 MG tablet Take 1 tablet by mouth 2 times daily for 5 days.       ASPIRIN ADULT LOW DOSE 81 MG EC tablet TAKE 1 TABLET BY MOUTH ONCE DAILY 90 tablet 97     escitalopram (LEXAPRO) 10 MG tablet TAKE 1 TABLET BY MOUTH ONCE DAILY 90 tablet 97     furosemide (LASIX) 40 MG tablet TAKE 1 TABLET BY MOUTH ONCE DAILY 90 tablet 97     hydrALAZINE (APRESOLINE) 50 MG tablet TAKE 1 TABLET BY MOUTH TWICE  DAILY 270 tablet 11     lisinopril (ZESTRIL) 20 MG tablet TAKE 1 TABLET BY MOUTH ONCE DAILY 90 tablet 97     loperamide (IMODIUM) 2 MG capsule Take 2 mg by mouth 2 times daily. Daily PRN       loratadine (CLARITIN) 10 MG tablet Take 0.5 tablets (5 mg) by mouth every other day 90 tablet 97     Multiple Vitamin (TAB-A-VITA) TABS TAKE 1 TABLET BY MOUTH ONCE DAILY 90 tablet 97     Multiple Vitamins-Minerals (PRESERVISION AREDS) CAPS TAKE 1 CAPSULE BY MOUTH TWICE DAILY WITH MEALS 180 capsule 97      oxyCODONE (ROXICODONE) 5 MG tablet Take 0.5 tablets (2.5 mg) by mouth every 6 hours as needed for pain. 4 tablet      PROPYLENE GLYCOL-GLYCERIN OP Place 1 drop into both eyes 3 times daily.       PROPYLENE GLYCOL-GLYCERIN OP Place 2 drops into both eyes daily as needed.       SPIRIVA RESPIMAT 2.5 MCG/ACT inhaler INHALE 2 PUFFS INTO THE LUNGS ONCE DAILY 4 g 97     Vitamin D3 (CHOLECALCIFEROL) 25 mcg (1000 units) tablet Take 1 tablet (25 mcg) by mouth daily       No current facility-administered medications for this visit.       PHYSICAL EXAM  Vital Signs: Temp: 97.9  F (36.6  C)   BP: 101/57 Pulse: 65   Resp: 18 SpO2: 92 %      Weight: 111 lbs 12.8 oz  Constitutional: Sitting up in chair, NAD  HEENT: Sclera white, MMM  Respiratory: Breathing non-labored. Lungs CTAB - no wheezes, crackles, or rhonchi  Cardiovascular: Heart RRR, no m/r/g. No pedal edema  GI: +BS, abd soft/NT  Skin/Integument: Dry flaky skin to LLE with diffuse erythema  Musculoskeletal: Normal muscle bulk and tone  Neuro: Alert and appropriate, AZAR  Psych: Calm and cooperative    LAB/IMAGING DATA:  [facility]  Most Recent 3 CBC's:  Recent Labs   Lab Test 03/24/25  0526 03/19/25  0653 03/18/25  0635   WBC 8.6 9.2 11.8*   HGB 10.5* 10.5* 10.5*   MCV 92 91 91    286 326     Most Recent 3 BMP's:  Recent Labs   Lab Test 03/24/25  0526 03/19/25  0653 03/18/25  0635 03/17/25  0826 03/16/25  0601     --  137  --  139   POTASSIUM 5.2  --  4.2  --  3.9   CHLORIDE 102  --  104  --  101   CO2 28  --  25  --  27   BUN 21.4  --  15.4  --  17.9   CR 1.13* 0.94 0.94   < > 0.99*   ANIONGAP 11  --  8  --  11   TANIA 9.1  --  8.8  --  8.9   GLC 91  --  113*  --  98    < > = values in this interval not displayed.         ASSESSMENT/PLAN:      LLE cellulitis: Improved  Chronic LLE ulcer  PAD, PVD  Physical deconditioning  Referred to the ER after failing attempt at outpatient management. Given ceftriaxone x1 in the ED; started on IV  cefazolin on admission then broadened to IV vancomycin/pip-tazo with eventual improvement. WOC consulted - wound care initiated. Discharged with Augmentin 500-125 mg BID x5 days   - will complete 5-d course of Augmentin today, 3/24  - cont wound care as ordered   - cont PTA pain regimen: APAP 1000 mg daily and daily prn, oxycodone 2.5 mg q6h prn   - cont PT/OT  - SW for discharge planning  - has appt with Vascular Surgery on 4/9/25     Diarrhea associated with antibiotics  Hx of microscopic colitis  At baseline has ~3 BMs per day. Conts on PTA Imodium BID and daily prn    Acute on chronic HFpEF: Resolved  Pulmonary hypertension  Treated with IV Lasix on admission. Resumed on PTA Lasix 40 mg daily at discharge  - Creatinine increased from 0.94 to 1.13 between 3/19 and 3/24  - Hold Lasix 3/25-3/26/25  - repeat BMP on 3/26/25    SHIVANI on CKD stage II: Resolved  Hyponatremia: Resolved  Creatinine up to 1.8 from baseline ~1. Returned to baseline prior to hospital discharge  - Creatinine increased from 0.94 to 1.13 between 3/19 and 3/24  - Hold Lasix 3/25-3/26/25  - repeat BMP on 3/26/25    CAD, native heart, native vessels  Essential hypertension  PTA regimen: ASA 81 mg daily, amlodipine 7.5 mg daily, hydralazine 50 mg BID, lisinopril 20 mg daily, Lasix 40 mg daily  - Creatinine increased from 0.94 to 1.13 between 3/19 and 3/24  - Hold Lasix 3/25-3/26/25  - repeat BMP on 3/26/25  - conts on other PTA meds: ASA, amlodipine, hydralazine, lisinopril     Asthma/COPD, uncomplicated  Chronic, stable. Cont PTA Spiriva    Hx of DM2  A1c 5.2 on 8/13/24. No need for routine BG monitoring at TCU     Chronic anemia  Hgb has been stable within baseline ~10. Monitor prn     Major recurrent depressive disorder with anxiety  Chronic, stable. Cont PTA escitalopram 10 mg daily     Orders:   - Hold Lasix 3/25-3/26/25  - repeat BMP on 3/26/25; dx: CHF    Electronically signed by:  Mary Kate Rutledge MD                       Sincerely,        Mary Kate Rutledge MD    Electronically signed

## 2025-03-24 NOTE — PROGRESS NOTES
[facility] GERIATRICS  Initial Visit Note  March 24, 2025       PRIMARY CARE PROVIDER AND CLINIC:  CORNELIUS Kenyon CNP, [address] [facility] [address]    [place] Medical Record Number:  [id number]  Place of Service where encounter took place:  [facility]  ([facility]) [[id number]]    Chief Complaint   Patient presents with    Hospital F/U        HPI:  Shiloh Covarrubias is a 89 year old (11/3/1929) admitted to [facility]  ([facility]) [[id number]] from [facility].     Reviewed ED note, H&P, discharge summary, labs, and imaging from recent hospital stay     Hospital stay: 3/13/25-3/19/25, [facility]    Summary:   89 year old female with hx of HFpEF, PAD with known nonhealing wound to LLE who was hospitalized at [facility] 3/13-3/19/25 for ongoing LLE cellulitis after failed attempt at outpatient management with various courses of oral antibiotics; mild CHF; and SHIVANI. Re: cellulitis, pt was given a dose of IV ceftriaxone in the ED, changed to IV cefazolin on admission. WOCN consulted, wound care initiated. Due to lack of improvement, antibiotics were broadened to IV vancomycin and pip-tazo. Cellulitis subsequently improved, and she was discharged with Augmentin x5 days. Treated for mild CHF exacerbation with IV Lasix; resumed on PTA oral Lasix prior to discharge. Renal function gradually returned to baseline during hospital stay. Referred to U for ongoing wound care. Lives at [facility].     Admitted to this facility for: rehab, medical management, and nursing care.    Patient's prior living condition: lives in an assisted living facility, [facility]    Today, patient was seen as an initial visit. Facility records reviewed. VSS. A1 with transfers/ADLs. Reports ongoing left ankle pain and LLE skin breakdown though overall improved, and pain mainly controlled with Tylenol. Otherwise offers no concerns. Denies cp/sob. Eating and drinking well.        CODE STATUS: DNR / DNI    ALLERGIES:   Allergies   Allergen Reactions    Clonidine Derivatives      Severe side effects      Entocort [Corticosteroids]     Macrobid [Nitrofurantoin]      Diarrhea      Sulfa Antibiotics Itching     itch        REVIEW OF SYSTEMS:   6 point ROS was completed. Pertinent positives and negatives are noted above in the HPI. All others negative    PAST MEDICAL HISTORY  Past Medical History:   Diagnosis Date    Asthma 1/4/2023    CAD (coronary artery disease) 1/4/2023    Cerebral infarction (H) 1/4/2023    Chronic kidney disease, stage 3 (H) 11/16/2020    COPD (chronic obstructive pulmonary disease) (H) 1/4/2023    Disorder of thyroid 1/4/2023    Edema     Peripheral edema    Essential hypertension with goal blood pressure less than 130/80 5/24/2016    Generalized muscle weakness 1/4/2023    GERD (gastroesophageal reflux disease) 1/19/2011    Heart failure (H) 1/4/2023    Hyperlipidemia LDL goal <100 3/30/2011    Hypertension     Hypertriglyceridemia 5/18/2010    Major depressive disorder, single episode, mild 2/15/2016    Osteoarthrosis, unspecified whether generalized or localized, involving lower leg 1/8/2007    Problem list name updated by automated process. Provider to review Replacing diagnoses that were inactivated after the 10/1/2021 regulatory import.    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    Type 2 diabetes mellitus (H) 1/4/2023    Unspecified asthma(493.90)     Unspecified intestinal obstruction 02/17/10    D/C 02/20/10       PAST SURGICAL HISTORY  Past Surgical History:   Procedure Laterality Date    ESOPHAGOSCOPY, GASTROSCOPY, DUODENOSCOPY (EGD), COMBINED  2/8/2011    COMBINED ESOPHAGOSCOPY, GASTROSCOPY, DUODENOSCOPY (EGD), BIOPSY SINGLE OR MULTIPLE performed by ANGY LIMA at  GI    ESOPHAGOSCOPY, GASTROSCOPY, DUODENOSCOPY (EGD), DILATATION, COMBINED  2/8/2011     COMBINED ESOPHAGOSCOPY, GASTROSCOPY, DUODENOSCOPY (EGD), DILATATION performed by ANGY LIMA at [facility] UGI ENDOSCOPY, SIMPLE EXAM  02/23/10       FAMILY HISTORY  Reviewed and noncontributory    SOCIAL HISTORY  Patient's living condition: lives in an assisted living facility, [facility]   reports that she has never smoked. She has never used smokeless tobacco. She reports current alcohol use. She reports that she does not use drugs.    MEDICATIONS  Post Medication Reconciliation Status: discharge medications reconciled and changed, per note/orders     Current Outpatient Medications   Medication Sig Dispense Refill    acetaminophen (TYLENOL) 500 MG tablet Take 1,000 mg by mouth every morning.      acetaminophen (TYLENOL) 500 MG tablet Take 1,000 mg by mouth daily as needed for mild pain.      AMLODIPINE BESYLATE PO Take 7.5 mg by mouth daily.      amoxicillin-clavulanate (AUGMENTIN) 500-125 MG tablet Take 1 tablet by mouth 2 times daily for 5 days.      ASPIRIN ADULT LOW DOSE 81 MG EC tablet TAKE 1 TABLET BY MOUTH ONCE DAILY 90 tablet 97    escitalopram (LEXAPRO) 10 MG tablet TAKE 1 TABLET BY MOUTH ONCE DAILY 90 tablet 97    furosemide (LASIX) 40 MG tablet TAKE 1 TABLET BY MOUTH ONCE DAILY 90 tablet 97    hydrALAZINE (APRESOLINE) 50 MG tablet TAKE 1 TABLET BY MOUTH TWICE  DAILY 270 tablet 11    lisinopril (ZESTRIL) 20 MG tablet TAKE 1 TABLET BY MOUTH ONCE DAILY 90 tablet 97    loperamide (IMODIUM) 2 MG capsule Take 2 mg by mouth 2 times daily. Daily PRN      loratadine (CLARITIN) 10 MG tablet Take 0.5 tablets (5 mg) by mouth every other day 90 tablet 97    Multiple Vitamin (TAB-A-VITA) TABS TAKE 1 TABLET BY MOUTH ONCE DAILY 90 tablet 97    Multiple Vitamins-Minerals (PRESERVISION AREDS) CAPS TAKE 1 CAPSULE BY MOUTH TWICE DAILY WITH MEALS 180 capsule 97    oxyCODONE (ROXICODONE) 5 MG tablet Take 0.5 tablets (2.5 mg) by mouth every 6 hours as needed for pain. 4 tablet     PROPYLENE GLYCOL-GLYCERIN OP Place  1 drop into both eyes 3 times daily.      PROPYLENE GLYCOL-GLYCERIN OP Place 2 drops into both eyes daily as needed.      SPIRIVA RESPIMAT 2.5 MCG/ACT inhaler INHALE 2 PUFFS INTO THE LUNGS ONCE DAILY 4 g 97    Vitamin D3 (CHOLECALCIFEROL) 25 mcg (1000 units) tablet Take 1 tablet (25 mcg) by mouth daily       No current facility-administered medications for this visit.       PHYSICAL EXAM  Vital Signs: Temp: 97.9  F (36.6  C)   BP: 101/57 Pulse: 65   Resp: 18 SpO2: 92 %      Weight: 111 lbs 12.8 oz  Constitutional: Sitting up in chair, NAD  HEENT: Sclera white, MMM  Respiratory: Breathing non-labored. Lungs CTAB - no wheezes, crackles, or rhonchi  Cardiovascular: Heart RRR, no m/r/g. No pedal edema  GI: +BS, abd soft/NT  Skin/Integument: Dry flaky skin to LLE with diffuse erythema  Musculoskeletal: Normal muscle bulk and tone  Neuro: Alert and appropriate, AZAR  Psych: Calm and cooperative    LAB/IMAGING DATA:  [facility] Epic [facility]  Most Recent 3 CBC's:  Recent Labs   Lab Test 03/24/25  0526 03/19/25  0653 03/18/25  0635   WBC 8.6 9.2 11.8*   HGB 10.5* 10.5* 10.5*   MCV 92 91 91    286 326     Most Recent 3 BMP's:  Recent Labs   Lab Test 03/24/25  0526 03/19/25  0653 03/18/25  0635 03/17/25  0826 03/16/25  0601     --  137  --  139   POTASSIUM 5.2  --  4.2  --  3.9   CHLORIDE 102  --  104  --  101   CO2 28  --  25  --  27   BUN 21.4  --  15.4  --  17.9   CR 1.13* 0.94 0.94   < > 0.99*   ANIONGAP 11  --  8  --  11   TANIA 9.1  --  8.8  --  8.9   GLC 91  --  113*  --  98    < > = values in this interval not displayed.         ASSESSMENT/PLAN:      LLE cellulitis: Improved  Chronic LLE ulcer  PAD, PVD  Physical deconditioning  Referred to the ER after failing attempt at outpatient management. Given ceftriaxone x1 in the ED; started on IV cefazolin on admission then broadened to IV vancomycin/pip-tazo with eventual improvement. WOC consulted - wound care initiated. Discharged with Augmentin  500-125 mg BID x5 days   - will complete 5-d course of Augmentin today, 3/24  - cont wound care as ordered   - cont PTA pain regimen: APAP 1000 mg daily and daily prn, oxycodone 2.5 mg q6h prn   - cont PT/OT  - SW for discharge planning  - has appt with Vascular Surgery on 4/9/25     Diarrhea associated with antibiotics  Hx of microscopic colitis  At baseline has ~3 BMs per day. Conts on PTA Imodium BID and daily prn    Acute on chronic HFpEF: Resolved  Pulmonary hypertension  Treated with IV Lasix on admission. Resumed on PTA Lasix 40 mg daily at discharge  - Creatinine increased from 0.94 to 1.13 between 3/19 and 3/24  - Hold Lasix 3/25-3/26/25  - repeat BMP on 3/26/25    SHIVANI on CKD stage II: Resolved  Hyponatremia: Resolved  Creatinine up to 1.8 from baseline ~1. Returned to baseline prior to hospital discharge  - Creatinine increased from 0.94 to 1.13 between 3/19 and 3/24  - Hold Lasix 3/25-3/26/25  - repeat BMP on 3/26/25    CAD, native heart, native vessels  Essential hypertension  PTA regimen: ASA 81 mg daily, amlodipine 7.5 mg daily, hydralazine 50 mg BID, lisinopril 20 mg daily, Lasix 40 mg daily  - Creatinine increased from 0.94 to 1.13 between 3/19 and 3/24  - Hold Lasix 3/25-3/26/25  - repeat BMP on 3/26/25  - conts on other PTA meds: ASA, amlodipine, hydralazine, lisinopril     Asthma/COPD, uncomplicated  Chronic, stable. Cont PTA Spiriva    Hx of DM2  A1c 5.2 on 8/13/24. No need for routine BG monitoring at TCU     Chronic anemia  Hgb has been stable within baseline ~10. Monitor prn     Major recurrent depressive disorder with anxiety  Chronic, stable. Cont PTA escitalopram 10 mg daily     Orders:   - Hold Lasix 3/25-3/26/25  - repeat BMP on 3/26/25; dx: CHF    Electronically signed by:  Mary Kate Rutledge MD

## 2025-03-24 NOTE — LETTER
Shiloh Covarrubias  [id number]  11/3/1929      PROVIDER ORDERS    Hold Lasix 3/25 and 3/26/25  Repeat BMP 3/26/25; dx: SHIVANI Rutledge MD  3/24/2025, 1:33 PM        Electronically signed

## 2025-03-25 ENCOUNTER — LAB REQUISITION (OUTPATIENT)
Dept: LAB | Facility: CLINIC | Age: OVER 89
End: 2025-03-25
Payer: COMMERCIAL

## 2025-03-25 DIAGNOSIS — N17.9 ACUTE KIDNEY FAILURE, UNSPECIFIED: ICD-10-CM

## 2025-03-25 NOTE — PROGRESS NOTES
[facility] GERIATRICS DISCHARGE SUMMARY      Patient's name: Shiloh Covarrubias  YOB: 1929  MRN:  [id number]  Place of Service where encounter took place:  No question data found.    Primary Care Provider and Clinic to resume care:   Israel Scott, CORNELIUS CNP, [address] [facility] / [facility] [address]    Assisted Living: [facility] ([place])     Discharging Provider: Mary Kate Rutledge MD MD  Recent Hospitalization/ED:  [facility]   Date of SNF Admission:  3/19/25  Date of SNF Discharge:  4/1/25  Discharged to: previous assisted living at [facility]   Cognitive Scores: BIMS: 15/15 and SLUMS: 26/30  Physical Function: Ambulating 225 ft with 4WW    Code Status   [unfilled]    Follow-up Needs after TCU Discharge:   Follow Up:     - PCP in 1-2 weeks  - Vascular Surgery on 4/9/26  Discharge Services: Home Care:  Occupational Therapy, Physical Therapy, Registered Nurse, and Home Health Aide    Med changes at TCU discharge:  ***    Hospital/TCU Summary:    89 year old female with hx of HFpEF, PAD with known nonhealing wound to LLE who was hospitalized at [facility] 3/13-3/19/25 for ongoing LLE cellulitis after failed attempt at outpatient management with various courses of oral antibiotics; mild CHF; and SHIVANI. Re: cellulitis, pt was given a dose of IV ceftriaxone in the ED, changed to IV cefazolin on admission. WOCN consulted, wound care initiated. Due to lack of improvement, antibiotics were broadened to IV vancomycin and pip-tazo. Cellulitis subsequently improved, and she was discharged with Augmentin x5 days. Treated for mild CHF exacerbation with IV Lasix; resumed on PTA oral Lasix prior to discharge. Renal function gradually returned to baseline during hospital stay. Referred to TCU for ongoing wound care. Lives at [facility].     Today, patient was seen for discharge planning - medically stable, has progressed with therapies,  and will discharge back to her [facility] with home RN/PT/OT/HHA. *** Detailed TCU course by problem noted below:     LLE cellulitis: Improved  Chronic LLE ulcer  PAD, PVD  Physical deconditioning  Referred to the ER after failing attempt at outpatient management. Given ceftriaxone x1 in the ED; started on IV cefazolin on admission then broadened to IV vancomycin/pip-tazo with eventual improvement. WOC consulted - wound care initiated. Discharged with Augmentin 500-125 mg BID, completed 5-d course at TCU.   - cont current wound care every other day: 1. Cleanse with wound cleanser and dry 2. Apply nonadhesive Optifoam to wound 3. Wrap with Kerlix.  - cont PTA pain regimen: APAP 1000 mg daily and daily prn, oxycodone 2.5 mg q6h prn   - home RN, PT, OT, and HHA ordered at TCU discharge  - has appt with Vascular Surgery on 4/9/25     Diarrhea associated with antibiotics  Hx of microscopic colitis  At baseline has ~3 BMs per day. Conts on PTA Imodium BID and daily prn     Acute on chronic HFpEF: Resolved  Pulmonary hypertension  Treated with IV Lasix on admission. Resumed on PTA Lasix 40 mg daily at discharge  - Creatinine increased from 0.94 to 1.13 between 3/19 and 3/24 ***  - Hold Lasix 3/25-3/26/25  - repeat BMP on 3/26/25     SHIAVNI on CKD stage II: Resolved  Hyponatremia: Resolved  Creatinine up to 1.8 from baseline ~1. Returned to baseline prior to hospital discharge  - Creatinine increased from 0.94 to 1.13 between 3/19 and 3/24  - Hold Lasix 3/25-3/26/25  - repeat BMP on 3/26/25     CAD, native heart, native vessels  Essential hypertension  PTA regimen: ASA 81 mg daily, amlodipine 7.5 mg daily, hydralazine 50 mg BID, lisinopril 20 mg daily, Lasix 40 mg daily  - Creatinine increased from 0.94 to 1.13 between 3/19 and 3/24  - Hold Lasix 3/25-3/26/25  - repeat BMP on 3/26/25  - conts on other PTA meds: ASA, amlodipine, hydralazine, lisinopril     Asthma/COPD, uncomplicated  Chronic, stable. Cont PTA Spiriva     Hx of  DM2  A1c 5.2 on 8/13/24. No need for routine BG monitoring at TCU     Chronic anemia  Hgb has been stable within baseline ~10. Monitor prn     Major recurrent depressive disorder with anxiety  Chronic, stable. Cont PTA escitalopram 10 mg daily    Allergies   Allergies   Allergen Reactions    Clonidine Derivatives      Severe side effects      Entocort [Corticosteroids]     Macrobid [Nitrofurantoin]      Diarrhea      Sulfa Antibiotics Itching     itch     Discharge Medications   Post Medication Reconciliation Status: {MED REC LIST:769225}     {Providers Please double check the med list (in the plan section >> meds & orders tab) and Discontinue any of the meds flagged by the TC to be discontinued}  Current Outpatient Medications   Medication Sig Dispense Refill    acetaminophen (TYLENOL) 500 MG tablet Take 1,000 mg by mouth every morning.      acetaminophen (TYLENOL) 500 MG tablet Take 1,000 mg by mouth daily as needed for mild pain.      AMLODIPINE BESYLATE PO Take 7.5 mg by mouth daily.      ASPIRIN ADULT LOW DOSE 81 MG EC tablet TAKE 1 TABLET BY MOUTH ONCE DAILY 90 tablet 97    escitalopram (LEXAPRO) 10 MG tablet TAKE 1 TABLET BY MOUTH ONCE DAILY 90 tablet 97    furosemide (LASIX) 40 MG tablet TAKE 1 TABLET BY MOUTH ONCE DAILY 90 tablet 97    hydrALAZINE (APRESOLINE) 50 MG tablet TAKE 1 TABLET BY MOUTH TWICE  DAILY 270 tablet 11    lisinopril (ZESTRIL) 20 MG tablet TAKE 1 TABLET BY MOUTH ONCE DAILY 90 tablet 97    loperamide (IMODIUM) 2 MG capsule Take 2 mg by mouth 2 times daily. Daily PRN      loratadine (CLARITIN) 10 MG tablet Take 0.5 tablets (5 mg) by mouth every other day 90 tablet 97    Multiple Vitamin (TAB-A-VITA) TABS TAKE 1 TABLET BY MOUTH ONCE DAILY 90 tablet 97    Multiple Vitamins-Minerals (PRESERVISION AREDS) CAPS TAKE 1 CAPSULE BY MOUTH TWICE DAILY WITH MEALS 180 capsule 97    oxyCODONE (ROXICODONE) 5 MG tablet Take 0.5 tablets (2.5 mg) by mouth every 6 hours as needed for pain. 4 tablet      "PROPYLENE GLYCOL-GLYCERIN OP Place 1 drop into both eyes 3 times daily.      PROPYLENE GLYCOL-GLYCERIN OP Place 2 drops into both eyes daily as needed.      SPIRIVA RESPIMAT 2.5 MCG/ACT inhaler INHALE 2 PUFFS INTO THE LUNGS ONCE DAILY 4 g 97    Vitamin D3 (CHOLECALCIFEROL) 25 mcg (1000 units) tablet Take 1 tablet (25 mcg) by mouth daily        ***    Controlled medications:   {fgscontrolledmeds1:861994}     Physical Exam:   Vitals: LMP  (LMP Unknown)   BMI: There is no height or weight on file to calculate BMI.  Constitutional: Resting in bed, NAD ***  HEENT: Sclera white, MMM  Respiratory: Breathing non-labored. Lungs CTAB - no wheezes, crackles, or rhonchi  Cardiovascular: Heart RRR, no m/r/g. No pedal edema  GI: +BS, abd soft/NT  Skin/Integument: No rash  Musculoskeletal: Normal muscle bulk and tone  Neuro: Alert and appropriate, AZAR  Psych: Calm and cooperative    Significant Results and Procedures   {Data for Discharge Summary:276580}      TOTAL DISCHARGE TIME:   ***minutes    Electronically signed by:  Mary Kate Rutledge MD       Documentation of Face to Face and Certification for Home Health Services    I certify that services are/were furnished while this patient was under the care of a physician and that a physician or an allowed non-physician practitioner (NPP), had a face-to-face encounter that meets the physician face-to-face encounter requirements. The encounter was in whole, or in part, related to the primary reason for home health. The patient is confined to his/her home and needs intermittent skilled nursing, physical therapy, speech-language pathology, or the continued need for occupational therapy. A plan of care has been established by a physician and is periodically reviewed by a physician.  Date of Face-to-Face Encounter: {Date choice:600923::\"***\"}.    I certify that, based on my findings, the following services are medically necessary home health services: Nursing, Occupational Therapy, " Physical Therapy, and HHA .    My clinical findings support the need for the above skilled services because: Requires assistance of another person or specialized equipment to access medical services because patient: Is prone to wander/get lost without assistance...    Patient to re-establish plan of care with their PCP within 7-10 days after leaving the facility to reestablish care.  Medicare certified PECOS provider: Mary Kate Rutledge MD  Date: March 26, 2025    DME ***

## 2025-03-26 ENCOUNTER — DISCHARGE SUMMARY NURSING HOME (OUTPATIENT)
Dept: GERIATRICS | Facility: CLINIC | Age: OVER 89
End: 2025-03-26
Payer: COMMERCIAL

## 2025-03-26 VITALS
HEART RATE: 60 BPM | RESPIRATION RATE: 16 BRPM | TEMPERATURE: 97.9 F | BODY MASS INDEX: 19.28 KG/M2 | WEIGHT: 108.8 LBS | OXYGEN SATURATION: 92 % | HEIGHT: 63 IN | DIASTOLIC BLOOD PRESSURE: 56 MMHG | SYSTOLIC BLOOD PRESSURE: 116 MMHG

## 2025-03-26 DIAGNOSIS — I10 ESSENTIAL HYPERTENSION: ICD-10-CM

## 2025-03-26 DIAGNOSIS — K52.839 MICROSCOPIC COLITIS, UNSPECIFIED MICROSCOPIC COLITIS TYPE: ICD-10-CM

## 2025-03-26 DIAGNOSIS — N18.9 ACUTE KIDNEY INJURY SUPERIMPOSED ON CKD: ICD-10-CM

## 2025-03-26 DIAGNOSIS — N17.9 ACUTE KIDNEY INJURY SUPERIMPOSED ON CKD: ICD-10-CM

## 2025-03-26 DIAGNOSIS — R53.81 PHYSICAL DECONDITIONING: ICD-10-CM

## 2025-03-26 DIAGNOSIS — I50.33 ACUTE ON CHRONIC HEART FAILURE WITH PRESERVED EJECTION FRACTION (HFPEF) (H): ICD-10-CM

## 2025-03-26 DIAGNOSIS — L03.116 CELLULITIS OF LEFT LOWER EXTREMITY: Primary | ICD-10-CM

## 2025-03-26 DIAGNOSIS — L97.929 ULCER OF LEFT LOWER EXTREMITY, UNSPECIFIED ULCER STAGE (H): ICD-10-CM

## 2025-03-26 DIAGNOSIS — I25.10 CORONARY ARTERY DISEASE WITHOUT ANGINA PECTORIS, UNSPECIFIED VESSEL OR LESION TYPE, UNSPECIFIED WHETHER NATIVE OR TRANSPLANTED HEART: ICD-10-CM

## 2025-03-26 LAB
ANION GAP SERPL CALCULATED.3IONS-SCNC: 10 MMOL/L (ref 7–15)
BUN SERPL-MCNC: 20.1 MG/DL (ref 8–23)
CALCIUM SERPL-MCNC: 9 MG/DL (ref 8.8–10.4)
CHLORIDE SERPL-SCNC: 103 MMOL/L (ref 98–107)
CREAT SERPL-MCNC: 1.16 MG/DL (ref 0.51–0.95)
EGFRCR SERPLBLD CKD-EPI 2021: 43 ML/MIN/1.73M2
GLUCOSE SERPL-MCNC: 91 MG/DL (ref 70–99)
HCO3 SERPL-SCNC: 26 MMOL/L (ref 22–29)
POTASSIUM SERPL-SCNC: 4.7 MMOL/L (ref 3.4–5.3)
SODIUM SERPL-SCNC: 139 MMOL/L (ref 135–145)

## 2025-03-26 PROCEDURE — P9603 ONE-WAY ALLOW PRORATED MILES: HCPCS | Mod: ORL

## 2025-03-26 PROCEDURE — 99316 NF DSCHRG MGMT 30 MIN+: CPT | Performed by: INTERNAL MEDICINE

## 2025-03-26 PROCEDURE — 80048 BASIC METABOLIC PNL TOTAL CA: CPT | Mod: ORL

## 2025-03-26 PROCEDURE — 36415 COLL VENOUS BLD VENIPUNCTURE: CPT | Mod: ORL

## 2025-03-26 RX ORDER — OXYCODONE HYDROCHLORIDE 5 MG/1
2.5 TABLET ORAL EVERY 6 HOURS PRN
Qty: 30 TABLET | Refills: 0 | Status: SHIPPED | OUTPATIENT
Start: 2025-03-26 | End: 2025-03-27

## 2025-03-26 RX ORDER — ACETAMINOPHEN 500 MG
500 TABLET ORAL 2 TIMES DAILY PRN
Qty: 60 TABLET | Refills: 0 | Status: SHIPPED | OUTPATIENT
Start: 2025-03-26 | End: 2025-03-27

## 2025-03-26 RX ORDER — FUROSEMIDE 40 MG/1
40 TABLET ORAL EVERY OTHER DAY
Qty: 90 TABLET | Refills: 97 | Status: SHIPPED | OUTPATIENT
Start: 2025-04-01 | End: 2025-03-27

## 2025-03-26 RX ORDER — LISINOPRIL 20 MG/1
20 TABLET ORAL DAILY
COMMUNITY
Start: 2025-03-26

## 2025-03-26 NOTE — PROGRESS NOTES
[facility] GERIATRICS DISCHARGE SUMMARY      Patient's name: Shiloh Covarrubias  YOB: 1929  MRN:  [id number]  Place of Service where encounter took place:  [facility]  (U) [[id number]]    Primary Care Provider and Clinic to resume care:   Israel Scott, APRN CNP, [address] [facility] [address]    Assisted Living: [facility] ([place])     Discharging Provider: Mary Kate Rutledge MD MD  Recent Hospitalization/ED:  [facility]   Date of SNF Admission:  3/19/25  Date of SNF Discharge:  4/1/25  Discharged to: previous assisted living at [facility]   Cognitive Scores: BIMS: 15/15 and SLUMS: 26/30  Physical Function: Ambulating 225 ft with 4WW    Code Status  DNR/DNI    Follow-up Needs after TCU Discharge:   Follow Up:     - PCP in 1-2 weeks  - Vascular Surgery on 4/9/26  Discharge Services: Home Care:  Occupational Therapy, Physical Therapy, Registered Nurse, and Home Health Aide    Med changes at TCU discharge:  Lisinpril 20 mg daily on hold  Lasix 40 mg every other day beginning 4/1/25 (PTA on 40 mg daily)  Tylenol 500 mg BID prn (PTA 1000 mg daily and daily prn)    Hospital/TCU Summary:    89 year old female with hx of HFpEF, PAD with known nonhealing wound to LLE who was hospitalized at [facility] 3/13-3/19/25 for ongoing LLE cellulitis after failed attempt at outpatient management with various courses of oral antibiotics; mild CHF; and SHIVANI. Re: cellulitis, pt was given a dose of IV ceftriaxone in the ED, changed to IV cefazolin on admission. WOCN consulted, wound care initiated. Due to lack of improvement, antibiotics were broadened to IV vancomycin and pip-tazo. Cellulitis subsequently improved, and she was discharged with Augmentin x5 days. Treated for mild CHF exacerbation with IV Lasix; resumed on PTA oral Lasix prior to discharge. Renal function gradually returned to baseline during hospital stay.  Referred to TCU for ongoing wound care. Lives at [facility].     Today, patient was seen for discharge planning - medically stable, has progressed with therapies, and will discharge back to her [facility] with home RN/PT/OT/HHA. Patient is doing well at the time of our visit, and offers no concerns. She feels that she has gotten stronger with therapies, and is looking forward to returning to her [facility]. Detailed TCU course by problem noted below:     LLE cellulitis: Improved  Chronic LLE ulcer  PAD, PVD  Physical deconditioning  Referred to the ER after failing attempt at outpatient management. Given ceftriaxone x1 in the ED; started on IV cefazolin on admission then broadened to IV vancomycin/pip-tazo with eventual improvement. WOC consulted - wound care initiated. Discharged with Augmentin 500-125 mg BID, completed 5-d course at TCU.   - cont current wound care every other day: 1. Cleanse with wound cleanser and dry 2. Apply nonadhesive Optifoam to wound 3. Wrap with Kerlix.  - Pain regimen at TCU discharge: APAP 500 mg BID prn, oxycodone 2.5 mg q6h prn   - home RN, PT, OT, and HHA ordered at TCU discharge  - has appt with Vascular Surgery on 4/9/25     Diarrhea associated with antibiotics: Improved  Hx of microscopic colitis  At baseline has ~3 BMs per day. Stable at TCU  - Conts on PTA Imodium BID and daily prn     SHIVANI on CKD stage II  Hyponatremia: Resolved  Creatinine up to 1.8 from baseline ~1. Returned to baseline prior to hospital discharge  - Creatinine increased from 0.94 to 1.1 range at TCU  - PTA lisinopril on hold at TCU discharge  - Lasix on hold 3/26-3/31/25. Start Lasix 40 mg every other day on 4/1/25  - Recommend repeat BMP at next visit with PCP    Acute on chronic HFpEF: Resolved  Pulmonary hypertension  Treated with IV Lasix on admission. Resumed on PTA Lasix 40 mg daily at discharge. Remained compensated throughout TCU stay  - Lasix 40 mg daily on hold 3/26-3/31/25  - Decrease Lasix to 40 mg every  other day on 4/1/25  - Recommend repeat BMP at next visit with PCP      CAD, native heart, native vessels  Essential hypertension  Chronic, stable.  PTA regimen: ASA 81 mg daily, amlodipine 7.5 mg daily, hydralazine 50 mg BID, lisinopril 20 mg daily, Lasix 40 mg daily  - Lisinopril and Lasix adjusted as noted above for SHIVANI  - conts on other PTA meds: ASA, amlodipine, hydralazine  - recommend repeat BMP at next visit with PCP    Asthma/COPD, uncomplicated  Chronic, stable. Cont PTA Spiriva     Hx of DM2  Chronic, stable. A1c 5.2 on 8/13/24.     Chronic anemia  Hgb has been stable within baseline ~10.     Major recurrent depressive disorder with anxiety  Chronic, stable. Cont PTA escitalopram 10 mg daily    Allergies   Allergies   Allergen Reactions    Clonidine Derivatives      Severe side effects      Entocort [Corticosteroids]     Macrobid [Nitrofurantoin]      Diarrhea      Sulfa Antibiotics Itching     itch     Discharge Medications   Post Medication Reconciliation Status: discharge medications reconciled and changed, per note/orders     Current Outpatient Medications   Medication Sig Dispense Refill    acetaminophen (TYLENOL) 500 MG tablet Take 1 tablet (500 mg) by mouth 2 times daily as needed for mild pain. 60 tablet 0    [START ON 4/1/2025] furosemide (LASIX) 40 MG tablet Take 1 tablet (40 mg) by mouth every other day. 90 tablet 97    lisinopril (ZESTRIL) 20 MG tablet Take 1 tablet (20 mg) by mouth daily. HOLD beginning 3/26/25      oxyCODONE (ROXICODONE) 5 MG tablet Take 0.5 tablets (2.5 mg) by mouth every 6 hours as needed for pain. 30 tablet 0    AMLODIPINE BESYLATE PO Take 7.5 mg by mouth daily.      ASPIRIN ADULT LOW DOSE 81 MG EC tablet TAKE 1 TABLET BY MOUTH ONCE DAILY 90 tablet 97    escitalopram (LEXAPRO) 10 MG tablet TAKE 1 TABLET BY MOUTH ONCE DAILY 90 tablet 97    hydrALAZINE (APRESOLINE) 50 MG tablet TAKE 1 TABLET BY MOUTH TWICE  DAILY 270 tablet 11    loperamide (IMODIUM) 2 MG capsule Take 2 mg  "by mouth 2 times daily. Daily PRN      loratadine (CLARITIN) 10 MG tablet Take 0.5 tablets (5 mg) by mouth every other day 90 tablet 97    Multiple Vitamin (TAB-A-VITA) TABS TAKE 1 TABLET BY MOUTH ONCE DAILY 90 tablet 97    Multiple Vitamins-Minerals (PRESERVISION AREDS) CAPS TAKE 1 CAPSULE BY MOUTH TWICE DAILY WITH MEALS 180 capsule 97    PROPYLENE GLYCOL-GLYCERIN OP Place 1 drop into both eyes 3 times daily.      PROPYLENE GLYCOL-GLYCERIN OP Place 2 drops into both eyes daily as needed.      SPIRIVA RESPIMAT 2.5 MCG/ACT inhaler INHALE 2 PUFFS INTO THE LUNGS ONCE DAILY 4 g 97    Vitamin D3 (CHOLECALCIFEROL) 25 mcg (1000 units) tablet Take 1 tablet (25 mcg) by mouth daily          Controlled medications:   Medication oxycodone electronically prescribed to [facility] pharmacy     Physical Exam:   Vitals: /56   Pulse 60   Temp 97.9  F (36.6  C)   Resp 16   Ht 1.6 m (5' 3\")   Wt 49.4 kg (108 lb 12.8 oz)   LMP  (LMP Unknown)   SpO2 92%   BMI 19.27 kg/m    BMI: Body mass index is 19.27 kg/m .  Constitutional: Sitting up in chair, NAD  HEENT: Sclera white, MMM  Respiratory: Breathing non-labored. Lungs CTAB - no wheezes, crackles, or rhonchi  Cardiovascular: Heart RRR, no m/r/g. No pedal edema  GI: +BS, abd soft/NT  Skin/Integument: Dry flaky skin to LLE with diffuse erythema  Musculoskeletal: Normal muscle bulk and tone  Neuro: Alert and appropriate, AZAR  Psych: Calm and cooperative    Significant Results and Procedures   Most Recent 3 CBC's:  Recent Labs   Lab Test 03/24/25  0526 03/19/25  0653 03/18/25  0635   WBC 8.6 9.2 11.8*   HGB 10.5* 10.5* 10.5*   MCV 92 91 91    286 326     Most Recent 3 BMP's:  Recent Labs   Lab Test 03/26/25  0455 03/24/25  0526 03/19/25  0653 03/18/25  0635    141  --  137   POTASSIUM 4.7 5.2  --  4.2   CHLORIDE 103 102  --  104   CO2 26 28  --  25   BUN 20.1 21.4  --  15.4   CR 1.16* 1.13* 0.94 0.94   ANIONGAP 10 11  --  8   TANIA 9.0 9.1  --  8.8   GLC 91 91  --  " 113*       TOTAL DISCHARGE TIME:  45 minutes    Electronically signed by:  Mary Kate Rutledge MD       Documentation of Face to Face and Certification for Home Health Services    I certify that services are/were furnished while this patient was under the care of a physician and that a physician or an allowed non-physician practitioner (NPP), had a face-to-face encounter that meets the physician face-to-face encounter requirements. The encounter was in whole, or in part, related to the primary reason for home health. The patient is confined to his/her home and needs intermittent skilled nursing, physical therapy, speech-language pathology, or the continued need for occupational therapy. A plan of care has been established by a physician and is periodically reviewed by a physician.  Date of Face-to-Face Encounter: 3/26/2025.    I certify that, based on my findings, the following services are medically necessary home health services: Nursing, Occupational Therapy, Physical Therapy, and HHA .    My clinical findings support the need for the above skilled services because: Requires assistance of another person or specialized equipment to access medical services because patient: Is prone to wander/get lost without assistance...    Patient to re-establish plan of care with their PCP within 7-10 days after leaving the facility to reestablish care.  Medicare certified PECOS provider: Mary Kate Rutledge MD  Date: March 26, 2025

## 2025-03-26 NOTE — LETTER
3/26/2025      Shiloh Covarrubias  C/o Chuck Covarrubias  [id number] [address] [place] [id number]        [facility] GERIATRICS DISCHARGE SUMMARY      Patient's name: Shiloh Covarrubias  YOB: 1929  MRN:  [id number]  Place of Service where encounter took place:  [facility]  (U) [[id number]]    Primary Care Provider and Clinic to resume care:   Israel Scott, [facility], [address] [facility] [id number]    Assisted Living: [facility] ([facility])     Discharging Provider: Mary Kate Rutledge MD MD  Recent Hospitalization/ED:  [facility]   Date of SNF Admission:  3/19/25  Date of SNF Discharge:  4/1/25  Discharged to: previous assisted living at [facility]   Cognitive Scores: BIMS: 15/15 and SLUMS: 26/30  Physical Function: Ambulating 225 ft with 4WW    Code Status DNR/DNI    Follow-up Needs after TCU Discharge:   Follow Up:     - PCP in 1-2 weeks  - Vascular Surgery on 4/9/26  Discharge Services: Home Care:  Occupational Therapy, Physical Therapy, Registered Nurse, and Home Health Aide    Med changes at TCU discharge:  Lisinpril 20 mg daily on hold  Lasix 40 mg every other day beginning 4/1/25 (PTA on 40 mg daily)  Tylenol 500 mg BID prn (PTA 1000 mg daily and daily prn)    Hospital/TCU Summary:    89 year old female with hx of HFpEF, PAD with known nonhealing wound to LLE who was hospitalized at [facility] 3/13-3/19/25 for ongoing LLE cellulitis after failed attempt at outpatient management with various courses of oral antibiotics; mild CHF; and SHIVANI. Re: cellulitis, pt was given a dose of IV ceftriaxone in the ED, changed to IV cefazolin on admission. WOCN consulted, wound care initiated. Due to lack of improvement, antibiotics were broadened to IV vancomycin and pip-tazo. Cellulitis subsequently improved, and she was discharged with Augmentin x5 days. Treated for mild CHF exacerbation with IV Lasix; resumed on PTA oral  Lasix prior to discharge. Renal function gradually returned to baseline during hospital stay. Referred to TCU for ongoing wound care. Lives at [facility].     Today, patient was seen for discharge planning - medically stable, has progressed with therapies, and will discharge back to her [facility] with home RN/PT/OT/HHA. Patient is doing well at the time of our visit, and offers no concerns. She feels that she has gotten stronger with therapies, and is looking forward to returning to her assisted. Detailed TCU course by problem noted below:     LLE cellulitis: Improved  Chronic LLE ulcer  PAD, PVD  Physical deconditioning  Referred to the ER after failing attempt at outpatient management. Given ceftriaxone x1 in the ED; started on IV cefazolin on admission then broadened to IV vancomycin/pip-tazo with eventual improvement. WOC consulted - wound care initiated. Discharged with Augmentin 500-125 mg BID, completed 5-d course at TCU.   - cont current wound care every other day: 1. Cleanse with wound cleanser and dry 2. Apply nonadhesive Optifoam to wound 3. Wrap with Kerlix.  - Pain regimen at TCU discharge: APAP 500 mg BID prn, oxycodone 2.5 mg q6h prn   - home RN, PT, OT, and HHA ordered at TCU discharge  - has appt with Vascular Surgery on 4/9/25     Diarrhea associated with antibiotics: Improved  Hx of microscopic colitis  At baseline has ~3 BMs per day. Stable at TCU  - Conts on PTA Imodium BID and daily prn     SHIVANI on CKD stage II  Hyponatremia: Resolved  Creatinine up to 1.8 from baseline ~1. Returned to baseline prior to hospital discharge  - Creatinine increased from 0.94 to 1.1 range at TCU  - PTA lisinopril on hold at TCU discharge  - Lasix on hold 3/26-3/31/25. Start Lasix 40 mg every other day on 4/1/25  - Recommend repeat BMP at next visit with PCP    Acute on chronic HFpEF: Resolved  Pulmonary hypertension  Treated with IV Lasix on admission. Resumed on PTA Lasix 40 mg daily at discharge. Remained compensated  throughout TCU stay  - Lasix 40 mg daily on hold 3/26-3/31/25  - Decrease Lasix to 40 mg every other day on 4/1/25  - Recommend repeat BMP at next visit with PCP      CAD, native heart, native vessels  Essential hypertension  Chronic, stable.  PTA regimen: ASA 81 mg daily, amlodipine 7.5 mg daily, hydralazine 50 mg BID, lisinopril 20 mg daily, Lasix 40 mg daily  - Lisinopril and Lasix adjusted as noted above for SHIVANI  - conts on other PTA meds: ASA, amlodipine, hydralazine  - recommend repeat BMP at next visit with PCP    Asthma/COPD, uncomplicated  Chronic, stable. Cont PTA Spiriva     Hx of DM2  Chronic, stable. A1c 5.2 on 8/13/24.     Chronic anemia  Hgb has been stable within baseline ~10.     Major recurrent depressive disorder with anxiety  Chronic, stable. Cont PTA escitalopram 10 mg daily    Allergies  Allergies   Allergen Reactions     Clonidine Derivatives      Severe side effects       Entocort [Corticosteroids]      Macrobid [Nitrofurantoin]      Diarrhea       Sulfa Antibiotics Itching     itch     Discharge Medications  Post Medication Reconciliation Status: discharge medications reconciled and changed, per note/orders     Current Outpatient Medications   Medication Sig Dispense Refill     acetaminophen (TYLENOL) 500 MG tablet Take 1 tablet (500 mg) by mouth 2 times daily as needed for mild pain. 60 tablet 0     [START ON 4/1/2025] furosemide (LASIX) 40 MG tablet Take 1 tablet (40 mg) by mouth every other day. 90 tablet 97     lisinopril (ZESTRIL) 20 MG tablet Take 1 tablet (20 mg) by mouth daily. HOLD beginning 3/26/25       oxyCODONE (ROXICODONE) 5 MG tablet Take 0.5 tablets (2.5 mg) by mouth every 6 hours as needed for pain. 30 tablet 0     AMLODIPINE BESYLATE PO Take 7.5 mg by mouth daily.       ASPIRIN ADULT LOW DOSE 81 MG EC tablet TAKE 1 TABLET BY MOUTH ONCE DAILY 90 tablet 97     escitalopram (LEXAPRO) 10 MG tablet TAKE 1 TABLET BY MOUTH ONCE DAILY 90 tablet 97     hydrALAZINE (APRESOLINE) 50 MG  "tablet TAKE 1 TABLET BY MOUTH TWICE  DAILY 270 tablet 11     loperamide (IMODIUM) 2 MG capsule Take 2 mg by mouth 2 times daily. Daily PRN       loratadine (CLARITIN) 10 MG tablet Take 0.5 tablets (5 mg) by mouth every other day 90 tablet 97     Multiple Vitamin (TAB-A-VITA) TABS TAKE 1 TABLET BY MOUTH ONCE DAILY 90 tablet 97     Multiple Vitamins-Minerals (PRESERVISION AREDS) CAPS TAKE 1 CAPSULE BY MOUTH TWICE DAILY WITH MEALS 180 capsule 97     PROPYLENE GLYCOL-GLYCERIN OP Place 1 drop into both eyes 3 times daily.       PROPYLENE GLYCOL-GLYCERIN OP Place 2 drops into both eyes daily as needed.       SPIRIVA RESPIMAT 2.5 MCG/ACT inhaler INHALE 2 PUFFS INTO THE LUNGS ONCE DAILY 4 g 97     Vitamin D3 (CHOLECALCIFEROL) 25 mcg (1000 units) tablet Take 1 tablet (25 mcg) by mouth daily          Controlled medications:   Medication oxycodone electronically prescribed to [facility] pharmacy     Physical Exam:   Vitals: /56   Pulse 60   Temp 97.9  F (36.6  C)   Resp 16   Ht 1.6 m (5' 3\")   Wt 49.4 kg (108 lb 12.8 oz)   LMP  (LMP Unknown)   SpO2 92%   BMI 19.27 kg/m    BMI: Body mass index is 19.27 kg/m .  Constitutional: Sitting up in chair, NAD  HEENT: Sclera white, MMM  Respiratory: Breathing non-labored. Lungs CTAB - no wheezes, crackles, or rhonchi  Cardiovascular: Heart RRR, no m/r/g. No pedal edema  GI: +BS, abd soft/NT  Skin/Integument: Dry flaky skin to LLE with diffuse erythema  Musculoskeletal: Normal muscle bulk and tone  Neuro: Alert and appropriate, AZAR  Psych: Calm and cooperative    Significant Results and Procedures  Most Recent 3 CBC's:  Recent Labs   Lab Test 03/24/25  0526 03/19/25  0653 03/18/25  0635   WBC 8.6 9.2 11.8*   HGB 10.5* 10.5* 10.5*   MCV 92 91 91    286 326     Most Recent 3 BMP's:  Recent Labs   Lab Test 03/26/25  0455 03/24/25  0526 03/19/25  0653 03/18/25  0635    141  --  137   POTASSIUM 4.7 5.2  --  4.2   CHLORIDE 103 102  --  104   CO2 26 28  --  25   BUN " 20.1 21.4  --  15.4   CR 1.16* 1.13* 0.94 0.94   ANIONGAP 10 11  --  8   TANIA 9.0 9.1  --  8.8   GLC 91 91  --  113*       TOTAL DISCHARGE TIME:  45 minutes    Electronically signed by:  Mary Kate Rutledge MD       Documentation of Face to Face and Certification for Home Health Services    I certify that services are/were furnished while this patient was under the care of a physician and that a physician or an allowed non-physician practitioner (NPP), had a face-to-face encounter that meets the physician face-to-face encounter requirements. The encounter was in whole, or in part, related to the primary reason for home health. The patient is confined to his/her home and needs intermittent skilled nursing, physical therapy, speech-language pathology, or the continued need for occupational therapy. A plan of care has been established by a physician and is periodically reviewed by a physician.  Date of Face-to-Face Encounter: 3/26/2025.    I certify that, based on my findings, the following services are medically necessary home health services: Nursing, Occupational Therapy, Physical Therapy, and HHA .    My clinical findings support the need for the above skilled services because: Requires assistance of another person or specialized equipment to access medical services because patient: Is prone to wander/get lost without assistance...    Patient to re-establish plan of care with their PCP within 7-10 days after leaving the facility to reestablish care.  Medicare certified PECOS provider: Mary Kate Rutledge MD  Date: March 26, 2025                   Sincerely,        Mary Kate Rutledge MD    Electronically signed

## 2025-03-27 RX ORDER — ACETAMINOPHEN 500 MG
500 TABLET ORAL 2 TIMES DAILY PRN
Qty: 60 TABLET | Refills: 0 | Status: SHIPPED | OUTPATIENT
Start: 2025-03-27

## 2025-03-27 RX ORDER — OXYCODONE HYDROCHLORIDE 5 MG/1
2.5 TABLET ORAL EVERY 6 HOURS PRN
Qty: 30 TABLET | Refills: 0 | Status: SHIPPED | OUTPATIENT
Start: 2025-03-27

## 2025-03-27 RX ORDER — FUROSEMIDE 40 MG/1
40 TABLET ORAL EVERY OTHER DAY
Qty: 90 TABLET | Refills: 97 | Status: SHIPPED | OUTPATIENT
Start: 2025-04-01

## 2025-03-30 ENCOUNTER — LAB REQUISITION (OUTPATIENT)
Dept: LAB | Facility: CLINIC | Age: OVER 89
End: 2025-03-30
Payer: COMMERCIAL

## 2025-03-30 DIAGNOSIS — I50.42 CHRONIC COMBINED SYSTOLIC (CONGESTIVE) AND DIASTOLIC (CONGESTIVE) HEART FAILURE (H): ICD-10-CM

## 2025-03-31 LAB
ANION GAP SERPL CALCULATED.3IONS-SCNC: 12 MMOL/L (ref 7–15)
BUN SERPL-MCNC: 24.4 MG/DL (ref 8–23)
CALCIUM SERPL-MCNC: 8.9 MG/DL (ref 8.8–10.4)
CHLORIDE SERPL-SCNC: 104 MMOL/L (ref 98–107)
CREAT SERPL-MCNC: 0.99 MG/DL (ref 0.51–0.95)
EGFRCR SERPLBLD CKD-EPI 2021: 52 ML/MIN/1.73M2
GLUCOSE SERPL-MCNC: 84 MG/DL (ref 70–99)
HCO3 SERPL-SCNC: 23 MMOL/L (ref 22–29)
POTASSIUM SERPL-SCNC: 4.9 MMOL/L (ref 3.4–5.3)
SODIUM SERPL-SCNC: 139 MMOL/L (ref 135–145)

## 2025-03-31 PROCEDURE — 80048 BASIC METABOLIC PNL TOTAL CA: CPT | Mod: ORL

## 2025-03-31 PROCEDURE — 36415 COLL VENOUS BLD VENIPUNCTURE: CPT | Mod: ORL

## 2025-03-31 PROCEDURE — P9604 ONE-WAY ALLOW PRORATED TRIP: HCPCS | Mod: ORL

## 2025-04-07 NOTE — PROGRESS NOTES
"[facility]  PRIMARY CARE PROVIDER AND CLINIC:  Israel Scott, APRN CNP, [address] [facility] [address]  Chief Complaint   Patient presents with    Hospital F/U     TCU f/u     [facility] Medical Record Number:  [id number]  Place of Service where encounter took place:  [facility] [[id number]]    Shiloh Covarrubias  is a 89 year old  (11/3/1929),  [facility] 4/1/25 following a hospital stay at [facility] from 3/13/25 - 3/19/25 .  Admitted to this facility for  medical management and nursing care.    HPI:    HPI information obtained from: facility chart records, facility staff, patient report, and [facility] chart review.   Brief Summary of Hospital Course:   89 year old female with hx of HFpEF, PAD with known nonhealing wound to LLE who was hospitalized at [facility] 3/13-3/19/25 for ongoing LLE cellulitis after failed attempt at outpatient management with various courses of oral antibiotics; mild CHF; and SHIVANI. Re: cellulitis, pt was given a dose of IV ceftriaxone in the ED, changed to IV cefazolin on admission. WOCN consulted, wound care initiated. Due to lack of improvement, antibiotics were broadened to IV vancomycin and pip-tazo. Cellulitis subsequently improved, and she was discharged with Augmentin x5 days. Treated for mild CHF exacerbation with IV Lasix; resumed on PTA oral Lasix prior to discharge. Renal function gradually returned to baseline during hospital stay. Referred to TCU for ongoing wound care. Now back to [place]  Updates on Status Since Skilled nursing Admission:  Appears mostly back to baseline. Slight LLE leg pain but says it's related to \"dry skin\". She is wearing gentle compression. Edema to LLE at 1+, no redness or warmth but hard to appreciate pedal pulses, popliteal, dorsalis pedis. BP's have been variable, usually higher before " medications 126/181/42-76. Her lisinopril has been on hold. Her PTA lasix is now every other day. Eating and drinking, having bowel movements. No use of prn oxycodone.       CODE STATUS/ADVANCE DIRECTIVES DISCUSSION:   DNR / DNI  Patient's living condition: lives in an assisted living facility  ALLERGIES: Clonidine derivatives, Entocort [corticosteroids], Macrobid [nitrofurantoin], and Sulfa antibiotics  PAST MEDICAL HISTORY:  has a past medical history of Asthma (1/4/2023), CAD (coronary artery disease) (1/4/2023), Cerebral infarction (H) (1/4/2023), Chronic kidney disease, stage 3 (H) (11/16/2020), COPD (chronic obstructive pulmonary disease) (H) (1/4/2023), Disorder of thyroid (1/4/2023), Edema, Essential hypertension with goal blood pressure less than 130/80 (5/24/2016), Generalized muscle weakness (1/4/2023), GERD (gastroesophageal reflux disease) (1/19/2011), Heart failure (H) (1/4/2023), Hyperlipidemia LDL goal <100 (3/30/2011), Hypertension, Hypertriglyceridemia (5/18/2010), Major depressive disorder, single episode, mild (2/15/2016), Osteoarthrosis, unspecified whether generalized or localized, involving lower leg (1/8/2007), Peripheral vascular disease (3/30/2021), Squamous cell carcinoma (2015), 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    She has no past medical history of Unspecified cerebral artery occlusion with cerebral infarction.  PAST SURGICAL HISTORY:   has a past surgical history that includes UPPER GI ENDOSCOPY,EXAM (02/23/10); Esophagoscopy, gastroscopy, duodenoscopy (EGD), combined (2/8/2011); and Esophagoscopy, gastroscopy, duodenoscopy (EGD), dilatation, combined (2/8/2011).  FAMILY HISTORY: family history includes Arthritis in her mother; Depression in her maternal uncle; Osteoporosis in her mother; Thyroid Disease in her mother.  SOCIAL HISTORY:    "reports that she has never smoked. She has never used smokeless tobacco. She reports current alcohol use. She reports that she does not use drugs.    Post Discharge Medication Reconciliation Status: discharge medications reconciled, continue medications without change    Current Outpatient Medications   Medication Sig Dispense Refill    acetaminophen (TYLENOL) 500 MG tablet Take 1 tablet (500 mg) by mouth 2 times daily as needed for mild pain. 60 tablet 0    AMLODIPINE BESYLATE PO Take 7.5 mg by mouth daily.      ASPIRIN ADULT LOW DOSE 81 MG EC tablet TAKE 1 TABLET BY MOUTH ONCE DAILY 90 tablet 97    escitalopram (LEXAPRO) 10 MG tablet TAKE 1 TABLET BY MOUTH ONCE DAILY 90 tablet 97    furosemide (LASIX) 40 MG tablet Take 1 tablet (40 mg) by mouth every other day. 90 tablet 97    hydrALAZINE (APRESOLINE) 50 MG tablet TAKE 1 TABLET BY MOUTH TWICE  DAILY 270 tablet 11    loperamide (IMODIUM) 2 MG capsule Take 2 mg by mouth 2 times daily. And 1 capsule daily PRN      loratadine (CLARITIN) 10 MG tablet Take 0.5 tablets (5 mg) by mouth every other day 90 tablet 97    Multiple Vitamin (TAB-A-VITA) TABS TAKE 1 TABLET BY MOUTH ONCE DAILY 90 tablet 97    Multiple Vitamins-Minerals (PRESERVISION AREDS) CAPS TAKE 1 CAPSULE BY MOUTH TWICE DAILY WITH MEALS 180 capsule 97    PROPYLENE GLYCOL-GLYCERIN OP Place 1 drop into both eyes 3 times daily.      PROPYLENE GLYCOL-GLYCERIN OP Place 2 drops into both eyes daily as needed.      SPIRIVA RESPIMAT 2.5 MCG/ACT inhaler INHALE 2 PUFFS INTO THE LUNGS ONCE DAILY 4 g 97    Vitamin D3 (CHOLECALCIFEROL) 25 mcg (1000 units) tablet Take 1 tablet (25 mcg) by mouth daily       ROS:  4 point ROS including Respiratory, CV, GI and , other than that noted in the HPI,  is negative    Vitals:  BP (!) 158/48   Pulse 69   Temp 97.3  F (36.3  C)   Resp 16   Ht 1.6 m (5' 3\")   Wt 50.3 kg (110 lb 14.4 oz)   LMP  (LMP Unknown)   SpO2 94%   BMI 19.65 kg/m    Exam:  GENERAL APPEARANCE:  Alert, calm " and pleasant   ENT:  Mouth and posterior oropharynx normal, dry mucous membranes, hoarse voice at baseline, [place], wears hearing aids   EYES:  EOM, conjunctivae, lids, pupils and irises normal, glasses  RESP:  respiratory effort and palpation of chest normal, lungs clear to auscultation but diminished   CV:  Palpation and auscultation of heart done , regular rate and rhythm, 2/6 murmur, LLE edema 1+, trace to right   ABDOMEN: bowel sounds normal  M/S:   gait and station at baseline with U-step walker or cane (around apartment)   SKIN: LLE with no open areas   NEURO:   Cranial nerves 2-12 are normal tested and grossly at patient's baseline  PSYCH:  oriented X 3       Lab/Diagnostic data:  CBC RESULTS:   Recent Labs   Lab Test 03/24/25  0526 03/19/25  0653   WBC 8.6 9.2   RBC 3.55* 3.59*   HGB 10.5* 10.5*   HCT 32.8* 32.7*   MCV 92 91   MCH 29.6 29.2   MCHC 32.0 32.1   RDW 13.9 13.6    286       Last Basic Metabolic Panel:  Recent Labs   Lab Test 03/31/25  0530 03/26/25  0455    139   POTASSIUM 4.9 4.7   CHLORIDE 104 103   TANIA 8.9 9.0   CO2 23 26   BUN 24.4* 20.1   CR 0.99* 1.16*   GLC 84 91       Liver Function Studies -   Recent Labs   Lab Test 08/13/24  0847 05/16/23  1210   PROTTOTAL 7.4 7.3   ALBUMIN 4.4 4.1   BILITOTAL 0.4 0.3   ALKPHOS 71 80   AST 26 25   ALT 14 15       TSH   Date Value Ref Range Status   08/13/2024 3.03 0.30 - 4.20 uIU/mL Final   05/16/2023 2.43 0.30 - 4.20 uIU/mL Final   08/27/2018 1.94 0.40 - 4.00 mU/L Final   09/05/2017 2.60 0.40 - 4.00 mU/L Final       Lab Results   Component Value Date    A1C 5.2 08/13/2024    A1C 5.3 08/27/2018     ASSESSMENT/PLAN:  (L03.116) Cellulitis of left lower extremity  (primary encounter diagnosis)  (I25.10) Coronary artery disease without angina pectoris, unspecified vessel or lesion type, unspecified whether native or transplanted heart  (I10) Essential hypertension  (I73.9) PAD (peripheral artery disease)  (R52) Pain    New orders:   -discontinue  oxycodone (not using)   -CMP and BMP next week on lab day   -PT/OT/RN  -continue every other day lasix for now  -weekly weight and update provider with >5 lbs in 1 week  -vascular follow up 4/9  -discontinue Lisinopril for now   -BP/HR twice daily in [facility] for provider review and dose adjustment of medications         Electronically signed by:  CORNELIUS Kenyon CNP

## 2025-04-08 ENCOUNTER — LAB REQUISITION (OUTPATIENT)
Dept: LAB | Facility: CLINIC | Age: OVER 89
End: 2025-04-08
Payer: COMMERCIAL

## 2025-04-08 ENCOUNTER — ASSISTED LIVING VISIT (OUTPATIENT)
Dept: GERIATRICS | Facility: CLINIC | Age: OVER 89
End: 2025-04-08
Payer: COMMERCIAL

## 2025-04-08 VITALS
WEIGHT: 110.9 LBS | HEIGHT: 63 IN | DIASTOLIC BLOOD PRESSURE: 48 MMHG | SYSTOLIC BLOOD PRESSURE: 158 MMHG | BODY MASS INDEX: 19.65 KG/M2 | HEART RATE: 69 BPM | OXYGEN SATURATION: 94 % | TEMPERATURE: 97.3 F | RESPIRATION RATE: 16 BRPM

## 2025-04-08 DIAGNOSIS — N18.30 CHRONIC KIDNEY DISEASE, STAGE 3 UNSPECIFIED (H): ICD-10-CM

## 2025-04-08 DIAGNOSIS — I10 ESSENTIAL HYPERTENSION: ICD-10-CM

## 2025-04-08 DIAGNOSIS — L03.116 CELLULITIS OF LEFT LOWER EXTREMITY: Primary | ICD-10-CM

## 2025-04-08 DIAGNOSIS — I73.9 PAD (PERIPHERAL ARTERY DISEASE): ICD-10-CM

## 2025-04-08 DIAGNOSIS — I25.10 CORONARY ARTERY DISEASE WITHOUT ANGINA PECTORIS, UNSPECIFIED VESSEL OR LESION TYPE, UNSPECIFIED WHETHER NATIVE OR TRANSPLANTED HEART: ICD-10-CM

## 2025-04-08 DIAGNOSIS — R52 PAIN: ICD-10-CM

## 2025-04-08 PROCEDURE — 99349 HOME/RES VST EST MOD MDM 40: CPT | Performed by: NURSE PRACTITIONER

## 2025-04-08 NOTE — LETTER
" 4/8/2025      Shiloh Covarrubias  C/o Chuck Covarrubias  [address] [address] [place] [id number]        [facility]  PRIMARY CARE PROVIDER AND CLINIC:  CORNELIUS Kenyon [facility], [address] [facility] / [facility] [address]  Chief Complaint   Patient presents with     Hospital F/U     TCU f/u     [facility] Record Number:  [id number]  Place of Service where encounter took place:  [facility] [[id number]]    Shiloh Covarrubias  is a 89 year old  (11/3/1929),  [facility] the above facility [facility] from 3/19/25 - 4/1/25 following a hospital stay at [facility] from 3/13/25 - 3/19/25 .  Admitted to this facility for  medical management and nursing care.    HPI:    HPI information obtained from: facility chart records, facility staff, patient report, and [facility] chart review.   Brief Summary of Hospital Course:   89 year old female with hx of HFpEF, PAD with known nonhealing wound to LLE who was hospitalized at [facility] 3/13-3/19/25 for ongoing LLE cellulitis after failed attempt at outpatient management with various courses of oral antibiotics; mild CHF; and SHIVANI. Re: cellulitis, pt was given a dose of IV ceftriaxone in the ED, changed to IV cefazolin on admission. WOCN consulted, wound care initiated. Due to lack of improvement, antibiotics were broadened to IV vancomycin and pip-tazo. Cellulitis subsequently improved, and she was discharged with Augmentin x5 days. Treated for mild CHF exacerbation with IV Lasix; resumed on PTA oral Lasix prior to discharge. Renal function gradually returned to baseline during hospital stay. Referred to TCU for ongoing wound care. Now back to [place]  Updates on Status Since Skilled nursing Admission:  Appears mostly back to baseline. Slight LLE leg pain but says it's related to \"dry skin\". She is wearing gentle compression. Edema to LLE at 1+, no redness or warmth but hard to " appreciate pedal pulses, popliteal, dorsalis pedis. BP's have been variable, usually higher before medications 126/181/42-76. Her lisinopril has been on hold. Her PTA lasix is now every other day. Eating and drinking, having bowel movements. No use of prn oxycodone.       CODE STATUS/ADVANCE DIRECTIVES DISCUSSION:   DNR / DNI  Patient's living condition: lives in an assisted living facility  ALLERGIES: Clonidine derivatives, Entocort [corticosteroids], Macrobid [nitrofurantoin], and Sulfa antibiotics  PAST MEDICAL HISTORY:  has a past medical history of Asthma (1/4/2023), CAD (coronary artery disease) (1/4/2023), Cerebral infarction (H) (1/4/2023), Chronic kidney disease, stage 3 (H) (11/16/2020), COPD (chronic obstructive pulmonary disease) (H) (1/4/2023), Disorder of thyroid (1/4/2023), Edema, Essential hypertension with goal blood pressure less than 130/80 (5/24/2016), Generalized muscle weakness (1/4/2023), GERD (gastroesophageal reflux disease) (1/19/2011), Heart failure (H) (1/4/2023), Hyperlipidemia LDL goal <100 (3/30/2011), Hypertension, Hypertriglyceridemia (5/18/2010), Major depressive disorder, single episode, mild (2/15/2016), Osteoarthrosis, unspecified whether generalized or localized, involving lower leg (1/8/2007), Peripheral vascular disease (3/30/2021), Squamous cell carcinoma (2015), 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    She has no past medical history of Unspecified cerebral artery occlusion with cerebral infarction.  PAST SURGICAL HISTORY:   has a past surgical history that includes UPPER GI ENDOSCOPY,EXAM (02/23/10); Esophagoscopy, gastroscopy, duodenoscopy (EGD), combined (2/8/2011); and Esophagoscopy, gastroscopy, duodenoscopy (EGD), dilatation, combined (2/8/2011).  FAMILY HISTORY: family history includes Arthritis in her mother; Depression in her  "maternal uncle; Osteoporosis in her mother; Thyroid Disease in her mother.  SOCIAL HISTORY:   reports that she has never smoked. She has never used smokeless tobacco. She reports current alcohol use. She reports that she does not use drugs.    Post Discharge Medication Reconciliation Status: discharge medications reconciled, continue medications without change    Current Outpatient Medications   Medication Sig Dispense Refill     acetaminophen (TYLENOL) 500 MG tablet Take 1 tablet (500 mg) by mouth 2 times daily as needed for mild pain. 60 tablet 0     AMLODIPINE BESYLATE PO Take 7.5 mg by mouth daily.       ASPIRIN ADULT LOW DOSE 81 MG EC tablet TAKE 1 TABLET BY MOUTH ONCE DAILY 90 tablet 97     escitalopram (LEXAPRO) 10 MG tablet TAKE 1 TABLET BY MOUTH ONCE DAILY 90 tablet 97     furosemide (LASIX) 40 MG tablet Take 1 tablet (40 mg) by mouth every other day. 90 tablet 97     hydrALAZINE (APRESOLINE) 50 MG tablet TAKE 1 TABLET BY MOUTH TWICE  DAILY 270 tablet 11     loperamide (IMODIUM) 2 MG capsule Take 2 mg by mouth 2 times daily. And 1 capsule daily PRN       loratadine (CLARITIN) 10 MG tablet Take 0.5 tablets (5 mg) by mouth every other day 90 tablet 97     Multiple Vitamin (TAB-A-VITA) TABS TAKE 1 TABLET BY MOUTH ONCE DAILY 90 tablet 97     Multiple Vitamins-Minerals (PRESERVISION AREDS) CAPS TAKE 1 CAPSULE BY MOUTH TWICE DAILY WITH MEALS 180 capsule 97     PROPYLENE GLYCOL-GLYCERIN OP Place 1 drop into both eyes 3 times daily.       PROPYLENE GLYCOL-GLYCERIN OP Place 2 drops into both eyes daily as needed.       SPIRIVA RESPIMAT 2.5 MCG/ACT inhaler INHALE 2 PUFFS INTO THE LUNGS ONCE DAILY 4 g 97     Vitamin D3 (CHOLECALCIFEROL) 25 mcg (1000 units) tablet Take 1 tablet (25 mcg) by mouth daily       ROS:  4 point ROS including Respiratory, CV, GI and , other than that noted in the HPI,  is negative    Vitals:  BP (!) 158/48   Pulse 69   Temp 97.3  F (36.3  C)   Resp 16   Ht 1.6 m (5' 3\")   Wt 50.3 kg " (110 lb 14.4 oz)   LMP  (LMP Unknown)   SpO2 94%   BMI 19.65 kg/m    Exam:  GENERAL APPEARANCE:  Alert, calm and pleasant   ENT:  Mouth and posterior oropharynx normal, dry mucous membranes, hoarse voice at baseline, [place], wears hearing aids   EYES:  EOM, conjunctivae, lids, pupils and irises normal, glasses  RESP:  respiratory effort and palpation of chest normal, lungs clear to auscultation but diminished   CV:  Palpation and auscultation of heart done , regular rate and rhythm, 2/6 murmur, LLE edema 1+, trace to right   ABDOMEN: bowel sounds normal  M/S:   gait and station at baseline with U-step walker or cane (around apartment)   SKIN: LLE with no open areas   NEURO:   Cranial nerves 2-12 are normal tested and grossly at patient's baseline  PSYCH:  oriented X 3       Lab/Diagnostic data:  CBC RESULTS:   Recent Labs   Lab Test 03/24/25  0526 03/19/25  0653   WBC 8.6 9.2   RBC 3.55* 3.59*   HGB 10.5* 10.5*   HCT 32.8* 32.7*   MCV 92 91   MCH 29.6 29.2   MCHC 32.0 32.1   RDW 13.9 13.6    286       Last Basic Metabolic Panel:  Recent Labs   Lab Test 03/31/25  0530 03/26/25  0455    139   POTASSIUM 4.9 4.7   CHLORIDE 104 103   TANIA 8.9 9.0   CO2 23 26   BUN 24.4* 20.1   CR 0.99* 1.16*   GLC 84 91       Liver Function Studies -   Recent Labs   Lab Test 08/13/24  0847 05/16/23  1210   PROTTOTAL 7.4 7.3   ALBUMIN 4.4 4.1   BILITOTAL 0.4 0.3   ALKPHOS 71 80   AST 26 25   ALT 14 15       TSH   Date Value Ref Range Status   08/13/2024 3.03 0.30 - 4.20 uIU/mL Final   05/16/2023 2.43 0.30 - 4.20 uIU/mL Final   08/27/2018 1.94 0.40 - 4.00 mU/L Final   09/05/2017 2.60 0.40 - 4.00 mU/L Final       Lab Results   Component Value Date    A1C 5.2 08/13/2024    A1C 5.3 08/27/2018     ASSESSMENT/PLAN:  (L03.116) Cellulitis of left lower extremity  (primary encounter diagnosis)  (I25.10) Coronary artery disease without angina pectoris, unspecified vessel or lesion type, unspecified whether native or transplanted  heart  (I10) Essential hypertension  (I73.9) PAD (peripheral artery disease)  (R52) Pain    New orders:   -discontinue oxycodone (not using)   -CMP and BMP next week on lab day   -PT/OT/RN  -continue every other day lasix for now  -weekly weight and update provider with >5 lbs in 1 week  -vascular follow up 4/9  -discontinue Lisinopril for now   -BP/HR twice daily in [facility] for provider review and dose adjustment of medications         Electronically signed by:  CORNELIUS Kenyon CNP                       Sincerely,        CORNELIUS Kenyon CNP    Electronically signed

## 2025-04-08 NOTE — LETTER
Shiloh Covarrubias orders:     -CMP and CBC next week on lab day for CKD    -discontinue oxycodone     -discontinue Lisinopril         Electronically signed by:  CORNELIUS Kenyon CNP

## 2025-04-15 LAB
ALBUMIN SERPL BCG-MCNC: 3.9 G/DL (ref 3.5–5.2)
ALP SERPL-CCNC: 78 U/L (ref 40–150)
ALT SERPL W P-5'-P-CCNC: 14 U/L (ref 0–50)
ANION GAP SERPL CALCULATED.3IONS-SCNC: 11 MMOL/L (ref 7–15)
AST SERPL W P-5'-P-CCNC: 26 U/L (ref 0–45)
BILIRUB SERPL-MCNC: 0.3 MG/DL
BUN SERPL-MCNC: 20 MG/DL (ref 8–23)
CALCIUM SERPL-MCNC: 9.4 MG/DL (ref 8.8–10.4)
CHLORIDE SERPL-SCNC: 100 MMOL/L (ref 98–107)
CREAT SERPL-MCNC: 1.12 MG/DL (ref 0.51–0.95)
EGFRCR SERPLBLD CKD-EPI 2021: 45 ML/MIN/1.73M2
ERYTHROCYTE [DISTWIDTH] IN BLOOD BY AUTOMATED COUNT: 15.1 % (ref 10–15)
GLUCOSE SERPL-MCNC: 83 MG/DL (ref 70–99)
HCO3 SERPL-SCNC: 28 MMOL/L (ref 22–29)
HCT VFR BLD AUTO: 37.8 % (ref 35–47)
HGB BLD-MCNC: 11.8 G/DL (ref 11.7–15.7)
MCH RBC QN AUTO: 29 PG (ref 26.5–33)
MCHC RBC AUTO-ENTMCNC: 31.2 G/DL (ref 31.5–36.5)
MCV RBC AUTO: 93 FL (ref 78–100)
PLATELET # BLD AUTO: 195 10E3/UL (ref 150–450)
POTASSIUM SERPL-SCNC: 5 MMOL/L (ref 3.4–5.3)
PROT SERPL-MCNC: 7.3 G/DL (ref 6.4–8.3)
RBC # BLD AUTO: 4.07 10E6/UL (ref 3.8–5.2)
SODIUM SERPL-SCNC: 139 MMOL/L (ref 135–145)
WBC # BLD AUTO: 8.4 10E3/UL (ref 4–11)

## 2025-04-19 ENCOUNTER — HEALTH MAINTENANCE LETTER (OUTPATIENT)
Age: OVER 89
End: 2025-04-19

## 2025-05-01 ENCOUNTER — TELEPHONE (OUTPATIENT)
Dept: GERIATRICS | Facility: CLINIC | Age: OVER 89
End: 2025-05-01
Payer: COMMERCIAL

## 2025-05-01 NOTE — TELEPHONE ENCOUNTER
[facility] Triage Call    Provider: Jenny Padilla MD  Facility: [facility] Type:  AL    Caller: Jami   Call Back Number: [phone]    Allergies:    Allergies   Allergen Reactions    Clonidine Derivatives      Severe side effects      Entocort [Corticosteroids]     Macrobid [Nitrofurantoin]      Diarrhea      Sulfa Antibiotics Itching     itch        SBAR:     S-(situation): Today the nurse is going to report weight gain.   Today's weight recheck is 114lbs,  yesterday 113lbs,   4/23 102  4/16 108  4/9   110    B-(background): Currently on Lasix 40 mg every other day.  Patient getting weekly weights  hx of HFpEF, history of PAD with known nonhealing wounds    A-(assessment): /58 (prior to meds) P64, T96.6, RR 20, O2 96% room air  , No shortness of breath, lung sounds clear, trace pedal edema.  Per the patient her appetite was down and now she says her appetite is back and eating better.   No current signs or symptoms of fluid overload.     Verbal Order per: MELONIE Henry  NNO-continue to monitor for s/s of fluid overload  NP will see at next visit.   Verbal order/direction given to: Left VM for nursing staff  Idania Sanchez, RN      Idania Sanchez, RN

## 2025-05-02 ENCOUNTER — ASSISTED LIVING VISIT (OUTPATIENT)
Dept: GERIATRICS | Facility: CLINIC | Age: OVER 89
End: 2025-05-02
Payer: COMMERCIAL

## 2025-05-02 VITALS
SYSTOLIC BLOOD PRESSURE: 143 MMHG | BODY MASS INDEX: 20.07 KG/M2 | HEART RATE: 72 BPM | RESPIRATION RATE: 20 BRPM | OXYGEN SATURATION: 98 % | HEIGHT: 63 IN | TEMPERATURE: 96.9 F | DIASTOLIC BLOOD PRESSURE: 56 MMHG | WEIGHT: 113.3 LBS

## 2025-05-02 DIAGNOSIS — I50.32 CHRONIC DIASTOLIC HEART FAILURE (H): ICD-10-CM

## 2025-05-02 DIAGNOSIS — I10 ESSENTIAL HYPERTENSION: ICD-10-CM

## 2025-05-02 DIAGNOSIS — I27.20 PULMONARY HYPERTENSION (H): ICD-10-CM

## 2025-05-02 DIAGNOSIS — I25.10 CORONARY ARTERY DISEASE WITHOUT ANGINA PECTORIS, UNSPECIFIED VESSEL OR LESION TYPE, UNSPECIFIED WHETHER NATIVE OR TRANSPLANTED HEART: ICD-10-CM

## 2025-05-02 DIAGNOSIS — N18.32 STAGE 3B CHRONIC KIDNEY DISEASE (H): ICD-10-CM

## 2025-05-02 DIAGNOSIS — E78.5 DYSLIPIDEMIA: ICD-10-CM

## 2025-05-02 DIAGNOSIS — J44.9 CHRONIC OBSTRUCTIVE PULMONARY DISEASE, UNSPECIFIED COPD TYPE (H): ICD-10-CM

## 2025-05-02 DIAGNOSIS — Z87.2 HISTORY OF CELLULITIS: ICD-10-CM

## 2025-05-02 DIAGNOSIS — F32.A ANXIETY AND DEPRESSION: ICD-10-CM

## 2025-05-02 DIAGNOSIS — I73.9 PAD (PERIPHERAL ARTERY DISEASE): ICD-10-CM

## 2025-05-02 DIAGNOSIS — F41.9 ANXIETY AND DEPRESSION: ICD-10-CM

## 2025-05-02 DIAGNOSIS — E11.59 TYPE 2 DIABETES MELLITUS WITH OTHER CIRCULATORY COMPLICATION, WITHOUT LONG-TERM CURRENT USE OF INSULIN (H): Primary | ICD-10-CM

## 2025-05-02 DIAGNOSIS — H35.30 MACULAR DEGENERATION (SENILE) OF RETINA: ICD-10-CM

## 2025-05-02 NOTE — LETTER
" 2025      Shiloh Covarrubias  C/o Chuck Covarrubias  [address] [address] [place] [id number]        [facility]    Name:   Shiloh Covarrubias (Xochitl)  :   11/3/1929  MRN:    [id number]     Facility:   [facility] [[id number]]   Room: 203  Code Status: DNR/DNI and POLST AVAILABLE -     DOS:  2025    PCP:  CORNELIUS Kim CNP     CHIEF COMPLAINT / REASON FOR VISIT:  Chief Complaint   Patient presents with     RECHECK     Heart failure, pulmonary HTN          HPI: Shiloh is a 89 year old female with a PMHx significant for HFpEF, CKD stage IIIb, DM 2 (with PAD, nonhealing wounds, and Hx cellulitis), COPD, essential and pulmonary hypertension, dyslipidemia (hyperlipidemia and hypertriglyceridemia) CAD, anxiety and depression, and bilateral macular degeneration (wet left, dry right).  She was hospitalized in March for ongoing LLE cellulitis after failed attempts at outpatient management with various courses of oral antibiotics      CURRENT/RECENT TCU ISSUES    Chronic diastolic heart failure  Stage IIIb kidney disease  -- Nursing staff report weight gain, though this may be due to improved appetite.  She tells me it \"finally came back, and now I'm hungry all the time.\"  -- Continue with current dosing of furosemide.    Diabetes mellitus type 2  Peripheral arterial disease  Dyslipidemia  Coronary artery disease  -- Blood glucose levels are diet controlled.  -- All lower extremity wounds are currently healed.  -- Given her age, she is not on a statin.    History of nonhealing wound/cellulitis of the LLE  -- Currently resolved after 6-day hospitalization.  -- Has dry, scaly skin and I am recommending Aquaphor twice daily.    Essential hypertension  Pulmonary hypertension  -- Blood pressure adequately managed with amlodipine, furosemide, and hydralazine.  -- Blood pressures are checked before meds are given, and they are typically high.  I am suggesting " that we discontinue premedication blood pressure checks and instead check 1.5 to 2 hours after BP meds administration.    COPD  -- No recent exacerbations.  -- Continue using Spiriva Respimat.    Macular degeneration  -- Will be getting injection to left eye (wet).    -- Right eye is dry.    Anxiety and depression  -- She tells me her  passed away 14 years ago, and she still misses him.  -- Seems to be doing okay at the moment, especially in light of healing of the wound on her left lower extremity for which she was hospitalized for 6 days.  -- Continues on escitalopram      ROS: 10 point ROS of systems including Constitutional, Eyes, Respiratory, Cardiovascular, Gastroenterology, Genitourinary, Integumentary, Musculoskeletal, and Psychiatric were all negative except for pertinent positives noted in my HPI.      Past Medical History:   Diagnosis Date     Asthma 1/4/2023     CAD (coronary artery disease) 1/4/2023     Cerebral infarction (H) 1/4/2023     Chronic kidney disease, stage 3 (H) 11/16/2020     COPD (chronic obstructive pulmonary disease) (H) 1/4/2023     Disorder of thyroid 1/4/2023     Edema     Peripheral edema     Essential hypertension with goal blood pressure less than 130/80 5/24/2016     Generalized muscle weakness 1/4/2023     GERD (gastroesophageal reflux disease) 1/19/2011     Heart failure (H) 1/4/2023     Hyperlipidemia LDL goal <100 3/30/2011     Hypertension      Hypertriglyceridemia 5/18/2010     Major depressive disorder, single episode, mild 2/15/2016     Osteoarthrosis, unspecified whether generalized or localized, involving lower leg 1/8/2007    Problem list name updated by automated process. Provider to review Replacing diagnoses that were inactivated after the 10/1/2021 regulatory import.     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     Depression Maternal Uncle         Bouts of depression     Hypertension No family hx of      C.A.D. No family hx of      Social History     Socioeconomic History     Marital status:      Spouse name: Edilson Haque     Number of children: 5     Years of education: 9     Highest education level: None   Occupational History     Occupation: [employer] worked for [employer]   Tobacco Use     Smoking status: Never     Smokeless tobacco: Never   Vaping Use     Vaping status: Never Used   Substance and Sexual Activity     Alcohol use: Yes     Alcohol/week: 0.0 standard drinks of alcohol     Comment: 3 per  year     Drug use: No     Sexual activity: Never     Partners: Male   Other Topics Concern      Service No     Blood Transfusions No     Caffeine Concern No     Comment: Drinks decaf at home. Drinks alot of tea.     Occupational Exposure No     Hobby Hazards No     Sleep Concern No     Stress Concern Yes     Comment: sold their home, then rented, and now built town home.     Weight Concern No     Special Diet No     Back Care No     Comment: Goes to Chiropractor once every 8 weeks.     Exercise Yes     Comment: She goes to [facility] and she walks     Seat Belt Yes     Comment: Always uses a seat belt.     Self-Exams No     Parent/sibling w/ CABG, MI or angioplasty before 65F 55M? No     Social Drivers of Health     Financial Resource Strain: Low Risk  (3/13/2025)    Financial Resource Strain      Within the past 12 months, have you or your family members you live with been unable to get utilities (heat, electricity) when it was really needed?: No   Food Insecurity: Low Risk  (3/13/2025)    Food Insecurity      Within the past 12 months, did you worry that your  food would run out before you got money to buy more?: No      Within the past 12 months, did the food you bought just not last and you didn t have money to get more?: No   Transportation Needs: Low Risk  (3/13/2025)    Transportation Needs      Within the past 12 months, has lack of transportation kept you from medical appointments, getting your medicines, non-medical meetings or appointments, work, or from getting things that you need?: No   Interpersonal Safety: Low Risk  (3/13/2025)    Interpersonal Safety      Do you feel physically and emotionally safe where you currently live?: Yes      Within the past 12 months, have you been hit, slapped, kicked or otherwise physically hurt by someone?: No      Within the past 12 months, have you been humiliated or emotionally abused in other ways by your partner or ex-partner?: No   Housing Stability: Low Risk  (3/13/2025)    Housing Stability      Do you have housing? : Yes      Are you worried about losing your housing?: No       MEDICATIONS: Reviewed from the MAR, physician orders, and/or earlier progress notes.  MED REC REQUIRED  Post Medication Reconciliation Status:     Current Outpatient Medications   Medication Sig Dispense Refill     acetaminophen (TYLENOL) 500 MG tablet Take 1 tablet (500 mg) by mouth 2 times daily as needed for mild pain. 60 tablet 0     AMLODIPINE BESYLATE PO Take 7.5 mg by mouth daily.       ASPIRIN ADULT LOW DOSE 81 MG EC tablet TAKE 1 TABLET BY MOUTH ONCE DAILY 90 tablet 97     escitalopram (LEXAPRO) 10 MG tablet TAKE 1 TABLET BY MOUTH ONCE DAILY 90 tablet 97     furosemide (LASIX) 40 MG tablet Take 1 tablet (40 mg) by mouth every other day. 90 tablet 97     hydrALAZINE (APRESOLINE) 50 MG tablet TAKE 1 TABLET BY MOUTH TWICE  DAILY 270 tablet 11     loperamide (IMODIUM) 2 MG capsule Take 2 mg by mouth 2 times daily. And 1 capsule daily PRN       loratadine (CLARITIN) 10 MG tablet Take 0.5 tablets (5 mg) by mouth every other day 90 tablet 97  "    Multiple Vitamin (TAB-A-VITA) TABS TAKE 1 TABLET BY MOUTH ONCE DAILY 90 tablet 97     Multiple Vitamins-Minerals (PRESERVISION AREDS) CAPS TAKE 1 CAPSULE BY MOUTH TWICE DAILY WITH MEALS 180 capsule 97     PROPYLENE GLYCOL-GLYCERIN OP Place 1 drop into both eyes 3 times daily.       PROPYLENE GLYCOL-GLYCERIN OP Place 2 drops into both eyes daily as needed.       SPIRIVA RESPIMAT 2.5 MCG/ACT inhaler INHALE 2 PUFFS INTO THE LUNGS ONCE DAILY 4 g 97     Vitamin D3 (CHOLECALCIFEROL) 25 mcg (1000 units) tablet Take 1 tablet (25 mcg) by mouth daily       No current facility-administered medications for this visit.     ALLERGIES:   Allergies   Allergen Reactions     Clonidine Derivatives      Severe side effects       Entocort [Corticosteroids]      Macrobid [Nitrofurantoin]      Diarrhea       Sulfa Antibiotics Itching     itch       DIET: Regular, regular texture, thin liquids.    Vitals:    05/02/25 1453   BP: (!) 143/56   Pulse: 72   Resp: 20   Temp: 96.9  F (36.1  C)   SpO2: 98%   Weight: 51.4 kg (113 lb 4.8 oz)   Height: 1.6 m (5' 3\")     Wt Readings from Last 4 Encounters:   05/02/25 51.4 kg (113 lb 4.8 oz)   04/07/25 50.3 kg (110 lb 14.4 oz)   03/26/25 49.4 kg (108 lb 12.8 oz)   03/24/25 50.7 kg (111 lb 12.8 oz)     Body mass index is 20.07 kg/m .  Body surface area is 1.51 meters squared.    EXAMINATION:   General: Delightful, alert, and conversant elderly female, sitting comfortably, in no apparent distress.  Head: Normocephalic and atraumatic.   Eyes: PERRLA, sclerae clear.   ENT: Moist oral mucosa.  Has upper and lower partials.  Hard of hearing (hearing aids work well).  Cardiovascular: Regular rate and rhythm with a coarse 4/6 murmur at the ULSB.  Currently, no peripheral edema.  Respiratory: Lungs clear to auscultation bilaterally.   Abdomen: Nondistended.   Musculoskeletal/Extremities: Age-related degenerative joint disease.  Walks well with a 4 pronged cane.  Integument: No visible rashes, clinically " significant lesions, or skin breakdown at this time.  LE wound appears to be healed.  Skin is scaly, especially around the ankles.  She does use lotion but I am recommending we try Aquaphor.  Cognitive/Psychiatric: Alert and oriented with euthymic affect.    DIAGNOSTICS:   No results found for this or any previous visit (from the past 240 hours).  Last Comprehensive Metabolic Panel:  Sodium   Date Value Ref Range Status   04/15/2025 139 135 - 145 mmol/L Final   04/09/2021 137 133 - 144 mmol/L Final     Potassium   Date Value Ref Range Status   04/15/2025 5.0 3.4 - 5.3 mmol/L Final   07/28/2022 4.7 3.4 - 5.3 mmol/L Final   04/09/2021 4.4 3.4 - 5.3 mmol/L Final     Chloride   Date Value Ref Range Status   04/15/2025 100 98 - 107 mmol/L Final   07/28/2022 104 94 - 109 mmol/L Final   04/09/2021 104 94 - 109 mmol/L Final     Carbon Dioxide   Date Value Ref Range Status   04/09/2021 28 20 - 32 mmol/L Final     Carbon Dioxide (CO2)   Date Value Ref Range Status   04/15/2025 28 22 - 29 mmol/L Final   07/28/2022 25 20 - 32 mmol/L Final     Anion Gap   Date Value Ref Range Status   04/15/2025 11 7 - 15 mmol/L Final   07/28/2022 7 3 - 14 mmol/L Final   04/09/2021 5 3 - 14 mmol/L Final     Glucose   Date Value Ref Range Status   04/15/2025 83 70 - 99 mg/dL Final   07/28/2022 94 70 - 99 mg/dL Final   04/09/2021 96 70 - 99 mg/dL Final     Urea Nitrogen   Date Value Ref Range Status   04/15/2025 20.0 8.0 - 23.0 mg/dL Final   07/28/2022 37 (H) 7 - 30 mg/dL Final   04/09/2021 30 7 - 30 mg/dL Final     Creatinine   Date Value Ref Range Status   04/15/2025 1.12 (H) 0.51 - 0.95 mg/dL Final   04/09/2021 1.24 (H) 0.52 - 1.04 mg/dL Final     GFR Estimate   Date Value Ref Range Status   04/15/2025 45 (L) >60 mL/min/1.73m2 Final   04/09/2021 38 (L) >60 mL/min/[1.73_m2] Final     Comment:     Non  GFR Calc  Starting 12/18/2018, serum creatinine based estimated GFR (eGFR) will be   calculated using the Chronic Kidney Disease  Epidemiology Collaboration   (CKD-EPI) equation.       Calcium   Date Value Ref Range Status   04/15/2025 9.4 8.8 - 10.4 mg/dL Final   04/09/2021 9.4 8.5 - 10.1 mg/dL Final     Bilirubin Total   Date Value Ref Range Status   04/15/2025 0.3 <=1.2 mg/dL Final   04/09/2021 0.4 0.2 - 1.3 mg/dL Final     Alkaline Phosphatase   Date Value Ref Range Status   04/15/2025 78 40 - 150 U/L Final   04/09/2021 73 40 - 150 U/L Final     ALT   Date Value Ref Range Status   04/15/2025 14 0 - 50 U/L Final   04/09/2021 22 0 - 50 U/L Final     AST   Date Value Ref Range Status   04/15/2025 26 0 - 45 U/L Final   04/09/2021 17 0 - 45 U/L Final     Lab Results   Component Value Date    WBC 8.4 04/15/2025    WBC 8.3 04/09/2021     Lab Results   Component Value Date    RBC 4.07 04/15/2025    RBC 4.24 04/09/2021     Lab Results   Component Value Date    HGB 11.8 04/15/2025    HGB 13.0 04/09/2021     Lab Results   Component Value Date    HCT 37.8 04/15/2025    HCT 40.4 04/09/2021     Lab Results   Component Value Date    MCV 93 04/15/2025    MCV 95 04/09/2021     Lab Results   Component Value Date    MCH 29.0 04/15/2025    MCH 30.7 04/09/2021     Lab Results   Component Value Date    MCHC 31.2 04/15/2025    MCHC 32.2 04/09/2021     Lab Results   Component Value Date    RDW 15.1 04/15/2025    RDW 13.2 04/09/2021     Lab Results   Component Value Date     04/15/2025     04/09/2021       ASSESSMENT/Plan:      ICD-10-CM    1. Type 2 diabetes mellitus with other circulatory complication, without long-term current use of insulin (H)  E11.59       2. Chronic diastolic heart failure (H)  I50.32       3. Stage 3b chronic kidney disease (H)  N18.32       4. PAD (peripheral artery disease)  I73.9       5. History of lower extremity cellulitis  Z87.2       6. Chronic obstructive pulmonary disease, unspecified COPD type (H)  J44.9       7. Essential hypertension  I10       8. Pulmonary hypertension (H)  I27.20       9. Dyslipidemia  (hyperlipidemia & hypertriglyceridemia)  E78.5       10. Coronary artery disease without angina pectoris, unspecified vessel or lesion type, unspecified whether native or transplanted heart  I25.10       11. Macular degeneration (senile) of retina, bilateral (wet left, dry right)  H35.30       12. Anxiety and depression  F41.9     F32.A           CHANGES:    Aquaphor to lower extremities twice daily as needed.  Discontinue premedication blood pressure checks and instead check 1.5 to 2 hours after BP meds administration.    CARE PLAN:    The care plan, medications, vital signs, orders, and nursing notes have been reviewed, and all orders signed. Changes to care plan, if any, as noted. Otherwise, continue current plan of care. Total time for visit was 35 minutes including, but not limited to, non-face-to-face time spent reviewing records, counseling, and coordination of care.    The above has been created using voice recognition software. Please be aware that this may unintentionally  produce inaccuracies and/or nonsensical sentences.      Electronically signed by: CORNELIUS Kim CNP      Sincerely,        CORNELIUS Kim CNP    Electronically signed

## 2025-05-06 ENCOUNTER — PATIENT OUTREACH (OUTPATIENT)
Dept: CARE COORDINATION | Facility: CLINIC | Age: OVER 89
End: 2025-05-06
Payer: COMMERCIAL

## 2025-05-08 RX ORDER — MINERAL OIL/HYDROPHIL PETROLAT
OINTMENT (GRAM) TOPICAL 2 TIMES DAILY PRN
COMMUNITY

## 2025-05-08 NOTE — PROGRESS NOTES
"[facility]    Name:   Shiloh Covarrubias (Xochitl)  :   11/3/1929  MRN:    [id number]     Facility:   [facility] ([facility]) [[id number]]   Room: 203  Code Status: DNR/DNI and POLST AVAILABLE -     DOS:  2025    PCP:  CORNELIUS Kim CNP     CHIEF COMPLAINT / REASON FOR VISIT:  Chief Complaint   Patient presents with    RECHECK     Heart failure, pulmonary HTN          HPI: Shiloh is a 89 year old female with a PMHx significant for HFpEF, CKD stage IIIb, DM 2 (with PAD, nonhealing wounds, and Hx cellulitis), COPD, essential and pulmonary hypertension, dyslipidemia (hyperlipidemia and hypertriglyceridemia) CAD, anxiety and depression, and bilateral macular degeneration (wet left, dry right).  She was hospitalized in March for ongoing LLE cellulitis after failed attempts at outpatient management with various courses of oral antibiotics      CURRENT/RECENT TCU ISSUES    Chronic diastolic heart failure  Stage IIIb kidney disease  -- Nursing staff report weight gain, though this may be due to improved appetite.  She tells me it \"finally came back, and now I'm hungry all the time.\"  -- Continue with current dosing of furosemide.    Diabetes mellitus type 2  Peripheral arterial disease  Dyslipidemia  Coronary artery disease  -- Blood glucose levels are diet controlled.  -- All lower extremity wounds are currently healed.  -- Given her age, she is not on a statin.    History of nonhealing wound/cellulitis of the LLE  -- Currently resolved after 6-day hospitalization.  -- Has dry, scaly skin and I am recommending Aquaphor twice daily.    Essential hypertension  Pulmonary hypertension  -- Blood pressure adequately managed with amlodipine, furosemide, and hydralazine.  -- Blood pressures are checked before meds are given, and they are typically high.  I am suggesting that we discontinue premedication blood pressure checks and instead check 1.5 to 2 hours after BP meds " administration.    COPD  -- No recent exacerbations.  -- Continue using Spiriva Respimat.    Macular degeneration  -- Will be getting injection to left eye (wet).    -- Right eye is dry.    Anxiety and depression  -- She tells me her  passed away 14 years ago, and she still misses him.  -- Seems to be doing okay at the moment, especially in light of healing of the wound on her left lower extremity for which she was hospitalized for 6 days.  -- Continues on escitalopram      ROS: 10 point ROS of systems including Constitutional, Eyes, Respiratory, Cardiovascular, Gastroenterology, Genitourinary, Integumentary, Musculoskeletal, and Psychiatric were all negative except for pertinent positives noted in my HPI.      Past Medical History:   Diagnosis Date    Asthma 1/4/2023    CAD (coronary artery disease) 1/4/2023    Cerebral infarction (H) 1/4/2023    Chronic kidney disease, stage 3 (H) 11/16/2020    COPD (chronic obstructive pulmonary disease) (H) 1/4/2023    Disorder of thyroid 1/4/2023    Edema     Peripheral edema    Essential hypertension with goal blood pressure less than 130/80 5/24/2016    Generalized muscle weakness 1/4/2023    GERD (gastroesophageal reflux disease) 1/19/2011    Heart failure (H) 1/4/2023    Hyperlipidemia LDL goal <100 3/30/2011    Hypertension     Hypertriglyceridemia 5/18/2010    Major depressive disorder, single episode, mild 2/15/2016    Osteoarthrosis, unspecified whether generalized or localized, involving lower leg 1/8/2007    Problem list name updated by automated process. Provider to review Replacing diagnoses that were inactivated after the 10/1/2021 regulatory import.    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    Depression Maternal Uncle         Bouts of depression    Hypertension No family hx of     C.A.D. No family hx of      Social History     Socioeconomic History    Marital status:      Spouse name: Edilson Haque    Number of children: 5    Years of education: 9    Highest education level: None   Occupational History    Occupation: reitred worked for [employer]   Tobacco Use    Smoking status: Never    Smokeless tobacco: Never   Vaping Use    Vaping status: Never Used   Substance and Sexual Activity    Alcohol use: Yes     Alcohol/week: 0.0 standard drinks of alcohol     Comment: 3 per  year    Drug use: No    Sexual activity: Never     Partners: Male   Other Topics Concern     Service No    Blood Transfusions No    Caffeine Concern No     Comment: Drinks decaf at home. Drinks alot of tea.    Occupational Exposure No    Hobby Hazards No    Sleep Concern No    Stress Concern Yes     Comment: sold their home, then rented, and now built town home.    Weight Concern No    Special Diet No    Back Care No     Comment: Goes to Chiropractor once every 8 weeks.    Exercise Yes     Comment: She goes to [employer] and she walks    Seat Belt Yes     Comment: Always uses a seat belt.    Self-Exams No    Parent/sibling w/ CABG, MI or angioplasty before 65F 55M? No     Social Drivers of Health     Financial Resource Strain: Low Risk  (3/13/2025)    Financial Resource Strain     Within the past 12 months, have you or your family members you live with been unable to get utilities (heat, electricity) when it was really needed?: No   Food Insecurity: Low Risk  (3/13/2025)    Food Insecurity     Within the past 12 months, did you worry that your food would run out before you got money to buy more?: No     Within the past 12 months, did the food you bought just not last and you didn t have money to get  more?: No   Transportation Needs: Low Risk  (3/13/2025)    Transportation Needs     Within the past 12 months, has lack of transportation kept you from medical appointments, getting your medicines, non-medical meetings or appointments, work, or from getting things that you need?: No   Interpersonal Safety: Low Risk  (3/13/2025)    Interpersonal Safety     Do you feel physically and emotionally safe where you currently live?: Yes     Within the past 12 months, have you been hit, slapped, kicked or otherwise physically hurt by someone?: No     Within the past 12 months, have you been humiliated or emotionally abused in other ways by your partner or ex-partner?: No   Housing Stability: Low Risk  (3/13/2025)    Housing Stability     Do you have housing? : Yes     Are you worried about losing your housing?: No       MEDICATIONS: Reviewed from the MAR, physician orders, and/or earlier progress notes.  MED REC REQUIRED  Post Medication Reconciliation Status:     Current Outpatient Medications   Medication Sig Dispense Refill    acetaminophen (TYLENOL) 500 MG tablet Take 1 tablet (500 mg) by mouth 2 times daily as needed for mild pain. 60 tablet 0    AMLODIPINE BESYLATE PO Take 7.5 mg by mouth daily.      ASPIRIN ADULT LOW DOSE 81 MG EC tablet TAKE 1 TABLET BY MOUTH ONCE DAILY 90 tablet 97    escitalopram (LEXAPRO) 10 MG tablet TAKE 1 TABLET BY MOUTH ONCE DAILY 90 tablet 97    furosemide (LASIX) 40 MG tablet Take 1 tablet (40 mg) by mouth every other day. 90 tablet 97    hydrALAZINE (APRESOLINE) 50 MG tablet TAKE 1 TABLET BY MOUTH TWICE  DAILY 270 tablet 11    loperamide (IMODIUM) 2 MG capsule Take 2 mg by mouth 2 times daily. And 1 capsule daily PRN      loratadine (CLARITIN) 10 MG tablet Take 0.5 tablets (5 mg) by mouth every other day 90 tablet 97    Multiple Vitamin (TAB-A-VITA) TABS TAKE 1 TABLET BY MOUTH ONCE DAILY 90 tablet 97    Multiple Vitamins-Minerals (PRESERVISION AREDS) CAPS TAKE 1 CAPSULE BY MOUTH TWICE DAILY  "WITH MEALS 180 capsule 97    PROPYLENE GLYCOL-GLYCERIN OP Place 1 drop into both eyes 3 times daily.      PROPYLENE GLYCOL-GLYCERIN OP Place 2 drops into both eyes daily as needed.      SPIRIVA RESPIMAT 2.5 MCG/ACT inhaler INHALE 2 PUFFS INTO THE LUNGS ONCE DAILY 4 g 97    Vitamin D3 (CHOLECALCIFEROL) 25 mcg (1000 units) tablet Take 1 tablet (25 mcg) by mouth daily       No current facility-administered medications for this visit.     ALLERGIES:   Allergies   Allergen Reactions    Clonidine Derivatives      Severe side effects      Entocort [Corticosteroids]     Macrobid [Nitrofurantoin]      Diarrhea      Sulfa Antibiotics Itching     itch       DIET: Regular, regular texture, thin liquids.    Vitals:    05/02/25 1453   BP: (!) 143/56   Pulse: 72   Resp: 20   Temp: 96.9  F (36.1  C)   SpO2: 98%   Weight: 51.4 kg (113 lb 4.8 oz)   Height: 1.6 m (5' 3\")     Wt Readings from Last 4 Encounters:   05/02/25 51.4 kg (113 lb 4.8 oz)   04/07/25 50.3 kg (110 lb 14.4 oz)   03/26/25 49.4 kg (108 lb 12.8 oz)   03/24/25 50.7 kg (111 lb 12.8 oz)     Body mass index is 20.07 kg/m .  Body surface area is 1.51 meters squared.    EXAMINATION:   General: Delightful, alert, and conversant elderly female, sitting comfortably, in no apparent distress.  Head: Normocephalic and atraumatic.   Eyes: PERRLA, sclerae clear.   ENT: Moist oral mucosa.  Has upper and lower partials.  Hard of hearing (hearing aids work well).  Cardiovascular: Regular rate and rhythm with a coarse 4/6 murmur at the ULSB.  Currently, no peripheral edema.  Respiratory: Lungs clear to auscultation bilaterally.   Abdomen: Nondistended.   Musculoskeletal/Extremities: Age-related degenerative joint disease.  Walks well with a 4 pronged cane.  Integument: No visible rashes, clinically significant lesions, or skin breakdown at this time.  LE wound appears to be healed.  Skin is scaly, especially around the ankles.  She does use lotion but I am recommending we try " Aquaphor.  Cognitive/Psychiatric: Alert and oriented with euthymic affect.    DIAGNOSTICS:   No results found for this or any previous visit (from the past 240 hours).  Last Comprehensive Metabolic Panel:  Sodium   Date Value Ref Range Status   04/15/2025 139 135 - 145 mmol/L Final   04/09/2021 137 133 - 144 mmol/L Final     Potassium   Date Value Ref Range Status   04/15/2025 5.0 3.4 - 5.3 mmol/L Final   07/28/2022 4.7 3.4 - 5.3 mmol/L Final   04/09/2021 4.4 3.4 - 5.3 mmol/L Final     Chloride   Date Value Ref Range Status   04/15/2025 100 98 - 107 mmol/L Final   07/28/2022 104 94 - 109 mmol/L Final   04/09/2021 104 94 - 109 mmol/L Final     Carbon Dioxide   Date Value Ref Range Status   04/09/2021 28 20 - 32 mmol/L Final     Carbon Dioxide (CO2)   Date Value Ref Range Status   04/15/2025 28 22 - 29 mmol/L Final   07/28/2022 25 20 - 32 mmol/L Final     Anion Gap   Date Value Ref Range Status   04/15/2025 11 7 - 15 mmol/L Final   07/28/2022 7 3 - 14 mmol/L Final   04/09/2021 5 3 - 14 mmol/L Final     Glucose   Date Value Ref Range Status   04/15/2025 83 70 - 99 mg/dL Final   07/28/2022 94 70 - 99 mg/dL Final   04/09/2021 96 70 - 99 mg/dL Final     Urea Nitrogen   Date Value Ref Range Status   04/15/2025 20.0 8.0 - 23.0 mg/dL Final   07/28/2022 37 (H) 7 - 30 mg/dL Final   04/09/2021 30 7 - 30 mg/dL Final     Creatinine   Date Value Ref Range Status   04/15/2025 1.12 (H) 0.51 - 0.95 mg/dL Final   04/09/2021 1.24 (H) 0.52 - 1.04 mg/dL Final     GFR Estimate   Date Value Ref Range Status   04/15/2025 45 (L) >60 mL/min/1.73m2 Final   04/09/2021 38 (L) >60 mL/min/[1.73_m2] Final     Comment:     Non  GFR Calc  Starting 12/18/2018, serum creatinine based estimated GFR (eGFR) will be   calculated using the Chronic Kidney Disease Epidemiology Collaboration   (CKD-EPI) equation.       Calcium   Date Value Ref Range Status   04/15/2025 9.4 8.8 - 10.4 mg/dL Final   04/09/2021 9.4 8.5 - 10.1 mg/dL Final      Bilirubin Total   Date Value Ref Range Status   04/15/2025 0.3 <=1.2 mg/dL Final   04/09/2021 0.4 0.2 - 1.3 mg/dL Final     Alkaline Phosphatase   Date Value Ref Range Status   04/15/2025 78 40 - 150 U/L Final   04/09/2021 73 40 - 150 U/L Final     ALT   Date Value Ref Range Status   04/15/2025 14 0 - 50 U/L Final   04/09/2021 22 0 - 50 U/L Final     AST   Date Value Ref Range Status   04/15/2025 26 0 - 45 U/L Final   04/09/2021 17 0 - 45 U/L Final     Lab Results   Component Value Date    WBC 8.4 04/15/2025    WBC 8.3 04/09/2021     Lab Results   Component Value Date    RBC 4.07 04/15/2025    RBC 4.24 04/09/2021     Lab Results   Component Value Date    HGB 11.8 04/15/2025    HGB 13.0 04/09/2021     Lab Results   Component Value Date    HCT 37.8 04/15/2025    HCT 40.4 04/09/2021     Lab Results   Component Value Date    MCV 93 04/15/2025    MCV 95 04/09/2021     Lab Results   Component Value Date    MCH 29.0 04/15/2025    MCH 30.7 04/09/2021     Lab Results   Component Value Date    MCHC 31.2 04/15/2025    MCHC 32.2 04/09/2021     Lab Results   Component Value Date    RDW 15.1 04/15/2025    RDW 13.2 04/09/2021     Lab Results   Component Value Date     04/15/2025     04/09/2021       ASSESSMENT/Plan:      ICD-10-CM    1. Type 2 diabetes mellitus with other circulatory complication, without long-term current use of insulin (H)  E11.59       2. Chronic diastolic heart failure (H)  I50.32       3. Stage 3b chronic kidney disease (H)  N18.32       4. PAD (peripheral artery disease)  I73.9       5. History of lower extremity cellulitis  Z87.2       6. Chronic obstructive pulmonary disease, unspecified COPD type (H)  J44.9       7. Essential hypertension  I10       8. Pulmonary hypertension (H)  I27.20       9. Dyslipidemia (hyperlipidemia & hypertriglyceridemia)  E78.5       10. Coronary artery disease without angina pectoris, unspecified vessel or lesion type, unspecified whether native or  transplanted heart  I25.10       11. Macular degeneration (senile) of retina, bilateral (wet left, dry right)  H35.30       12. Anxiety and depression  F41.9     F32.A           CHANGES:    Aquaphor to lower extremities twice daily as needed.  Discontinue premedication blood pressure checks and instead check 1.5 to 2 hours after BP meds administration.    CARE PLAN:    The care plan, medications, vital signs, orders, and nursing notes have been reviewed, and all orders signed. Changes to care plan, if any, as noted. Otherwise, continue current plan of care. Total time for visit was 35 minutes including, but not limited to, non-face-to-face time spent reviewing records, counseling, and coordination of care.    The above has been created using voice recognition software. Please be aware that this may unintentionally  produce inaccuracies and/or nonsensical sentences.      Electronically signed by: CORNELIUS Kim CNP

## 2025-05-27 ENCOUNTER — OFFICE VISIT (OUTPATIENT)
Dept: PHARMACY | Facility: CLINIC | Age: OVER 89
End: 2025-05-27
Payer: COMMERCIAL

## 2025-05-27 DIAGNOSIS — R60.0 LOCALIZED EDEMA: ICD-10-CM

## 2025-05-27 DIAGNOSIS — J30.2 SEASONAL ALLERGIC RHINITIS, UNSPECIFIED TRIGGER: ICD-10-CM

## 2025-05-27 DIAGNOSIS — I10 HYPERTENSION, UNSPECIFIED TYPE: Primary | ICD-10-CM

## 2025-05-27 DIAGNOSIS — I25.10 CORONARY ARTERY DISEASE INVOLVING NATIVE HEART WITHOUT ANGINA PECTORIS, UNSPECIFIED VESSEL OR LESION TYPE: ICD-10-CM

## 2025-05-27 DIAGNOSIS — J44.9 CHRONIC OBSTRUCTIVE PULMONARY DISEASE, UNSPECIFIED COPD TYPE (H): ICD-10-CM

## 2025-05-27 DIAGNOSIS — R52 PAIN: ICD-10-CM

## 2025-05-27 DIAGNOSIS — H04.123 DRY EYES: ICD-10-CM

## 2025-05-27 DIAGNOSIS — I63.9 CEREBRAL INFARCTION, UNSPECIFIED MECHANISM (H): ICD-10-CM

## 2025-05-27 DIAGNOSIS — F32.0 MAJOR DEPRESSIVE DISORDER, SINGLE EPISODE, MILD: ICD-10-CM

## 2025-05-27 DIAGNOSIS — I73.9 PERIPHERAL VASCULAR DISEASE: ICD-10-CM

## 2025-05-27 DIAGNOSIS — H35.30 ARMD (AGE RELATED MACULAR DEGENERATION): ICD-10-CM

## 2025-05-27 DIAGNOSIS — K58.0 IRRITABLE BOWEL SYNDROME WITH DIARRHEA: ICD-10-CM

## 2025-05-27 DIAGNOSIS — F41.1 ANXIETY STATE: ICD-10-CM

## 2025-05-27 PROCEDURE — 99606 MTMS BY PHARM EST 15 MIN: CPT | Performed by: PHARMACIST

## 2025-05-27 NOTE — PROGRESS NOTES
Medication Therapy Management (MTM) Encounter    ASSESSMENT:                            Medication Adherence/Access: No issues identified.    Hypertension, Edema/Heart failure, CAD, PVD, h/o CVA  Systolic blood pressure frequently >150 and diastolic often <60 (preference for diastolic >60-65 to reduce risk of cardiac events, hypoperfusion).  For isolated systolic hypertension, may benefit from increase in amlodipine, monitoring closely for any worsening of edema.  We did not discuss this potential change today.  Would be hesitant to add low dose lisinopril back to regimen due to K = 5 on 4/15/25.  Of note, patient is not on statin for secondary prevention - per chart review, appears she was previously on simvastatin but discontinued in 2011 due to leg cramps that improved with discontinuation and chart review indicates she has preferred not to start/retry a statin.    Diarrhea/IBS  Stable.      Dry eyes, ARMD  Stable.     COPD, Rhinitis  Stable.    Mental Health   Depression, Anxiety  Stable.     Pain   Stable.    PLAN:                            If patient agreeable, provider may consider increase in amlodipine to 10mg at bedtime.  If increased, please monitor closely for increase in peripheral edema.    Follow-up: ~6-12 months, sooner if necessary    SUBJECTIVE/OBJECTIVE:                          Matilde Covarrubias is a 89 year old female seen for a follow-up visit.       Reason for visit: follow-up from 2/11/25 MTM visit.    Allergies/ADRs: Reviewed in chart  Past Medical History: Reviewed in chart  Tobacco: She reports that she has never smoked. She has never used smokeless tobacco.  Alcohol: not currently using  Assistive Devices: Ustep walker; cane inside apartment (but notes she often walks without cane inside apartment).  States she goes on walks daily.  Appetite: Appetite has come back per her report.  Had lost weight while in the hospital & now she notes slowly gaining weight.  Eats most of meals.   Recent  Falls: none  Medication Adherence/Access: Patient has assistance with medication administration: assisted living.  Feels good & states she feels meds are doing what they should.      Hypertension , Edema/Heart failure, CAD, PVD, h/o CVA  Amlodipine 7.5mg daily in the evening  Aspirin 81mg daily in the morning  Furosemide 40mg every other day in the morning  Hydralazine 50mg twice daily    History of vascular wounds, cellulitis, lower ext edema.   Hydralazine was reduced from three times daily following our last MTM visit due to diastolic blood pressures quite low and potential of hydralazine to contribute to edema, CNS adverse effects, etc.   Furosemide reduced from daily dosing on 4/1/25.  Previously on lisinopril 20mg daily - this was held at TCU discharge on 4/1/25 due to SHIVANI & discontinued on 4/8/25.  Wears pressure socks; elevates feet during day.  Legs up 2-3 times per day now, she states they are better and don't swell as much with the elevation.    She reports no dizziness.  No headaches.  No chest pain or shortness of breath.  Will feel heart race if walking fast.  No black sticky stools or bleeding concern.  Denies urinary frequency or incontinence concerns.    BP Readings from Last 3 Encounters:   05/02/25 (!) 143/56   04/07/25 (!) 158/48   03/26/25 116/56       Diarrhea/IBS  Loperamide 2mg three times daily & once daily as needed    States no diarrhea.  Back on loperamide three times daily scheduled - previous noon dose was taken away, now back on per her request last week.  BM every day or every other day.  Sometimes if ate something, certain soups - would exacerbate symptoms.  Denies abdominal pain, nausea or vomiting.      Dry eyes, ARMD  Advanced eye relief eye drops - 1 drop each eye three times daily & 1 drop daily as needed  Avastin eye injection  AREDS2- 1 tablet twice daily    Eye drops effective.  Next appt with eye specialist is June 9 for Avastin injection.         COPD ,Rhinitis  Loratadine  "5mg every other day  Spiriva 2.5mcg - 2 puffs once daily    Denies congestion or runny nose, shortness of breath, wheezing, cough.  Patient reports no current medication side effects.           Mental Health   Depression, Anxiety:   Escitalopram 10mg daily in the morning    Replies  goodness yes  when asked if sleeps ok.  Feels escitalopram is helpful and her preference is to continue. Was anxious when leg wouldn't heal.  States she tends to get anxious \"if something comes up\".  Continues to find robbin in activities that facility offers.  Patient reports no current medication side effects.       Pain   Acetaminophen 500mg twice daily as needed    Denies pain - no pain or as needed Tylenol use for a long time per her report.             Estimated Creatinine Clearance: 24.4 mL/min (A) (based on SCr of 1.12 mg/dL (H)).    ----------------      I spent 15 minutes with this patient today.     A summary of these recommendations was sent via [employer].    Mimi Marcos, Pharm.D.,[facility]  Board Certified Geriatric Pharmacist  Medication Therapy Management Pharmacist  [phone]           Medication Therapy Recommendations  Hypertension, unspecified type   1 Current Medication: AMLODIPINE BESYLATE PO   Current Medication Sig: Take 7.5 mg by mouth daily.   Rationale: Dose too low - Dosage too low - Effectiveness   Recommendation: Increase Dose   Status: Contact Provider - Awaiting Response   Identified Date: 5/27/2025              "

## 2025-05-27 NOTE — PATIENT INSTRUCTIONS
"Recommendations from today's MTM visit:                                                       If you are agreeable, provider may consider increase in amlodipine to 10mg at bedtime.  If increased, please monitor closely for increase in peripheral edema/swelling.    Follow-up: ~6-12 months, sooner if necessary      It was great speaking with you today.  I value your experience and would be very thankful for your time in providing feedback in our clinic survey. In the next few days, you may receive an email or text message from [employer] with a link to a survey related to your  clinical pharmacist.\"     To schedule another MTM appointment, please call me directly or you may call the MTM scheduling line at [phone] or toll-free at [phone].     My Clinical Pharmacist's contact information:                                                      Please feel free to contact me with any questions or concerns you have.      Mimi Marcos, Pharm.D.,[facility]  Board Certified Geriatric Pharmacist  Medication Therapy Management Pharmacist  [phone]      "

## 2025-06-24 DIAGNOSIS — Z53.9 DIAGNOSIS NOT YET DEFINED: Primary | ICD-10-CM

## 2025-06-24 PROCEDURE — G0180 MD CERTIFICATION HHA PATIENT: HCPCS | Performed by: NURSE PRACTITIONER

## 2025-06-27 ENCOUNTER — TELEPHONE (OUTPATIENT)
Dept: GERIATRICS | Facility: CLINIC | Age: OVER 89
End: 2025-06-27
Payer: COMMERCIAL

## 2025-06-27 NOTE — TELEPHONE ENCOUNTER
[facility] Communication      Epic messages are checked 8 AM to 5:30 PM business days.   If need is urgent or if orders are needed by end of business day, call [phone]    Facility: [facility]  Geriatrics Provider:  CORNELIUS Patel CNP   Caller: Marii  Call Back Number: [phone]    Allergies:    Allergies   Allergen Reactions    Clonidine Derivatives      Severe side effects      Entocort [Corticosteroids]     Macrobid [Nitrofurantoin]      Diarrhea      Sulfa Antibiotics Itching     itch         Reason for Call: medication given on non scheduled day    Interventions Initiated:     Related Medications:     Assessment:   S: medication  B: AL client  A: resident received furosemide 40mg on non scheduled day. Current order is for furosemide 40mg po every other day.   R: Provider updated.     Vitals: BP:  144/74  P:: 64  R:: 18  SPO2: 93% R/A Temp.:  97.5  Wt: 113.5 lbs      Nursing/Patient Requests: NA         Please send response/orders to [facility] yung Veras RN

## 2025-07-16 DIAGNOSIS — Z53.9 DIAGNOSIS NOT YET DEFINED: Primary | ICD-10-CM

## 2025-07-16 PROCEDURE — G0179 MD RECERTIFICATION HHA PT: HCPCS | Performed by: NURSE PRACTITIONER

## 2025-08-02 ENCOUNTER — HEALTH MAINTENANCE LETTER (OUTPATIENT)
Age: OVER 89
End: 2025-08-02